# Patient Record
Sex: FEMALE | Race: BLACK OR AFRICAN AMERICAN | NOT HISPANIC OR LATINO | URBAN - METROPOLITAN AREA
[De-identification: names, ages, dates, MRNs, and addresses within clinical notes are randomized per-mention and may not be internally consistent; named-entity substitution may affect disease eponyms.]

---

## 2017-01-17 ENCOUNTER — OUTPATIENT (OUTPATIENT)
Dept: OUTPATIENT SERVICES | Facility: HOSPITAL | Age: 67
LOS: 1 days | Discharge: HOME | End: 2017-01-17
Payer: MEDICARE

## 2017-01-17 DIAGNOSIS — F33.9 MAJOR DEPRESSIVE DISORDER, RECURRENT, UNSPECIFIED: ICD-10-CM

## 2017-01-17 PROCEDURE — 90870 ELECTROCONVULSIVE THERAPY: CPT

## 2017-01-19 ENCOUNTER — OUTPATIENT (OUTPATIENT)
Dept: OUTPATIENT SERVICES | Facility: HOSPITAL | Age: 67
LOS: 1 days | Discharge: HOME | End: 2017-01-19
Payer: MEDICARE

## 2017-01-19 PROCEDURE — 90792 PSYCH DIAG EVAL W/MED SRVCS: CPT

## 2017-01-20 DIAGNOSIS — F25.0 SCHIZOAFFECTIVE DISORDER, BIPOLAR TYPE: ICD-10-CM

## 2017-01-23 PROCEDURE — 90870 ELECTROCONVULSIVE THERAPY: CPT

## 2017-01-30 PROCEDURE — 90870 ELECTROCONVULSIVE THERAPY: CPT

## 2017-02-02 PROCEDURE — 90834 PSYTX W PT 45 MINUTES: CPT

## 2017-02-06 PROCEDURE — 90870 ELECTROCONVULSIVE THERAPY: CPT

## 2017-02-08 PROCEDURE — 90834 PSYTX W PT 45 MINUTES: CPT

## 2017-02-13 PROCEDURE — 90870 ELECTROCONVULSIVE THERAPY: CPT

## 2017-02-16 PROCEDURE — 99214 OFFICE O/P EST MOD 30 MIN: CPT

## 2017-02-16 PROCEDURE — 90846 FAMILY PSYTX W/O PT 50 MIN: CPT

## 2017-03-08 PROCEDURE — 90870 ELECTROCONVULSIVE THERAPY: CPT

## 2017-03-16 PROCEDURE — 99214 OFFICE O/P EST MOD 30 MIN: CPT

## 2017-03-22 PROCEDURE — 90870 ELECTROCONVULSIVE THERAPY: CPT

## 2017-04-03 PROCEDURE — 90870 ELECTROCONVULSIVE THERAPY: CPT

## 2017-04-10 PROCEDURE — 99214 OFFICE O/P EST MOD 30 MIN: CPT

## 2017-04-19 PROCEDURE — 90870 ELECTROCONVULSIVE THERAPY: CPT

## 2017-05-03 PROCEDURE — 90870 ELECTROCONVULSIVE THERAPY: CPT

## 2017-05-09 PROCEDURE — 99214 OFFICE O/P EST MOD 30 MIN: CPT

## 2017-05-17 PROCEDURE — 90870 ELECTROCONVULSIVE THERAPY: CPT

## 2017-06-15 PROCEDURE — 99214 OFFICE O/P EST MOD 30 MIN: CPT

## 2017-06-21 PROCEDURE — 90870 ELECTROCONVULSIVE THERAPY: CPT

## 2017-07-13 PROCEDURE — 99214 OFFICE O/P EST MOD 30 MIN: CPT

## 2017-07-18 PROCEDURE — 90870 ELECTROCONVULSIVE THERAPY: CPT

## 2017-08-11 PROCEDURE — 99214 OFFICE O/P EST MOD 30 MIN: CPT

## 2017-08-15 PROCEDURE — 90870 ELECTROCONVULSIVE THERAPY: CPT

## 2017-08-23 PROCEDURE — 90846 FAMILY PSYTX W/O PT 50 MIN: CPT

## 2017-09-08 PROCEDURE — 99214 OFFICE O/P EST MOD 30 MIN: CPT

## 2017-09-14 PROCEDURE — 90870 ELECTROCONVULSIVE THERAPY: CPT

## 2017-10-06 PROCEDURE — 99214 OFFICE O/P EST MOD 30 MIN: CPT

## 2017-10-10 PROCEDURE — 90870 ELECTROCONVULSIVE THERAPY: CPT

## 2017-11-07 PROCEDURE — 90870 ELECTROCONVULSIVE THERAPY: CPT

## 2017-12-01 PROCEDURE — 99214 OFFICE O/P EST MOD 30 MIN: CPT

## 2017-12-05 LAB — GLUCOSE BLDC GLUCOMTR-MCNC: 120 MG/DL — HIGH (ref 70–99)

## 2017-12-05 PROCEDURE — 90870 ELECTROCONVULSIVE THERAPY: CPT

## 2017-12-27 PROCEDURE — 99214 OFFICE O/P EST MOD 30 MIN: CPT

## 2018-01-30 LAB — GLUCOSE BLDC GLUCOMTR-MCNC: 137 MG/DL — HIGH (ref 70–99)

## 2018-01-30 PROCEDURE — 90870 ELECTROCONVULSIVE THERAPY: CPT

## 2018-01-31 PROCEDURE — 99214 OFFICE O/P EST MOD 30 MIN: CPT

## 2018-02-27 LAB — GLUCOSE BLDC GLUCOMTR-MCNC: 135 MG/DL — HIGH (ref 70–99)

## 2018-02-27 PROCEDURE — 90870 ELECTROCONVULSIVE THERAPY: CPT

## 2018-03-27 LAB — GLUCOSE BLDC GLUCOMTR-MCNC: 101 MG/DL — HIGH (ref 70–99)

## 2018-03-27 PROCEDURE — 90870 ELECTROCONVULSIVE THERAPY: CPT

## 2018-04-02 PROCEDURE — 99214 OFFICE O/P EST MOD 30 MIN: CPT

## 2018-04-30 PROCEDURE — 99214 OFFICE O/P EST MOD 30 MIN: CPT

## 2018-05-01 DIAGNOSIS — F03.90 UNSPECIFIED DEMENTIA WITHOUT BEHAVIORAL DISTURBANCE: ICD-10-CM

## 2018-05-16 PROCEDURE — 99214 OFFICE O/P EST MOD 30 MIN: CPT

## 2018-05-16 PROCEDURE — 90834 PSYTX W PT 45 MINUTES: CPT

## 2018-05-29 PROCEDURE — 99214 OFFICE O/P EST MOD 30 MIN: CPT

## 2018-06-05 PROCEDURE — 90834 PSYTX W PT 45 MINUTES: CPT

## 2018-06-26 PROCEDURE — 99214 OFFICE O/P EST MOD 30 MIN: CPT

## 2018-07-03 PROCEDURE — 90834 PSYTX W PT 45 MINUTES: CPT

## 2018-07-10 PROCEDURE — 99214 OFFICE O/P EST MOD 30 MIN: CPT

## 2018-07-13 ENCOUNTER — INPATIENT (INPATIENT)
Facility: HOSPITAL | Age: 68
LOS: 19 days | Discharge: HOME | End: 2018-08-02
Attending: PSYCHIATRY & NEUROLOGY | Admitting: PSYCHIATRY & NEUROLOGY
Payer: MEDICARE

## 2018-07-13 VITALS — TEMPERATURE: 97 F | HEIGHT: 61 IN | WEIGHT: 123.02 LBS

## 2018-07-13 DIAGNOSIS — F31.10 BIPOLAR DISORDER, CURRENT EPISODE MANIC WITHOUT PSYCHOTIC FEATURES, UNSPECIFIED: ICD-10-CM

## 2018-07-13 RX ORDER — CITALOPRAM 10 MG/1
10 TABLET, FILM COATED ORAL DAILY
Qty: 0 | Refills: 0 | Status: DISCONTINUED | OUTPATIENT
Start: 2018-07-13 | End: 2018-07-14

## 2018-07-13 RX ORDER — ARIPIPRAZOLE 15 MG/1
20 TABLET ORAL AT BEDTIME
Qty: 0 | Refills: 0 | Status: DISCONTINUED | OUTPATIENT
Start: 2018-07-13 | End: 2018-08-02

## 2018-07-13 RX ORDER — METFORMIN HYDROCHLORIDE 850 MG/1
500 TABLET ORAL DAILY
Qty: 0 | Refills: 0 | Status: DISCONTINUED | OUTPATIENT
Start: 2018-07-13 | End: 2018-08-02

## 2018-07-13 RX ORDER — GABAPENTIN 400 MG/1
100 CAPSULE ORAL
Qty: 0 | Refills: 0 | Status: DISCONTINUED | OUTPATIENT
Start: 2018-07-13 | End: 2018-07-14

## 2018-07-13 RX ORDER — DONEPEZIL HYDROCHLORIDE 10 MG/1
10 TABLET, FILM COATED ORAL AT BEDTIME
Qty: 0 | Refills: 0 | Status: DISCONTINUED | OUTPATIENT
Start: 2018-07-13 | End: 2018-08-02

## 2018-07-13 RX ORDER — OLANZAPINE 15 MG/1
1.25 TABLET, FILM COATED ORAL ONCE
Qty: 0 | Refills: 0 | Status: DISCONTINUED | OUTPATIENT
Start: 2018-07-13 | End: 2018-08-02

## 2018-07-13 RX ORDER — OLANZAPINE 15 MG/1
1.25 TABLET, FILM COATED ORAL ONCE
Qty: 0 | Refills: 0 | Status: COMPLETED | OUTPATIENT
Start: 2018-07-13 | End: 2018-07-14

## 2018-07-13 RX ORDER — SIMVASTATIN 20 MG/1
20 TABLET, FILM COATED ORAL AT BEDTIME
Qty: 0 | Refills: 0 | Status: DISCONTINUED | OUTPATIENT
Start: 2018-07-13 | End: 2018-08-02

## 2018-07-14 PROCEDURE — 99232 SBSQ HOSP IP/OBS MODERATE 35: CPT

## 2018-07-14 RX ORDER — GABAPENTIN 400 MG/1
100 CAPSULE ORAL THREE TIMES A DAY
Qty: 0 | Refills: 0 | Status: DISCONTINUED | OUTPATIENT
Start: 2018-07-14 | End: 2018-07-17

## 2018-07-14 RX ADMIN — ARIPIPRAZOLE 20 MILLIGRAM(S): 15 TABLET ORAL at 21:50

## 2018-07-14 RX ADMIN — GABAPENTIN 100 MILLIGRAM(S): 400 CAPSULE ORAL at 21:50

## 2018-07-14 RX ADMIN — METFORMIN HYDROCHLORIDE 500 MILLIGRAM(S): 850 TABLET ORAL at 08:27

## 2018-07-14 RX ADMIN — SIMVASTATIN 20 MILLIGRAM(S): 20 TABLET, FILM COATED ORAL at 21:50

## 2018-07-14 RX ADMIN — DONEPEZIL HYDROCHLORIDE 10 MILLIGRAM(S): 10 TABLET, FILM COATED ORAL at 21:50

## 2018-07-14 RX ADMIN — OLANZAPINE 1.25 MILLIGRAM(S): 15 TABLET, FILM COATED ORAL at 00:26

## 2018-07-14 RX ADMIN — CITALOPRAM 10 MILLIGRAM(S): 10 TABLET, FILM COATED ORAL at 08:27

## 2018-07-14 RX ADMIN — GABAPENTIN 100 MILLIGRAM(S): 400 CAPSULE ORAL at 08:27

## 2018-07-14 NOTE — CHART NOTE - NSCHARTNOTEFT_GEN_A_CORE
Screening Medical Evaluation  Patient Admitted from: [facility]    [facility] admitting diagnosis: Bipolar affective disorder, current episode manic without psychotic symptom    PAST MEDICAL & SURGICAL HISTORY:  Depression  Diabetes  No significant past surgical history        Allergies    penicillins (Fever)  phenothiazines (Unknown)  Stelazine (Other)  sulfa drugs (Unknown)  sulfur (Other)    Intolerances        Social History:     FAMILY HISTORY:      MEDICATIONS  (STANDING):  ARIPiprazole 20 milliGRAM(s) Oral at bedtime  citalopram 10 milliGRAM(s) Oral daily  donepezil 10 milliGRAM(s) Oral at bedtime  gabapentin 100 milliGRAM(s) Oral two times a day  metFORMIN  milliGRAM(s) Oral daily  simvastatin 20 milliGRAM(s) Oral at bedtime    MEDICATIONS  (PRN):  OLANZapine Injectable 1.25 milliGRAM(s) IntraMuscular once PRN agitation      Vital Signs Last 24 Hrs  T(C): 36.2 (13 Jul 2018 21:45), Max: 36.2 (13 Jul 2018 21:45)  T(F): 97.1 (13 Jul 2018 21:45), Max: 97.1 (13 Jul 2018 21:45)  HR: 69 (13 Jul 2018 21:45)  BP: 158/77 (13 Jul 2018 21:45)  RR: --  SpO2: --  CAPILLARY BLOOD GLUCOSE            PHYSICAL EXAM:  GENERAL: NAD, well-developed  HEAD:  Atraumatic, Normocephalic  EYES: EOMI, PERRLA, conjunctiva and sclera clear  NECK: Supple.  CHEST/LUNG: Clear to auscultation bilaterally; No wheezes noted.  HEART: Regular rate and rhythm; +s1 and +s2; No murmurs, rubs, or gallops  ABDOMEN: Soft, Nontender, Nondistended; Normoactive Bowel sounds present  EXTREMITIES:  2+ Peripheral Pulses, No cyanosis, or edema  PSYCH: AAOx3  NEUROLOGY: non-focal  SKIN: No rashes or lesions    LABS:                    RADIOLOGY & ADDITIONAL TESTS:    Assessment and Plan:  67 year old female presenting today from [facility] to [facility] with admitting diagnosis of  Bipolar affective disorder, current episode manic without psychotic symptoms with PMH of HLD, DM, legally blind, and dementia. Denies any medical concerns at this time. Denies any fever, chills, headache, chest pain, SOB, abdominal pain, N/V/D/C.  1) DM: Continue metformin  mg PO BID.  2) Dementia: Continue Donepezil 10 mg po q hs.  3) HLD: Continue simvastatin 20 mg po q hs.  4)  Bipolar affective disorder, current episode manic without psychotic symptoms: Follow care plan as per primary psych team.

## 2018-07-15 LAB
CHOLEST SERPL-MCNC: 178 MG/DL — SIGNIFICANT CHANGE UP (ref 120–199)
FOLATE SERPL-MCNC: > 20 NG/ML — HIGH (ref 4.7–20)
GLUCOSE BLDC GLUCOMTR-MCNC: 115 MG/DL — HIGH (ref 70–99)
HBA1C BLD-MCNC: 6.7 % — HIGH (ref 4–5.6)
HDLC SERPL-MCNC: 108 MG/DL — HIGH (ref 45–65)
LIPID PNL WITH DIRECT LDL SERPL: 71 MG/DL — SIGNIFICANT CHANGE UP
TRIGL SERPL-MCNC: 71 MG/DL — SIGNIFICANT CHANGE UP (ref 10–149)
VIT B12 SERPL-MCNC: 1366 PG/ML — HIGH (ref 200–900)

## 2018-07-15 PROCEDURE — 99232 SBSQ HOSP IP/OBS MODERATE 35: CPT

## 2018-07-15 RX ADMIN — ARIPIPRAZOLE 20 MILLIGRAM(S): 15 TABLET ORAL at 21:14

## 2018-07-15 RX ADMIN — METFORMIN HYDROCHLORIDE 500 MILLIGRAM(S): 850 TABLET ORAL at 08:26

## 2018-07-15 RX ADMIN — SIMVASTATIN 20 MILLIGRAM(S): 20 TABLET, FILM COATED ORAL at 21:14

## 2018-07-15 RX ADMIN — GABAPENTIN 100 MILLIGRAM(S): 400 CAPSULE ORAL at 21:14

## 2018-07-15 RX ADMIN — DONEPEZIL HYDROCHLORIDE 10 MILLIGRAM(S): 10 TABLET, FILM COATED ORAL at 21:14

## 2018-07-15 RX ADMIN — GABAPENTIN 100 MILLIGRAM(S): 400 CAPSULE ORAL at 08:26

## 2018-07-16 LAB
GLUCOSE BLDC GLUCOMTR-MCNC: 112 MG/DL — HIGH (ref 70–99)
GLUCOSE BLDC GLUCOMTR-MCNC: 142 MG/DL — HIGH (ref 70–99)

## 2018-07-16 PROCEDURE — 99232 SBSQ HOSP IP/OBS MODERATE 35: CPT

## 2018-07-16 RX ADMIN — METFORMIN HYDROCHLORIDE 500 MILLIGRAM(S): 850 TABLET ORAL at 09:00

## 2018-07-16 RX ADMIN — GABAPENTIN 100 MILLIGRAM(S): 400 CAPSULE ORAL at 09:00

## 2018-07-16 RX ADMIN — ARIPIPRAZOLE 20 MILLIGRAM(S): 15 TABLET ORAL at 20:24

## 2018-07-16 RX ADMIN — DONEPEZIL HYDROCHLORIDE 10 MILLIGRAM(S): 10 TABLET, FILM COATED ORAL at 20:24

## 2018-07-16 RX ADMIN — GABAPENTIN 100 MILLIGRAM(S): 400 CAPSULE ORAL at 13:29

## 2018-07-16 RX ADMIN — SIMVASTATIN 20 MILLIGRAM(S): 20 TABLET, FILM COATED ORAL at 20:24

## 2018-07-16 RX ADMIN — GABAPENTIN 100 MILLIGRAM(S): 400 CAPSULE ORAL at 20:24

## 2018-07-17 LAB
ALBUMIN SERPL ELPH-MCNC: 4.3 G/DL — SIGNIFICANT CHANGE UP (ref 3.3–5)
ALP SERPL-CCNC: 57 U/L — SIGNIFICANT CHANGE UP (ref 40–120)
ALT FLD-CCNC: 18 U/L — SIGNIFICANT CHANGE UP (ref 4–33)
AST SERPL-CCNC: 28 U/L — SIGNIFICANT CHANGE UP (ref 4–32)
BASOPHILS # BLD AUTO: 0.04 K/UL — SIGNIFICANT CHANGE UP (ref 0–0.2)
BASOPHILS NFR BLD AUTO: 0.5 % — SIGNIFICANT CHANGE UP (ref 0–2)
BILIRUB SERPL-MCNC: 0.4 MG/DL — SIGNIFICANT CHANGE UP (ref 0.2–1.2)
BUN SERPL-MCNC: 25 MG/DL — HIGH (ref 7–23)
CALCIUM SERPL-MCNC: 10 MG/DL — SIGNIFICANT CHANGE UP (ref 8.4–10.5)
CHLORIDE SERPL-SCNC: 105 MMOL/L — SIGNIFICANT CHANGE UP (ref 98–107)
CO2 SERPL-SCNC: 26 MMOL/L — SIGNIFICANT CHANGE UP (ref 22–31)
CREAT SERPL-MCNC: 0.89 MG/DL — SIGNIFICANT CHANGE UP (ref 0.5–1.3)
EOSINOPHIL # BLD AUTO: 0.04 K/UL — SIGNIFICANT CHANGE UP (ref 0–0.5)
EOSINOPHIL NFR BLD AUTO: 0.5 % — SIGNIFICANT CHANGE UP (ref 0–6)
GLUCOSE BLDC GLUCOMTR-MCNC: 169 MG/DL — HIGH (ref 70–99)
GLUCOSE SERPL-MCNC: 206 MG/DL — HIGH (ref 70–99)
HCT VFR BLD CALC: 36.7 % — SIGNIFICANT CHANGE UP (ref 34.5–45)
HGB BLD-MCNC: 11.6 G/DL — SIGNIFICANT CHANGE UP (ref 11.5–15.5)
IMM GRANULOCYTES # BLD AUTO: 0.02 # — SIGNIFICANT CHANGE UP
IMM GRANULOCYTES NFR BLD AUTO: 0.2 % — SIGNIFICANT CHANGE UP (ref 0–1.5)
LYMPHOCYTES # BLD AUTO: 1.51 K/UL — SIGNIFICANT CHANGE UP (ref 1–3.3)
LYMPHOCYTES # BLD AUTO: 17.3 % — SIGNIFICANT CHANGE UP (ref 13–44)
MCHC RBC-ENTMCNC: 23.6 PG — LOW (ref 27–34)
MCHC RBC-ENTMCNC: 31.6 % — LOW (ref 32–36)
MCV RBC AUTO: 74.6 FL — LOW (ref 80–100)
MONOCYTES # BLD AUTO: 0.74 K/UL — SIGNIFICANT CHANGE UP (ref 0–0.9)
MONOCYTES NFR BLD AUTO: 8.5 % — SIGNIFICANT CHANGE UP (ref 2–14)
NEUTROPHILS # BLD AUTO: 6.37 K/UL — SIGNIFICANT CHANGE UP (ref 1.8–7.4)
NEUTROPHILS NFR BLD AUTO: 73 % — SIGNIFICANT CHANGE UP (ref 43–77)
NRBC # FLD: 0 — SIGNIFICANT CHANGE UP
PLATELET # BLD AUTO: 320 K/UL — SIGNIFICANT CHANGE UP (ref 150–400)
PMV BLD: 10.3 FL — SIGNIFICANT CHANGE UP (ref 7–13)
POTASSIUM SERPL-MCNC: 4.1 MMOL/L — SIGNIFICANT CHANGE UP (ref 3.5–5.3)
POTASSIUM SERPL-SCNC: 4.1 MMOL/L — SIGNIFICANT CHANGE UP (ref 3.5–5.3)
PROT SERPL-MCNC: 7.2 G/DL — SIGNIFICANT CHANGE UP (ref 6–8.3)
RBC # BLD: 4.92 M/UL — SIGNIFICANT CHANGE UP (ref 3.8–5.2)
RBC # FLD: 19.6 % — HIGH (ref 10.3–14.5)
SODIUM SERPL-SCNC: 145 MMOL/L — SIGNIFICANT CHANGE UP (ref 135–145)
WBC # BLD: 8.72 K/UL — SIGNIFICANT CHANGE UP (ref 3.8–10.5)
WBC # FLD AUTO: 8.72 K/UL — SIGNIFICANT CHANGE UP (ref 3.8–10.5)

## 2018-07-17 PROCEDURE — 99232 SBSQ HOSP IP/OBS MODERATE 35: CPT | Mod: 25

## 2018-07-17 RX ORDER — LITHIUM CARBONATE 300 MG/1
300 TABLET, EXTENDED RELEASE ORAL AT BEDTIME
Qty: 0 | Refills: 0 | Status: DISCONTINUED | OUTPATIENT
Start: 2018-07-17 | End: 2018-07-19

## 2018-07-17 RX ADMIN — DONEPEZIL HYDROCHLORIDE 10 MILLIGRAM(S): 10 TABLET, FILM COATED ORAL at 21:49

## 2018-07-17 RX ADMIN — GABAPENTIN 100 MILLIGRAM(S): 400 CAPSULE ORAL at 08:51

## 2018-07-17 RX ADMIN — Medication 0.5 MILLIGRAM(S): at 14:08

## 2018-07-17 RX ADMIN — METFORMIN HYDROCHLORIDE 500 MILLIGRAM(S): 850 TABLET ORAL at 08:51

## 2018-07-17 RX ADMIN — LITHIUM CARBONATE 300 MILLIGRAM(S): 300 TABLET, EXTENDED RELEASE ORAL at 21:49

## 2018-07-17 RX ADMIN — SIMVASTATIN 20 MILLIGRAM(S): 20 TABLET, FILM COATED ORAL at 21:49

## 2018-07-17 RX ADMIN — ARIPIPRAZOLE 20 MILLIGRAM(S): 15 TABLET ORAL at 21:49

## 2018-07-18 LAB — GLUCOSE BLDC GLUCOMTR-MCNC: 150 MG/DL — HIGH (ref 70–99)

## 2018-07-18 PROCEDURE — 99232 SBSQ HOSP IP/OBS MODERATE 35: CPT

## 2018-07-18 RX ADMIN — SIMVASTATIN 20 MILLIGRAM(S): 20 TABLET, FILM COATED ORAL at 21:37

## 2018-07-18 RX ADMIN — Medication 0.5 MILLIGRAM(S): at 09:56

## 2018-07-18 RX ADMIN — LITHIUM CARBONATE 300 MILLIGRAM(S): 300 TABLET, EXTENDED RELEASE ORAL at 21:36

## 2018-07-18 RX ADMIN — METFORMIN HYDROCHLORIDE 500 MILLIGRAM(S): 850 TABLET ORAL at 09:55

## 2018-07-18 RX ADMIN — DONEPEZIL HYDROCHLORIDE 10 MILLIGRAM(S): 10 TABLET, FILM COATED ORAL at 21:36

## 2018-07-18 RX ADMIN — ARIPIPRAZOLE 20 MILLIGRAM(S): 15 TABLET ORAL at 21:36

## 2018-07-19 LAB
GLUCOSE BLDC GLUCOMTR-MCNC: 101 MG/DL — HIGH (ref 70–99)
GLUCOSE BLDC GLUCOMTR-MCNC: 127 MG/DL — HIGH (ref 70–99)

## 2018-07-19 PROCEDURE — 99232 SBSQ HOSP IP/OBS MODERATE 35: CPT

## 2018-07-19 RX ORDER — LITHIUM CARBONATE 300 MG/1
450 TABLET, EXTENDED RELEASE ORAL AT BEDTIME
Qty: 0 | Refills: 0 | Status: DISCONTINUED | OUTPATIENT
Start: 2018-07-19 | End: 2018-08-02

## 2018-07-19 RX ADMIN — ARIPIPRAZOLE 20 MILLIGRAM(S): 15 TABLET ORAL at 20:40

## 2018-07-19 RX ADMIN — SIMVASTATIN 20 MILLIGRAM(S): 20 TABLET, FILM COATED ORAL at 20:39

## 2018-07-19 RX ADMIN — DONEPEZIL HYDROCHLORIDE 10 MILLIGRAM(S): 10 TABLET, FILM COATED ORAL at 20:40

## 2018-07-19 RX ADMIN — Medication 0.5 MILLIGRAM(S): at 09:41

## 2018-07-19 RX ADMIN — LITHIUM CARBONATE 450 MILLIGRAM(S): 300 TABLET, EXTENDED RELEASE ORAL at 20:39

## 2018-07-19 RX ADMIN — METFORMIN HYDROCHLORIDE 500 MILLIGRAM(S): 850 TABLET ORAL at 09:41

## 2018-07-20 LAB — GLUCOSE BLDC GLUCOMTR-MCNC: 94 MG/DL — SIGNIFICANT CHANGE UP (ref 70–99)

## 2018-07-20 PROCEDURE — 99232 SBSQ HOSP IP/OBS MODERATE 35: CPT

## 2018-07-20 RX ADMIN — METFORMIN HYDROCHLORIDE 500 MILLIGRAM(S): 850 TABLET ORAL at 12:20

## 2018-07-21 LAB — GLUCOSE BLDC GLUCOMTR-MCNC: 108 MG/DL — HIGH (ref 70–99)

## 2018-07-21 PROCEDURE — 99232 SBSQ HOSP IP/OBS MODERATE 35: CPT

## 2018-07-21 RX ADMIN — ARIPIPRAZOLE 20 MILLIGRAM(S): 15 TABLET ORAL at 21:20

## 2018-07-21 RX ADMIN — METFORMIN HYDROCHLORIDE 500 MILLIGRAM(S): 850 TABLET ORAL at 08:50

## 2018-07-21 RX ADMIN — DONEPEZIL HYDROCHLORIDE 10 MILLIGRAM(S): 10 TABLET, FILM COATED ORAL at 21:20

## 2018-07-21 RX ADMIN — LITHIUM CARBONATE 450 MILLIGRAM(S): 300 TABLET, EXTENDED RELEASE ORAL at 21:20

## 2018-07-21 RX ADMIN — SIMVASTATIN 20 MILLIGRAM(S): 20 TABLET, FILM COATED ORAL at 21:20

## 2018-07-22 LAB — GLUCOSE BLDC GLUCOMTR-MCNC: 235 MG/DL — HIGH (ref 70–99)

## 2018-07-22 PROCEDURE — 99232 SBSQ HOSP IP/OBS MODERATE 35: CPT

## 2018-07-22 RX ADMIN — SIMVASTATIN 20 MILLIGRAM(S): 20 TABLET, FILM COATED ORAL at 21:59

## 2018-07-22 RX ADMIN — DONEPEZIL HYDROCHLORIDE 10 MILLIGRAM(S): 10 TABLET, FILM COATED ORAL at 21:59

## 2018-07-22 RX ADMIN — ARIPIPRAZOLE 20 MILLIGRAM(S): 15 TABLET ORAL at 21:59

## 2018-07-22 RX ADMIN — METFORMIN HYDROCHLORIDE 500 MILLIGRAM(S): 850 TABLET ORAL at 08:54

## 2018-07-22 RX ADMIN — LITHIUM CARBONATE 450 MILLIGRAM(S): 300 TABLET, EXTENDED RELEASE ORAL at 21:59

## 2018-07-23 LAB
GLUCOSE BLDC GLUCOMTR-MCNC: 119 MG/DL — HIGH (ref 70–99)
LITHIUM SERPL-MCNC: 0.75 MMOL/L — SIGNIFICANT CHANGE UP (ref 0.6–1.2)

## 2018-07-23 PROCEDURE — 99232 SBSQ HOSP IP/OBS MODERATE 35: CPT

## 2018-07-23 RX ADMIN — LITHIUM CARBONATE 450 MILLIGRAM(S): 300 TABLET, EXTENDED RELEASE ORAL at 20:55

## 2018-07-23 RX ADMIN — DONEPEZIL HYDROCHLORIDE 10 MILLIGRAM(S): 10 TABLET, FILM COATED ORAL at 20:55

## 2018-07-23 RX ADMIN — METFORMIN HYDROCHLORIDE 500 MILLIGRAM(S): 850 TABLET ORAL at 08:48

## 2018-07-23 RX ADMIN — SIMVASTATIN 20 MILLIGRAM(S): 20 TABLET, FILM COATED ORAL at 20:55

## 2018-07-23 RX ADMIN — ARIPIPRAZOLE 20 MILLIGRAM(S): 15 TABLET ORAL at 20:55

## 2018-07-24 LAB — GLUCOSE BLDC GLUCOMTR-MCNC: 189 MG/DL — HIGH (ref 70–99)

## 2018-07-24 PROCEDURE — 99232 SBSQ HOSP IP/OBS MODERATE 35: CPT

## 2018-07-24 RX ADMIN — LITHIUM CARBONATE 450 MILLIGRAM(S): 300 TABLET, EXTENDED RELEASE ORAL at 21:29

## 2018-07-24 RX ADMIN — DONEPEZIL HYDROCHLORIDE 10 MILLIGRAM(S): 10 TABLET, FILM COATED ORAL at 21:29

## 2018-07-24 RX ADMIN — ARIPIPRAZOLE 20 MILLIGRAM(S): 15 TABLET ORAL at 21:29

## 2018-07-24 RX ADMIN — METFORMIN HYDROCHLORIDE 500 MILLIGRAM(S): 850 TABLET ORAL at 08:53

## 2018-07-24 RX ADMIN — SIMVASTATIN 20 MILLIGRAM(S): 20 TABLET, FILM COATED ORAL at 21:29

## 2018-07-25 LAB — GLUCOSE BLDC GLUCOMTR-MCNC: 136 MG/DL — HIGH (ref 70–99)

## 2018-07-25 PROCEDURE — 99232 SBSQ HOSP IP/OBS MODERATE 35: CPT

## 2018-07-25 RX ADMIN — SIMVASTATIN 20 MILLIGRAM(S): 20 TABLET, FILM COATED ORAL at 21:08

## 2018-07-25 RX ADMIN — METFORMIN HYDROCHLORIDE 500 MILLIGRAM(S): 850 TABLET ORAL at 08:41

## 2018-07-25 RX ADMIN — ARIPIPRAZOLE 20 MILLIGRAM(S): 15 TABLET ORAL at 21:07

## 2018-07-25 RX ADMIN — DONEPEZIL HYDROCHLORIDE 10 MILLIGRAM(S): 10 TABLET, FILM COATED ORAL at 21:07

## 2018-07-25 RX ADMIN — LITHIUM CARBONATE 450 MILLIGRAM(S): 300 TABLET, EXTENDED RELEASE ORAL at 21:07

## 2018-07-26 LAB — GLUCOSE BLDC GLUCOMTR-MCNC: 130 MG/DL — HIGH (ref 70–99)

## 2018-07-26 PROCEDURE — 99232 SBSQ HOSP IP/OBS MODERATE 35: CPT

## 2018-07-26 RX ADMIN — SIMVASTATIN 20 MILLIGRAM(S): 20 TABLET, FILM COATED ORAL at 20:23

## 2018-07-26 RX ADMIN — DONEPEZIL HYDROCHLORIDE 10 MILLIGRAM(S): 10 TABLET, FILM COATED ORAL at 20:23

## 2018-07-26 RX ADMIN — METFORMIN HYDROCHLORIDE 500 MILLIGRAM(S): 850 TABLET ORAL at 09:07

## 2018-07-26 RX ADMIN — ARIPIPRAZOLE 20 MILLIGRAM(S): 15 TABLET ORAL at 20:23

## 2018-07-26 RX ADMIN — LITHIUM CARBONATE 450 MILLIGRAM(S): 300 TABLET, EXTENDED RELEASE ORAL at 20:23

## 2018-07-27 LAB — GLUCOSE BLDC GLUCOMTR-MCNC: 140 MG/DL — HIGH (ref 70–99)

## 2018-07-27 PROCEDURE — 99232 SBSQ HOSP IP/OBS MODERATE 35: CPT

## 2018-07-27 RX ADMIN — ARIPIPRAZOLE 20 MILLIGRAM(S): 15 TABLET ORAL at 21:55

## 2018-07-27 RX ADMIN — SIMVASTATIN 20 MILLIGRAM(S): 20 TABLET, FILM COATED ORAL at 21:56

## 2018-07-27 RX ADMIN — LITHIUM CARBONATE 450 MILLIGRAM(S): 300 TABLET, EXTENDED RELEASE ORAL at 21:56

## 2018-07-27 RX ADMIN — DONEPEZIL HYDROCHLORIDE 10 MILLIGRAM(S): 10 TABLET, FILM COATED ORAL at 21:56

## 2018-07-27 RX ADMIN — METFORMIN HYDROCHLORIDE 500 MILLIGRAM(S): 850 TABLET ORAL at 09:44

## 2018-07-28 LAB — GLUCOSE BLDC GLUCOMTR-MCNC: 138 MG/DL — HIGH (ref 70–99)

## 2018-07-28 RX ADMIN — LITHIUM CARBONATE 450 MILLIGRAM(S): 300 TABLET, EXTENDED RELEASE ORAL at 21:46

## 2018-07-28 RX ADMIN — METFORMIN HYDROCHLORIDE 500 MILLIGRAM(S): 850 TABLET ORAL at 08:48

## 2018-07-28 RX ADMIN — ARIPIPRAZOLE 20 MILLIGRAM(S): 15 TABLET ORAL at 21:46

## 2018-07-28 RX ADMIN — DONEPEZIL HYDROCHLORIDE 10 MILLIGRAM(S): 10 TABLET, FILM COATED ORAL at 21:46

## 2018-07-28 RX ADMIN — SIMVASTATIN 20 MILLIGRAM(S): 20 TABLET, FILM COATED ORAL at 21:46

## 2018-07-29 LAB — GLUCOSE BLDC GLUCOMTR-MCNC: 204 MG/DL — HIGH (ref 70–99)

## 2018-07-29 RX ADMIN — METFORMIN HYDROCHLORIDE 500 MILLIGRAM(S): 850 TABLET ORAL at 09:58

## 2018-07-29 RX ADMIN — LITHIUM CARBONATE 450 MILLIGRAM(S): 300 TABLET, EXTENDED RELEASE ORAL at 21:20

## 2018-07-29 RX ADMIN — ARIPIPRAZOLE 20 MILLIGRAM(S): 15 TABLET ORAL at 21:20

## 2018-07-29 RX ADMIN — DONEPEZIL HYDROCHLORIDE 10 MILLIGRAM(S): 10 TABLET, FILM COATED ORAL at 21:20

## 2018-07-29 RX ADMIN — SIMVASTATIN 20 MILLIGRAM(S): 20 TABLET, FILM COATED ORAL at 21:20

## 2018-07-30 LAB
GLUCOSE BLDC GLUCOMTR-MCNC: 119 MG/DL — HIGH (ref 70–99)
LITHIUM SERPL-MCNC: 0.77 MMOL/L — SIGNIFICANT CHANGE UP (ref 0.6–1.2)

## 2018-07-30 PROCEDURE — 99232 SBSQ HOSP IP/OBS MODERATE 35: CPT

## 2018-07-30 RX ADMIN — METFORMIN HYDROCHLORIDE 500 MILLIGRAM(S): 850 TABLET ORAL at 08:57

## 2018-07-30 RX ADMIN — SIMVASTATIN 20 MILLIGRAM(S): 20 TABLET, FILM COATED ORAL at 21:28

## 2018-07-30 RX ADMIN — ARIPIPRAZOLE 20 MILLIGRAM(S): 15 TABLET ORAL at 21:28

## 2018-07-30 RX ADMIN — DONEPEZIL HYDROCHLORIDE 10 MILLIGRAM(S): 10 TABLET, FILM COATED ORAL at 21:28

## 2018-07-30 RX ADMIN — LITHIUM CARBONATE 450 MILLIGRAM(S): 300 TABLET, EXTENDED RELEASE ORAL at 21:28

## 2018-07-31 LAB
GLUCOSE BLDC GLUCOMTR-MCNC: 126 MG/DL — HIGH (ref 70–99)
GLUCOSE BLDC GLUCOMTR-MCNC: 167 MG/DL — HIGH (ref 70–99)

## 2018-07-31 PROCEDURE — 99232 SBSQ HOSP IP/OBS MODERATE 35: CPT

## 2018-07-31 RX ADMIN — ARIPIPRAZOLE 20 MILLIGRAM(S): 15 TABLET ORAL at 20:59

## 2018-07-31 RX ADMIN — SIMVASTATIN 20 MILLIGRAM(S): 20 TABLET, FILM COATED ORAL at 20:59

## 2018-07-31 RX ADMIN — DONEPEZIL HYDROCHLORIDE 10 MILLIGRAM(S): 10 TABLET, FILM COATED ORAL at 20:59

## 2018-07-31 RX ADMIN — LITHIUM CARBONATE 450 MILLIGRAM(S): 300 TABLET, EXTENDED RELEASE ORAL at 20:59

## 2018-07-31 RX ADMIN — METFORMIN HYDROCHLORIDE 500 MILLIGRAM(S): 850 TABLET ORAL at 09:28

## 2018-08-01 LAB — GLUCOSE BLDC GLUCOMTR-MCNC: 125 MG/DL — HIGH (ref 70–99)

## 2018-08-01 PROCEDURE — 99232 SBSQ HOSP IP/OBS MODERATE 35: CPT

## 2018-08-01 RX ORDER — SIMVASTATIN 20 MG/1
1 TABLET, FILM COATED ORAL
Qty: 30 | Refills: 0 | OUTPATIENT
Start: 2018-08-01 | End: 2018-08-30

## 2018-08-01 RX ORDER — ARIPIPRAZOLE 15 MG/1
1 TABLET ORAL
Qty: 30 | Refills: 0 | OUTPATIENT
Start: 2018-08-01 | End: 2018-08-30

## 2018-08-01 RX ORDER — LITHIUM CARBONATE 300 MG/1
1 TABLET, EXTENDED RELEASE ORAL
Qty: 30 | Refills: 0 | OUTPATIENT
Start: 2018-08-01 | End: 2018-08-30

## 2018-08-01 RX ORDER — METFORMIN HYDROCHLORIDE 850 MG/1
1 TABLET ORAL
Qty: 30 | Refills: 0 | OUTPATIENT
Start: 2018-08-01 | End: 2018-08-30

## 2018-08-01 RX ORDER — DONEPEZIL HYDROCHLORIDE 10 MG/1
1 TABLET, FILM COATED ORAL
Qty: 30 | Refills: 0 | OUTPATIENT
Start: 2018-08-01 | End: 2018-08-30

## 2018-08-01 RX ADMIN — ARIPIPRAZOLE 20 MILLIGRAM(S): 15 TABLET ORAL at 21:29

## 2018-08-01 RX ADMIN — METFORMIN HYDROCHLORIDE 500 MILLIGRAM(S): 850 TABLET ORAL at 08:53

## 2018-08-01 RX ADMIN — LITHIUM CARBONATE 450 MILLIGRAM(S): 300 TABLET, EXTENDED RELEASE ORAL at 21:29

## 2018-08-01 RX ADMIN — SIMVASTATIN 20 MILLIGRAM(S): 20 TABLET, FILM COATED ORAL at 21:29

## 2018-08-01 RX ADMIN — DONEPEZIL HYDROCHLORIDE 10 MILLIGRAM(S): 10 TABLET, FILM COATED ORAL at 21:29

## 2018-08-02 VITALS — HEART RATE: 85 BPM | SYSTOLIC BLOOD PRESSURE: 148 MMHG | TEMPERATURE: 97 F | DIASTOLIC BLOOD PRESSURE: 67 MMHG

## 2018-08-02 LAB — GLUCOSE BLDC GLUCOMTR-MCNC: 184 MG/DL — HIGH (ref 70–99)

## 2018-08-02 PROCEDURE — 99238 HOSP IP/OBS DSCHRG MGMT 30/<: CPT

## 2018-08-02 RX ADMIN — METFORMIN HYDROCHLORIDE 500 MILLIGRAM(S): 850 TABLET ORAL at 09:27

## 2018-08-10 ENCOUNTER — OUTPATIENT (OUTPATIENT)
Dept: OUTPATIENT SERVICES | Facility: HOSPITAL | Age: 68
LOS: 1 days | Discharge: HOME | End: 2018-08-10
Payer: MEDICARE

## 2018-08-10 ENCOUNTER — INPATIENT (INPATIENT)
Facility: HOSPITAL | Age: 68
LOS: 31 days | Discharge: HOME | End: 2018-09-11
Attending: PSYCHIATRY & NEUROLOGY | Admitting: PSYCHIATRY & NEUROLOGY
Payer: MEDICARE

## 2018-08-10 VITALS
RESPIRATION RATE: 16 BRPM | OXYGEN SATURATION: 99 % | TEMPERATURE: 98 F | HEART RATE: 62 BPM | SYSTOLIC BLOOD PRESSURE: 146 MMHG | DIASTOLIC BLOOD PRESSURE: 52 MMHG

## 2018-08-10 DIAGNOSIS — F33.9 MAJOR DEPRESSIVE DISORDER, RECURRENT, UNSPECIFIED: ICD-10-CM

## 2018-08-10 LAB
ALBUMIN SERPL ELPH-MCNC: 4.1 G/DL — SIGNIFICANT CHANGE UP (ref 3.3–5)
ALP SERPL-CCNC: 55 U/L — SIGNIFICANT CHANGE UP (ref 40–120)
ALT FLD-CCNC: 21 U/L — SIGNIFICANT CHANGE UP (ref 4–33)
AMPHET UR-MCNC: NEGATIVE — SIGNIFICANT CHANGE UP
APAP SERPL-MCNC: < 15 UG/ML — LOW (ref 15–25)
APPEARANCE UR: CLEAR — SIGNIFICANT CHANGE UP
AST SERPL-CCNC: 30 U/L — SIGNIFICANT CHANGE UP (ref 4–32)
BARBITURATES UR SCN-MCNC: NEGATIVE — SIGNIFICANT CHANGE UP
BASOPHILS # BLD AUTO: 0.03 K/UL — SIGNIFICANT CHANGE UP (ref 0–0.2)
BASOPHILS NFR BLD AUTO: 0.4 % — SIGNIFICANT CHANGE UP (ref 0–2)
BENZODIAZ UR-MCNC: NEGATIVE — SIGNIFICANT CHANGE UP
BILIRUB SERPL-MCNC: 0.6 MG/DL — SIGNIFICANT CHANGE UP (ref 0.2–1.2)
BILIRUB UR-MCNC: NEGATIVE — SIGNIFICANT CHANGE UP
BLOOD UR QL VISUAL: NEGATIVE — SIGNIFICANT CHANGE UP
BUN SERPL-MCNC: 19 MG/DL — SIGNIFICANT CHANGE UP (ref 7–23)
CALCIUM SERPL-MCNC: 10.4 MG/DL — SIGNIFICANT CHANGE UP (ref 8.4–10.5)
CANNABINOIDS UR-MCNC: NEGATIVE — SIGNIFICANT CHANGE UP
CHLORIDE SERPL-SCNC: 107 MMOL/L — SIGNIFICANT CHANGE UP (ref 98–107)
CO2 SERPL-SCNC: 27 MMOL/L — SIGNIFICANT CHANGE UP (ref 22–31)
COCAINE METAB.OTHER UR-MCNC: NEGATIVE — SIGNIFICANT CHANGE UP
COLOR SPEC: SIGNIFICANT CHANGE UP
CREAT SERPL-MCNC: 0.97 MG/DL — SIGNIFICANT CHANGE UP (ref 0.5–1.3)
EOSINOPHIL # BLD AUTO: 0.06 K/UL — SIGNIFICANT CHANGE UP (ref 0–0.5)
EOSINOPHIL NFR BLD AUTO: 0.7 % — SIGNIFICANT CHANGE UP (ref 0–6)
ETHANOL BLD-MCNC: < 10 MG/DL — SIGNIFICANT CHANGE UP
GLUCOSE BLDC GLUCOMTR-MCNC: 232 MG/DL — HIGH (ref 70–99)
GLUCOSE SERPL-MCNC: 109 MG/DL — HIGH (ref 70–99)
GLUCOSE UR-MCNC: NEGATIVE — SIGNIFICANT CHANGE UP
HCT VFR BLD CALC: 35.6 % — SIGNIFICANT CHANGE UP (ref 34.5–45)
HGB BLD-MCNC: 10.9 G/DL — LOW (ref 11.5–15.5)
IMM GRANULOCYTES # BLD AUTO: 0.02 # — SIGNIFICANT CHANGE UP
IMM GRANULOCYTES NFR BLD AUTO: 0.2 % — SIGNIFICANT CHANGE UP (ref 0–1.5)
KETONES UR-MCNC: NEGATIVE — SIGNIFICANT CHANGE UP
LEUKOCYTE ESTERASE UR-ACNC: NEGATIVE — SIGNIFICANT CHANGE UP
LYMPHOCYTES # BLD AUTO: 1.46 K/UL — SIGNIFICANT CHANGE UP (ref 1–3.3)
LYMPHOCYTES # BLD AUTO: 17.5 % — SIGNIFICANT CHANGE UP (ref 13–44)
MCHC RBC-ENTMCNC: 24.3 PG — LOW (ref 27–34)
MCHC RBC-ENTMCNC: 30.6 % — LOW (ref 32–36)
MCV RBC AUTO: 79.3 FL — LOW (ref 80–100)
METHADONE UR-MCNC: NEGATIVE — SIGNIFICANT CHANGE UP
MONOCYTES # BLD AUTO: 0.47 K/UL — SIGNIFICANT CHANGE UP (ref 0–0.9)
MONOCYTES NFR BLD AUTO: 5.6 % — SIGNIFICANT CHANGE UP (ref 2–14)
MUCOUS THREADS # UR AUTO: SIGNIFICANT CHANGE UP
NEUTROPHILS # BLD AUTO: 6.3 K/UL — SIGNIFICANT CHANGE UP (ref 1.8–7.4)
NEUTROPHILS NFR BLD AUTO: 75.6 % — SIGNIFICANT CHANGE UP (ref 43–77)
NITRITE UR-MCNC: NEGATIVE — SIGNIFICANT CHANGE UP
NRBC # FLD: 0 — SIGNIFICANT CHANGE UP
OPIATES UR-MCNC: NEGATIVE — SIGNIFICANT CHANGE UP
OXYCODONE UR-MCNC: NEGATIVE — SIGNIFICANT CHANGE UP
PCP UR-MCNC: NEGATIVE — SIGNIFICANT CHANGE UP
PH UR: 7 — SIGNIFICANT CHANGE UP (ref 4.6–8)
PLATELET # BLD AUTO: 291 K/UL — SIGNIFICANT CHANGE UP (ref 150–400)
PMV BLD: 9.9 FL — SIGNIFICANT CHANGE UP (ref 7–13)
POTASSIUM SERPL-MCNC: 4 MMOL/L — SIGNIFICANT CHANGE UP (ref 3.5–5.3)
POTASSIUM SERPL-SCNC: 4 MMOL/L — SIGNIFICANT CHANGE UP (ref 3.5–5.3)
PROT SERPL-MCNC: 6.7 G/DL — SIGNIFICANT CHANGE UP (ref 6–8.3)
PROT UR-MCNC: NEGATIVE MG/DL — SIGNIFICANT CHANGE UP
RBC # BLD: 4.49 M/UL — SIGNIFICANT CHANGE UP (ref 3.8–5.2)
RBC # FLD: 20.6 % — HIGH (ref 10.3–14.5)
SALICYLATES SERPL-MCNC: < 5 MG/DL — LOW (ref 15–30)
SODIUM SERPL-SCNC: 144 MMOL/L — SIGNIFICANT CHANGE UP (ref 135–145)
SP GR SPEC: 1.01 — SIGNIFICANT CHANGE UP (ref 1–1.04)
SQUAMOUS # UR AUTO: SIGNIFICANT CHANGE UP
TSH SERPL-MCNC: 2.88 UIU/ML — SIGNIFICANT CHANGE UP (ref 0.27–4.2)
UROBILINOGEN FLD QL: NORMAL MG/DL — SIGNIFICANT CHANGE UP
WBC # BLD: 8.34 K/UL — SIGNIFICANT CHANGE UP (ref 3.8–10.5)
WBC # FLD AUTO: 8.34 K/UL — SIGNIFICANT CHANGE UP (ref 3.8–10.5)
WBC UR QL: SIGNIFICANT CHANGE UP (ref 0–?)

## 2018-08-10 PROCEDURE — 99285 EMERGENCY DEPT VISIT HI MDM: CPT

## 2018-08-10 RX ORDER — INSULIN LISPRO 100/ML
VIAL (ML) SUBCUTANEOUS
Qty: 0 | Refills: 0 | Status: DISCONTINUED | OUTPATIENT
Start: 2018-08-10 | End: 2018-09-11

## 2018-08-10 RX ORDER — SIMVASTATIN 20 MG/1
20 TABLET, FILM COATED ORAL AT BEDTIME
Qty: 0 | Refills: 0 | Status: DISCONTINUED | OUTPATIENT
Start: 2018-08-10 | End: 2018-09-11

## 2018-08-10 RX ORDER — DONEPEZIL HYDROCHLORIDE 10 MG/1
10 TABLET, FILM COATED ORAL AT BEDTIME
Qty: 0 | Refills: 0 | Status: DISCONTINUED | OUTPATIENT
Start: 2018-08-10 | End: 2018-09-11

## 2018-08-10 RX ORDER — HALOPERIDOL DECANOATE 100 MG/ML
1 INJECTION INTRAMUSCULAR EVERY 6 HOURS
Qty: 0 | Refills: 0 | Status: DISCONTINUED | OUTPATIENT
Start: 2018-08-10 | End: 2018-09-11

## 2018-08-10 RX ORDER — LITHIUM CARBONATE 300 MG/1
450 TABLET, EXTENDED RELEASE ORAL AT BEDTIME
Qty: 0 | Refills: 0 | Status: DISCONTINUED | OUTPATIENT
Start: 2018-08-10 | End: 2018-09-11

## 2018-08-10 RX ORDER — DIPHENHYDRAMINE HCL 50 MG
25 CAPSULE ORAL ONCE
Qty: 0 | Refills: 0 | Status: DISCONTINUED | OUTPATIENT
Start: 2018-08-10 | End: 2018-08-11

## 2018-08-10 RX ORDER — ARIPIPRAZOLE 15 MG/1
20 TABLET ORAL AT BEDTIME
Qty: 0 | Refills: 0 | Status: DISCONTINUED | OUTPATIENT
Start: 2018-08-10 | End: 2018-08-14

## 2018-08-10 RX ORDER — DIPHENHYDRAMINE HCL 50 MG
25 CAPSULE ORAL EVERY 6 HOURS
Qty: 0 | Refills: 0 | Status: DISCONTINUED | OUTPATIENT
Start: 2018-08-10 | End: 2018-08-11

## 2018-08-10 RX ORDER — METFORMIN HYDROCHLORIDE 850 MG/1
500 TABLET ORAL DAILY
Qty: 0 | Refills: 0 | Status: DISCONTINUED | OUTPATIENT
Start: 2018-08-10 | End: 2018-09-11

## 2018-08-10 RX ORDER — HALOPERIDOL DECANOATE 100 MG/ML
1 INJECTION INTRAMUSCULAR ONCE
Qty: 0 | Refills: 0 | Status: DISCONTINUED | OUTPATIENT
Start: 2018-08-11 | End: 2018-09-11

## 2018-08-10 RX ORDER — INSULIN LISPRO 100/ML
VIAL (ML) SUBCUTANEOUS AT BEDTIME
Qty: 0 | Refills: 0 | Status: DISCONTINUED | OUTPATIENT
Start: 2018-08-10 | End: 2018-09-11

## 2018-08-10 RX ADMIN — DONEPEZIL HYDROCHLORIDE 10 MILLIGRAM(S): 10 TABLET, FILM COATED ORAL at 21:10

## 2018-08-10 RX ADMIN — LITHIUM CARBONATE 450 MILLIGRAM(S): 300 TABLET, EXTENDED RELEASE ORAL at 21:10

## 2018-08-10 RX ADMIN — ARIPIPRAZOLE 20 MILLIGRAM(S): 15 TABLET ORAL at 21:10

## 2018-08-10 RX ADMIN — SIMVASTATIN 20 MILLIGRAM(S): 20 TABLET, FILM COATED ORAL at 21:10

## 2018-08-10 NOTE — ED ADULT NURSE NOTE - NSIMPLEMENTINTERV_GEN_ALL_ED
Implemented All Universal Safety Interventions:  [place] to call system. Call bell, personal items and telephone within reach. Instruct patient to call for assistance. Room bathroom lighting operational. Non-slip footwear when patient is off stretcher. Physically safe environment: no spills, clutter or unnecessary equipment. Stretcher in lowest position, wheels locked, appropriate side rails in place.

## 2018-08-10 NOTE — ED BEHAVIORAL HEALTH ASSESSMENT NOTE - PSYCHIATRIC ISSUES AND PLAN (INCLUDE STANDING AND PRN MEDICATION)
Increase Abilify to 12.5mg q HS for psychosis, continue Klonopin 0.25mg q HS for anxiety. Haldol 1mg q 6 hours prn for acute agitation. Abilify 4.875mg IM for acute agitation Abilify  20 MG q HS for psychosis;  Lithium  mg HS; Aricept 10 mg HS; Haldol 1mg q 6 hours prn for acute agitation. Haldol 1 mg IM q 6 hours PRN acute agitation

## 2018-08-10 NOTE — ED BEHAVIORAL HEALTH ASSESSMENT NOTE - MEDICAL ISSUES AND PLAN (INCLUDE STANDING AND PRN MEDICATION)
Fall Risk precautions. Continue Simvastatin 20mg q HS for HLD, Metformin ER 500mg BID, Valsartan 20mg q AM (for renal protection, not for HTN), MVI, continue Citracal D, Metamucil PRN Fall Risk precautions. Continue Simvastatin 20mg q HS for HLD, Metformin ER 500mg AM,

## 2018-08-10 NOTE — ED BEHAVIORAL HEALTH ASSESSMENT NOTE - DESCRIPTION
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

## 2018-08-10 NOTE — ED PROVIDER NOTE - MEDICAL DECISION MAKING DETAILS
66 y/o F hx Depression, DM   Labs/ Urine Tox/UA, EKG   Medical evaluation performed. There is no clinical evidence of intoxication or any acute medical problem requiring immediate intervention. Patient is awaiting psychiatric consultation. Final disposition will be determined by psychiatrist. 66 y/o F hx Depression, DM , Schizophrenia, Dementia, Blindness   Labs/ Urine Tox/UA, EKG   Medical evaluation performed. There is no clinical evidence of intoxication or any acute medical problem requiring immediate intervention. Patient is awaiting psychiatric consultation. Final disposition will be determined by psychiatrist. 66 y/o F hx Depression, DM , Schizophrenia, Dementia, Blindness   Labs/ Urine Tox/UA, EKG.   Medical evaluation performed. There is no clinical evidence of intoxication or any acute medical problem requiring immediate intervention. Patient is awaiting psychiatric consultation. Final disposition will be determined by psychiatrist.
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
## 2018-08-10 NOTE — ED ADULT NURSE NOTE - OBJECTIVE STATEMENT
Received pt in  pt calm & cooperative sent from  outpatient clinic for  Hi towards . pt denies Si/AVh presently oriented to unit comfort measures provided eval on going.
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
## 2018-08-10 NOTE — ED BEHAVIORAL HEALTH ASSESSMENT NOTE - CASE SUMMARY
67F with schizoaffective, recently discharged, presents from outpatient intake for agitation and psychosis. Patient is threatening to harm her  and has delusions regarding infidelity. In the ED she is disorganized and agitated.  reports administering her medications and lithium level appropriate. Patient is currently a threat to others and requires admission for safety and stabilization. I agree with plan as above. Admit to low 5, 939. No constant observation in locked supervised setting. I agree with treatment plan above.

## 2018-08-10 NOTE — ED BEHAVIORAL HEALTH ASSESSMENT NOTE - NS ED BHA MSE GENERAL APPEARANCE
BP right back over 180 SBP.  Refused earlier tylenol rectally.  Had temp down on bear hugger.  On abx.      Plan:  - will avoid labetalol or diltiazem due to HR 70 and on amiodarone.  - Hydralazine 10 mg IV every 1 hr prn for 2 doses for SBP > 180 for now.     Well developed

## 2018-08-10 NOTE — ED BEHAVIORAL HEALTH ASSESSMENT NOTE - OTHER PAST PSYCHIATRIC HISTORY (INCLUDE DETAILS REGARDING ONSET, COURSE OF ILLNESS, INPATIENT/OUTPATIENT TREATMENT)
- initial diagnosis as "manic depressive," then Paranoid Schizophrenia, now Schizoaffective d/o, bipolary type;  - many prior psych admissions including [facility] on 3/28/11, [facility] 2S. Pt was doing well from [id number] and since then has been hospitalized almost every year. Got ECT in 2008 at [facility].;  Last admission [facility] 7/13/18-8/2/18;    - admitted at [facility] 2x in 2011   - no hx of suicide attempts/substance use

## 2018-08-10 NOTE — ED BEHAVIORAL HEALTH ASSESSMENT NOTE - OTHER
limited as Pt is legally blind ambulates w/ assistance due to legal blindness solemn; serious paranoid and grandiose CVM [id number]

## 2018-08-10 NOTE — ED BEHAVIORAL HEALTH ASSESSMENT NOTE - CURRENT MEDICATION
abilify 20mg po qhs; Lithium  mg HS, simvastatin 20 mg HS, Metformin ER 500mg am, , Aricept 10mg po qd (rx by neuro);

## 2018-08-10 NOTE — ED BEHAVIORAL HEALTH ASSESSMENT NOTE - AXIS III
legally blind secondary to glaucoma; chronic GI issues - fecal incontinence; hx of constipation; DM; HLD
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
## 2018-08-10 NOTE — ED ADULT TRIAGE NOTE - CHIEF COMPLAINT QUOTE
Pt from [facility] crisis center, sent by MD there for homicidal thoughts towards .  Pt told MD there that she feels unsafe around  that she would would try to hurt him.  Pt calm in triage.

## 2018-08-10 NOTE — ED PROVIDER NOTE - OBJECTIVE STATEMENT
68 y/o F hx Depression, DM   referred by the [facility] for increase auditory hallucinations, homicidal ideations  and bizarre behaviour.     Denies SI/HI/VH/AH.  Denies falling, punching or kicking any objects.  Denies pain, SOB, fever, chills, chest/ abdominal discomfort. Denies recent use of alcohol or illicit drugs . 68 y/o F hx Depression, DM, Schizophrenia, Dementia   referred by the [facility] for increase auditory hallucinations, homicidal ideations  and bizarre behaviour . States ' I might do something to him"  Denies SI/VH  Denies falling, punching or kicking any objects.  Denies pain, SOB, fever, chills, chest/ abdominal discomfort. Denies recent use of alcohol or illicit drugs .

## 2018-08-10 NOTE — ED ADULT NURSE NOTE - CHIEF COMPLAINT QUOTE
Pt from [facility] crisis center, sent by MD there for homicidal thoughts towards .  Pt told MD there that she feels unsafe around  that she would would try to hurt him.  Pt calm in triage.

## 2018-08-11 LAB
GLUCOSE BLDC GLUCOMTR-MCNC: 139 MG/DL — HIGH (ref 70–99)
GLUCOSE BLDC GLUCOMTR-MCNC: 142 MG/DL — HIGH (ref 70–99)
GLUCOSE BLDC GLUCOMTR-MCNC: 148 MG/DL — HIGH (ref 70–99)
GLUCOSE BLDC GLUCOMTR-MCNC: 161 MG/DL — HIGH (ref 70–99)

## 2018-08-11 PROCEDURE — 99223 1ST HOSP IP/OBS HIGH 75: CPT

## 2018-08-11 RX ORDER — CLONAZEPAM 1 MG
0.25 TABLET ORAL
Qty: 0 | Refills: 0 | Status: DISCONTINUED | OUTPATIENT
Start: 2018-08-11 | End: 2018-08-12

## 2018-08-11 RX ADMIN — LITHIUM CARBONATE 450 MILLIGRAM(S): 300 TABLET, EXTENDED RELEASE ORAL at 21:08

## 2018-08-11 RX ADMIN — SIMVASTATIN 20 MILLIGRAM(S): 20 TABLET, FILM COATED ORAL at 21:08

## 2018-08-11 RX ADMIN — Medication 1: at 17:20

## 2018-08-11 RX ADMIN — Medication 0.25 MILLIGRAM(S): at 21:01

## 2018-08-11 RX ADMIN — DONEPEZIL HYDROCHLORIDE 10 MILLIGRAM(S): 10 TABLET, FILM COATED ORAL at 21:01

## 2018-08-11 RX ADMIN — HALOPERIDOL DECANOATE 1 MILLIGRAM(S): 100 INJECTION INTRAMUSCULAR at 18:16

## 2018-08-11 RX ADMIN — METFORMIN HYDROCHLORIDE 500 MILLIGRAM(S): 850 TABLET ORAL at 08:31

## 2018-08-11 RX ADMIN — ARIPIPRAZOLE 20 MILLIGRAM(S): 15 TABLET ORAL at 21:01

## 2018-08-12 LAB
GLUCOSE BLDC GLUCOMTR-MCNC: 124 MG/DL — HIGH (ref 70–99)
GLUCOSE BLDC GLUCOMTR-MCNC: 138 MG/DL — HIGH (ref 70–99)
GLUCOSE BLDC GLUCOMTR-MCNC: 139 MG/DL — HIGH (ref 70–99)
GLUCOSE BLDC GLUCOMTR-MCNC: 182 MG/DL — HIGH (ref 70–99)

## 2018-08-12 PROCEDURE — 99232 SBSQ HOSP IP/OBS MODERATE 35: CPT

## 2018-08-12 RX ADMIN — HALOPERIDOL DECANOATE 1 MILLIGRAM(S): 100 INJECTION INTRAMUSCULAR at 22:50

## 2018-08-12 RX ADMIN — LITHIUM CARBONATE 450 MILLIGRAM(S): 300 TABLET, EXTENDED RELEASE ORAL at 20:35

## 2018-08-12 RX ADMIN — DONEPEZIL HYDROCHLORIDE 10 MILLIGRAM(S): 10 TABLET, FILM COATED ORAL at 20:35

## 2018-08-12 RX ADMIN — SIMVASTATIN 20 MILLIGRAM(S): 20 TABLET, FILM COATED ORAL at 20:36

## 2018-08-12 RX ADMIN — Medication 0.25 MILLIGRAM(S): at 08:27

## 2018-08-12 RX ADMIN — ARIPIPRAZOLE 20 MILLIGRAM(S): 15 TABLET ORAL at 20:35

## 2018-08-12 RX ADMIN — Medication 0.5 MILLIGRAM(S): at 20:36

## 2018-08-12 RX ADMIN — METFORMIN HYDROCHLORIDE 500 MILLIGRAM(S): 850 TABLET ORAL at 08:27

## 2018-08-13 LAB
GLUCOSE BLDC GLUCOMTR-MCNC: 125 MG/DL — HIGH (ref 70–99)
GLUCOSE BLDC GLUCOMTR-MCNC: 133 MG/DL — HIGH (ref 70–99)
GLUCOSE BLDC GLUCOMTR-MCNC: 139 MG/DL — HIGH (ref 70–99)
GLUCOSE BLDC GLUCOMTR-MCNC: 143 MG/DL — HIGH (ref 70–99)

## 2018-08-13 PROCEDURE — 99232 SBSQ HOSP IP/OBS MODERATE 35: CPT

## 2018-08-13 RX ADMIN — SIMVASTATIN 20 MILLIGRAM(S): 20 TABLET, FILM COATED ORAL at 21:42

## 2018-08-13 RX ADMIN — Medication 0.5 MILLIGRAM(S): at 09:00

## 2018-08-13 RX ADMIN — ARIPIPRAZOLE 20 MILLIGRAM(S): 15 TABLET ORAL at 21:43

## 2018-08-13 RX ADMIN — DONEPEZIL HYDROCHLORIDE 10 MILLIGRAM(S): 10 TABLET, FILM COATED ORAL at 21:43

## 2018-08-13 RX ADMIN — HALOPERIDOL DECANOATE 1 MILLIGRAM(S): 100 INJECTION INTRAMUSCULAR at 04:21

## 2018-08-13 RX ADMIN — METFORMIN HYDROCHLORIDE 500 MILLIGRAM(S): 850 TABLET ORAL at 09:00

## 2018-08-13 RX ADMIN — LITHIUM CARBONATE 450 MILLIGRAM(S): 300 TABLET, EXTENDED RELEASE ORAL at 21:42

## 2018-08-13 RX ADMIN — Medication 0.5 MILLIGRAM(S): at 21:42

## 2018-08-13 NOTE — ED BEHAVIORAL HEALTH NOTE - BEHAVIORAL HEALTH NOTE
worker called [phone] for [facility] and spoke to Marianna ORTIZ who states that authorization is not needed because Medicare is the primary insurance. Marianna provided call reference number [id number].

## 2018-08-14 LAB
GLUCOSE BLDC GLUCOMTR-MCNC: 128 MG/DL — HIGH (ref 70–99)
GLUCOSE BLDC GLUCOMTR-MCNC: 148 MG/DL — HIGH (ref 70–99)
GLUCOSE BLDC GLUCOMTR-MCNC: 167 MG/DL — HIGH (ref 70–99)

## 2018-08-14 PROCEDURE — 99232 SBSQ HOSP IP/OBS MODERATE 35: CPT

## 2018-08-14 RX ORDER — ARIPIPRAZOLE 15 MG/1
10 TABLET ORAL AT BEDTIME
Qty: 0 | Refills: 0 | Status: DISCONTINUED | OUTPATIENT
Start: 2018-08-14 | End: 2018-08-16

## 2018-08-14 RX ORDER — HALOPERIDOL DECANOATE 100 MG/ML
1 INJECTION INTRAMUSCULAR
Qty: 0 | Refills: 0 | Status: DISCONTINUED | OUTPATIENT
Start: 2018-08-14 | End: 2018-08-16

## 2018-08-14 RX ADMIN — HALOPERIDOL DECANOATE 1 MILLIGRAM(S): 100 INJECTION INTRAMUSCULAR at 12:57

## 2018-08-14 RX ADMIN — HALOPERIDOL DECANOATE 1 MILLIGRAM(S): 100 INJECTION INTRAMUSCULAR at 21:15

## 2018-08-14 RX ADMIN — HALOPERIDOL DECANOATE 1 MILLIGRAM(S): 100 INJECTION INTRAMUSCULAR at 08:25

## 2018-08-14 RX ADMIN — Medication 0.5 MILLIGRAM(S): at 08:26

## 2018-08-14 RX ADMIN — SIMVASTATIN 20 MILLIGRAM(S): 20 TABLET, FILM COATED ORAL at 21:15

## 2018-08-14 RX ADMIN — ARIPIPRAZOLE 10 MILLIGRAM(S): 15 TABLET ORAL at 21:15

## 2018-08-14 RX ADMIN — LITHIUM CARBONATE 450 MILLIGRAM(S): 300 TABLET, EXTENDED RELEASE ORAL at 21:15

## 2018-08-14 RX ADMIN — DONEPEZIL HYDROCHLORIDE 10 MILLIGRAM(S): 10 TABLET, FILM COATED ORAL at 21:15

## 2018-08-14 RX ADMIN — Medication 0: at 21:51

## 2018-08-14 RX ADMIN — Medication 0.5 MILLIGRAM(S): at 21:15

## 2018-08-14 RX ADMIN — METFORMIN HYDROCHLORIDE 500 MILLIGRAM(S): 850 TABLET ORAL at 08:26

## 2018-08-14 RX ADMIN — HALOPERIDOL DECANOATE 1 MILLIGRAM(S): 100 INJECTION INTRAMUSCULAR at 05:16

## 2018-08-15 LAB
GLUCOSE BLDC GLUCOMTR-MCNC: 123 MG/DL — HIGH (ref 70–99)
GLUCOSE BLDC GLUCOMTR-MCNC: 134 MG/DL — HIGH (ref 70–99)
GLUCOSE BLDC GLUCOMTR-MCNC: 167 MG/DL — HIGH (ref 70–99)
GLUCOSE BLDC GLUCOMTR-MCNC: 183 MG/DL — HIGH (ref 70–99)

## 2018-08-15 PROCEDURE — 99232 SBSQ HOSP IP/OBS MODERATE 35: CPT

## 2018-08-15 RX ADMIN — DONEPEZIL HYDROCHLORIDE 10 MILLIGRAM(S): 10 TABLET, FILM COATED ORAL at 21:27

## 2018-08-15 RX ADMIN — HALOPERIDOL DECANOATE 1 MILLIGRAM(S): 100 INJECTION INTRAMUSCULAR at 08:48

## 2018-08-15 RX ADMIN — Medication 1: at 12:46

## 2018-08-15 RX ADMIN — Medication 0.5 MILLIGRAM(S): at 08:48

## 2018-08-15 RX ADMIN — LITHIUM CARBONATE 450 MILLIGRAM(S): 300 TABLET, EXTENDED RELEASE ORAL at 21:26

## 2018-08-15 RX ADMIN — Medication 0.5 MILLIGRAM(S): at 21:26

## 2018-08-15 RX ADMIN — ARIPIPRAZOLE 10 MILLIGRAM(S): 15 TABLET ORAL at 21:27

## 2018-08-15 RX ADMIN — METFORMIN HYDROCHLORIDE 500 MILLIGRAM(S): 850 TABLET ORAL at 08:48

## 2018-08-15 RX ADMIN — SIMVASTATIN 20 MILLIGRAM(S): 20 TABLET, FILM COATED ORAL at 21:26

## 2018-08-15 RX ADMIN — HALOPERIDOL DECANOATE 1 MILLIGRAM(S): 100 INJECTION INTRAMUSCULAR at 21:27

## 2018-08-16 LAB
GLUCOSE BLDC GLUCOMTR-MCNC: 138 MG/DL — HIGH (ref 70–99)
GLUCOSE BLDC GLUCOMTR-MCNC: 149 MG/DL — HIGH (ref 70–99)
GLUCOSE BLDC GLUCOMTR-MCNC: 173 MG/DL — HIGH (ref 70–99)
GLUCOSE BLDC GLUCOMTR-MCNC: 191 MG/DL — HIGH (ref 70–99)

## 2018-08-16 PROCEDURE — 99232 SBSQ HOSP IP/OBS MODERATE 35: CPT

## 2018-08-16 RX ORDER — HALOPERIDOL DECANOATE 100 MG/ML
2 INJECTION INTRAMUSCULAR
Qty: 0 | Refills: 0 | Status: DISCONTINUED | OUTPATIENT
Start: 2018-08-16 | End: 2018-08-20

## 2018-08-16 RX ADMIN — LITHIUM CARBONATE 450 MILLIGRAM(S): 300 TABLET, EXTENDED RELEASE ORAL at 20:51

## 2018-08-16 RX ADMIN — Medication 1: at 08:54

## 2018-08-16 RX ADMIN — DONEPEZIL HYDROCHLORIDE 10 MILLIGRAM(S): 10 TABLET, FILM COATED ORAL at 20:52

## 2018-08-16 RX ADMIN — Medication 0.5 MILLIGRAM(S): at 08:54

## 2018-08-16 RX ADMIN — SIMVASTATIN 20 MILLIGRAM(S): 20 TABLET, FILM COATED ORAL at 20:51

## 2018-08-16 RX ADMIN — HALOPERIDOL DECANOATE 2 MILLIGRAM(S): 100 INJECTION INTRAMUSCULAR at 20:52

## 2018-08-16 RX ADMIN — HALOPERIDOL DECANOATE 2 MILLIGRAM(S): 100 INJECTION INTRAMUSCULAR at 08:54

## 2018-08-16 RX ADMIN — Medication 0.5 MILLIGRAM(S): at 20:51

## 2018-08-16 RX ADMIN — Medication 1: at 17:17

## 2018-08-16 RX ADMIN — METFORMIN HYDROCHLORIDE 500 MILLIGRAM(S): 850 TABLET ORAL at 08:54

## 2018-08-17 LAB
ALBUMIN SERPL ELPH-MCNC: 4.1 G/DL — SIGNIFICANT CHANGE UP (ref 3.3–5)
ALP SERPL-CCNC: 55 U/L — SIGNIFICANT CHANGE UP (ref 40–120)
ALT FLD-CCNC: 24 U/L — SIGNIFICANT CHANGE UP (ref 4–33)
AST SERPL-CCNC: 28 U/L — SIGNIFICANT CHANGE UP (ref 4–32)
BILIRUB SERPL-MCNC: 0.9 MG/DL — SIGNIFICANT CHANGE UP (ref 0.2–1.2)
BUN SERPL-MCNC: 22 MG/DL — SIGNIFICANT CHANGE UP (ref 7–23)
CALCIUM SERPL-MCNC: 10 MG/DL — SIGNIFICANT CHANGE UP (ref 8.4–10.5)
CHLORIDE SERPL-SCNC: 104 MMOL/L — SIGNIFICANT CHANGE UP (ref 98–107)
CO2 SERPL-SCNC: 25 MMOL/L — SIGNIFICANT CHANGE UP (ref 22–31)
CREAT SERPL-MCNC: 1 MG/DL — SIGNIFICANT CHANGE UP (ref 0.5–1.3)
GLUCOSE BLDC GLUCOMTR-MCNC: 123 MG/DL — HIGH (ref 70–99)
GLUCOSE BLDC GLUCOMTR-MCNC: 129 MG/DL — HIGH (ref 70–99)
GLUCOSE BLDC GLUCOMTR-MCNC: 149 MG/DL — HIGH (ref 70–99)
GLUCOSE BLDC GLUCOMTR-MCNC: 158 MG/DL — HIGH (ref 70–99)
GLUCOSE SERPL-MCNC: 114 MG/DL — HIGH (ref 70–99)
HCT VFR BLD CALC: 35.5 % — SIGNIFICANT CHANGE UP (ref 34.5–45)
HGB BLD-MCNC: 10.8 G/DL — LOW (ref 11.5–15.5)
MCHC RBC-ENTMCNC: 24.6 PG — LOW (ref 27–34)
MCHC RBC-ENTMCNC: 30.4 % — LOW (ref 32–36)
MCV RBC AUTO: 80.9 FL — SIGNIFICANT CHANGE UP (ref 80–100)
NRBC # FLD: 0 — SIGNIFICANT CHANGE UP
PLATELET # BLD AUTO: 300 K/UL — SIGNIFICANT CHANGE UP (ref 150–400)
PMV BLD: 10.3 FL — SIGNIFICANT CHANGE UP (ref 7–13)
POTASSIUM SERPL-MCNC: 4.1 MMOL/L — SIGNIFICANT CHANGE UP (ref 3.5–5.3)
POTASSIUM SERPL-SCNC: 4.1 MMOL/L — SIGNIFICANT CHANGE UP (ref 3.5–5.3)
PROT SERPL-MCNC: 6.6 G/DL — SIGNIFICANT CHANGE UP (ref 6–8.3)
RBC # BLD: 4.39 M/UL — SIGNIFICANT CHANGE UP (ref 3.8–5.2)
RBC # FLD: 20.8 % — HIGH (ref 10.3–14.5)
SODIUM SERPL-SCNC: 141 MMOL/L — SIGNIFICANT CHANGE UP (ref 135–145)
WBC # BLD: 9.39 K/UL — SIGNIFICANT CHANGE UP (ref 3.8–10.5)
WBC # FLD AUTO: 9.39 K/UL — SIGNIFICANT CHANGE UP (ref 3.8–10.5)

## 2018-08-17 PROCEDURE — 99232 SBSQ HOSP IP/OBS MODERATE 35: CPT

## 2018-08-17 RX ADMIN — METFORMIN HYDROCHLORIDE 500 MILLIGRAM(S): 850 TABLET ORAL at 08:39

## 2018-08-17 RX ADMIN — Medication 0.5 MILLIGRAM(S): at 08:39

## 2018-08-17 RX ADMIN — Medication 0.5 MILLIGRAM(S): at 20:50

## 2018-08-17 RX ADMIN — DONEPEZIL HYDROCHLORIDE 10 MILLIGRAM(S): 10 TABLET, FILM COATED ORAL at 20:50

## 2018-08-17 RX ADMIN — HALOPERIDOL DECANOATE 2 MILLIGRAM(S): 100 INJECTION INTRAMUSCULAR at 08:38

## 2018-08-17 RX ADMIN — HALOPERIDOL DECANOATE 1 MILLIGRAM(S): 100 INJECTION INTRAMUSCULAR at 01:14

## 2018-08-17 RX ADMIN — LITHIUM CARBONATE 450 MILLIGRAM(S): 300 TABLET, EXTENDED RELEASE ORAL at 20:50

## 2018-08-17 RX ADMIN — SIMVASTATIN 20 MILLIGRAM(S): 20 TABLET, FILM COATED ORAL at 20:50

## 2018-08-17 RX ADMIN — HALOPERIDOL DECANOATE 2 MILLIGRAM(S): 100 INJECTION INTRAMUSCULAR at 20:50

## 2018-08-18 LAB
GLUCOSE BLDC GLUCOMTR-MCNC: 120 MG/DL — HIGH (ref 70–99)
GLUCOSE BLDC GLUCOMTR-MCNC: 130 MG/DL — HIGH (ref 70–99)
GLUCOSE BLDC GLUCOMTR-MCNC: 183 MG/DL — HIGH (ref 70–99)
GLUCOSE BLDC GLUCOMTR-MCNC: 207 MG/DL — HIGH (ref 70–99)

## 2018-08-18 PROCEDURE — 99232 SBSQ HOSP IP/OBS MODERATE 35: CPT

## 2018-08-18 RX ADMIN — Medication 2: at 17:58

## 2018-08-18 RX ADMIN — HALOPERIDOL DECANOATE 2 MILLIGRAM(S): 100 INJECTION INTRAMUSCULAR at 09:08

## 2018-08-18 RX ADMIN — METFORMIN HYDROCHLORIDE 500 MILLIGRAM(S): 850 TABLET ORAL at 09:08

## 2018-08-18 RX ADMIN — LITHIUM CARBONATE 450 MILLIGRAM(S): 300 TABLET, EXTENDED RELEASE ORAL at 21:31

## 2018-08-18 RX ADMIN — HALOPERIDOL DECANOATE 2 MILLIGRAM(S): 100 INJECTION INTRAMUSCULAR at 21:31

## 2018-08-18 RX ADMIN — Medication 0.5 MILLIGRAM(S): at 09:08

## 2018-08-18 RX ADMIN — SIMVASTATIN 20 MILLIGRAM(S): 20 TABLET, FILM COATED ORAL at 21:31

## 2018-08-18 RX ADMIN — Medication 0.5 MILLIGRAM(S): at 21:31

## 2018-08-18 RX ADMIN — DONEPEZIL HYDROCHLORIDE 10 MILLIGRAM(S): 10 TABLET, FILM COATED ORAL at 21:31

## 2018-08-19 LAB
GLUCOSE BLDC GLUCOMTR-MCNC: 131 MG/DL — HIGH (ref 70–99)
GLUCOSE BLDC GLUCOMTR-MCNC: 132 MG/DL — HIGH (ref 70–99)
GLUCOSE BLDC GLUCOMTR-MCNC: 146 MG/DL — HIGH (ref 70–99)

## 2018-08-19 PROCEDURE — 99232 SBSQ HOSP IP/OBS MODERATE 35: CPT

## 2018-08-19 RX ADMIN — Medication 0.5 MILLIGRAM(S): at 09:30

## 2018-08-19 RX ADMIN — METFORMIN HYDROCHLORIDE 500 MILLIGRAM(S): 850 TABLET ORAL at 09:30

## 2018-08-19 RX ADMIN — DONEPEZIL HYDROCHLORIDE 10 MILLIGRAM(S): 10 TABLET, FILM COATED ORAL at 21:16

## 2018-08-19 RX ADMIN — LITHIUM CARBONATE 450 MILLIGRAM(S): 300 TABLET, EXTENDED RELEASE ORAL at 21:16

## 2018-08-19 RX ADMIN — SIMVASTATIN 20 MILLIGRAM(S): 20 TABLET, FILM COATED ORAL at 21:17

## 2018-08-19 RX ADMIN — Medication 0.5 MILLIGRAM(S): at 21:16

## 2018-08-19 RX ADMIN — HALOPERIDOL DECANOATE 2 MILLIGRAM(S): 100 INJECTION INTRAMUSCULAR at 09:31

## 2018-08-19 RX ADMIN — HALOPERIDOL DECANOATE 2 MILLIGRAM(S): 100 INJECTION INTRAMUSCULAR at 21:16

## 2018-08-20 LAB
GLUCOSE BLDC GLUCOMTR-MCNC: 130 MG/DL — HIGH (ref 70–99)
GLUCOSE BLDC GLUCOMTR-MCNC: 132 MG/DL — HIGH (ref 70–99)
GLUCOSE BLDC GLUCOMTR-MCNC: 156 MG/DL — HIGH (ref 70–99)
GLUCOSE BLDC GLUCOMTR-MCNC: 220 MG/DL — HIGH (ref 70–99)

## 2018-08-20 PROCEDURE — 99232 SBSQ HOSP IP/OBS MODERATE 35: CPT

## 2018-08-20 RX ORDER — HALOPERIDOL DECANOATE 100 MG/ML
3 INJECTION INTRAMUSCULAR AT BEDTIME
Qty: 0 | Refills: 0 | Status: DISCONTINUED | OUTPATIENT
Start: 2018-08-20 | End: 2018-08-23

## 2018-08-20 RX ORDER — HALOPERIDOL DECANOATE 100 MG/ML
2 INJECTION INTRAMUSCULAR DAILY
Qty: 0 | Refills: 0 | Status: DISCONTINUED | OUTPATIENT
Start: 2018-08-20 | End: 2018-08-23

## 2018-08-20 RX ADMIN — Medication 0.5 MILLIGRAM(S): at 08:41

## 2018-08-20 RX ADMIN — HALOPERIDOL DECANOATE 3 MILLIGRAM(S): 100 INJECTION INTRAMUSCULAR at 20:42

## 2018-08-20 RX ADMIN — METFORMIN HYDROCHLORIDE 500 MILLIGRAM(S): 850 TABLET ORAL at 08:41

## 2018-08-20 RX ADMIN — DONEPEZIL HYDROCHLORIDE 10 MILLIGRAM(S): 10 TABLET, FILM COATED ORAL at 20:42

## 2018-08-20 RX ADMIN — Medication 0.5 MILLIGRAM(S): at 20:42

## 2018-08-20 RX ADMIN — LITHIUM CARBONATE 450 MILLIGRAM(S): 300 TABLET, EXTENDED RELEASE ORAL at 20:42

## 2018-08-20 RX ADMIN — Medication 1: at 08:41

## 2018-08-20 RX ADMIN — HALOPERIDOL DECANOATE 2 MILLIGRAM(S): 100 INJECTION INTRAMUSCULAR at 08:41

## 2018-08-20 RX ADMIN — SIMVASTATIN 20 MILLIGRAM(S): 20 TABLET, FILM COATED ORAL at 20:42

## 2018-08-21 LAB
GLUCOSE BLDC GLUCOMTR-MCNC: 114 MG/DL — HIGH (ref 70–99)
GLUCOSE BLDC GLUCOMTR-MCNC: 131 MG/DL — HIGH (ref 70–99)
GLUCOSE BLDC GLUCOMTR-MCNC: 161 MG/DL — HIGH (ref 70–99)
GLUCOSE BLDC GLUCOMTR-MCNC: 179 MG/DL — HIGH (ref 70–99)
GLUCOSE BLDC GLUCOMTR-MCNC: 409 MG/DL — HIGH (ref 70–99)

## 2018-08-21 PROCEDURE — 99232 SBSQ HOSP IP/OBS MODERATE 35: CPT

## 2018-08-21 RX ADMIN — HALOPERIDOL DECANOATE 2 MILLIGRAM(S): 100 INJECTION INTRAMUSCULAR at 09:14

## 2018-08-21 RX ADMIN — DONEPEZIL HYDROCHLORIDE 10 MILLIGRAM(S): 10 TABLET, FILM COATED ORAL at 20:52

## 2018-08-21 RX ADMIN — HALOPERIDOL DECANOATE 3 MILLIGRAM(S): 100 INJECTION INTRAMUSCULAR at 21:08

## 2018-08-21 RX ADMIN — SIMVASTATIN 20 MILLIGRAM(S): 20 TABLET, FILM COATED ORAL at 20:52

## 2018-08-21 RX ADMIN — METFORMIN HYDROCHLORIDE 500 MILLIGRAM(S): 850 TABLET ORAL at 09:14

## 2018-08-21 RX ADMIN — Medication 0.5 MILLIGRAM(S): at 09:14

## 2018-08-21 RX ADMIN — LITHIUM CARBONATE 450 MILLIGRAM(S): 300 TABLET, EXTENDED RELEASE ORAL at 20:52

## 2018-08-21 RX ADMIN — Medication 0.5 MILLIGRAM(S): at 20:52

## 2018-08-21 RX ADMIN — Medication 1: at 17:09

## 2018-08-22 LAB
GLUCOSE BLDC GLUCOMTR-MCNC: 132 MG/DL — HIGH (ref 70–99)
GLUCOSE BLDC GLUCOMTR-MCNC: 141 MG/DL — HIGH (ref 70–99)
GLUCOSE BLDC GLUCOMTR-MCNC: 170 MG/DL — HIGH (ref 70–99)
GLUCOSE BLDC GLUCOMTR-MCNC: 265 MG/DL — HIGH (ref 70–99)

## 2018-08-22 PROCEDURE — 99232 SBSQ HOSP IP/OBS MODERATE 35: CPT

## 2018-08-22 RX ADMIN — Medication 1: at 12:26

## 2018-08-22 RX ADMIN — HALOPERIDOL DECANOATE 2 MILLIGRAM(S): 100 INJECTION INTRAMUSCULAR at 08:15

## 2018-08-22 RX ADMIN — Medication 1: at 20:57

## 2018-08-22 RX ADMIN — Medication 0.5 MILLIGRAM(S): at 20:19

## 2018-08-22 RX ADMIN — Medication 0.5 MILLIGRAM(S): at 08:15

## 2018-08-22 RX ADMIN — LITHIUM CARBONATE 450 MILLIGRAM(S): 300 TABLET, EXTENDED RELEASE ORAL at 20:19

## 2018-08-22 RX ADMIN — HALOPERIDOL DECANOATE 3 MILLIGRAM(S): 100 INJECTION INTRAMUSCULAR at 20:19

## 2018-08-22 RX ADMIN — METFORMIN HYDROCHLORIDE 500 MILLIGRAM(S): 850 TABLET ORAL at 08:53

## 2018-08-22 RX ADMIN — SIMVASTATIN 20 MILLIGRAM(S): 20 TABLET, FILM COATED ORAL at 20:19

## 2018-08-22 RX ADMIN — DONEPEZIL HYDROCHLORIDE 10 MILLIGRAM(S): 10 TABLET, FILM COATED ORAL at 20:19

## 2018-08-23 LAB
GLUCOSE BLDC GLUCOMTR-MCNC: 141 MG/DL — HIGH (ref 70–99)
GLUCOSE BLDC GLUCOMTR-MCNC: 193 MG/DL — HIGH (ref 70–99)
GLUCOSE BLDC GLUCOMTR-MCNC: 230 MG/DL — HIGH (ref 70–99)

## 2018-08-23 PROCEDURE — 99232 SBSQ HOSP IP/OBS MODERATE 35: CPT

## 2018-08-23 RX ORDER — HALOPERIDOL DECANOATE 100 MG/ML
3 INJECTION INTRAMUSCULAR
Qty: 0 | Refills: 0 | Status: DISCONTINUED | OUTPATIENT
Start: 2018-08-23 | End: 2018-09-05

## 2018-08-23 RX ADMIN — Medication 0.5 MILLIGRAM(S): at 08:30

## 2018-08-23 RX ADMIN — METFORMIN HYDROCHLORIDE 500 MILLIGRAM(S): 850 TABLET ORAL at 08:30

## 2018-08-23 RX ADMIN — SIMVASTATIN 20 MILLIGRAM(S): 20 TABLET, FILM COATED ORAL at 20:08

## 2018-08-23 RX ADMIN — HALOPERIDOL DECANOATE 3 MILLIGRAM(S): 100 INJECTION INTRAMUSCULAR at 08:27

## 2018-08-23 RX ADMIN — HALOPERIDOL DECANOATE 3 MILLIGRAM(S): 100 INJECTION INTRAMUSCULAR at 20:08

## 2018-08-23 RX ADMIN — LITHIUM CARBONATE 450 MILLIGRAM(S): 300 TABLET, EXTENDED RELEASE ORAL at 20:08

## 2018-08-23 RX ADMIN — Medication 0: at 21:05

## 2018-08-23 RX ADMIN — DONEPEZIL HYDROCHLORIDE 10 MILLIGRAM(S): 10 TABLET, FILM COATED ORAL at 20:08

## 2018-08-23 RX ADMIN — Medication 0.5 MILLIGRAM(S): at 20:08

## 2018-08-23 RX ADMIN — Medication 1: at 12:57

## 2018-08-24 LAB
GLUCOSE BLDC GLUCOMTR-MCNC: 151 MG/DL — HIGH (ref 70–99)
GLUCOSE BLDC GLUCOMTR-MCNC: 153 MG/DL — HIGH (ref 70–99)
GLUCOSE BLDC GLUCOMTR-MCNC: 170 MG/DL — HIGH (ref 70–99)
GLUCOSE BLDC GLUCOMTR-MCNC: 236 MG/DL — HIGH (ref 70–99)

## 2018-08-24 PROCEDURE — 99232 SBSQ HOSP IP/OBS MODERATE 35: CPT

## 2018-08-24 RX ADMIN — HALOPERIDOL DECANOATE 3 MILLIGRAM(S): 100 INJECTION INTRAMUSCULAR at 08:28

## 2018-08-24 RX ADMIN — LITHIUM CARBONATE 450 MILLIGRAM(S): 300 TABLET, EXTENDED RELEASE ORAL at 20:49

## 2018-08-24 RX ADMIN — Medication 1: at 08:47

## 2018-08-24 RX ADMIN — HALOPERIDOL DECANOATE 3 MILLIGRAM(S): 100 INJECTION INTRAMUSCULAR at 20:49

## 2018-08-24 RX ADMIN — Medication 1: at 17:06

## 2018-08-24 RX ADMIN — DONEPEZIL HYDROCHLORIDE 10 MILLIGRAM(S): 10 TABLET, FILM COATED ORAL at 20:49

## 2018-08-24 RX ADMIN — SIMVASTATIN 20 MILLIGRAM(S): 20 TABLET, FILM COATED ORAL at 20:49

## 2018-08-24 RX ADMIN — METFORMIN HYDROCHLORIDE 500 MILLIGRAM(S): 850 TABLET ORAL at 08:28

## 2018-08-24 RX ADMIN — Medication 0.5 MILLIGRAM(S): at 08:28

## 2018-08-24 RX ADMIN — Medication 0.5 MILLIGRAM(S): at 20:49

## 2018-08-25 LAB
GLUCOSE BLDC GLUCOMTR-MCNC: 147 MG/DL — HIGH (ref 70–99)
GLUCOSE BLDC GLUCOMTR-MCNC: 153 MG/DL — HIGH (ref 70–99)
GLUCOSE BLDC GLUCOMTR-MCNC: 180 MG/DL — HIGH (ref 70–99)

## 2018-08-25 RX ADMIN — SIMVASTATIN 20 MILLIGRAM(S): 20 TABLET, FILM COATED ORAL at 21:19

## 2018-08-25 RX ADMIN — Medication 0.5 MILLIGRAM(S): at 21:19

## 2018-08-25 RX ADMIN — DONEPEZIL HYDROCHLORIDE 10 MILLIGRAM(S): 10 TABLET, FILM COATED ORAL at 22:41

## 2018-08-25 RX ADMIN — METFORMIN HYDROCHLORIDE 500 MILLIGRAM(S): 850 TABLET ORAL at 09:00

## 2018-08-25 RX ADMIN — Medication 0.5 MILLIGRAM(S): at 08:59

## 2018-08-25 RX ADMIN — LITHIUM CARBONATE 450 MILLIGRAM(S): 300 TABLET, EXTENDED RELEASE ORAL at 21:19

## 2018-08-25 RX ADMIN — HALOPERIDOL DECANOATE 3 MILLIGRAM(S): 100 INJECTION INTRAMUSCULAR at 22:41

## 2018-08-25 RX ADMIN — HALOPERIDOL DECANOATE 3 MILLIGRAM(S): 100 INJECTION INTRAMUSCULAR at 08:59

## 2018-08-25 RX ADMIN — Medication 1: at 17:18

## 2018-08-26 LAB
GLUCOSE BLDC GLUCOMTR-MCNC: 125 MG/DL — HIGH (ref 70–99)
GLUCOSE BLDC GLUCOMTR-MCNC: 126 MG/DL — HIGH (ref 70–99)
GLUCOSE BLDC GLUCOMTR-MCNC: 139 MG/DL — HIGH (ref 70–99)
GLUCOSE BLDC GLUCOMTR-MCNC: 232 MG/DL — HIGH (ref 70–99)

## 2018-08-26 RX ADMIN — SIMVASTATIN 20 MILLIGRAM(S): 20 TABLET, FILM COATED ORAL at 20:57

## 2018-08-26 RX ADMIN — Medication 0.5 MILLIGRAM(S): at 20:57

## 2018-08-26 RX ADMIN — HALOPERIDOL DECANOATE 3 MILLIGRAM(S): 100 INJECTION INTRAMUSCULAR at 09:59

## 2018-08-26 RX ADMIN — Medication 0.5 MILLIGRAM(S): at 09:59

## 2018-08-26 RX ADMIN — LITHIUM CARBONATE 450 MILLIGRAM(S): 300 TABLET, EXTENDED RELEASE ORAL at 20:57

## 2018-08-26 RX ADMIN — HALOPERIDOL DECANOATE 3 MILLIGRAM(S): 100 INJECTION INTRAMUSCULAR at 20:56

## 2018-08-26 RX ADMIN — METFORMIN HYDROCHLORIDE 500 MILLIGRAM(S): 850 TABLET ORAL at 09:59

## 2018-08-26 RX ADMIN — DONEPEZIL HYDROCHLORIDE 10 MILLIGRAM(S): 10 TABLET, FILM COATED ORAL at 20:56

## 2018-08-27 LAB
GLUCOSE BLDC GLUCOMTR-MCNC: 137 MG/DL — HIGH (ref 70–99)
GLUCOSE BLDC GLUCOMTR-MCNC: 151 MG/DL — HIGH (ref 70–99)
GLUCOSE BLDC GLUCOMTR-MCNC: 179 MG/DL — HIGH (ref 70–99)
GLUCOSE BLDC GLUCOMTR-MCNC: 383 MG/DL — HIGH (ref 70–99)

## 2018-08-27 PROCEDURE — 99232 SBSQ HOSP IP/OBS MODERATE 35: CPT

## 2018-08-27 RX ADMIN — HALOPERIDOL DECANOATE 3 MILLIGRAM(S): 100 INJECTION INTRAMUSCULAR at 08:03

## 2018-08-27 RX ADMIN — HALOPERIDOL DECANOATE 3 MILLIGRAM(S): 100 INJECTION INTRAMUSCULAR at 20:33

## 2018-08-27 RX ADMIN — LITHIUM CARBONATE 450 MILLIGRAM(S): 300 TABLET, EXTENDED RELEASE ORAL at 20:33

## 2018-08-27 RX ADMIN — DONEPEZIL HYDROCHLORIDE 10 MILLIGRAM(S): 10 TABLET, FILM COATED ORAL at 20:33

## 2018-08-27 RX ADMIN — Medication 0.5 MILLIGRAM(S): at 08:03

## 2018-08-27 RX ADMIN — SIMVASTATIN 20 MILLIGRAM(S): 20 TABLET, FILM COATED ORAL at 20:33

## 2018-08-27 RX ADMIN — Medication 3: at 21:20

## 2018-08-27 RX ADMIN — Medication 1: at 12:54

## 2018-08-27 RX ADMIN — Medication 1: at 09:05

## 2018-08-27 RX ADMIN — METFORMIN HYDROCHLORIDE 500 MILLIGRAM(S): 850 TABLET ORAL at 08:03

## 2018-08-27 RX ADMIN — Medication 0.5 MILLIGRAM(S): at 20:33

## 2018-08-28 LAB
GLUCOSE BLDC GLUCOMTR-MCNC: 130 MG/DL — HIGH (ref 70–99)
GLUCOSE BLDC GLUCOMTR-MCNC: 143 MG/DL — HIGH (ref 70–99)
GLUCOSE BLDC GLUCOMTR-MCNC: 204 MG/DL — HIGH (ref 70–99)
GLUCOSE BLDC GLUCOMTR-MCNC: 238 MG/DL — HIGH (ref 70–99)

## 2018-08-28 PROCEDURE — 99232 SBSQ HOSP IP/OBS MODERATE 35: CPT

## 2018-08-28 RX ADMIN — DONEPEZIL HYDROCHLORIDE 10 MILLIGRAM(S): 10 TABLET, FILM COATED ORAL at 20:50

## 2018-08-28 RX ADMIN — LITHIUM CARBONATE 450 MILLIGRAM(S): 300 TABLET, EXTENDED RELEASE ORAL at 20:50

## 2018-08-28 RX ADMIN — Medication 0: at 21:15

## 2018-08-28 RX ADMIN — SIMVASTATIN 20 MILLIGRAM(S): 20 TABLET, FILM COATED ORAL at 20:50

## 2018-08-28 RX ADMIN — HALOPERIDOL DECANOATE 3 MILLIGRAM(S): 100 INJECTION INTRAMUSCULAR at 20:50

## 2018-08-28 RX ADMIN — HALOPERIDOL DECANOATE 3 MILLIGRAM(S): 100 INJECTION INTRAMUSCULAR at 08:26

## 2018-08-28 RX ADMIN — Medication 0.5 MILLIGRAM(S): at 08:26

## 2018-08-28 RX ADMIN — METFORMIN HYDROCHLORIDE 500 MILLIGRAM(S): 850 TABLET ORAL at 08:26

## 2018-08-28 RX ADMIN — Medication 0.5 MILLIGRAM(S): at 20:50

## 2018-08-29 PROBLEM — F03.90 UNSPECIFIED DEMENTIA WITHOUT BEHAVIORAL DISTURBANCE: Chronic | Status: ACTIVE | Noted: 2018-08-10

## 2018-08-29 PROBLEM — F20.9 SCHIZOPHRENIA, UNSPECIFIED: Chronic | Status: ACTIVE | Noted: 2018-08-10

## 2018-08-29 PROBLEM — F03.90 UNSPECIFIED DEMENTIA, UNSPECIFIED SEVERITY, WITHOUT BEHAVIORAL DISTURBANCE, PSYCHOTIC DISTURBANCE, MOOD DISTURBANCE, AND ANXIETY: Chronic | Status: ACTIVE | Noted: 2018-08-10

## 2018-08-29 PROBLEM — H54.8 LEGAL BLINDNESS, AS DEFINED IN USA: Chronic | Status: ACTIVE | Noted: 2018-08-10

## 2018-08-29 LAB
GLUCOSE BLDC GLUCOMTR-MCNC: 171 MG/DL — HIGH (ref 70–99)
GLUCOSE BLDC GLUCOMTR-MCNC: 173 MG/DL — HIGH (ref 70–99)
GLUCOSE BLDC GLUCOMTR-MCNC: 181 MG/DL — HIGH (ref 70–99)
GLUCOSE BLDC GLUCOMTR-MCNC: 217 MG/DL — HIGH (ref 70–99)

## 2018-08-29 PROCEDURE — 99232 SBSQ HOSP IP/OBS MODERATE 35: CPT

## 2018-08-29 RX ADMIN — DONEPEZIL HYDROCHLORIDE 10 MILLIGRAM(S): 10 TABLET, FILM COATED ORAL at 21:04

## 2018-08-29 RX ADMIN — HALOPERIDOL DECANOATE 3 MILLIGRAM(S): 100 INJECTION INTRAMUSCULAR at 21:04

## 2018-08-29 RX ADMIN — LITHIUM CARBONATE 450 MILLIGRAM(S): 300 TABLET, EXTENDED RELEASE ORAL at 21:04

## 2018-08-29 RX ADMIN — Medication 1: at 12:46

## 2018-08-29 RX ADMIN — SIMVASTATIN 20 MILLIGRAM(S): 20 TABLET, FILM COATED ORAL at 21:04

## 2018-08-29 RX ADMIN — Medication 1: at 16:48

## 2018-08-29 RX ADMIN — Medication 1: at 08:31

## 2018-08-29 RX ADMIN — Medication 0.5 MILLIGRAM(S): at 21:04

## 2018-08-29 RX ADMIN — HALOPERIDOL DECANOATE 3 MILLIGRAM(S): 100 INJECTION INTRAMUSCULAR at 09:00

## 2018-08-29 RX ADMIN — Medication 0.5 MILLIGRAM(S): at 08:31

## 2018-08-29 RX ADMIN — METFORMIN HYDROCHLORIDE 500 MILLIGRAM(S): 850 TABLET ORAL at 08:31

## 2018-08-30 LAB
GLUCOSE BLDC GLUCOMTR-MCNC: 136 MG/DL — HIGH (ref 70–99)
GLUCOSE BLDC GLUCOMTR-MCNC: 147 MG/DL — HIGH (ref 70–99)
GLUCOSE BLDC GLUCOMTR-MCNC: 151 MG/DL — HIGH (ref 70–99)
GLUCOSE BLDC GLUCOMTR-MCNC: 206 MG/DL — HIGH (ref 70–99)

## 2018-08-30 PROCEDURE — 99232 SBSQ HOSP IP/OBS MODERATE 35: CPT

## 2018-08-30 RX ADMIN — Medication 0.5 MILLIGRAM(S): at 08:28

## 2018-08-30 RX ADMIN — Medication 1: at 08:59

## 2018-08-30 RX ADMIN — SIMVASTATIN 20 MILLIGRAM(S): 20 TABLET, FILM COATED ORAL at 21:43

## 2018-08-30 RX ADMIN — HALOPERIDOL DECANOATE 3 MILLIGRAM(S): 100 INJECTION INTRAMUSCULAR at 08:28

## 2018-08-30 RX ADMIN — DONEPEZIL HYDROCHLORIDE 10 MILLIGRAM(S): 10 TABLET, FILM COATED ORAL at 21:43

## 2018-08-30 RX ADMIN — HALOPERIDOL DECANOATE 3 MILLIGRAM(S): 100 INJECTION INTRAMUSCULAR at 21:43

## 2018-08-30 RX ADMIN — Medication 0.5 MILLIGRAM(S): at 21:43

## 2018-08-30 RX ADMIN — LITHIUM CARBONATE 450 MILLIGRAM(S): 300 TABLET, EXTENDED RELEASE ORAL at 21:43

## 2018-08-30 RX ADMIN — METFORMIN HYDROCHLORIDE 500 MILLIGRAM(S): 850 TABLET ORAL at 08:28

## 2018-08-31 LAB
GLUCOSE BLDC GLUCOMTR-MCNC: 114 MG/DL — HIGH (ref 70–99)
GLUCOSE BLDC GLUCOMTR-MCNC: 167 MG/DL — HIGH (ref 70–99)
GLUCOSE BLDC GLUCOMTR-MCNC: 190 MG/DL — HIGH (ref 70–99)
GLUCOSE BLDC GLUCOMTR-MCNC: 244 MG/DL — HIGH (ref 70–99)

## 2018-08-31 PROCEDURE — 99232 SBSQ HOSP IP/OBS MODERATE 35: CPT

## 2018-08-31 RX ADMIN — Medication 0.5 MILLIGRAM(S): at 09:22

## 2018-08-31 RX ADMIN — Medication 1: at 17:23

## 2018-08-31 RX ADMIN — HALOPERIDOL DECANOATE 3 MILLIGRAM(S): 100 INJECTION INTRAMUSCULAR at 20:58

## 2018-08-31 RX ADMIN — DONEPEZIL HYDROCHLORIDE 10 MILLIGRAM(S): 10 TABLET, FILM COATED ORAL at 20:58

## 2018-08-31 RX ADMIN — Medication 0.5 MILLIGRAM(S): at 20:58

## 2018-08-31 RX ADMIN — LITHIUM CARBONATE 450 MILLIGRAM(S): 300 TABLET, EXTENDED RELEASE ORAL at 20:58

## 2018-08-31 RX ADMIN — METFORMIN HYDROCHLORIDE 500 MILLIGRAM(S): 850 TABLET ORAL at 09:22

## 2018-08-31 RX ADMIN — HALOPERIDOL DECANOATE 3 MILLIGRAM(S): 100 INJECTION INTRAMUSCULAR at 09:22

## 2018-08-31 RX ADMIN — SIMVASTATIN 20 MILLIGRAM(S): 20 TABLET, FILM COATED ORAL at 20:58

## 2018-09-01 LAB
GLUCOSE BLDC GLUCOMTR-MCNC: 122 MG/DL — HIGH (ref 70–99)
GLUCOSE BLDC GLUCOMTR-MCNC: 151 MG/DL — HIGH (ref 70–99)
GLUCOSE BLDC GLUCOMTR-MCNC: 155 MG/DL — HIGH (ref 70–99)
GLUCOSE BLDC GLUCOMTR-MCNC: 192 MG/DL — HIGH (ref 70–99)

## 2018-09-01 RX ADMIN — LITHIUM CARBONATE 450 MILLIGRAM(S): 300 TABLET, EXTENDED RELEASE ORAL at 21:03

## 2018-09-01 RX ADMIN — Medication 0: at 21:11

## 2018-09-01 RX ADMIN — HALOPERIDOL DECANOATE 3 MILLIGRAM(S): 100 INJECTION INTRAMUSCULAR at 21:04

## 2018-09-01 RX ADMIN — Medication 0.5 MILLIGRAM(S): at 21:03

## 2018-09-01 RX ADMIN — DONEPEZIL HYDROCHLORIDE 10 MILLIGRAM(S): 10 TABLET, FILM COATED ORAL at 21:04

## 2018-09-01 RX ADMIN — HALOPERIDOL DECANOATE 3 MILLIGRAM(S): 100 INJECTION INTRAMUSCULAR at 09:07

## 2018-09-01 RX ADMIN — Medication 0.5 MILLIGRAM(S): at 09:07

## 2018-09-01 RX ADMIN — SIMVASTATIN 20 MILLIGRAM(S): 20 TABLET, FILM COATED ORAL at 21:03

## 2018-09-01 RX ADMIN — METFORMIN HYDROCHLORIDE 500 MILLIGRAM(S): 850 TABLET ORAL at 09:07

## 2018-09-02 LAB
GLUCOSE BLDC GLUCOMTR-MCNC: 127 MG/DL — HIGH (ref 70–99)
GLUCOSE BLDC GLUCOMTR-MCNC: 152 MG/DL — HIGH (ref 70–99)
GLUCOSE BLDC GLUCOMTR-MCNC: 222 MG/DL — HIGH (ref 70–99)

## 2018-09-02 RX ADMIN — METFORMIN HYDROCHLORIDE 500 MILLIGRAM(S): 850 TABLET ORAL at 09:28

## 2018-09-02 RX ADMIN — Medication 0: at 20:39

## 2018-09-02 RX ADMIN — HALOPERIDOL DECANOATE 3 MILLIGRAM(S): 100 INJECTION INTRAMUSCULAR at 09:28

## 2018-09-02 RX ADMIN — Medication 1: at 17:17

## 2018-09-02 RX ADMIN — Medication 0.5 MILLIGRAM(S): at 09:28

## 2018-09-02 RX ADMIN — SIMVASTATIN 20 MILLIGRAM(S): 20 TABLET, FILM COATED ORAL at 20:45

## 2018-09-02 RX ADMIN — HALOPERIDOL DECANOATE 3 MILLIGRAM(S): 100 INJECTION INTRAMUSCULAR at 20:45

## 2018-09-02 RX ADMIN — LITHIUM CARBONATE 450 MILLIGRAM(S): 300 TABLET, EXTENDED RELEASE ORAL at 20:45

## 2018-09-02 RX ADMIN — Medication 0.5 MILLIGRAM(S): at 20:45

## 2018-09-02 RX ADMIN — DONEPEZIL HYDROCHLORIDE 10 MILLIGRAM(S): 10 TABLET, FILM COATED ORAL at 20:45

## 2018-09-03 LAB
GLUCOSE BLDC GLUCOMTR-MCNC: 143 MG/DL — HIGH (ref 70–99)
GLUCOSE BLDC GLUCOMTR-MCNC: 166 MG/DL — HIGH (ref 70–99)
GLUCOSE BLDC GLUCOMTR-MCNC: 192 MG/DL — HIGH (ref 70–99)
GLUCOSE BLDC GLUCOMTR-MCNC: 198 MG/DL — HIGH (ref 70–99)

## 2018-09-03 RX ADMIN — Medication 1: at 12:45

## 2018-09-03 RX ADMIN — DONEPEZIL HYDROCHLORIDE 10 MILLIGRAM(S): 10 TABLET, FILM COATED ORAL at 20:37

## 2018-09-03 RX ADMIN — HALOPERIDOL DECANOATE 3 MILLIGRAM(S): 100 INJECTION INTRAMUSCULAR at 20:37

## 2018-09-03 RX ADMIN — Medication 1: at 17:37

## 2018-09-03 RX ADMIN — SIMVASTATIN 20 MILLIGRAM(S): 20 TABLET, FILM COATED ORAL at 20:37

## 2018-09-03 RX ADMIN — LITHIUM CARBONATE 450 MILLIGRAM(S): 300 TABLET, EXTENDED RELEASE ORAL at 20:37

## 2018-09-03 RX ADMIN — METFORMIN HYDROCHLORIDE 500 MILLIGRAM(S): 850 TABLET ORAL at 08:50

## 2018-09-03 RX ADMIN — Medication 0.5 MILLIGRAM(S): at 08:49

## 2018-09-03 RX ADMIN — HALOPERIDOL DECANOATE 3 MILLIGRAM(S): 100 INJECTION INTRAMUSCULAR at 08:50

## 2018-09-03 RX ADMIN — Medication 0: at 20:31

## 2018-09-03 RX ADMIN — Medication 0.5 MILLIGRAM(S): at 20:37

## 2018-09-04 LAB
GLUCOSE BLDC GLUCOMTR-MCNC: 149 MG/DL — HIGH (ref 70–99)
GLUCOSE BLDC GLUCOMTR-MCNC: 159 MG/DL — HIGH (ref 70–99)
GLUCOSE BLDC GLUCOMTR-MCNC: 171 MG/DL — HIGH (ref 70–99)
GLUCOSE BLDC GLUCOMTR-MCNC: 245 MG/DL — HIGH (ref 70–99)

## 2018-09-04 PROCEDURE — 99232 SBSQ HOSP IP/OBS MODERATE 35: CPT

## 2018-09-04 RX ADMIN — SIMVASTATIN 20 MILLIGRAM(S): 20 TABLET, FILM COATED ORAL at 20:55

## 2018-09-04 RX ADMIN — HALOPERIDOL DECANOATE 3 MILLIGRAM(S): 100 INJECTION INTRAMUSCULAR at 08:38

## 2018-09-04 RX ADMIN — Medication 0.5 MILLIGRAM(S): at 08:38

## 2018-09-04 RX ADMIN — DONEPEZIL HYDROCHLORIDE 10 MILLIGRAM(S): 10 TABLET, FILM COATED ORAL at 20:55

## 2018-09-04 RX ADMIN — Medication 1: at 12:42

## 2018-09-04 RX ADMIN — HALOPERIDOL DECANOATE 3 MILLIGRAM(S): 100 INJECTION INTRAMUSCULAR at 20:55

## 2018-09-04 RX ADMIN — LITHIUM CARBONATE 450 MILLIGRAM(S): 300 TABLET, EXTENDED RELEASE ORAL at 20:55

## 2018-09-04 RX ADMIN — METFORMIN HYDROCHLORIDE 500 MILLIGRAM(S): 850 TABLET ORAL at 08:38

## 2018-09-04 RX ADMIN — Medication 0.5 MILLIGRAM(S): at 20:55

## 2018-09-04 RX ADMIN — Medication 1: at 08:34

## 2018-09-05 LAB
BUN SERPL-MCNC: 24 MG/DL — HIGH (ref 7–23)
CALCIUM SERPL-MCNC: 10.3 MG/DL — SIGNIFICANT CHANGE UP (ref 8.4–10.5)
CHLORIDE SERPL-SCNC: 110 MMOL/L — HIGH (ref 98–107)
CO2 SERPL-SCNC: 27 MMOL/L — SIGNIFICANT CHANGE UP (ref 22–31)
CREAT SERPL-MCNC: 0.99 MG/DL — SIGNIFICANT CHANGE UP (ref 0.5–1.3)
GLUCOSE BLDC GLUCOMTR-MCNC: 153 MG/DL — HIGH (ref 70–99)
GLUCOSE BLDC GLUCOMTR-MCNC: 171 MG/DL — HIGH (ref 70–99)
GLUCOSE BLDC GLUCOMTR-MCNC: 177 MG/DL — HIGH (ref 70–99)
GLUCOSE BLDC GLUCOMTR-MCNC: 210 MG/DL — HIGH (ref 70–99)
GLUCOSE SERPL-MCNC: 161 MG/DL — HIGH (ref 70–99)
LITHIUM SERPL-MCNC: 0.77 MMOL/L — SIGNIFICANT CHANGE UP (ref 0.6–1.2)
POTASSIUM SERPL-MCNC: 4.2 MMOL/L — SIGNIFICANT CHANGE UP (ref 3.5–5.3)
POTASSIUM SERPL-SCNC: 4.2 MMOL/L — SIGNIFICANT CHANGE UP (ref 3.5–5.3)
SODIUM SERPL-SCNC: 148 MMOL/L — HIGH (ref 135–145)

## 2018-09-05 PROCEDURE — 99232 SBSQ HOSP IP/OBS MODERATE 35: CPT | Mod: 25

## 2018-09-05 RX ORDER — HALOPERIDOL DECANOATE 100 MG/ML
2 INJECTION INTRAMUSCULAR
Qty: 0 | Refills: 0 | Status: DISCONTINUED | OUTPATIENT
Start: 2018-09-05 | End: 2018-09-11

## 2018-09-05 RX ADMIN — Medication 0.5 MILLIGRAM(S): at 08:27

## 2018-09-05 RX ADMIN — HALOPERIDOL DECANOATE 2 MILLIGRAM(S): 100 INJECTION INTRAMUSCULAR at 20:27

## 2018-09-05 RX ADMIN — LITHIUM CARBONATE 450 MILLIGRAM(S): 300 TABLET, EXTENDED RELEASE ORAL at 20:27

## 2018-09-05 RX ADMIN — Medication 0.5 MILLIGRAM(S): at 20:27

## 2018-09-05 RX ADMIN — Medication 1: at 09:23

## 2018-09-05 RX ADMIN — HALOPERIDOL DECANOATE 3 MILLIGRAM(S): 100 INJECTION INTRAMUSCULAR at 08:27

## 2018-09-05 RX ADMIN — Medication 1: at 17:16

## 2018-09-05 RX ADMIN — SIMVASTATIN 20 MILLIGRAM(S): 20 TABLET, FILM COATED ORAL at 20:27

## 2018-09-05 RX ADMIN — METFORMIN HYDROCHLORIDE 500 MILLIGRAM(S): 850 TABLET ORAL at 08:27

## 2018-09-05 RX ADMIN — DONEPEZIL HYDROCHLORIDE 10 MILLIGRAM(S): 10 TABLET, FILM COATED ORAL at 20:27

## 2018-09-06 LAB
ALBUMIN SERPL ELPH-MCNC: 4 G/DL — SIGNIFICANT CHANGE UP (ref 3.3–5)
ALP SERPL-CCNC: 69 U/L — SIGNIFICANT CHANGE UP (ref 40–120)
ALT FLD-CCNC: 16 U/L — SIGNIFICANT CHANGE UP (ref 4–33)
AST SERPL-CCNC: 21 U/L — SIGNIFICANT CHANGE UP (ref 4–32)
BILIRUB SERPL-MCNC: 1.1 MG/DL — SIGNIFICANT CHANGE UP (ref 0.2–1.2)
BUN SERPL-MCNC: 27 MG/DL — HIGH (ref 7–23)
CALCIUM SERPL-MCNC: 9.9 MG/DL — SIGNIFICANT CHANGE UP (ref 8.4–10.5)
CHLORIDE SERPL-SCNC: 104 MMOL/L — SIGNIFICANT CHANGE UP (ref 98–107)
CO2 SERPL-SCNC: 25 MMOL/L — SIGNIFICANT CHANGE UP (ref 22–31)
CREAT SERPL-MCNC: 1 MG/DL — SIGNIFICANT CHANGE UP (ref 0.5–1.3)
GLUCOSE BLDC GLUCOMTR-MCNC: 160 MG/DL — HIGH (ref 70–99)
GLUCOSE BLDC GLUCOMTR-MCNC: 163 MG/DL — HIGH (ref 70–99)
GLUCOSE BLDC GLUCOMTR-MCNC: 173 MG/DL — HIGH (ref 70–99)
GLUCOSE BLDC GLUCOMTR-MCNC: 242 MG/DL — HIGH (ref 70–99)
GLUCOSE SERPL-MCNC: 162 MG/DL — HIGH (ref 70–99)
HCT VFR BLD CALC: 37.6 % — SIGNIFICANT CHANGE UP (ref 34.5–45)
HGB BLD-MCNC: 11.7 G/DL — SIGNIFICANT CHANGE UP (ref 11.5–15.5)
LITHIUM SERPL-MCNC: 0.72 MMOL/L — SIGNIFICANT CHANGE UP (ref 0.6–1.2)
MCHC RBC-ENTMCNC: 26.1 PG — LOW (ref 27–34)
MCHC RBC-ENTMCNC: 31.1 % — LOW (ref 32–36)
MCV RBC AUTO: 83.9 FL — SIGNIFICANT CHANGE UP (ref 80–100)
NRBC # FLD: 0 — SIGNIFICANT CHANGE UP
PLATELET # BLD AUTO: 282 K/UL — SIGNIFICANT CHANGE UP (ref 150–400)
PMV BLD: 9.9 FL — SIGNIFICANT CHANGE UP (ref 7–13)
POTASSIUM SERPL-MCNC: 3.9 MMOL/L — SIGNIFICANT CHANGE UP (ref 3.5–5.3)
POTASSIUM SERPL-SCNC: 3.9 MMOL/L — SIGNIFICANT CHANGE UP (ref 3.5–5.3)
PROT SERPL-MCNC: 7 G/DL — SIGNIFICANT CHANGE UP (ref 6–8.3)
RBC # BLD: 4.48 M/UL — SIGNIFICANT CHANGE UP (ref 3.8–5.2)
RBC # FLD: 19.2 % — HIGH (ref 10.3–14.5)
SODIUM SERPL-SCNC: 141 MMOL/L — SIGNIFICANT CHANGE UP (ref 135–145)
WBC # BLD: 14.9 K/UL — HIGH (ref 3.8–10.5)
WBC # FLD AUTO: 14.9 K/UL — HIGH (ref 3.8–10.5)

## 2018-09-06 PROCEDURE — 99232 SBSQ HOSP IP/OBS MODERATE 35: CPT

## 2018-09-06 RX ADMIN — DONEPEZIL HYDROCHLORIDE 10 MILLIGRAM(S): 10 TABLET, FILM COATED ORAL at 20:43

## 2018-09-06 RX ADMIN — Medication 1: at 13:56

## 2018-09-06 RX ADMIN — Medication 1: at 17:11

## 2018-09-06 RX ADMIN — METFORMIN HYDROCHLORIDE 500 MILLIGRAM(S): 850 TABLET ORAL at 08:43

## 2018-09-06 RX ADMIN — Medication 0.5 MILLIGRAM(S): at 08:43

## 2018-09-06 RX ADMIN — HALOPERIDOL DECANOATE 2 MILLIGRAM(S): 100 INJECTION INTRAMUSCULAR at 20:43

## 2018-09-06 RX ADMIN — LITHIUM CARBONATE 450 MILLIGRAM(S): 300 TABLET, EXTENDED RELEASE ORAL at 21:17

## 2018-09-06 RX ADMIN — Medication 0.5 MILLIGRAM(S): at 20:43

## 2018-09-06 RX ADMIN — SIMVASTATIN 20 MILLIGRAM(S): 20 TABLET, FILM COATED ORAL at 20:43

## 2018-09-06 RX ADMIN — HALOPERIDOL DECANOATE 2 MILLIGRAM(S): 100 INJECTION INTRAMUSCULAR at 08:43

## 2018-09-06 RX ADMIN — Medication 1: at 09:03

## 2018-09-07 LAB
GLUCOSE BLDC GLUCOMTR-MCNC: 152 MG/DL — HIGH (ref 70–99)
GLUCOSE BLDC GLUCOMTR-MCNC: 162 MG/DL — HIGH (ref 70–99)
GLUCOSE BLDC GLUCOMTR-MCNC: 173 MG/DL — HIGH (ref 70–99)
GLUCOSE BLDC GLUCOMTR-MCNC: 247 MG/DL — HIGH (ref 70–99)

## 2018-09-07 PROCEDURE — 99232 SBSQ HOSP IP/OBS MODERATE 35: CPT

## 2018-09-07 RX ADMIN — Medication 0.5 MILLIGRAM(S): at 08:27

## 2018-09-07 RX ADMIN — DONEPEZIL HYDROCHLORIDE 10 MILLIGRAM(S): 10 TABLET, FILM COATED ORAL at 21:20

## 2018-09-07 RX ADMIN — Medication 1: at 12:44

## 2018-09-07 RX ADMIN — METFORMIN HYDROCHLORIDE 500 MILLIGRAM(S): 850 TABLET ORAL at 08:32

## 2018-09-07 RX ADMIN — HALOPERIDOL DECANOATE 2 MILLIGRAM(S): 100 INJECTION INTRAMUSCULAR at 21:20

## 2018-09-07 RX ADMIN — Medication 1: at 08:37

## 2018-09-07 RX ADMIN — Medication 0.5 MILLIGRAM(S): at 21:20

## 2018-09-07 RX ADMIN — LITHIUM CARBONATE 450 MILLIGRAM(S): 300 TABLET, EXTENDED RELEASE ORAL at 21:20

## 2018-09-07 RX ADMIN — HALOPERIDOL DECANOATE 2 MILLIGRAM(S): 100 INJECTION INTRAMUSCULAR at 08:27

## 2018-09-07 RX ADMIN — SIMVASTATIN 20 MILLIGRAM(S): 20 TABLET, FILM COATED ORAL at 21:20

## 2018-09-08 LAB
GLUCOSE BLDC GLUCOMTR-MCNC: 163 MG/DL — HIGH (ref 70–99)
GLUCOSE BLDC GLUCOMTR-MCNC: 169 MG/DL — HIGH (ref 70–99)
GLUCOSE BLDC GLUCOMTR-MCNC: 255 MG/DL — HIGH (ref 70–99)

## 2018-09-08 RX ORDER — LANOLIN ALCOHOL/MO/W.PET/CERES
3 CREAM (GRAM) TOPICAL AT BEDTIME
Qty: 0 | Refills: 0 | Status: DISCONTINUED | OUTPATIENT
Start: 2018-09-08 | End: 2018-09-11

## 2018-09-08 RX ADMIN — LITHIUM CARBONATE 450 MILLIGRAM(S): 300 TABLET, EXTENDED RELEASE ORAL at 21:15

## 2018-09-08 RX ADMIN — Medication 0.5 MILLIGRAM(S): at 21:15

## 2018-09-08 RX ADMIN — METFORMIN HYDROCHLORIDE 500 MILLIGRAM(S): 850 TABLET ORAL at 08:27

## 2018-09-08 RX ADMIN — Medication 3 MILLIGRAM(S): at 00:56

## 2018-09-08 RX ADMIN — Medication 0: at 21:00

## 2018-09-08 RX ADMIN — SIMVASTATIN 20 MILLIGRAM(S): 20 TABLET, FILM COATED ORAL at 21:15

## 2018-09-08 RX ADMIN — HALOPERIDOL DECANOATE 2 MILLIGRAM(S): 100 INJECTION INTRAMUSCULAR at 08:26

## 2018-09-08 RX ADMIN — Medication 1: at 08:26

## 2018-09-08 RX ADMIN — Medication 0.5 MILLIGRAM(S): at 08:27

## 2018-09-08 RX ADMIN — DONEPEZIL HYDROCHLORIDE 10 MILLIGRAM(S): 10 TABLET, FILM COATED ORAL at 21:15

## 2018-09-08 RX ADMIN — HALOPERIDOL DECANOATE 2 MILLIGRAM(S): 100 INJECTION INTRAMUSCULAR at 21:15

## 2018-09-09 LAB
GLUCOSE BLDC GLUCOMTR-MCNC: 145 MG/DL — HIGH (ref 70–99)
GLUCOSE BLDC GLUCOMTR-MCNC: 145 MG/DL — HIGH (ref 70–99)
GLUCOSE BLDC GLUCOMTR-MCNC: 155 MG/DL — HIGH (ref 70–99)
GLUCOSE BLDC GLUCOMTR-MCNC: 190 MG/DL — HIGH (ref 70–99)

## 2018-09-09 RX ADMIN — LITHIUM CARBONATE 450 MILLIGRAM(S): 300 TABLET, EXTENDED RELEASE ORAL at 21:42

## 2018-09-09 RX ADMIN — DONEPEZIL HYDROCHLORIDE 10 MILLIGRAM(S): 10 TABLET, FILM COATED ORAL at 21:42

## 2018-09-09 RX ADMIN — Medication 0.5 MILLIGRAM(S): at 08:49

## 2018-09-09 RX ADMIN — Medication 0.5 MILLIGRAM(S): at 21:42

## 2018-09-09 RX ADMIN — METFORMIN HYDROCHLORIDE 500 MILLIGRAM(S): 850 TABLET ORAL at 08:49

## 2018-09-09 RX ADMIN — HALOPERIDOL DECANOATE 2 MILLIGRAM(S): 100 INJECTION INTRAMUSCULAR at 21:42

## 2018-09-09 RX ADMIN — HALOPERIDOL DECANOATE 2 MILLIGRAM(S): 100 INJECTION INTRAMUSCULAR at 08:49

## 2018-09-09 RX ADMIN — SIMVASTATIN 20 MILLIGRAM(S): 20 TABLET, FILM COATED ORAL at 21:43

## 2018-09-09 RX ADMIN — Medication 3 MILLIGRAM(S): at 21:43

## 2018-09-10 LAB
GLUCOSE BLDC GLUCOMTR-MCNC: 130 MG/DL — HIGH (ref 70–99)
GLUCOSE BLDC GLUCOMTR-MCNC: 145 MG/DL — HIGH (ref 70–99)
GLUCOSE BLDC GLUCOMTR-MCNC: 146 MG/DL — HIGH (ref 70–99)
GLUCOSE BLDC GLUCOMTR-MCNC: 177 MG/DL — HIGH (ref 70–99)
GLUCOSE BLDC GLUCOMTR-MCNC: 283 MG/DL — HIGH (ref 70–99)

## 2018-09-10 PROCEDURE — 99232 SBSQ HOSP IP/OBS MODERATE 35: CPT

## 2018-09-10 RX ORDER — HALOPERIDOL DECANOATE 100 MG/ML
1 INJECTION INTRAMUSCULAR
Qty: 0 | Refills: 0 | DISCHARGE
Start: 2018-09-10

## 2018-09-10 RX ORDER — METFORMIN HYDROCHLORIDE 850 MG/1
1 TABLET ORAL
Qty: 0 | Refills: 0 | DISCHARGE
Start: 2018-09-10

## 2018-09-10 RX ORDER — DONEPEZIL HYDROCHLORIDE 10 MG/1
1 TABLET, FILM COATED ORAL
Qty: 0 | Refills: 0 | DISCHARGE
Start: 2018-09-10

## 2018-09-10 RX ORDER — SIMVASTATIN 20 MG/1
1 TABLET, FILM COATED ORAL
Qty: 0 | Refills: 0 | DISCHARGE
Start: 2018-09-10

## 2018-09-10 RX ORDER — LITHIUM CARBONATE 300 MG/1
1 TABLET, EXTENDED RELEASE ORAL
Qty: 0 | Refills: 0 | DISCHARGE
Start: 2018-09-10

## 2018-09-10 RX ORDER — LANOLIN ALCOHOL/MO/W.PET/CERES
1 CREAM (GRAM) TOPICAL
Qty: 0 | Refills: 0 | DISCHARGE
Start: 2018-09-10

## 2018-09-10 RX ADMIN — Medication 1: at 21:50

## 2018-09-10 RX ADMIN — LITHIUM CARBONATE 450 MILLIGRAM(S): 300 TABLET, EXTENDED RELEASE ORAL at 20:58

## 2018-09-10 RX ADMIN — Medication 0.5 MILLIGRAM(S): at 09:33

## 2018-09-10 RX ADMIN — SIMVASTATIN 20 MILLIGRAM(S): 20 TABLET, FILM COATED ORAL at 21:47

## 2018-09-10 RX ADMIN — HALOPERIDOL DECANOATE 2 MILLIGRAM(S): 100 INJECTION INTRAMUSCULAR at 21:46

## 2018-09-10 RX ADMIN — METFORMIN HYDROCHLORIDE 500 MILLIGRAM(S): 850 TABLET ORAL at 09:33

## 2018-09-10 RX ADMIN — HALOPERIDOL DECANOATE 2 MILLIGRAM(S): 100 INJECTION INTRAMUSCULAR at 09:33

## 2018-09-10 RX ADMIN — Medication 0.5 MILLIGRAM(S): at 20:58

## 2018-09-10 RX ADMIN — DONEPEZIL HYDROCHLORIDE 10 MILLIGRAM(S): 10 TABLET, FILM COATED ORAL at 20:58

## 2018-09-10 RX ADMIN — Medication 3 MILLIGRAM(S): at 21:47

## 2018-09-11 VITALS — TEMPERATURE: 98 F | RESPIRATION RATE: 18 BRPM

## 2018-09-11 LAB — GLUCOSE BLDC GLUCOMTR-MCNC: 162 MG/DL — HIGH (ref 70–99)

## 2018-09-11 PROCEDURE — 99238 HOSP IP/OBS DSCHRG MGMT 30/<: CPT

## 2018-09-11 RX ADMIN — Medication 0.5 MILLIGRAM(S): at 08:44

## 2018-09-11 RX ADMIN — METFORMIN HYDROCHLORIDE 500 MILLIGRAM(S): 850 TABLET ORAL at 08:44

## 2018-09-11 RX ADMIN — HALOPERIDOL DECANOATE 2 MILLIGRAM(S): 100 INJECTION INTRAMUSCULAR at 08:44

## 2018-09-11 RX ADMIN — Medication 1: at 08:40

## 2018-09-12 ENCOUNTER — OUTPATIENT (OUTPATIENT)
Dept: OUTPATIENT SERVICES | Facility: HOSPITAL | Age: 68
LOS: 1 days | Discharge: HOME | End: 2018-09-12

## 2018-09-12 PROCEDURE — 90792 PSYCH DIAG EVAL W/MED SRVCS: CPT

## 2018-09-13 DIAGNOSIS — E11.9 TYPE 2 DIABETES MELLITUS WITHOUT COMPLICATIONS: ICD-10-CM

## 2018-09-13 DIAGNOSIS — H54.7 UNSPECIFIED VISUAL LOSS: ICD-10-CM

## 2018-09-13 DIAGNOSIS — F03.90 UNSPECIFIED DEMENTIA WITHOUT BEHAVIORAL DISTURBANCE: ICD-10-CM

## 2018-09-13 DIAGNOSIS — E78.5 HYPERLIPIDEMIA, UNSPECIFIED: ICD-10-CM

## 2018-09-13 DIAGNOSIS — F25.0 SCHIZOAFFECTIVE DISORDER, BIPOLAR TYPE: ICD-10-CM

## 2018-10-30 ENCOUNTER — EMERGENCY (EMERGENCY)
Facility: HOSPITAL | Age: 68
LOS: 1 days | Discharge: HOME | End: 2018-10-30
Attending: EMERGENCY MEDICINE
Payer: MEDICARE

## 2018-10-30 VITALS
HEART RATE: 73 BPM | TEMPERATURE: 98 F | RESPIRATION RATE: 16 BRPM | OXYGEN SATURATION: 100 % | DIASTOLIC BLOOD PRESSURE: 72 MMHG | SYSTOLIC BLOOD PRESSURE: 131 MMHG

## 2018-10-30 VITALS — WEIGHT: 123.02 LBS | HEIGHT: 61 IN

## 2018-10-30 LAB
ALBUMIN SERPL ELPH-MCNC: 2.8 G/DL — LOW (ref 3.5–5)
ALP SERPL-CCNC: 63 U/L — SIGNIFICANT CHANGE UP (ref 40–120)
ALT FLD-CCNC: 29 U/L DA — SIGNIFICANT CHANGE UP (ref 10–60)
ANION GAP SERPL CALC-SCNC: 8 MMOL/L — SIGNIFICANT CHANGE UP (ref 5–17)
APAP SERPL-MCNC: <2 UG/ML — LOW (ref 10–30)
APPEARANCE UR: CLEAR — SIGNIFICANT CHANGE UP
AST SERPL-CCNC: 23 U/L — SIGNIFICANT CHANGE UP (ref 10–40)
BASOPHILS # BLD AUTO: 0.1 K/UL — SIGNIFICANT CHANGE UP (ref 0–0.2)
BASOPHILS NFR BLD AUTO: 1.5 % — SIGNIFICANT CHANGE UP (ref 0–2)
BILIRUB SERPL-MCNC: 0.3 MG/DL — SIGNIFICANT CHANGE UP (ref 0.2–1.2)
BILIRUB UR-MCNC: NEGATIVE — SIGNIFICANT CHANGE UP
BUN SERPL-MCNC: 22 MG/DL — HIGH (ref 7–18)
CALCIUM SERPL-MCNC: 9.4 MG/DL — SIGNIFICANT CHANGE UP (ref 8.4–10.5)
CHLORIDE SERPL-SCNC: 102 MMOL/L — SIGNIFICANT CHANGE UP (ref 96–108)
CO2 SERPL-SCNC: 27 MMOL/L — SIGNIFICANT CHANGE UP (ref 22–31)
COLOR SPEC: YELLOW — SIGNIFICANT CHANGE UP
CREAT SERPL-MCNC: 1.02 MG/DL — SIGNIFICANT CHANGE UP (ref 0.5–1.3)
DIFF PNL FLD: ABNORMAL
EOSINOPHIL # BLD AUTO: 0.1 K/UL — SIGNIFICANT CHANGE UP (ref 0–0.5)
EOSINOPHIL NFR BLD AUTO: 1.2 % — SIGNIFICANT CHANGE UP (ref 0–6)
ETHANOL SERPL-MCNC: <10 MG/DL — SIGNIFICANT CHANGE UP (ref 0–10)
GLUCOSE SERPL-MCNC: 197 MG/DL — HIGH (ref 70–99)
GLUCOSE UR QL: NEGATIVE — SIGNIFICANT CHANGE UP
HCT VFR BLD CALC: 42.5 % — SIGNIFICANT CHANGE UP (ref 34.5–45)
HGB BLD-MCNC: 13.6 G/DL — SIGNIFICANT CHANGE UP (ref 11.5–15.5)
KETONES UR-MCNC: NEGATIVE — SIGNIFICANT CHANGE UP
LEUKOCYTE ESTERASE UR-ACNC: ABNORMAL
LITHIUM SERPL-MCNC: 0.7 MMOL/L — SIGNIFICANT CHANGE UP (ref 0.6–1.2)
LYMPHOCYTES # BLD AUTO: 0.9 K/UL — LOW (ref 1–3.3)
LYMPHOCYTES # BLD AUTO: 9.6 % — LOW (ref 13–44)
MCHC RBC-ENTMCNC: 26.1 PG — LOW (ref 27–34)
MCHC RBC-ENTMCNC: 32 GM/DL — SIGNIFICANT CHANGE UP (ref 32–36)
MCV RBC AUTO: 81.6 FL — SIGNIFICANT CHANGE UP (ref 80–100)
MONOCYTES # BLD AUTO: 1 K/UL — HIGH (ref 0–0.9)
MONOCYTES NFR BLD AUTO: 10 % — SIGNIFICANT CHANGE UP (ref 2–14)
NEUTROPHILS # BLD AUTO: 7.4 K/UL — SIGNIFICANT CHANGE UP (ref 1.8–7.4)
NEUTROPHILS NFR BLD AUTO: 77.7 % — HIGH (ref 43–77)
NITRITE UR-MCNC: NEGATIVE — SIGNIFICANT CHANGE UP
PH UR: 7 — SIGNIFICANT CHANGE UP (ref 5–8)
PLATELET # BLD AUTO: 229 K/UL — SIGNIFICANT CHANGE UP (ref 150–400)
POTASSIUM SERPL-MCNC: 3.7 MMOL/L — SIGNIFICANT CHANGE UP (ref 3.5–5.3)
POTASSIUM SERPL-SCNC: 3.7 MMOL/L — SIGNIFICANT CHANGE UP (ref 3.5–5.3)
PROT SERPL-MCNC: 6.8 G/DL — SIGNIFICANT CHANGE UP (ref 6–8.3)
PROT UR-MCNC: 15
RBC # BLD: 5.21 M/UL — HIGH (ref 3.8–5.2)
RBC # FLD: 12.6 % — SIGNIFICANT CHANGE UP (ref 10.3–14.5)
SALICYLATES SERPL-MCNC: <1.7 MG/DL — LOW (ref 2.8–20)
SODIUM SERPL-SCNC: 137 MMOL/L — SIGNIFICANT CHANGE UP (ref 135–145)
SP GR SPEC: 1 — LOW (ref 1.01–1.02)
TROPONIN I SERPL-MCNC: <0.015 NG/ML — SIGNIFICANT CHANGE UP (ref 0–0.04)
UROBILINOGEN FLD QL: NEGATIVE — SIGNIFICANT CHANGE UP
WBC # BLD: 9.5 K/UL — SIGNIFICANT CHANGE UP (ref 3.8–10.5)
WBC # FLD AUTO: 9.5 K/UL — SIGNIFICANT CHANGE UP (ref 3.8–10.5)

## 2018-10-30 PROCEDURE — 99283 EMERGENCY DEPT VISIT LOW MDM: CPT | Mod: 25

## 2018-10-30 PROCEDURE — 99283 EMERGENCY DEPT VISIT LOW MDM: CPT

## 2018-10-30 PROCEDURE — 93005 ELECTROCARDIOGRAM TRACING: CPT

## 2018-10-30 PROCEDURE — 84484 ASSAY OF TROPONIN QUANT: CPT

## 2018-10-30 PROCEDURE — 82962 GLUCOSE BLOOD TEST: CPT

## 2018-10-30 PROCEDURE — 80053 COMPREHEN METABOLIC PANEL: CPT

## 2018-10-30 PROCEDURE — 85027 COMPLETE CBC AUTOMATED: CPT

## 2018-10-30 PROCEDURE — 80307 DRUG TEST PRSMV CHEM ANLYZR: CPT

## 2018-10-30 PROCEDURE — 81001 URINALYSIS AUTO W/SCOPE: CPT

## 2018-10-30 PROCEDURE — 80178 ASSAY OF LITHIUM: CPT

## 2018-10-30 NOTE — ED PROVIDER NOTE - MEDICAL DECISION MAKING DETAILS
69 y/o F presents with known dementia and schizoaffective disorder taking lithium. Patient sent for decreased activity over 4 days. Will obtain labs, urinalysis and reevaluate.

## 2018-10-30 NOTE — ED PROVIDER NOTE - CARE PLAN
Principal Discharge DX:	Weakness  Secondary Diagnosis:	Dementia without behavioral disturbance, unspecified dementia type

## 2018-10-30 NOTE — ED ADULT NURSE NOTE - NS ED NURSE RECORD ANOTHER HT AND WT
Advance Directive  Advance directives are legal documents that let you make choices ahead of time about your health care and medical treatment in case you become unable to communicate for yourself. Advance directives are a way for you to communicate your wishes to family, friends, and health care providers. This can help convey your decisions about end-of-life care if you become unable to communicate.  Discussing and writing advance directives should happen over time rather than all at once. Advance directives can be changed depending on your situation and what you want, even after you have signed the advance directives.  If you do not have an advance directive, some states assign family decision makers to act on your behalf based on how closely you are related to them. Each state has its own laws regarding advance directives. You may want to check with your health care provider, , or state representative about the laws in your state. There are different types of advance directives, such as:  · Medical power of .  · Living will.  · Do not resuscitate (DNR) or do not attempt resuscitation (DNAR) order.  Health care proxy and medical power of   A health care proxy, also called a health care agent, is a person who is appointed to make medical decisions for you in cases in which you are unable to make the decisions yourself. Generally, people choose someone they know well and trust to represent their preferences. Make sure to ask this person for an agreement to act as your proxy. A proxy may have to exercise judgment in the event of a medical decision for which your wishes are not known.  A medical power of  is a legal document that names your health care proxy. Depending on the laws in your state, after the document is written, it may also need to be:  · Signed.  · Notarized.  · Dated.  · Copied.  · Witnessed.  · Incorporated into your medical record.  You may also want to appoint  someone to manage your financial affairs in a situation in which you are unable to do so. This is called a durable power of  for finances. It is a separate legal document from the durable power of  for health care. You may choose the same person or someone different from your health care proxy to act as your agent in financial matters.  If you do not appoint a proxy, or if there is a concern that the proxy is not acting in your best interests, a court-appointed guardian may be designated to act on your behalf.  Living will  A living will is a set of instructions documenting your wishes about medical care when you cannot express them yourself. Health care providers should keep a copy of your living will in your medical record. You may want to give a copy to family members or friends. To alert caregivers in case of an emergency, you can place a card in your wallet to let them know that you have a living will and where they can find it. A living will is used if you become:  · Terminally ill.  · Incapacitated.  · Unable to communicate or make decisions.  Items to consider in your living will include:  · The use or non-use of life-sustaining equipment, such as dialysis machines and breathing machines (ventilators).  · A DNR or DNAR order, which is the instruction not to use cardiopulmonary resuscitation (CPR) if breathing or heartbeat stops.  · The use or non-use of tube feeding.  · Withholding of food and fluids.  · Comfort (palliative) care when the goal becomes comfort rather than a cure.  · Organ and tissue donation.  A living will does not give instructions for distributing your money and property if you should pass away. It is recommended that you seek the advice of a  when writing a will. Decisions about taxes, beneficiaries, and asset distribution will be legally binding. This process can relieve your family and friends of any concerns surrounding disputes or questions that may come up about  the distribution of your assets.  DNR or DNAR  A DNR or DNAR order is a request not to have CPR in the event that your heart stops beating or you stop breathing. If a DNR or DNAR order has not been made and shared, a health care provider will try to help any patient whose heart has stopped or who has stopped breathing. If you plan to have surgery, talk with your health care provider about how your DNR or DNAR order will be followed if problems occur.  Summary  · Advance directives are the legal documents that allow you to make choices ahead of time about your health care and medical treatment in case you become unable to communicate for yourself.  · The process of discussing and writing advance directives should happen over time. You can change the advance directives, even after you have signed them.  · Advance directives include DNR or DNAR orders, living hubbard, and designating an agent as your medical power of .  This information is not intended to replace advice given to you by your health care provider. Make sure you discuss any questions you have with your health care provider.  Document Released: 03/26/2009 Document Revised: 11/06/2017 Document Reviewed: 11/06/2017  [employer] Interactive Patient Education © 2017 [employer]     Yes

## 2018-10-30 NOTE — ED PROVIDER NOTE - OBJECTIVE STATEMENT
67 y/o F with PMHx of dementia, schizoaffective disorder, DM, legally blind presents to the ED sent from home by visiting nurse with  for decreased activity level x 4 days. Patient was started on lithium a few months ago. Home care nurse wanted labs, lithium level and urinalysis.  lives with patient and denies injury, fever, vomiting, diarrhea, neurologic symptoms, syncope, LOC or any other complaints. Allergies: PCNs, Phenothiazines, Sulfa drugs, Stelazine, Sulfur.

## 2018-10-30 NOTE — ED ADULT TRIAGE NOTE - CHIEF COMPLAINT QUOTE
Lethargic x Friday as per spouse. Says PMD wants her Lithium level checked. Lethargic x Friday as per spouse. Says PMD wants her Lithium level checked. Has h/o Dementia.

## 2018-10-30 NOTE — ED ADULT NURSE NOTE - OBJECTIVE STATEMENT
As per  pt not herself poor appetite frequent urination and decreased activity. H/o vision impairment

## 2018-11-20 ENCOUNTER — EMERGENCY (EMERGENCY)
Facility: HOSPITAL | Age: 68
LOS: 1 days | Discharge: HOME | End: 2018-11-20
Attending: EMERGENCY MEDICINE
Payer: MEDICARE

## 2018-11-20 VITALS
OXYGEN SATURATION: 98 % | TEMPERATURE: 98 F | DIASTOLIC BLOOD PRESSURE: 93 MMHG | SYSTOLIC BLOOD PRESSURE: 159 MMHG | HEART RATE: 98 BPM | RESPIRATION RATE: 18 BRPM

## 2018-11-20 VITALS
RESPIRATION RATE: 16 BRPM | SYSTOLIC BLOOD PRESSURE: 152 MMHG | HEART RATE: 93 BPM | TEMPERATURE: 98 F | DIASTOLIC BLOOD PRESSURE: 74 MMHG | OXYGEN SATURATION: 99 %

## 2018-11-20 DIAGNOSIS — K62.3 RECTAL PROLAPSE: Chronic | ICD-10-CM

## 2018-11-20 PROCEDURE — 99284 EMERGENCY DEPT VISIT MOD MDM: CPT

## 2018-11-20 PROCEDURE — 70450 CT HEAD/BRAIN W/O DYE: CPT | Mod: 26

## 2018-11-20 PROCEDURE — 99284 EMERGENCY DEPT VISIT MOD MDM: CPT | Mod: 25

## 2018-11-20 PROCEDURE — 70450 CT HEAD/BRAIN W/O DYE: CPT

## 2018-11-20 PROCEDURE — 72125 CT NECK SPINE W/O DYE: CPT

## 2018-11-20 RX ORDER — ACETAMINOPHEN 500 MG
650 TABLET ORAL ONCE
Qty: 0 | Refills: 0 | Status: COMPLETED | OUTPATIENT
Start: 2018-11-20 | End: 2018-11-20

## 2018-11-20 RX ADMIN — Medication 650 MILLIGRAM(S): at 23:54

## 2018-11-20 NOTE — ED PROVIDER NOTE - OBJECTIVE STATEMENT
68 y.o. female with h/o NIDDM, last dose 6pm, as per , pt was sitting on the sofa,  instructed pt not to get up, pt with dementia, got up,  heard a noise, found pt on the floor in supine position, pt denies any pain @ the time.  no bleeding, LOC 68 y.o. female with h/o NIDDM, last dose 6pm, as per , pt was sitting on the sofa,  instructed pt not to get up, pt with dementia, got up,  heard a noise, found pt on the floor in supine position, pt denies any pain @ the time.  no bleeding, LOC,  is not sure if he head hit the exercise bike nearby

## 2018-11-20 NOTE — ED ADULT NURSE NOTE - NSIMPLEMENTINTERV_GEN_ALL_ED
Implemented All Fall Risk Interventions:  [place] to call system. Call bell, personal items and telephone within reach. Instruct patient to call for assistance. Room bathroom lighting operational. Non-slip footwear when patient is off stretcher. Physically safe environment: no spills, clutter or unnecessary equipment. Stretcher in lowest position, wheels locked, appropriate side rails in place. Provide visual cue, wrist band, yellow gown, etc. Monitor gait and stability. Monitor for mental status changes and reorient to person, place, and time. Review medications for side effects contributing to fall risk. Reinforce activity limits and safety measures with patient and family.

## 2018-11-20 NOTE — ED ADULT NURSE NOTE - OBJECTIVE STATEMENT
68 years old female brought to ED by  h/o falling and striking the occiput, pt denies any pain @ the time.  no bleeding, LOC,  is not sure if he head hit the exercise bike nearby. ED physician is aware of patient.

## 2018-11-20 NOTE — ED PROVIDER NOTE - PROGRESS NOTE DETAILS
pt now c/o neck pain, will get CT cervical pt now c/o ?neck pain, unable to verbalize, will get CT cervical CT head/cervical spine-neg, advised to f/u with PMD regarding abn thyroid finding

## 2018-11-20 NOTE — ED PROVIDER NOTE - MUSCULOSKELETAL, MLM
Spine appears normal, range of motion is not limited, no muscle or joint tenderness, generalized stiffness, no hematoma

## 2018-11-21 PROCEDURE — 72125 CT NECK SPINE W/O DYE: CPT | Mod: 26

## 2019-02-22 LAB — LITHIUM SERPL-MCNC: 0.83 MMOL/L — SIGNIFICANT CHANGE UP (ref 0.6–1.2)

## 2019-06-19 LAB
ALBUMIN SERPL ELPH-MCNC: 4.2 G/DL — SIGNIFICANT CHANGE UP (ref 3.3–5)
ALP SERPL-CCNC: 55 U/L — SIGNIFICANT CHANGE UP (ref 40–120)
ALT FLD-CCNC: 15 U/L — SIGNIFICANT CHANGE UP (ref 4–33)
ANION GAP SERPL CALC-SCNC: 12 MMO/L — SIGNIFICANT CHANGE UP (ref 7–14)
AST SERPL-CCNC: 20 U/L — SIGNIFICANT CHANGE UP (ref 4–32)
BASOPHILS # BLD AUTO: 0.05 K/UL — SIGNIFICANT CHANGE UP (ref 0–0.2)
BASOPHILS NFR BLD AUTO: 0.5 % — SIGNIFICANT CHANGE UP (ref 0–2)
BILIRUB SERPL-MCNC: 0.5 MG/DL — SIGNIFICANT CHANGE UP (ref 0.2–1.2)
BUN SERPL-MCNC: 34 MG/DL — HIGH (ref 7–23)
CALCIUM SERPL-MCNC: 11.7 MG/DL — HIGH (ref 8.4–10.5)
CHLORIDE SERPL-SCNC: 100 MMOL/L — SIGNIFICANT CHANGE UP (ref 98–107)
CHOLEST SERPL-MCNC: 126 MG/DL — SIGNIFICANT CHANGE UP (ref 120–199)
CO2 SERPL-SCNC: 28 MMOL/L — SIGNIFICANT CHANGE UP (ref 22–31)
CREAT SERPL-MCNC: 1.02 MG/DL — SIGNIFICANT CHANGE UP (ref 0.5–1.3)
EOSINOPHIL # BLD AUTO: 0.07 K/UL — SIGNIFICANT CHANGE UP (ref 0–0.5)
EOSINOPHIL NFR BLD AUTO: 0.7 % — SIGNIFICANT CHANGE UP (ref 0–6)
GLUCOSE SERPL-MCNC: 113 MG/DL — HIGH (ref 70–99)
HBA1C BLD-MCNC: 6.1 % — HIGH (ref 4–5.6)
HCT VFR BLD CALC: 40.2 % — SIGNIFICANT CHANGE UP (ref 34.5–45)
HDLC SERPL-MCNC: 70 MG/DL — HIGH (ref 45–65)
HGB BLD-MCNC: 12.1 G/DL — SIGNIFICANT CHANGE UP (ref 11.5–15.5)
IMM GRANULOCYTES NFR BLD AUTO: 0.4 % — SIGNIFICANT CHANGE UP (ref 0–1.5)
LIPID PNL WITH DIRECT LDL SERPL: 59 MG/DL — SIGNIFICANT CHANGE UP
LITHIUM SERPL-MCNC: 1.48 MMOL/L — HIGH (ref 0.6–1.2)
LYMPHOCYTES # BLD AUTO: 1.58 K/UL — SIGNIFICANT CHANGE UP (ref 1–3.3)
LYMPHOCYTES # BLD AUTO: 15.3 % — SIGNIFICANT CHANGE UP (ref 13–44)
MCHC RBC-ENTMCNC: 26.5 PG — LOW (ref 27–34)
MCHC RBC-ENTMCNC: 30.1 % — LOW (ref 32–36)
MCV RBC AUTO: 88 FL — SIGNIFICANT CHANGE UP (ref 80–100)
MONOCYTES # BLD AUTO: 0.61 K/UL — SIGNIFICANT CHANGE UP (ref 0–0.9)
MONOCYTES NFR BLD AUTO: 5.9 % — SIGNIFICANT CHANGE UP (ref 2–14)
NEUTROPHILS # BLD AUTO: 8 K/UL — HIGH (ref 1.8–7.4)
NEUTROPHILS NFR BLD AUTO: 77.2 % — HIGH (ref 43–77)
NRBC # FLD: 0 K/UL — SIGNIFICANT CHANGE UP (ref 0–0)
PLATELET # BLD AUTO: 303 K/UL — SIGNIFICANT CHANGE UP (ref 150–400)
PMV BLD: 10.6 FL — SIGNIFICANT CHANGE UP (ref 7–13)
POTASSIUM SERPL-MCNC: 4.3 MMOL/L — SIGNIFICANT CHANGE UP (ref 3.5–5.3)
POTASSIUM SERPL-SCNC: 4.3 MMOL/L — SIGNIFICANT CHANGE UP (ref 3.5–5.3)
PROT SERPL-MCNC: 6.9 G/DL — SIGNIFICANT CHANGE UP (ref 6–8.3)
RBC # BLD: 4.57 M/UL — SIGNIFICANT CHANGE UP (ref 3.8–5.2)
RBC # FLD: 15.8 % — HIGH (ref 10.3–14.5)
SODIUM SERPL-SCNC: 140 MMOL/L — SIGNIFICANT CHANGE UP (ref 135–145)
TRIGL SERPL-MCNC: 75 MG/DL — SIGNIFICANT CHANGE UP (ref 10–149)
TSH SERPL-MCNC: 3.72 UIU/ML — SIGNIFICANT CHANGE UP (ref 0.27–4.2)
WBC # BLD: 10.35 K/UL — SIGNIFICANT CHANGE UP (ref 3.8–10.5)
WBC # FLD AUTO: 10.35 K/UL — SIGNIFICANT CHANGE UP (ref 3.8–10.5)

## 2019-07-22 LAB
ALBUMIN SERPL ELPH-MCNC: 4.2 G/DL — SIGNIFICANT CHANGE UP (ref 3.3–5)
ALP SERPL-CCNC: 59 U/L — SIGNIFICANT CHANGE UP (ref 40–120)
ALT FLD-CCNC: 14 U/L — SIGNIFICANT CHANGE UP (ref 4–33)
ANION GAP SERPL CALC-SCNC: 16 MMO/L — HIGH (ref 7–14)
AST SERPL-CCNC: 21 U/L — SIGNIFICANT CHANGE UP (ref 4–32)
BASOPHILS # BLD AUTO: 0.05 K/UL — SIGNIFICANT CHANGE UP (ref 0–0.2)
BASOPHILS NFR BLD AUTO: 0.5 % — SIGNIFICANT CHANGE UP (ref 0–2)
BILIRUB SERPL-MCNC: 0.4 MG/DL — SIGNIFICANT CHANGE UP (ref 0.2–1.2)
BUN SERPL-MCNC: 31 MG/DL — HIGH (ref 7–23)
CA-I BLD-SCNC: 1.37 MMOL/L — HIGH (ref 1.03–1.23)
CALCIUM SERPL-MCNC: 11.4 MG/DL — HIGH (ref 8.4–10.5)
CHLORIDE SERPL-SCNC: 105 MMOL/L — SIGNIFICANT CHANGE UP (ref 98–107)
CO2 SERPL-SCNC: 23 MMOL/L — SIGNIFICANT CHANGE UP (ref 22–31)
CREAT SERPL-MCNC: 0.98 MG/DL — SIGNIFICANT CHANGE UP (ref 0.5–1.3)
EOSINOPHIL # BLD AUTO: 0.21 K/UL — SIGNIFICANT CHANGE UP (ref 0–0.5)
EOSINOPHIL NFR BLD AUTO: 1.9 % — SIGNIFICANT CHANGE UP (ref 0–6)
GLUCOSE SERPL-MCNC: 127 MG/DL — HIGH (ref 70–99)
HBA1C BLD-MCNC: 6.1 % — HIGH (ref 4–5.6)
HCT VFR BLD CALC: 41.1 % — SIGNIFICANT CHANGE UP (ref 34.5–45)
HGB BLD-MCNC: 12.3 G/DL — SIGNIFICANT CHANGE UP (ref 11.5–15.5)
IMM GRANULOCYTES NFR BLD AUTO: 0.4 % — SIGNIFICANT CHANGE UP (ref 0–1.5)
LITHIUM SERPL-MCNC: 1.43 MMOL/L — HIGH (ref 0.6–1.2)
LYMPHOCYTES # BLD AUTO: 1.81 K/UL — SIGNIFICANT CHANGE UP (ref 1–3.3)
LYMPHOCYTES # BLD AUTO: 16.4 % — SIGNIFICANT CHANGE UP (ref 13–44)
MCHC RBC-ENTMCNC: 26.9 PG — LOW (ref 27–34)
MCHC RBC-ENTMCNC: 29.9 % — LOW (ref 32–36)
MCV RBC AUTO: 89.7 FL — SIGNIFICANT CHANGE UP (ref 80–100)
MONOCYTES # BLD AUTO: 0.81 K/UL — SIGNIFICANT CHANGE UP (ref 0–0.9)
MONOCYTES NFR BLD AUTO: 7.3 % — SIGNIFICANT CHANGE UP (ref 2–14)
NEUTROPHILS # BLD AUTO: 8.13 K/UL — HIGH (ref 1.8–7.4)
NEUTROPHILS NFR BLD AUTO: 73.5 % — SIGNIFICANT CHANGE UP (ref 43–77)
NRBC # FLD: 0 K/UL — SIGNIFICANT CHANGE UP (ref 0–0)
PLATELET # BLD AUTO: 226 K/UL — SIGNIFICANT CHANGE UP (ref 150–400)
PMV BLD: 12 FL — SIGNIFICANT CHANGE UP (ref 7–13)
POTASSIUM SERPL-MCNC: 4.4 MMOL/L — SIGNIFICANT CHANGE UP (ref 3.5–5.3)
POTASSIUM SERPL-SCNC: 4.4 MMOL/L — SIGNIFICANT CHANGE UP (ref 3.5–5.3)
PROT SERPL-MCNC: 6.9 G/DL — SIGNIFICANT CHANGE UP (ref 6–8.3)
PTH-INTACT SERPL-MCNC: 33.59 PG/ML — SIGNIFICANT CHANGE UP (ref 15–65)
RBC # BLD: 4.58 M/UL — SIGNIFICANT CHANGE UP (ref 3.8–5.2)
RBC # FLD: 14.6 % — HIGH (ref 10.3–14.5)
SODIUM SERPL-SCNC: 144 MMOL/L — SIGNIFICANT CHANGE UP (ref 135–145)
TSH SERPL-MCNC: 3.72 UIU/ML — SIGNIFICANT CHANGE UP (ref 0.27–4.2)
VIT B12 SERPL-MCNC: 1686 PG/ML — HIGH (ref 200–900)
WBC # BLD: 11.05 K/UL — HIGH (ref 3.8–10.5)
WBC # FLD AUTO: 11.05 K/UL — HIGH (ref 3.8–10.5)

## 2019-07-29 LAB
ANION GAP SERPL CALC-SCNC: 15 MMO/L — HIGH (ref 7–14)
BUN SERPL-MCNC: 24 MG/DL — HIGH (ref 7–23)
CALCIUM SERPL-MCNC: 10.9 MG/DL — HIGH (ref 8.4–10.5)
CHLORIDE SERPL-SCNC: 105 MMOL/L — SIGNIFICANT CHANGE UP (ref 98–107)
CO2 SERPL-SCNC: 22 MMOL/L — SIGNIFICANT CHANGE UP (ref 22–31)
CREAT SERPL-MCNC: 0.93 MG/DL — SIGNIFICANT CHANGE UP (ref 0.5–1.3)
GLUCOSE SERPL-MCNC: 165 MG/DL — HIGH (ref 70–99)
LITHIUM SERPL-MCNC: 0.69 MMOL/L — SIGNIFICANT CHANGE UP (ref 0.6–1.2)
POTASSIUM SERPL-MCNC: 4 MMOL/L — SIGNIFICANT CHANGE UP (ref 3.5–5.3)
POTASSIUM SERPL-SCNC: 4 MMOL/L — SIGNIFICANT CHANGE UP (ref 3.5–5.3)
SODIUM SERPL-SCNC: 142 MMOL/L — SIGNIFICANT CHANGE UP (ref 135–145)

## 2019-09-04 LAB
ALBUMIN SERPL ELPH-MCNC: 4.1 G/DL — SIGNIFICANT CHANGE UP (ref 3.3–5)
ALP SERPL-CCNC: 62 U/L — SIGNIFICANT CHANGE UP (ref 40–120)
ALT FLD-CCNC: 10 U/L — SIGNIFICANT CHANGE UP (ref 4–33)
ANION GAP SERPL CALC-SCNC: 16 MMO/L — HIGH (ref 7–14)
AST SERPL-CCNC: 19 U/L — SIGNIFICANT CHANGE UP (ref 4–32)
BILIRUB SERPL-MCNC: 0.5 MG/DL — SIGNIFICANT CHANGE UP (ref 0.2–1.2)
BUN SERPL-MCNC: 18 MG/DL — SIGNIFICANT CHANGE UP (ref 7–23)
CALCIUM SERPL-MCNC: 10.7 MG/DL — HIGH (ref 8.4–10.5)
CHLORIDE SERPL-SCNC: 98 MMOL/L — SIGNIFICANT CHANGE UP (ref 98–107)
CO2 SERPL-SCNC: 25 MMOL/L — SIGNIFICANT CHANGE UP (ref 22–31)
CREAT SERPL-MCNC: 0.85 MG/DL — SIGNIFICANT CHANGE UP (ref 0.5–1.3)
GLUCOSE SERPL-MCNC: 111 MG/DL — HIGH (ref 70–99)
LITHIUM SERPL-MCNC: 0.46 MMOL/L — LOW (ref 0.6–1.2)
POTASSIUM SERPL-MCNC: 3.6 MMOL/L — SIGNIFICANT CHANGE UP (ref 3.5–5.3)
POTASSIUM SERPL-SCNC: 3.6 MMOL/L — SIGNIFICANT CHANGE UP (ref 3.5–5.3)
PROT SERPL-MCNC: 7 G/DL — SIGNIFICANT CHANGE UP (ref 6–8.3)
SODIUM SERPL-SCNC: 139 MMOL/L — SIGNIFICANT CHANGE UP (ref 135–145)

## 2019-10-19 ENCOUNTER — EMERGENCY (EMERGENCY)
Facility: HOSPITAL | Age: 69
LOS: 1 days | Discharge: HOME | End: 2019-10-19
Attending: EMERGENCY MEDICINE
Payer: MEDICARE

## 2019-10-19 VITALS
HEART RATE: 105 BPM | DIASTOLIC BLOOD PRESSURE: 93 MMHG | WEIGHT: 117.95 LBS | OXYGEN SATURATION: 100 % | TEMPERATURE: 98 F | RESPIRATION RATE: 20 BRPM | SYSTOLIC BLOOD PRESSURE: 169 MMHG | HEIGHT: 61 IN

## 2019-10-19 VITALS
RESPIRATION RATE: 16 BRPM | HEART RATE: 91 BPM | OXYGEN SATURATION: 97 % | DIASTOLIC BLOOD PRESSURE: 84 MMHG | SYSTOLIC BLOOD PRESSURE: 138 MMHG | TEMPERATURE: 98 F

## 2019-10-19 DIAGNOSIS — K62.3 RECTAL PROLAPSE: Chronic | ICD-10-CM

## 2019-10-19 LAB
ALBUMIN SERPL ELPH-MCNC: 3.2 G/DL — LOW (ref 3.5–5)
ALP SERPL-CCNC: 99 U/L — SIGNIFICANT CHANGE UP (ref 40–120)
ALT FLD-CCNC: 19 U/L DA — SIGNIFICANT CHANGE UP (ref 10–60)
ANION GAP SERPL CALC-SCNC: 8 MMOL/L — SIGNIFICANT CHANGE UP (ref 5–17)
APPEARANCE UR: CLEAR — SIGNIFICANT CHANGE UP
AST SERPL-CCNC: 21 U/L — SIGNIFICANT CHANGE UP (ref 10–40)
BACTERIA # UR AUTO: ABNORMAL /HPF
BASOPHILS # BLD AUTO: 0.03 K/UL — SIGNIFICANT CHANGE UP (ref 0–0.2)
BASOPHILS NFR BLD AUTO: 0.3 % — SIGNIFICANT CHANGE UP (ref 0–2)
BILIRUB SERPL-MCNC: 0.4 MG/DL — SIGNIFICANT CHANGE UP (ref 0.2–1.2)
BILIRUB UR-MCNC: NEGATIVE — SIGNIFICANT CHANGE UP
BUN SERPL-MCNC: 17 MG/DL — SIGNIFICANT CHANGE UP (ref 7–18)
CALCIUM SERPL-MCNC: 10 MG/DL — SIGNIFICANT CHANGE UP (ref 8.4–10.5)
CHLORIDE SERPL-SCNC: 108 MMOL/L — SIGNIFICANT CHANGE UP (ref 96–108)
CO2 SERPL-SCNC: 27 MMOL/L — SIGNIFICANT CHANGE UP (ref 22–31)
COLOR SPEC: YELLOW — SIGNIFICANT CHANGE UP
CREAT SERPL-MCNC: 1.13 MG/DL — SIGNIFICANT CHANGE UP (ref 0.5–1.3)
DIFF PNL FLD: ABNORMAL
EOSINOPHIL # BLD AUTO: 0.04 K/UL — SIGNIFICANT CHANGE UP (ref 0–0.5)
EOSINOPHIL NFR BLD AUTO: 0.3 % — SIGNIFICANT CHANGE UP (ref 0–6)
EPI CELLS # UR: ABNORMAL /HPF
GLUCOSE SERPL-MCNC: 182 MG/DL — HIGH (ref 70–99)
GLUCOSE UR QL: NEGATIVE — SIGNIFICANT CHANGE UP
HCT VFR BLD CALC: 38.6 % — SIGNIFICANT CHANGE UP (ref 34.5–45)
HGB BLD-MCNC: 11.7 G/DL — SIGNIFICANT CHANGE UP (ref 11.5–15.5)
IMM GRANULOCYTES NFR BLD AUTO: 0.4 % — SIGNIFICANT CHANGE UP (ref 0–1.5)
KETONES UR-MCNC: NEGATIVE — SIGNIFICANT CHANGE UP
LEUKOCYTE ESTERASE UR-ACNC: ABNORMAL
LITHIUM SERPL-MCNC: 0.6 MMOL/L — SIGNIFICANT CHANGE UP (ref 0.6–1.2)
LYMPHOCYTES # BLD AUTO: 1.2 K/UL — SIGNIFICANT CHANGE UP (ref 1–3.3)
LYMPHOCYTES # BLD AUTO: 10.1 % — LOW (ref 13–44)
MCHC RBC-ENTMCNC: 25.3 PG — LOW (ref 27–34)
MCHC RBC-ENTMCNC: 30.3 GM/DL — LOW (ref 32–36)
MCV RBC AUTO: 83.4 FL — SIGNIFICANT CHANGE UP (ref 80–100)
MONOCYTES # BLD AUTO: 0.6 K/UL — SIGNIFICANT CHANGE UP (ref 0–0.9)
MONOCYTES NFR BLD AUTO: 5.1 % — SIGNIFICANT CHANGE UP (ref 2–14)
NEUTROPHILS # BLD AUTO: 9.94 K/UL — HIGH (ref 1.8–7.4)
NEUTROPHILS NFR BLD AUTO: 83.8 % — HIGH (ref 43–77)
NITRITE UR-MCNC: NEGATIVE — SIGNIFICANT CHANGE UP
NRBC # BLD: 0 /100 WBCS — SIGNIFICANT CHANGE UP (ref 0–0)
PH UR: 8 — SIGNIFICANT CHANGE UP (ref 5–8)
PLATELET # BLD AUTO: 362 K/UL — SIGNIFICANT CHANGE UP (ref 150–400)
POTASSIUM SERPL-MCNC: 4 MMOL/L — SIGNIFICANT CHANGE UP (ref 3.5–5.3)
POTASSIUM SERPL-SCNC: 4 MMOL/L — SIGNIFICANT CHANGE UP (ref 3.5–5.3)
PROT SERPL-MCNC: 6.7 G/DL — SIGNIFICANT CHANGE UP (ref 6–8.3)
PROT UR-MCNC: 30 MG/DL
RBC # BLD: 4.63 M/UL — SIGNIFICANT CHANGE UP (ref 3.8–5.2)
RBC # FLD: 14.4 % — SIGNIFICANT CHANGE UP (ref 10.3–14.5)
RBC CASTS # UR COMP ASSIST: ABNORMAL /HPF (ref 0–2)
SODIUM SERPL-SCNC: 143 MMOL/L — SIGNIFICANT CHANGE UP (ref 135–145)
SP GR SPEC: 1.01 — SIGNIFICANT CHANGE UP (ref 1.01–1.02)
TROPONIN I SERPL-MCNC: <0.015 NG/ML — SIGNIFICANT CHANGE UP (ref 0–0.04)
UROBILINOGEN FLD QL: NEGATIVE — SIGNIFICANT CHANGE UP
WBC # BLD: 11.86 K/UL — HIGH (ref 3.8–10.5)
WBC # FLD AUTO: 11.86 K/UL — HIGH (ref 3.8–10.5)
WBC UR QL: ABNORMAL /HPF (ref 0–5)

## 2019-10-19 PROCEDURE — 71046 X-RAY EXAM CHEST 2 VIEWS: CPT

## 2019-10-19 PROCEDURE — 85027 COMPLETE CBC AUTOMATED: CPT

## 2019-10-19 PROCEDURE — 84484 ASSAY OF TROPONIN QUANT: CPT

## 2019-10-19 PROCEDURE — 81001 URINALYSIS AUTO W/SCOPE: CPT

## 2019-10-19 PROCEDURE — 71046 X-RAY EXAM CHEST 2 VIEWS: CPT | Mod: 26

## 2019-10-19 PROCEDURE — 72125 CT NECK SPINE W/O DYE: CPT

## 2019-10-19 PROCEDURE — 99285 EMERGENCY DEPT VISIT HI MDM: CPT | Mod: 25

## 2019-10-19 PROCEDURE — 99285 EMERGENCY DEPT VISIT HI MDM: CPT

## 2019-10-19 PROCEDURE — 80178 ASSAY OF LITHIUM: CPT

## 2019-10-19 PROCEDURE — 36415 COLL VENOUS BLD VENIPUNCTURE: CPT

## 2019-10-19 PROCEDURE — 70450 CT HEAD/BRAIN W/O DYE: CPT

## 2019-10-19 PROCEDURE — 93005 ELECTROCARDIOGRAM TRACING: CPT

## 2019-10-19 PROCEDURE — 80053 COMPREHEN METABOLIC PANEL: CPT

## 2019-10-19 PROCEDURE — 72125 CT NECK SPINE W/O DYE: CPT | Mod: 26

## 2019-10-19 PROCEDURE — 70450 CT HEAD/BRAIN W/O DYE: CPT | Mod: 26

## 2019-10-19 RX ORDER — NITROFURANTOIN MACROCRYSTAL 50 MG
1 CAPSULE ORAL
Qty: 10 | Refills: 0
Start: 2019-10-19 | End: 2019-10-23

## 2019-10-19 NOTE — ED PROVIDER NOTE - ENMT, MLM
Airway patent, Nasal mucosa clear. Mouth with normal mucosa. Throat has no vesicles, no oropharyngeal exudates and uvula is midline. Airway patent, Nasal mucosa clear.  Throat has no vesicles, no oropharyngeal exudates and uvula is midline. Dry mucous membranes

## 2019-10-19 NOTE — ED ADULT NURSE NOTE - NSIMPLEMENTINTERV_GEN_ALL_ED
Implemented All Fall with Harm Risk Interventions:  [place] to call system. Call bell, personal items and telephone within reach. Instruct patient to call for assistance. Room bathroom lighting operational. Non-slip footwear when patient is off stretcher. Physically safe environment: no spills, clutter or unnecessary equipment. Stretcher in lowest position, wheels locked, appropriate side rails in place. Provide visual cue, wrist band, yellow gown, etc. Monitor gait and stability. Monitor for mental status changes and reorient to person, place, and time. Review medications for side effects contributing to fall risk. Reinforce activity limits and safety measures with patient and family. Provide visual clues: red socks.

## 2019-10-19 NOTE — ED PROVIDER NOTE - MUSCULOSKELETAL, MLM
Spine appears normal, range of motion is not limited, no muscle or joint tenderness. PELVIS IS STABLE. MOVING ALL EXTREMITIES.

## 2019-10-19 NOTE — ED PROVIDER NOTE - CLINICAL SUMMARY MEDICAL DECISION MAKING FREE TEXT BOX
68 yo F s/p fall. Hx of schizophrenia and dementia. Poor historian and cannot relay events.  witnessed fall. CT head, C spine and XR negative for acute pathology. Labs grossly unremarkable. UA + for leuk esterase. Will tx for UTI. Rx provided for macrobid. Vital signs stable. Nontoxic and medically stable for discharge. Return precautions provided and patient understands to return to the ED for worsening signs and symptoms. Instructed to follow up with primary care physician and agreeable. Patient's questions answered.

## 2019-10-19 NOTE — ED PROVIDER NOTE - OBJECTIVE STATEMENT
70 y/o F patient with a significant PMHx of dementia, schizophrenia, depression, DM, legally blind bilaterally and with no significant PSHx presents to the ED s/p fall. As per  who witnessed fall, patient was getting up when she fell back and hit her head at 3:15 pm. Patient was ambulatory after fall and did not loose loc. Patient does not comprehend and speaks minimally at baseline, is a poor historian. Patient has had no recent medication changes. allergies: penicillin, stelazine, sulfur, phenothiazine, sulfa drugs. 68 y/o F patient with a significant PMHx of dementia, schizophrenia, depression, DM, legally blind bilaterally presents to the ED s/p fall. As per  who witnessed fall, patient was getting up when she fell back and hit her head at 3:15 pm. Patient was ambulatory after fall and did not lose loc. Patient does not comprehend and speaks minimally at baseline, is a poor historian. Patient has had no recent medication changes. allergies: penicillin, stelazine, sulfur, phenothiazine, sulfa drugs.

## 2019-10-19 NOTE — ED PROVIDER NOTE - NSFOLLOWUPCLINICS_GEN_ALL_ED_FT
[facility]  [facility]  [address] - [address]nd [place] [address]  Phone: [phone]  Fax: [phone]  Follow Up Time:

## 2019-10-19 NOTE — ED PROVIDER NOTE - PATIENT PORTAL LINK FT
You can access the FollowMyHealth Patient Portal offered by [facility] by registering at the following website: [email or web address]. By joining [employer]’s FollowMyHealth portal, you will also be able to view your health information using other applications (apps) compatible with our system.

## 2019-10-19 NOTE — ED PROVIDER NOTE - CONSTITUTIONAL, MLM
normal... Well appearing, well nourished, awake, alert, oriented to person, place, time/situation and in no apparent distress. well nourished, awake, in no apparent distress.

## 2019-10-19 NOTE — ED ADULT TRIAGE NOTE - CHIEF COMPLAINT QUOTE
Fell and hit back of head x 3.15 pm. No known loc. Pt has Dementia and Mental health issues. Also Blind

## 2019-10-19 NOTE — ED PROVIDER NOTE - NSFOLLOWUPINSTRUCTIONS_ED_ALL_ED_FT
You were seen today after your fall. You had a CAT scan which did not show acute fracture. Your lithium level was within normal limits. You were found to have a urinary tract infection. Please follow up with your primary care doctor. Please return to the Emergency Department for worsening signs or symptoms.

## 2019-10-19 NOTE — ED PROVIDER NOTE - EYES, MLM
Clear bilaterally, pupils equal, round and reactive to light. pupils equal, round and reactive to light.

## 2019-11-11 ENCOUNTER — INPATIENT (INPATIENT)
Facility: HOSPITAL | Age: 69
LOS: 22 days | Discharge: HOSPICE | End: 2019-12-04
Attending: HOSPITALIST | Admitting: HOSPITALIST
Payer: MEDICARE

## 2019-11-11 VITALS
OXYGEN SATURATION: 98 % | RESPIRATION RATE: 16 BRPM | HEART RATE: 99 BPM | SYSTOLIC BLOOD PRESSURE: 152 MMHG | DIASTOLIC BLOOD PRESSURE: 76 MMHG

## 2019-11-11 DIAGNOSIS — N18.2 CHRONIC KIDNEY DISEASE, STAGE 2 (MILD): ICD-10-CM

## 2019-11-11 DIAGNOSIS — N39.0 URINARY TRACT INFECTION, SITE NOT SPECIFIED: ICD-10-CM

## 2019-11-11 DIAGNOSIS — E11.9 TYPE 2 DIABETES MELLITUS WITHOUT COMPLICATIONS: ICD-10-CM

## 2019-11-11 DIAGNOSIS — K62.3 RECTAL PROLAPSE: Chronic | ICD-10-CM

## 2019-11-11 DIAGNOSIS — F20.9 SCHIZOPHRENIA, UNSPECIFIED: ICD-10-CM

## 2019-11-11 DIAGNOSIS — Z02.9 ENCOUNTER FOR ADMINISTRATIVE EXAMINATIONS, UNSPECIFIED: ICD-10-CM

## 2019-11-11 DIAGNOSIS — F03.91 UNSPECIFIED DEMENTIA WITH BEHAVIORAL DISTURBANCE: ICD-10-CM

## 2019-11-11 DIAGNOSIS — I50.32 CHRONIC DIASTOLIC (CONGESTIVE) HEART FAILURE: ICD-10-CM

## 2019-11-11 DIAGNOSIS — G93.41 METABOLIC ENCEPHALOPATHY: ICD-10-CM

## 2019-11-11 DIAGNOSIS — Z29.9 ENCOUNTER FOR PROPHYLACTIC MEASURES, UNSPECIFIED: ICD-10-CM

## 2019-11-11 LAB
ALBUMIN SERPL ELPH-MCNC: 4.1 G/DL — SIGNIFICANT CHANGE UP (ref 3.3–5)
ALP SERPL-CCNC: 88 U/L — SIGNIFICANT CHANGE UP (ref 40–120)
ALT FLD-CCNC: 10 U/L — SIGNIFICANT CHANGE UP (ref 4–33)
AMPHET UR-MCNC: NEGATIVE — SIGNIFICANT CHANGE UP
ANION GAP SERPL CALC-SCNC: 14 MMO/L — SIGNIFICANT CHANGE UP (ref 7–14)
APAP SERPL-MCNC: < 15 UG/ML — LOW (ref 15–25)
APPEARANCE UR: CLEAR — SIGNIFICANT CHANGE UP
AST SERPL-CCNC: 17 U/L — SIGNIFICANT CHANGE UP (ref 4–32)
BACTERIA # UR AUTO: HIGH
BARBITURATES UR SCN-MCNC: NEGATIVE — SIGNIFICANT CHANGE UP
BASE EXCESS BLDV CALC-SCNC: 6.7 MMOL/L — SIGNIFICANT CHANGE UP
BASOPHILS # BLD AUTO: 0.05 K/UL — SIGNIFICANT CHANGE UP (ref 0–0.2)
BASOPHILS NFR BLD AUTO: 0.3 % — SIGNIFICANT CHANGE UP (ref 0–2)
BENZODIAZ UR-MCNC: NEGATIVE — SIGNIFICANT CHANGE UP
BILIRUB SERPL-MCNC: 0.4 MG/DL — SIGNIFICANT CHANGE UP (ref 0.2–1.2)
BILIRUB UR-MCNC: NEGATIVE — SIGNIFICANT CHANGE UP
BLOOD GAS VENOUS - CREATININE: SIGNIFICANT CHANGE UP MG/DL (ref 0.5–1.3)
BLOOD UR QL VISUAL: SIGNIFICANT CHANGE UP
BUN SERPL-MCNC: 20 MG/DL — SIGNIFICANT CHANGE UP (ref 7–23)
CALCIUM SERPL-MCNC: 10.4 MG/DL — SIGNIFICANT CHANGE UP (ref 8.4–10.5)
CANNABINOIDS UR-MCNC: NEGATIVE — SIGNIFICANT CHANGE UP
CHLORIDE BLDV-SCNC: 110 MMOL/L — HIGH (ref 96–108)
CHLORIDE SERPL-SCNC: 106 MMOL/L — SIGNIFICANT CHANGE UP (ref 98–107)
CO2 SERPL-SCNC: 25 MMOL/L — SIGNIFICANT CHANGE UP (ref 22–31)
COCAINE METAB.OTHER UR-MCNC: NEGATIVE — SIGNIFICANT CHANGE UP
COLOR SPEC: COLORLESS — SIGNIFICANT CHANGE UP
CREAT SERPL-MCNC: 0.97 MG/DL — SIGNIFICANT CHANGE UP (ref 0.5–1.3)
EOSINOPHIL # BLD AUTO: 0.03 K/UL — SIGNIFICANT CHANGE UP (ref 0–0.5)
EOSINOPHIL NFR BLD AUTO: 0.2 % — SIGNIFICANT CHANGE UP (ref 0–6)
EPI CELLS # UR: SIGNIFICANT CHANGE UP
ETHANOL BLD-MCNC: < 10 MG/DL — SIGNIFICANT CHANGE UP
GAS PNL BLDV: 145 MMOL/L — SIGNIFICANT CHANGE UP (ref 136–146)
GLUCOSE BLDC GLUCOMTR-MCNC: 171 MG/DL — HIGH (ref 70–99)
GLUCOSE BLDV-MCNC: 192 MG/DL — HIGH (ref 70–99)
GLUCOSE SERPL-MCNC: 140 MG/DL — HIGH (ref 70–99)
GLUCOSE UR-MCNC: NEGATIVE — SIGNIFICANT CHANGE UP
HCO3 BLDV-SCNC: 29 MMOL/L — HIGH (ref 20–27)
HCT VFR BLD CALC: 40.2 % — SIGNIFICANT CHANGE UP (ref 34.5–45)
HCT VFR BLDV CALC: 51.8 % — HIGH (ref 34.5–45)
HGB BLD-MCNC: 12.1 G/DL — SIGNIFICANT CHANGE UP (ref 11.5–15.5)
HGB BLDV-MCNC: 16.9 G/DL — HIGH (ref 11.5–15.5)
IMM GRANULOCYTES NFR BLD AUTO: 0.4 % — SIGNIFICANT CHANGE UP (ref 0–1.5)
KETONES UR-MCNC: NEGATIVE — SIGNIFICANT CHANGE UP
LACTATE BLDV-MCNC: 2 MMOL/L — SIGNIFICANT CHANGE UP (ref 0.5–2)
LEUKOCYTE ESTERASE UR-ACNC: HIGH
LYMPHOCYTES # BLD AUTO: 1.32 K/UL — SIGNIFICANT CHANGE UP (ref 1–3.3)
LYMPHOCYTES # BLD AUTO: 6.8 % — LOW (ref 13–44)
MCHC RBC-ENTMCNC: 24.6 PG — LOW (ref 27–34)
MCHC RBC-ENTMCNC: 30.1 % — LOW (ref 32–36)
MCV RBC AUTO: 81.9 FL — SIGNIFICANT CHANGE UP (ref 80–100)
METHADONE UR-MCNC: NEGATIVE — SIGNIFICANT CHANGE UP
MONOCYTES # BLD AUTO: 0.82 K/UL — SIGNIFICANT CHANGE UP (ref 0–0.9)
MONOCYTES NFR BLD AUTO: 4.2 % — SIGNIFICANT CHANGE UP (ref 2–14)
MUCOUS THREADS # UR AUTO: SIGNIFICANT CHANGE UP
NEUTROPHILS # BLD AUTO: 17.18 K/UL — HIGH (ref 1.8–7.4)
NEUTROPHILS NFR BLD AUTO: 88.1 % — HIGH (ref 43–77)
NITRITE UR-MCNC: NEGATIVE — SIGNIFICANT CHANGE UP
NRBC # FLD: 0 K/UL — SIGNIFICANT CHANGE UP (ref 0–0)
OPIATES UR-MCNC: NEGATIVE — SIGNIFICANT CHANGE UP
OXYCODONE UR-MCNC: NEGATIVE — SIGNIFICANT CHANGE UP
PCO2 BLDV: 47 MMHG — SIGNIFICANT CHANGE UP (ref 41–51)
PCP UR-MCNC: NEGATIVE — SIGNIFICANT CHANGE UP
PH BLDV: 7.44 PH — HIGH (ref 7.32–7.43)
PH UR: 7 — SIGNIFICANT CHANGE UP (ref 5–8)
PLATELET # BLD AUTO: 381 K/UL — SIGNIFICANT CHANGE UP (ref 150–400)
PMV BLD: 10.5 FL — SIGNIFICANT CHANGE UP (ref 7–13)
PO2 BLDV: 37 MMHG — SIGNIFICANT CHANGE UP (ref 35–40)
POTASSIUM BLDV-SCNC: 3.9 MMOL/L — SIGNIFICANT CHANGE UP (ref 3.4–4.5)
POTASSIUM SERPL-MCNC: 4.2 MMOL/L — SIGNIFICANT CHANGE UP (ref 3.5–5.3)
POTASSIUM SERPL-SCNC: 4.2 MMOL/L — SIGNIFICANT CHANGE UP (ref 3.5–5.3)
PROT SERPL-MCNC: 7.3 G/DL — SIGNIFICANT CHANGE UP (ref 6–8.3)
PROT UR-MCNC: NEGATIVE — SIGNIFICANT CHANGE UP
RBC # BLD: 4.91 M/UL — SIGNIFICANT CHANGE UP (ref 3.8–5.2)
RBC # FLD: 15.1 % — HIGH (ref 10.3–14.5)
RBC CASTS # UR COMP ASSIST: HIGH (ref 0–?)
SALICYLATES SERPL-MCNC: < 5 MG/DL — LOW (ref 15–30)
SAO2 % BLDV: 67.9 % — SIGNIFICANT CHANGE UP (ref 60–85)
SODIUM SERPL-SCNC: 145 MMOL/L — SIGNIFICANT CHANGE UP (ref 135–145)
SP GR SPEC: 1.01 — SIGNIFICANT CHANGE UP (ref 1–1.04)
TSH SERPL-MCNC: 1.66 UIU/ML — SIGNIFICANT CHANGE UP (ref 0.27–4.2)
UROBILINOGEN FLD QL: NORMAL — SIGNIFICANT CHANGE UP
WBC # BLD: 19.48 K/UL — HIGH (ref 3.8–10.5)
WBC # FLD AUTO: 19.48 K/UL — HIGH (ref 3.8–10.5)
WBC UR QL: HIGH (ref 0–?)

## 2019-11-11 PROCEDURE — 99223 1ST HOSP IP/OBS HIGH 75: CPT

## 2019-11-11 RX ORDER — LITHIUM CARBONATE 300 MG/1
300 TABLET, EXTENDED RELEASE ORAL
Qty: 0 | Refills: 0 | DISCHARGE

## 2019-11-11 RX ORDER — SODIUM CHLORIDE 9 MG/ML
1000 INJECTION, SOLUTION INTRAVENOUS
Refills: 0 | Status: DISCONTINUED | OUTPATIENT
Start: 2019-11-11 | End: 2019-12-04

## 2019-11-11 RX ORDER — INSULIN LISPRO 100/ML
VIAL (ML) SUBCUTANEOUS
Refills: 0 | Status: DISCONTINUED | OUTPATIENT
Start: 2019-11-11 | End: 2019-12-04

## 2019-11-11 RX ORDER — INSULIN LISPRO 100/ML
VIAL (ML) SUBCUTANEOUS AT BEDTIME
Refills: 0 | Status: DISCONTINUED | OUTPATIENT
Start: 2019-11-11 | End: 2019-12-04

## 2019-11-11 RX ORDER — HALOPERIDOL DECANOATE 100 MG/ML
2 INJECTION INTRAMUSCULAR
Refills: 0 | Status: DISCONTINUED | OUTPATIENT
Start: 2019-11-11 | End: 2019-11-14

## 2019-11-11 RX ORDER — HALOPERIDOL DECANOATE 100 MG/ML
2 INJECTION INTRAMUSCULAR ONCE
Refills: 0 | Status: COMPLETED | OUTPATIENT
Start: 2019-11-11 | End: 2019-11-11

## 2019-11-11 RX ORDER — LITHIUM CARBONATE 300 MG/1
300 TABLET, EXTENDED RELEASE ORAL AT BEDTIME
Refills: 0 | Status: DISCONTINUED | OUTPATIENT
Start: 2019-11-11 | End: 2019-12-04

## 2019-11-11 RX ORDER — GLUCAGON INJECTION, SOLUTION 0.5 MG/.1ML
1 INJECTION, SOLUTION SUBCUTANEOUS ONCE
Refills: 0 | Status: DISCONTINUED | OUTPATIENT
Start: 2019-11-11 | End: 2019-12-04

## 2019-11-11 RX ORDER — DEXTROSE 50 % IN WATER 50 %
15 SYRINGE (ML) INTRAVENOUS ONCE
Refills: 0 | Status: DISCONTINUED | OUTPATIENT
Start: 2019-11-11 | End: 2019-12-04

## 2019-11-11 RX ORDER — CEFTRIAXONE 500 MG/1
1000 INJECTION, POWDER, FOR SOLUTION INTRAMUSCULAR; INTRAVENOUS ONCE
Refills: 0 | Status: COMPLETED | OUTPATIENT
Start: 2019-11-11 | End: 2019-11-11

## 2019-11-11 RX ORDER — ENOXAPARIN SODIUM 100 MG/ML
30 INJECTION SUBCUTANEOUS DAILY
Refills: 0 | Status: DISCONTINUED | OUTPATIENT
Start: 2019-11-11 | End: 2019-11-12

## 2019-11-11 RX ORDER — CEFTRIAXONE 500 MG/1
1000 INJECTION, POWDER, FOR SOLUTION INTRAMUSCULAR; INTRAVENOUS EVERY 24 HOURS
Refills: 0 | Status: COMPLETED | OUTPATIENT
Start: 2019-11-12 | End: 2019-11-13

## 2019-11-11 RX ORDER — SIMVASTATIN 20 MG/1
20 TABLET, FILM COATED ORAL AT BEDTIME
Refills: 0 | Status: DISCONTINUED | OUTPATIENT
Start: 2019-11-11 | End: 2019-12-04

## 2019-11-11 RX ORDER — LITHIUM CARBONATE 300 MG/1
300 TABLET, EXTENDED RELEASE ORAL AT BEDTIME
Refills: 0 | Status: DISCONTINUED | OUTPATIENT
Start: 2019-11-11 | End: 2019-11-11

## 2019-11-11 RX ORDER — DEXTROSE 50 % IN WATER 50 %
25 SYRINGE (ML) INTRAVENOUS ONCE
Refills: 0 | Status: DISCONTINUED | OUTPATIENT
Start: 2019-11-11 | End: 2019-12-04

## 2019-11-11 RX ORDER — DEXTROSE 50 % IN WATER 50 %
12.5 SYRINGE (ML) INTRAVENOUS ONCE
Refills: 0 | Status: DISCONTINUED | OUTPATIENT
Start: 2019-11-11 | End: 2019-12-04

## 2019-11-11 RX ORDER — TRAZODONE HCL 50 MG
50 TABLET ORAL AT BEDTIME
Refills: 0 | Status: DISCONTINUED | OUTPATIENT
Start: 2019-11-11 | End: 2019-11-20

## 2019-11-11 RX ORDER — HALOPERIDOL DECANOATE 100 MG/ML
1 INJECTION INTRAMUSCULAR
Refills: 0 | Status: DISCONTINUED | OUTPATIENT
Start: 2019-11-11 | End: 2019-11-14

## 2019-11-11 RX ADMIN — Medication 50 MILLIGRAM(S): at 21:01

## 2019-11-11 RX ADMIN — HALOPERIDOL DECANOATE 2 MILLIGRAM(S): 100 INJECTION INTRAMUSCULAR at 19:57

## 2019-11-11 RX ADMIN — Medication 0.25 MILLIGRAM(S): at 21:01

## 2019-11-11 RX ADMIN — CEFTRIAXONE 100 MILLIGRAM(S): 500 INJECTION, POWDER, FOR SOLUTION INTRAMUSCULAR; INTRAVENOUS at 17:16

## 2019-11-11 RX ADMIN — HALOPERIDOL DECANOATE 2 MILLIGRAM(S): 100 INJECTION INTRAMUSCULAR at 21:00

## 2019-11-11 RX ADMIN — SIMVASTATIN 20 MILLIGRAM(S): 20 TABLET, FILM COATED ORAL at 21:01

## 2019-11-11 RX ADMIN — Medication 0.5 MILLIGRAM(S): at 16:26

## 2019-11-11 RX ADMIN — LITHIUM CARBONATE 300 MILLIGRAM(S): 300 TABLET, EXTENDED RELEASE ORAL at 21:01

## 2019-11-11 NOTE — H&P ADULT - ASSESSMENT
68 yo female with pmhx of T2DM, schizophrenia, depression, dementia, legally blind, presenting with altered mental status for 3 days, a/f sepsis 2/2 UTI, metabolic encephalopathy: 69F h/o T2DM, schizophrenia, depression dementia (AAOx1 name), legally blind, presenting with altered mental status for 3 days and increased agitation at home, a/f sepsis (inc HR, leukocytosis) 2/2 UTI, metabolic encephalopathy:

## 2019-11-11 NOTE — ED PROVIDER NOTE - CARE PLAN
Principal Discharge DX:	UTI (urinary tract infection) Principal Discharge DX:	UTI (urinary tract infection)  Secondary Diagnosis:	Dementia with behavioral disturbance, unspecified dementia type

## 2019-11-11 NOTE — H&P ADULT - NSHPPHYSICALEXAM_GEN_ALL_CORE
Vital Signs Last 24 Hrs  T(C): 37.1 (11-11-19 @ 14:10), Max: 37.1 (11-11-19 @ 14:10)  T(F): 98.8 (11-11-19 @ 14:10), Max: 98.8 (11-11-19 @ 14:10)  HR: 82 (11-11-19 @ 16:00) (82 - 99)  BP: 130/61 (11-11-19 @ 16:00) (130/61 - 156/77)  BP(mean): --  RR: 18 (11-11-19 @ 16:00) (16 - 18)  SpO2: 100% (11-11-19 @ 16:00) (98% - 100%)    GENERAL: NAD  HEENT: EOMI, MMM, no oropharyngeal lesions or erythema appreciated  Pulm: normal work of breathing, CTABL  CV: RRR, S1&S2+, no m/r/g appreciated  ABDOMEN: soft, nt, nd, no hepatosplenomegaly  MSK: nl ROM  EXTREMITIES:  no appreciable edema in b/l LE  Neuro: A&Ox3, no focal deficits  SKIN: warm and dry, no visible rash Vital Signs Last 24 Hrs  T(C): 37.1 (11-11-19 @ 14:10), Max: 37.1 (11-11-19 @ 14:10)  T(F): 98.8 (11-11-19 @ 14:10), Max: 98.8 (11-11-19 @ 14:10)  HR: 82 (11-11-19 @ 16:00) (82 - 99)  BP: 130/61 (11-11-19 @ 16:00) (130/61 - 156/77)  BP(mean): --  RR: 18 (11-11-19 @ 16:00) (16 - 18)  SpO2: 100% (11-11-19 @ 16:00) (98% - 100%)    GENERAL: NAD  HEENT: EOMI, MMM, no oropharyngeal lesions or erythema appreciated  Pulm: normal work of breathing, CTABL  CV: RRR, S1&S2+, no m/r/g appreciated  ABDOMEN: soft, nt, nd, no rebound or guarding   MSK: sponatneously moving extremities   EXTREMITIES:  +1 edema b/l   psych: calm   SKIN: warm and dry, no visible rash

## 2019-11-11 NOTE — H&P ADULT - NSHPLABSRESULTS_GEN_ALL_CORE
admission labs personally reviewed: leukocytosis, (+) UA admission labs personally reviewed: leukocytosis, (+) UA, negative tox screen, CKD2, lithium wnl

## 2019-11-11 NOTE — ED PROVIDER NOTE - CHIEF COMPLAINT
The patient is a 69y Female complaining of agitation. The patient is a 69y Female brought in for agitation.

## 2019-11-11 NOTE — H&P ADULT - PROBLEM SELECTOR PLAN 8
-improve score 2, dvt ppx: lovenox   diet: cc  dispo: pending psych eval and treatment of infectio n

## 2019-11-11 NOTE — H&P ADULT - HISTORY OF PRESENT ILLNESS
70 yo female with pmhx of T2DM, schizophrenia, depression, dementia, legally blind, presenting with altered mental status for 3 days. Per family, patient has been scratching, hitting her family members and home health aides recently. No recent medication changes. Brought to ED for further evaluation. Denies fevers, tremors, seizures. 69F h/o T2DM, schizophrenia, depression dementia (AAOx1 name), legally blind, presenting with altered mental status for 3 days and increased agitation at home.  Patient had received ativan for agitation in ED and was unable to provide history.  provided history. Per , patient has been scratching, hitting her family members and home health aides recently, with worsening behavior over the past two days. She recently was treated at [facility] for UTI. No h/o MDR cultures. Incontinent at baseline so  unsure if there has been a change in freq or dysuria with dementia. No fevers at home, cough, SOB, dyspnea, abn pain, N/V/D/C.  No recent medication changes, sick contacts.  helps with meds and has HHA for a couple of hours each day.  brought to ED for further evaluation.

## 2019-11-11 NOTE — H&P ADULT - PROBLEM SELECTOR PLAN 3
-resume home psych meds -resume haldol TID, lithium hs, trazodone. check admission EKG to evaluate QTc  -emailed patient's outpt psychiatrist about admission   -behavioral health c/s in am 2/2 worsening agitation at home

## 2019-11-11 NOTE — H&P ADULT - PROBLEM SELECTOR PLAN 1
-meets sepsis criteria, c/w ceftriaxone, no h/o MDR organisms   -obtain blood and urine cultures  -check lactate   -IVF   -tylenol for fever -meets sepsis criteria, c/w ceftriaxone, no h/o MDR organisms. low threshold to broaden abx and obtain CT A/P if no clinical improvement on ceftriaxone  -obtain blood and urine cultures  -check lactate   -IVF   -tylenol for fever

## 2019-11-11 NOTE — H&P ADULT - PROBLEM SELECTOR PROBLEM 5
Type 2 diabetes mellitus without complication, without long-term current use of insulin Chronic diastolic heart failure

## 2019-11-11 NOTE — ED PROVIDER NOTE - ATTENDING CONTRIBUTION TO CARE
70 yo female with pmhx of schizo-affective disorder,, depression, dementia, legally blind, presenting with altered mental status for 3 days. Per family, patient has been scratching, hitting her family members and home health aides recently. No recent medication changes. Brought to ED for further evaluation.  No fevers, tremors, seizures. Patient unable to give history On exam: mild agitation at times, VSS afebrile, thin, no head neck or other acute injuries noted, lungs clear heart sounds normal abdomen soft. IMP: Increased agitation in 69F with schizo-affective disorder and dementia. Plan: RO medical cause, sedate as needed, labs UA. UTI noted and patient admitted to medicine 70 yo female with pmhx of schizo-affective disorder,, depression, dementia, legally blind, presenting with altered mental status for 3 days. Per family, patient has been scratching, hitting her family members and home health aides recently. No recent medication changes. Brought to ED for further evaluation.  No fevers, tremors, seizures. Patient unable to give history On exam: mild agitation at times, VSS afebrile, thin, no head neck or other acute injuries noted, lungs clear heart sounds normal abdomen soft. IMP: Increased agitation in 69F with schizo-affective disorder and dementia. Plan: RO medical cause, sedate as needed, labs UA. WBC 19 and UTI noted CXR LIDYA, and patient admitted to medicine

## 2019-11-11 NOTE — ED PROVIDER NOTE - OBJECTIVE STATEMENT
70 yo female with pmhx of schizophrenia, depression, dementia, legally blind, presenting with altered mental status for 3 days 68 yo female with pmhx of schizophrenia, depression, dementia, legally blind, presenting with altered mental status for 3 days. Per family, patient has been scratching, hitting her family members and home health aides recently. No recent medication changes. Brought to ED for further evaluation.    Denies fevers, tremors, seizures. 68 yo female with pmhx of schizo-affective disorder,, depression, dementia, legally blind, presenting with altered mental status for 3 days. Per family, patient has been scratching, hitting her family members and home health aides recently. No recent medication changes. Brought to ED for further evaluation.  No fevers, tremors, seizures. Patient unable to give history

## 2019-11-11 NOTE — ED ADULT NURSE NOTE - NSIMPLEMENTINTERV_GEN_ALL_ED
Implemented All Fall with Harm Risk Interventions:  [place] to call system. Call bell, personal items and telephone within reach. Instruct patient to call for assistance. Room bathroom lighting operational. Non-slip footwear when patient is off stretcher. Physically safe environment: no spills, clutter or unnecessary equipment. Stretcher in lowest position, wheels locked, appropriate side rails in place. Provide visual cue, wrist band, yellow gown, etc. Monitor gait and stability. Monitor for mental status changes and reorient to person, place, and time. Review medications for side effects contributing to fall risk. Reinforce activity limits and safety measures with patient and family. Provide visual clues: red socks.

## 2019-11-11 NOTE — ED PROVIDER NOTE - CLINICAL SUMMARY MEDICAL DECISION MAKING FREE TEXT BOX
68 yo female with pmhx schizophrenia depression dementia legally blind presenting with agitation/mental status change for 3 days. suggestive of medical/infectious vs psychiatric etiology. will get cbc cmp urine studies, and will reassess. likely admission

## 2019-11-11 NOTE — H&P ADULT - PROBLEM SELECTOR PLAN 2
-in setting of infection, treatment as stated in problem 1 -in setting of infection, treatment as stated in problem 1  -no recent falls   -tox screen and lithium wnl

## 2019-11-11 NOTE — H&P ADULT - PROBLEM SELECTOR PLAN 7
Transitions of Care Status:  1.  Name of PCP:  2.  PCP Contacted on Admission: [ ] Y    [ ] N    3.  PCP contacted at Discharge: [ ] Y    [ ] N    [ ] N/A  4.  Post-Discharge Appointment Date and Location:  5.  Summary of Handoff given to PCP: -renally dose meds and monitor Cr

## 2019-11-11 NOTE — ED ADULT NURSE NOTE - OBJECTIVE STATEMENT
70 y/o female presents to ED with c/o worsening agitation.  Per pt's  pt has been becoming more agitated and combative during the day.   states that pt hitting and biting HHA.  States that when nighttime medications are given her behavior is better.  Pt arrived in bed #26, awake, attempting to hit and bite at times but movements are slow, pt answers to name, follows simple commands but not consistently.  No cough, fever, chills, no n/v/d as per family at bedside.  Pt incontinent of urine in depends, no foul odor noted.  Pt afebrile rectally.   at bedside, awaiting provider eval. 70 y/o female presents to ED with c/o worsening agitation.  Per pt's  pt has been becoming more agitated and combative during the day. Pt with hx of dementia and schizophrenia.  states that pt hitting and biting HHA.  States that when nighttime medications are given her behavior is better.  Pt arrived in bed #26, awake, attempting to hit and bite at times but movements are slow, pt answers to name, follows simple commands but not consistently.  No cough, fever, chills, no n/v/d as per family at bedside.  Pt incontinent of urine in depends, no foul odor noted.  Pt afebrile rectally.   at bedside, awaiting provider eval.

## 2019-11-11 NOTE — ED ADULT TRIAGE NOTE - CHIEF COMPLAINT QUOTE
Arrives alone, as per EMS  activated EMS r/t increased aggression at home. As per EMS pt was grabbing onto things and notably agitated but no aggressive. Pt noted to be blind and bedbound, not cooperative with questions. PMH of schizophrenia and dementia.  Cathy Eckert notified, pt to go to main.

## 2019-11-11 NOTE — H&P ADULT - NSHPREVIEWOFSYSTEMS_GEN_ALL_CORE
REVIEW OF SYSTEMS:    CONSTITUTIONAL: No weakness, fevers or chills  EYES/ENT: No visual changes;  no throat pain   NECK: No pain or stiffness  RESPIRATORY: No cough, wheezing, hemoptysis; No shortness of breath  CARDIOVASCULAR: No chest pain or palpitations  GASTROINTESTINAL: No abdominal or epigastric pain. No nausea, vomiting, or hematemesis; No diarrhea or constipation. No melena or hematochezia.  GENITOURINARY: No dysuria, change in frequency or hematuria  NEUROLOGICAL: No numbness or weakness  SKIN: No itching, burning, rashes, or lesions   Psych: No depression   All other review of systems is negative unless indicated above. REVIEW OF SYSTEMS: limited ROS 2/2 dementia and patient receiving ativan. see above hpi

## 2019-11-11 NOTE — H&P ADULT - PROBLEM SELECTOR PLAN 9
Transitions of Care Status:  1.  Name of PCP: Dr. Pena   2.  PCP Contacted on Admission: [x ] Y    [ ] N  -emailed   3.  PCP contacted at Discharge: [ ] Y    [ ] N    [ ] N/A  4.  Post-Discharge Appointment Date and Location:  5.  Summary of Handoff given to PCP:

## 2019-11-11 NOTE — ED ADULT NURSE NOTE - HPI (INCLUDE ILLNESS QUALITY, SEVERITY, DURATION, TIMING, CONTEXT, MODIFYING FACTORS, ASSOCIATED SIGNS AND SYMPTOMS)
70 y/o female presents to ED with c/o worsening agitation.  Per pt's  pt has been becoming more agitated and combative during the day. Pt with hx of dementia and schizophrenia.  states that pt hitting and biting HHA.  States that when nighttime medications are given her behavior is better.  Pt arrived in bed #26, awake, attempting to hit and bite at times but movements are slow, pt answers to name, follows simple commands but not consistently.  No cough, fever, chills, no n/v/d as per family at bedside.  Pt incontinent of urine in depends, no foul odor noted.  Pt afebrile rectally.   at bedside, awaiting provider eval.

## 2019-11-11 NOTE — H&P ADULT - PROBLEM SELECTOR PLAN 5
-FS qac and hs with SSI -has mild LE edema, will hold lasix in setting of infection and resume tomorrow   -daily weight

## 2019-11-12 ENCOUNTER — TRANSCRIPTION ENCOUNTER (OUTPATIENT)
Age: 69
End: 2019-11-12

## 2019-11-12 DIAGNOSIS — F25.0 SCHIZOAFFECTIVE DISORDER, BIPOLAR TYPE: ICD-10-CM

## 2019-11-12 DIAGNOSIS — E87.0 HYPEROSMOLALITY AND HYPERNATREMIA: ICD-10-CM

## 2019-11-12 DIAGNOSIS — G93.40 ENCEPHALOPATHY, UNSPECIFIED: ICD-10-CM

## 2019-11-12 LAB
ANION GAP SERPL CALC-SCNC: 11 MMO/L — SIGNIFICANT CHANGE UP (ref 7–14)
ANION GAP SERPL CALC-SCNC: 16 MMO/L — HIGH (ref 7–14)
BASOPHILS # BLD AUTO: 0.06 K/UL — SIGNIFICANT CHANGE UP (ref 0–0.2)
BASOPHILS NFR BLD AUTO: 0.5 % — SIGNIFICANT CHANGE UP (ref 0–2)
BUN SERPL-MCNC: 20 MG/DL — SIGNIFICANT CHANGE UP (ref 7–23)
BUN SERPL-MCNC: 20 MG/DL — SIGNIFICANT CHANGE UP (ref 7–23)
CALCIUM SERPL-MCNC: 10.3 MG/DL — SIGNIFICANT CHANGE UP (ref 8.4–10.5)
CALCIUM SERPL-MCNC: 10.7 MG/DL — HIGH (ref 8.4–10.5)
CHLORIDE SERPL-SCNC: 108 MMOL/L — HIGH (ref 98–107)
CHLORIDE SERPL-SCNC: 115 MMOL/L — HIGH (ref 98–107)
CO2 SERPL-SCNC: 25 MMOL/L — SIGNIFICANT CHANGE UP (ref 22–31)
CO2 SERPL-SCNC: 26 MMOL/L — SIGNIFICANT CHANGE UP (ref 22–31)
CREAT SERPL-MCNC: 1.01 MG/DL — SIGNIFICANT CHANGE UP (ref 0.5–1.3)
CREAT SERPL-MCNC: 1.07 MG/DL — SIGNIFICANT CHANGE UP (ref 0.5–1.3)
EOSINOPHIL # BLD AUTO: 0.09 K/UL — SIGNIFICANT CHANGE UP (ref 0–0.5)
EOSINOPHIL NFR BLD AUTO: 0.7 % — SIGNIFICANT CHANGE UP (ref 0–6)
GLUCOSE BLDC GLUCOMTR-MCNC: 129 MG/DL — HIGH (ref 70–99)
GLUCOSE BLDC GLUCOMTR-MCNC: 135 MG/DL — HIGH (ref 70–99)
GLUCOSE BLDC GLUCOMTR-MCNC: 157 MG/DL — HIGH (ref 70–99)
GLUCOSE BLDC GLUCOMTR-MCNC: 168 MG/DL — HIGH (ref 70–99)
GLUCOSE SERPL-MCNC: 159 MG/DL — HIGH (ref 70–99)
GLUCOSE SERPL-MCNC: 173 MG/DL — HIGH (ref 70–99)
HBA1C BLD-MCNC: 7.5 % — HIGH (ref 4–5.6)
HCT VFR BLD CALC: 42.9 % — SIGNIFICANT CHANGE UP (ref 34.5–45)
HCV AB S/CO SERPL IA: 0.21 S/CO — SIGNIFICANT CHANGE UP (ref 0–0.99)
HCV AB SERPL-IMP: SIGNIFICANT CHANGE UP
HGB BLD-MCNC: 13.3 G/DL — SIGNIFICANT CHANGE UP (ref 11.5–15.5)
IMM GRANULOCYTES NFR BLD AUTO: 0.4 % — SIGNIFICANT CHANGE UP (ref 0–1.5)
LYMPHOCYTES # BLD AUTO: 1.7 K/UL — SIGNIFICANT CHANGE UP (ref 1–3.3)
LYMPHOCYTES # BLD AUTO: 13 % — SIGNIFICANT CHANGE UP (ref 13–44)
MAGNESIUM SERPL-MCNC: 2.1 MG/DL — SIGNIFICANT CHANGE UP (ref 1.6–2.6)
MCHC RBC-ENTMCNC: 25 PG — LOW (ref 27–34)
MCHC RBC-ENTMCNC: 31 % — LOW (ref 32–36)
MCV RBC AUTO: 80.8 FL — SIGNIFICANT CHANGE UP (ref 80–100)
MONOCYTES # BLD AUTO: 0.71 K/UL — SIGNIFICANT CHANGE UP (ref 0–0.9)
MONOCYTES NFR BLD AUTO: 5.4 % — SIGNIFICANT CHANGE UP (ref 2–14)
NEUTROPHILS # BLD AUTO: 10.48 K/UL — HIGH (ref 1.8–7.4)
NEUTROPHILS NFR BLD AUTO: 80 % — HIGH (ref 43–77)
NRBC # FLD: 0 K/UL — SIGNIFICANT CHANGE UP (ref 0–0)
PHOSPHATE SERPL-MCNC: 3 MG/DL — SIGNIFICANT CHANGE UP (ref 2.5–4.5)
PLATELET # BLD AUTO: 408 K/UL — HIGH (ref 150–400)
PMV BLD: 10.1 FL — SIGNIFICANT CHANGE UP (ref 7–13)
POTASSIUM SERPL-MCNC: 4.2 MMOL/L — SIGNIFICANT CHANGE UP (ref 3.5–5.3)
POTASSIUM SERPL-MCNC: 4.4 MMOL/L — SIGNIFICANT CHANGE UP (ref 3.5–5.3)
POTASSIUM SERPL-SCNC: 4.2 MMOL/L — SIGNIFICANT CHANGE UP (ref 3.5–5.3)
POTASSIUM SERPL-SCNC: 4.4 MMOL/L — SIGNIFICANT CHANGE UP (ref 3.5–5.3)
RBC # BLD: 5.31 M/UL — HIGH (ref 3.8–5.2)
RBC # FLD: 15.2 % — HIGH (ref 10.3–14.5)
SODIUM SERPL-SCNC: 149 MMOL/L — HIGH (ref 135–145)
SODIUM SERPL-SCNC: 152 MMOL/L — HIGH (ref 135–145)
SPECIMEN SOURCE: SIGNIFICANT CHANGE UP
SPECIMEN SOURCE: SIGNIFICANT CHANGE UP
WBC # BLD: 13.09 K/UL — HIGH (ref 3.8–10.5)
WBC # FLD AUTO: 13.09 K/UL — HIGH (ref 3.8–10.5)

## 2019-11-12 PROCEDURE — 93010 ELECTROCARDIOGRAM REPORT: CPT

## 2019-11-12 PROCEDURE — 90792 PSYCH DIAG EVAL W/MED SRVCS: CPT

## 2019-11-12 PROCEDURE — 99233 SBSQ HOSP IP/OBS HIGH 50: CPT

## 2019-11-12 RX ORDER — ENOXAPARIN SODIUM 100 MG/ML
40 INJECTION SUBCUTANEOUS DAILY
Refills: 0 | Status: DISCONTINUED | OUTPATIENT
Start: 2019-11-12 | End: 2019-12-04

## 2019-11-12 RX ORDER — SODIUM CHLORIDE 9 MG/ML
1000 INJECTION, SOLUTION INTRAVENOUS
Refills: 0 | Status: DISCONTINUED | OUTPATIENT
Start: 2019-11-12 | End: 2019-11-13

## 2019-11-12 RX ORDER — SODIUM CHLORIDE 9 MG/ML
1000 INJECTION INTRAMUSCULAR; INTRAVENOUS; SUBCUTANEOUS
Refills: 0 | Status: DISCONTINUED | OUTPATIENT
Start: 2019-11-12 | End: 2019-11-12

## 2019-11-12 RX ORDER — INFLUENZA VIRUS VACCINE 15; 15; 15; 15 UG/.5ML; UG/.5ML; UG/.5ML; UG/.5ML
0.5 SUSPENSION INTRAMUSCULAR ONCE
Refills: 0 | Status: DISCONTINUED | OUTPATIENT
Start: 2019-11-12 | End: 2019-11-14

## 2019-11-12 RX ADMIN — Medication 1: at 12:01

## 2019-11-12 RX ADMIN — CEFTRIAXONE 100 MILLIGRAM(S): 500 INJECTION, POWDER, FOR SOLUTION INTRAMUSCULAR; INTRAVENOUS at 17:11

## 2019-11-12 RX ADMIN — SIMVASTATIN 20 MILLIGRAM(S): 20 TABLET, FILM COATED ORAL at 21:38

## 2019-11-12 RX ADMIN — Medication 0.25 MILLIGRAM(S): at 21:38

## 2019-11-12 RX ADMIN — ENOXAPARIN SODIUM 40 MILLIGRAM(S): 100 INJECTION SUBCUTANEOUS at 12:03

## 2019-11-12 RX ADMIN — SODIUM CHLORIDE 60 MILLILITER(S): 9 INJECTION INTRAMUSCULAR; INTRAVENOUS; SUBCUTANEOUS at 08:58

## 2019-11-12 RX ADMIN — HALOPERIDOL DECANOATE 2 MILLIGRAM(S): 100 INJECTION INTRAMUSCULAR at 21:38

## 2019-11-12 RX ADMIN — Medication 1: at 17:08

## 2019-11-12 RX ADMIN — HALOPERIDOL DECANOATE 1 MILLIGRAM(S): 100 INJECTION INTRAMUSCULAR at 13:55

## 2019-11-12 RX ADMIN — Medication 50 MILLIGRAM(S): at 21:38

## 2019-11-12 RX ADMIN — HALOPERIDOL DECANOATE 2 MILLIGRAM(S): 100 INJECTION INTRAMUSCULAR at 08:40

## 2019-11-12 RX ADMIN — SODIUM CHLORIDE 75 MILLILITER(S): 9 INJECTION, SOLUTION INTRAVENOUS at 23:00

## 2019-11-12 RX ADMIN — LITHIUM CARBONATE 300 MILLIGRAM(S): 300 TABLET, EXTENDED RELEASE ORAL at 21:38

## 2019-11-12 NOTE — BEHAVIORAL HEALTH ASSESSMENT NOTE - HPI (INCLUDE ILLNESS QUALITY, SEVERITY, DURATION, TIMING, CONTEXT, MODIFYING FACTORS, ASSOCIATED SIGNS AND SYMPTOMS)
Patient is a 69 y.o. female , lives with , has home aid 4-6 hours a day, unemployed, legally blind, bedbound, multiple inpatient psych admissions, has psychiatrist in community at [facility], past psych hx of schizoaffective disorder depression type and possible dementia, PMHx of CKD and diabetes type 2 presenting to the ED with AMS and behavioral disturbance x 3 days. She has had increased aggression at home biting /home aid and pulling home aid's hair. In ED last night she received ativan and haldol for increased agitation. Psychiatry consulted for worsening dementia with agitation.   At bedside, patient appears irritable and has trouble engaging in conversation. She is A&O x 1 only to name. She keeps her eyes closed, holding onto baby doll mumbling incoherent words.   Collateral  at bedside says she has had increasing agitation at home. He says she was diagnosed with schizoaffective disorder around 15 y/o. He says she has not been formally diagnosed with dementia, but was put on Donepezil- discontinued about 5-6 months ago for being ineffective. Recently she has become more oriented at baseline. He helps her with eating, washing up, and brings her to her doctor appointments. He says she has no previous SA. She has gotten ECT before- last in March 2018.

## 2019-11-12 NOTE — BEHAVIORAL HEALTH ASSESSMENT NOTE - SUMMARY
Patient is a 69 y.o. female , lives with , has home aid 4-6 hours a day, unemployed, legally blind, bedbound, multiple inpatient psych admissions, has psychiatrist in community at [facility], past psych hx of schizoaffective disorder depression type and possible dementia, PMHx of CKD and diabetes type 2 presenting to the ED with AMS and behavioral disturbance x 3 days. She has had increased aggression at home biting /home aid and pulling home aid's hair. In ED last night she received ativan and haldol for increased agitation. Psychiatry consulted for worsening dementia with agitation.     11/12- Patient is irritable and unable to converse.  at bedside explains her increased agitation.     Recommendations-  - EO  - Continue haldol 1 mg @ 2 pm and 2 mg @ 8 am and 8 pm  - Continue lithium 300 mg at bedtime  - Continue ativan .25 mg at bedtime  - Continue trazodone 50 mg at bedtime  - Haldol .5 mg PRN PO/IM/IV for increased agitation

## 2019-11-12 NOTE — BEHAVIORAL HEALTH ASSESSMENT NOTE - PATIENT'S CHIEF COMPLAINT
Patient unable to converse. No nurse concerns today (has been letting nurse change her and taking meds), sleeping well, and no PRNs today.

## 2019-11-12 NOTE — PROGRESS NOTE ADULT - PROBLEM SELECTOR PLAN 8
-improve score 2, dvt ppx: lovenox   diet: cc  dispo: pending psych eval and treatment of infection -renally dose meds and monitor Cr

## 2019-11-12 NOTE — PROGRESS NOTE ADULT - PROBLEM SELECTOR PLAN 1
Met sepsis criteria, c/w ceftriaxone, no h/o MDR organisms. low threshold to broaden abx and obtain CT A/P if no clinical improvement on ceftriaxone  - blood cx's pending, urine culture not yet sent therefore will likely be low yield

## 2019-11-12 NOTE — PROGRESS NOTE ADULT - PROBLEM SELECTOR PROBLEM 7
CKD (chronic kidney disease) stage 2, GFR 60-89 ml/min Type 2 diabetes mellitus without complication, without long-term current use of insulin

## 2019-11-12 NOTE — PROGRESS NOTE ADULT - PROBLEM SELECTOR PLAN 2
-in setting of infection, likely underlying dementia and schizoaffective d/o  - treating infection  avoid Ambien/anticholinergics, day/night cues, supportive care

## 2019-11-12 NOTE — DISCHARGE NOTE PROVIDER - NSDCMRMEDTOKEN_GEN_ALL_CORE_FT
furosemide 20 mg oral tablet: 1 tab(s) orally 3 times a week: MW  Haldol 1 mg oral tablet: 1 milligram(s) orally once a day: in the afternoon   haloperidol 2 mg oral tablet: 1 tab(s) orally 2 times a day  lithium carbonate extended release: 300 milligram(s) orally once a day (at bedtime)  LORazepam 0.5 mg oral tablet: 0.5 tab(s) orally once a day (at bedtime)  metFORMIN 500 mg oral tablet, extended release: 1 tab(s) orally 2 times a day  potassium chloride 10 mEq oral capsule, extended release: 1 cap(s) orally 3 times a week: MW  simvastatin 20 mg oral tablet: 1 tab(s) orally once a day (at bedtime)  traZODone 50 mg oral tablet: 1 tab(s) orally once a day (at bedtime) Haldol 1 mg oral tablet: 1 milligram(s) orally once a day: in the afternoon   haloperidol 1 mg oral tablet: 1 tab(s) orally 2 times a day  lithium carbonate extended release: 300 milligram(s) orally once a day (at bedtime)  LORazepam 0.5 mg oral tablet: 0.5 tab(s) orally once a day (at bedtime)  metFORMIN 500 mg oral tablet, extended release: 1 tab(s) orally 2 times a day  simvastatin 20 mg oral tablet: 1 tab(s) orally once a day (at bedtime)  traZODone 50 mg oral tablet: 1 tab(s) orally once a day (at bedtime) Haldol 1 mg oral tablet: 1 milligram(s) orally once a day: in the afternoon   haloperidol 1 mg oral tablet: 1 tab(s) orally 2 times a day  lithium carbonate extended release: 300 milligram(s) orally once a day (at bedtime)  metFORMIN 500 mg oral tablet, extended release: 1 tab(s) orally 2 times a day  simvastatin 20 mg oral tablet: 1 tab(s) orally once a day (at bedtime)  traZODone 50 mg oral tablet: 1 tab(s) orally once a day (at bedtime) haloperidol 1 mg oral tablet: 1 tab(s) orally 3 times a day 8 am, 2pm, 8 pm  lithium carbonate extended release: 300 milligram(s) orally once a day (at bedtime)  metFORMIN 500 mg oral tablet, extended release: 1 tab(s) orally 2 times a day  simvastatin 20 mg oral tablet: 1 tab(s) orally once a day (at bedtime)  traZODone 50 mg oral tablet: 1 tab(s) orally once a day (at bedtime)

## 2019-11-12 NOTE — DISCHARGE NOTE PROVIDER - HOSPITAL COURSE
68 y/o F h/o T2DM, schizophrenia, depression, dementia, legally blind p/w AMS        Urinary tract infection likely etiology for patient's state of encephalopathy for which started on Ceftriaxone; Tox screen and lithium WNL; L    - Blood and urine cultures---        Hypernatremia/Hypercalcemia    - Fluids started; Trend BMP        Schizophrenia w/ concurrent h/o dementia for which psychiatry consulted and patient continued Haldol, Lithium, Ativan, Trazodone        Chronic diastolic heart failure with mild LE edema; Hold lasix in setting of infection and resume PRN ___________ **        Type 2 diabetes mellitus w/ A1c 7.5% monitored FS on ISS         Dispo - 70 y/o F h/o T2DM, schizophrenia, depression, dementia, legally blind p/w AMS        Urinary tract infection likely etiology for patient's state of encephalopathy for which started on Ceftriaxone; Tox screen and lithium WNL; L    - Blood and urine cultures---        Hypernatremia/Hypercalcemia    - 2/2 Poor PO intake, Fluids started; Trend BMP        Schizophrenia w/ concurrent h/o dementia for which psychiatry consulted and patient continued Haldol, Lithium, Ativan, Trazodone        Chronic diastolic heart failure with mild LE edema; Hold lasix in setting of  dehydration 2/2 poor PO intake         Type 2 diabetes mellitus w/ A1c 7.5% monitored FS on ISS         Dispo - 68 y/o F h/o T2DM, schizophrenia, depression, dementia, legally blind p/w AMS        Urinary tract infection likely etiology for patient's state of encephalopathy for which patient was treated with Ceftriaxone x3 days; Tox screen neg and lithium low; BCx NTD, UA (+) on admission, rpt UA neg, UCx not sent.        Metabolic encephalopathy in setting of infection, likely 2/2 UTI and poor PO intake. Labs as above.        Hypernatremia/Hypercalcemia in setting of poor PO intake and infection. Patient was started on glucerna x2 daily and D5W. Trended  BMP daily.        Schizophrenia w/ concurrent h/o dementia for which psychiatry consulted and patient continued on Haldol, Lithium, Ativan, Trazodone        Chronic diastolic heart failure with mild LE edema; Lasix held in setting of  dehydration 2/2 poor PO intake.        Type 2 diabetes mellitus w/ A1c 7.5% monitored FS on ISS.        Dispo -         On ___ this case was reviewed with  ____, the patient is medically stable and optimized for discharge. All medications were reviewed and prescriptions were sent to mutually agreed upon pharmacy. 70 y/o F h/o T2DM, schizophrenia, depression, dementia, legally blind p/w AMS        Urinary tract infection likely etiology for patient's state of encephalopathy for which patient was treated with Ceftriaxone x3 days; Tox screen neg and lithium low; BCx NTD, UA (+) on admission, rpt UA neg, UCx not sent.        Metabolic encephalopathy in setting of infection, likely 2/2 UTI and poor PO intake. Labs as above.        Hypernatremia/Hypercalcemia in setting of poor PO intake and infection. Patient was started on glucerna x2 daily and D5W. Trended  BMP daily.        Schizophrenia w/ concurrent h/o dementia for which psychiatry consulted and patient continued on Haldol, Lithium, Ativan, Trazodone        Chronic diastolic heart failure with mild LE edema; Lasix held in setting of  dehydration 2/2 poor PO intake.        Type 2 diabetes mellitus w/ A1c 7.5% monitored FS on ISS.        Dispo - Home with home hospice        On ___ this case was reviewed with  ____, the patient is cleared for discharge with home hospice services. All medications were reviewed and prescriptions were sent to hospice pharmacy. 70 y/o F h/o T2DM, schizophrenia, depression, dementia, legally blind p/w AMS        Urinary tract infection likely etiology for patient's state of encephalopathy for which patient was treated with Ceftriaxone x3 days; Tox screen neg and lithium low; BCx Negative, UA (+) on admission, rpt UA neg, UCx not sent.        Metabolic encephalopathy in setting of infection, likely 2/2 UTI and poor PO intake. Labs as above.        Hypernatremia/Hypercalcemia in setting of poor PO intake and infection. Patient was started on glucerna x2 daily and D5W. Trended  BMP daily.        Schizophrenia w/ concurrent h/o dementia for which psychiatry consulted. Continued haldol and lithium. Discontinued ativan and initiated trazadone. Haldol PRN for agitation.          Chronic diastolic heart failure with mild LE edema; Lasix held in setting of  dehydration 2/2 poor PO intake.        Type 2 diabetes mellitus w/ A1c 7.5% monitored FS on ISS.        Dispo - Home with home hospice        On ___ this case was reviewed with  ____, the patient is cleared for discharge with home hospice services. All medications were reviewed and prescriptions were sent to hospice pharmacy. 70 y/o F h/o T2DM, schizophrenia, depression, dementia, legally blind p/w AMS        Urinary tract infection likely etiology for patient's state of encephalopathy for which patient was treated with Ceftriaxone x3 days; Tox screen neg and lithium low; BCx Negative, UA (+) on admission, rpt UA neg, UCx not sent.        Metabolic encephalopathy in setting of infection, likely 2/2 UTI and poor PO intake. Labs as above.        Hypernatremia/Hypercalcemia in setting of poor PO intake and infection. Patient was started on glucerna x2 daily and D5W. Trended  BMP daily.        Schizophrenia w/ concurrent h/o dementia for which psychiatry consulted. Continued haldol and lithium. Discontinued ativan and initiated trazadone. Haldol PRN for agitation.          Chronic diastolic heart failure with mild LE edema; Lasix held in setting of  dehydration 2/2 poor PO intake.        Type 2 diabetes mellitus w/ A1c 7.5% monitored FS on ISS.        Dispo - Home with home hospice        On 12/4 this case was reviewed with Dr. Bryson  the patient is cleared for discharge with home hospice services. All medications were reviewed and prescriptions were sent to hospice pharmacy.

## 2019-11-12 NOTE — PROGRESS NOTE ADULT - PROBLEM SELECTOR PLAN 3
-c/w haldol TID, lithium hs, trazodone  avoid Ambien/anticholinergics, day/night cues, supportive care

## 2019-11-12 NOTE — PHYSICAL THERAPY INITIAL EVALUATION ADULT - PERTINENT HX OF CURRENT PROBLEM, REHAB EVAL
Pt. admitted for altered mental status, behavioral disturbance. Urinary tract infection without hematuria, metabolic encephalopathy.

## 2019-11-12 NOTE — PHYSICAL THERAPY INITIAL EVALUATION ADULT - CRITERIA FOR SKILLED THERAPEUTIC INTERVENTIONS
anticipated discharge recommendation/rehab potential/predicted duration of therapy intervention/risk reduction/prevention/impairments found/therapy frequency

## 2019-11-12 NOTE — DISCHARGE NOTE PROVIDER - NSDCCPCAREPLAN_GEN_ALL_CORE_FT
PRINCIPAL DISCHARGE DIAGNOSIS  Diagnosis: UTI (urinary tract infection)  Assessment and Plan of Treatment: You were started on antibiotics for a urinary infection. To help prevent future infections maintain healthy hygiene and avoid taking long baths. Wipe from front to back and empty your bladder frequently by keeping yourself properly hydrated. Monitor for signs/symptoms of infection, such as, fever/chills, burning/pain with urination, urinary frequency/hesitancy, cloudy urine, or blood in urine and follow-up with your primary care provider as outpatient for further care.      SECONDARY DISCHARGE DIAGNOSES  Diagnosis: Chronic diastolic heart failure  Assessment and Plan of Treatment: LASIX ___________    Diagnosis: Type 2 diabetes mellitus without complication, without long-term current use of insulin  Assessment and Plan of Treatment: Continue on medication regimen. Your HgA1c is 7.5%. Monitor for signs/symptoms of low blood glucose, such as, dizziness, altered mental status, or cool/clammy skin. In addition, monitor for signs/symptoms of high blood glucose, such as, feeling hot, dry, fatigued, or with increased thirst/urination. Make regular podiatry appointments in order to have feet checked for wounds and uncontrolled toe nail growth to prevent infections, as well as, appointments with an ophthalmologist to monitor your vision.    Diagnosis: Schizophrenia, unspecified type  Assessment and Plan of Treatment: Continue your medications as directed and follow up with your primary care provider/psychiatrist for further evaluation/management. If you are ever in need of immediate psychiatric assistance you may reach out to the [facility] [phone].    Diagnosis: Dementia with behavioral disturbance, unspecified dementia type  Assessment and Plan of Treatment: Please continue your medications as prescribed and allow help with daily acitivities of living. Maintain a safe environment and make movements in a careful manner to prevent falls. Ensure that you are eating and drinking adequately and maintaining a healthy sleep cycle. If you are in need of assistance with medication adjustment you can follow-up with your outpatient provider or refer to the [facility] clinic by calling [phone]. PRINCIPAL DISCHARGE DIAGNOSIS  Diagnosis: UTI (urinary tract infection)  Assessment and Plan of Treatment: You were started on antibiotics for a urinary infection. To help prevent future infections maintain healthy hygiene and avoid taking long baths. Wipe from front to back and empty your bladder frequently by keeping yourself properly hydrated. Monitor for signs/symptoms of infection, such as, fever/chills, burning/pain with urination, urinary frequency/hesitancy, cloudy urine, or blood in urine and follow-up with your primary care provider as outpatient for further care.      SECONDARY DISCHARGE DIAGNOSES  Diagnosis: Chronic diastolic heart failure  Assessment and Plan of Treatment: LASIX ___________    Diagnosis: Type 2 diabetes mellitus without complication, without long-term current use of insulin  Assessment and Plan of Treatment: Continue on medication regimen. Your HgA1c is 7.5%. Monitor for signs/symptoms of low blood glucose, such as, dizziness, altered mental status, or cool/clammy skin. In addition, monitor for signs/symptoms of high blood glucose, such as, feeling hot, dry, fatigued, or with increased thirst/urination. Make regular podiatry appointments in order to have feet checked for wounds and uncontrolled toe nail growth to prevent infections, as well as, appointments with an ophthalmologist to monitor your vision.    Diagnosis: Schizophrenia, unspecified type  Assessment and Plan of Treatment: Continue your medications as directed and follow up with your primary care provider/psychiatrist for further evaluation/management. If you are ever in need of immediate psychiatric assistance you may reach out to the [facility] [phone].    Diagnosis: Dementia with behavioral disturbance, unspecified dementia type  Assessment and Plan of Treatment: Please continue your medications as prescribed and allow help with daily acitivities of living. Maintain a safe environment and make movements in a careful manner to prevent falls. Ensure that you are eating and drinking adequately and maintaining a healthy sleep cycle. If you are in need of assistance with medication adjustment you can follow-up with your outpatient provider or refer to the [facility] clinic by calling [phone]. PRINCIPAL DISCHARGE DIAGNOSIS  Diagnosis: UTI (urinary tract infection)  Assessment and Plan of Treatment: You were started on antibiotics for a urinary infection. To help prevent future infections maintain healthy hygiene and avoid taking long baths. Wipe from front to back and empty your bladder frequently by keeping yourself properly hydrated. Monitor for signs/symptoms of infection, such as, fever/chills, burning/pain with urination, urinary frequency/hesitancy, cloudy urine, or blood in urine and follow-up with your primary care provider as outpatient for further care.      SECONDARY DISCHARGE DIAGNOSES  Diagnosis: Schizophrenia, unspecified type  Assessment and Plan of Treatment: Continue your medications as directed and follow up with your primary care provider/psychiatrist for further evaluation/management. If you are ever in need of immediate psychiatric assistance you may reach out to the [facility] [phone].    Diagnosis: Chronic diastolic heart failure  Assessment and Plan of Treatment: Continue recommended medication regimen. Monitor for signs/symptoms of fluid overload and electrolyte abnormalities, such as, shortness of breath, cough, swelling, chest discomfort, changes in heart rate, dizziness, fainting, or changes in menrtal status. Follow-up with your PCP/cardiologist outpatient after you've been discharged from the hospital.      Diagnosis: Type 2 diabetes mellitus without complication, without long-term current use of insulin  Assessment and Plan of Treatment: Continue on medication regimen. Your HgA1c is 7.5%. Monitor for signs/symptoms of low blood glucose, such as, dizziness, altered mental status, or cool/clammy skin. In addition, monitor for signs/symptoms of high blood glucose, such as, feeling hot, dry, fatigued, or with increased thirst/urination. Make regular podiatry appointments in order to have feet checked for wounds and uncontrolled toe nail growth to prevent infections, as well as, appointments with an ophthalmologist to monitor your vision.    Diagnosis: Dementia with behavioral disturbance, unspecified dementia type  Assessment and Plan of Treatment: Please continue your medications as prescribed and allow help with daily acitivities of living. Maintain a safe environment and make movements in a careful manner to prevent falls. Ensure that you are eating and drinking adequately and maintaining a healthy sleep cycle. If you are in need of assistance with medication adjustment you can follow-up with your outpatient provider or refer to the [facility] clinic by calling [phone]. PRINCIPAL DISCHARGE DIAGNOSIS  Diagnosis: UTI (urinary tract infection)  Assessment and Plan of Treatment: You were started on antibiotics for a urinary infection. To help prevent future infections maintain healthy hygiene and avoid taking long baths. Wipe from front to back and empty your bladder frequently by keeping yourself properly hydrated. Monitor for signs/symptoms of infection, such as, fever/chills, burning/pain with urination, urinary frequency/hesitancy, cloudy urine, or blood in urine and follow-up with your primary care provider as outpatient for further care.      SECONDARY DISCHARGE DIAGNOSES  Diagnosis: Hypernatremia  Assessment and Plan of Treatment: Sodium levels have been noted to be elevated. D5W was started via IV. Please continue D5W @ 50cc/hr.    Diagnosis: Schizophrenia, unspecified type  Assessment and Plan of Treatment: Continue your medications as directed and follow up with your primary care provider/psychiatrist for further evaluation/management. If you are ever in need of immediate psychiatric assistance you may reach out to the [facility] [phone].    Diagnosis: Chronic diastolic heart failure  Assessment and Plan of Treatment: Continue recommended medication regimen. Monitor for signs/symptoms of fluid overload and electrolyte abnormalities, such as, shortness of breath, cough, swelling, chest discomfort, changes in heart rate, dizziness, fainting, or changes in menrtal status. Follow-up with your PCP/cardiologist outpatient after you've been discharged from the hospital.      Diagnosis: Type 2 diabetes mellitus without complication, without long-term current use of insulin  Assessment and Plan of Treatment: Continue on medication regimen. Your HgA1c is 7.5%. Monitor for signs/symptoms of low blood glucose, such as, dizziness, altered mental status, or cool/clammy skin. In addition, monitor for signs/symptoms of high blood glucose, such as, feeling hot, dry, fatigued, or with increased thirst/urination. Make regular podiatry appointments in order to have feet checked for wounds and uncontrolled toe nail growth to prevent infections, as well as, appointments with an ophthalmologist to monitor your vision.    Diagnosis: Dementia with behavioral disturbance, unspecified dementia type  Assessment and Plan of Treatment: Please continue your medications as prescribed and allow help with daily acitivities of living. Maintain a safe environment and make movements in a careful manner to prevent falls. Ensure that you are eating and drinking adequately and maintaining a healthy sleep cycle. If you are in need of assistance with medication adjustment you can follow-up with your outpatient provider or refer to the [facility] clinic by calling [phone]. PRINCIPAL DISCHARGE DIAGNOSIS  Diagnosis: UTI (urinary tract infection)  Assessment and Plan of Treatment: You were started on antibiotics for a urinary infection. To help prevent future infections maintain healthy hygiene and avoid taking long baths. Wipe from front to back and empty your bladder frequently by keeping yourself properly hydrated. Monitor for signs/symptoms of infection, such as, fever/chills, burning/pain with urination, urinary frequency/hesitancy, cloudy urine, or blood in urine and follow-up with your primary care provider as outpatient for further care.      SECONDARY DISCHARGE DIAGNOSES  Diagnosis: Hypernatremia  Assessment and Plan of Treatment: Sodium levels have been noted to be elevated. You were given IVF uring hospitalization. Pt able to tolerate oral liquids. Please encourage PO entake upon discharge.    Diagnosis: Schizophrenia, unspecified type  Assessment and Plan of Treatment: Continue your medications as directed and follow up with your primary care provider/psychiatrist for further evaluation/management. If you are ever in need of immediate psychiatric assistance you may reach out to the [facility] [phone].    Diagnosis: Chronic diastolic heart failure  Assessment and Plan of Treatment: Continue recommended medication regimen. Monitor for signs/symptoms of fluid overload and electrolyte abnormalities, such as, shortness of breath, cough, swelling, chest discomfort, changes in heart rate, dizziness, fainting, or changes in menrtal status. Follow-up with your PCP/cardiologist outpatient after you've been discharged from the hospital.      Diagnosis: Type 2 diabetes mellitus without complication, without long-term current use of insulin  Assessment and Plan of Treatment: Continue on medication regimen. Your HgA1c is 7.5%. Monitor for signs/symptoms of low blood glucose, such as, dizziness, altered mental status, or cool/clammy skin. In addition, monitor for signs/symptoms of high blood glucose, such as, feeling hot, dry, fatigued, or with increased thirst/urination. Make regular podiatry appointments in order to have feet checked for wounds and uncontrolled toe nail growth to prevent infections, as well as, appointments with an ophthalmologist to monitor your vision.    Diagnosis: Dementia with behavioral disturbance, unspecified dementia type  Assessment and Plan of Treatment: Please continue your medications as prescribed and allow help with daily acitivities of living. Maintain a safe environment and make movements in a careful manner to prevent falls. Ensure that you are eating and drinking adequately and maintaining a healthy sleep cycle. If you are in need of assistance with medication adjustment you can follow-up with your outpatient provider or refer to the [facility] clinic by calling [phone].

## 2019-11-12 NOTE — PROGRESS NOTE ADULT - PROBLEM SELECTOR PLAN 9
Transitions of Care Status:  1.  Name of PCP: Dr. Pena   2.  PCP Contacted on Admission: [x ] Y    [ ] N  -emailed   3.  PCP contacted at Discharge: [ ] Y    [ ] N    [ ] N/A  4.  Post-Discharge Appointment Date and Location:  5.  Summary of Handoff given to PCP: -improve score 2, dvt ppx: lovenox   diet: cc  dispo: pending psych eval and treatment of infection

## 2019-11-12 NOTE — DISCHARGE NOTE PROVIDER - NSFOLLOWUPCLINICS_GEN_ALL_ED_FT
[facility]  Psychiatry  [address] [place] [address]  Phone: [phone]  Fax:   Follow Up Time:

## 2019-11-12 NOTE — PROGRESS NOTE ADULT - SUBJECTIVE AND OBJECTIVE BOX
[facility] Division of Hospital Medicine  Denia Trejo MD  Pager (M-F, 8A-5P):  In-house pager [phone]; Long-range pager [phone]  Other Times:  Please page Hospitalist in Charge -  In-house pager [phone]    Patient is a 69y old  Female who presents with a chief complaint of AMS, behavioral disturbance (2019 15:59)      SUBJECTIVE / OVERNIGHT EVENTS: Per RN did not sleep last night.  Given Haldol and Ativan in ED.  RN able to feed pt this am.  Pt awake, eyes open, not answering ?s reliably.  Given dementia baby doll and pt bit doll's head then held it.  ADDITIONAL REVIEW OF SYSTEMS:    MEDICATIONS  (STANDING):  cefTRIAXone   IVPB 1000 milliGRAM(s) IV Intermittent every 24 hours  dextrose 5%. 1000 milliLiter(s) (50 mL/Hr) IV Continuous <Continuous>  dextrose 50% Injectable 12.5 Gram(s) IV Push once  dextrose 50% Injectable 25 Gram(s) IV Push once  dextrose 50% Injectable 25 Gram(s) IV Push once  enoxaparin Injectable 40 milliGRAM(s) SubCutaneous daily  haloperidol    Concentrate 2 milliGRAM(s) Oral <User Schedule>  haloperidol    Concentrate 1 milliGRAM(s) Oral <User Schedule>  influenza   Vaccine 0.5 milliLiter(s) IntraMuscular once  insulin lispro (HumaLOG) corrective regimen sliding scale   SubCutaneous three times a day before meals  insulin lispro (HumaLOG) corrective regimen sliding scale   SubCutaneous at bedtime  lithium SR (LITHOBID) 300 milliGRAM(s) Oral at bedtime  LORazepam     Tablet 0.25 milliGRAM(s) Oral at bedtime  simvastatin 20 milliGRAM(s) Oral at bedtime  sodium chloride 0.9%. 1000 milliLiter(s) (60 mL/Hr) IV Continuous <Continuous>  traZODone 50 milliGRAM(s) Oral at bedtime    MEDICATIONS  (PRN):  dextrose 40% Gel 15 Gram(s) Oral once PRN Blood Glucose LESS THAN 70 milliGRAM(s)/deciliter  glucagon  Injectable 1 milliGRAM(s) IntraMuscular once PRN Glucose LESS THAN 70 milligrams/deciliter      CAPILLARY BLOOD GLUCOSE      POCT Blood Glucose.: 168 mg/dL (2019 16:38)  POCT Blood Glucose.: 157 mg/dL (2019 11:10)  POCT Blood Glucose.: 135 mg/dL (2019 07:28)  POCT Blood Glucose.: 171 mg/dL (2019 21:27)    I&O's Summary      PHYSICAL EXAM:  Vital Signs Last 24 Hrs  T(C): 36.6 (2019 11:06), Max: 36.6 (2019 11:06)  T(F): 97.9 (2019 11:06), Max: 97.9 (2019 11:06)  HR: 95 (2019 11:06) (76 - 95)  BP: 153/92 (2019 11:06) (129/76 - 153/92)  BP(mean): --  RR: 17 (2019 11:06) (17 - 18)  SpO2: 99% (2019 11:06) (95% - 99%)    CONSTITUTIONAL: elderly female, lying in bed, eyes open,   EYES: poor vision  ENMT: Moist oral mucosa, no pharyngeal injection or exudates; normal dentition  NECK: Supple, no palpable masses; no thyromegaly  RESPIRATORY: Normal respiratory effort; lungs are clear to auscultation bilaterally  CARDIOVASCULAR: Regular rate and rhythm, normal S1 and S2, no murmur/rub/gallop; No lower extremity edema; Peripheral pulses are 2+ bilaterally  ABDOMEN: Nontender to palpation, normoactive bowel sounds, no rebound/guarding  PSYCH: calm, oriented to name only, not answering ?s reliably    LABS:                        13.3   13.09 )-----------( 408      ( 2019 05:50 )             42.9     -12    149<H>  |  108<H>  |  20  ----------------------------<  173<H>  4.2   |  25  |  1.07    Ca    10.7<H>      2019 05:50  Phos  3.0     11-12  Mg     2.1     -12    TPro  7.3  /  Alb  4.1  /  TBili  0.4  /  DBili  x   /  AST  17  /  ALT  10  /  AlkPhos  88  11-11          Urinalysis Basic - ( 2019 16:20 )    Color: COLORLESS / Appearance: CLEAR / S.006 / pH: 7.0  Gluc: NEGATIVE / Ketone: NEGATIVE  / Bili: NEGATIVE / Urobili: NORMAL   Blood: SMALL / Protein: NEGATIVE / Nitrite: NEGATIVE   Leuk Esterase: LARGE / RBC: 6-10 / WBC 26-50   Sq Epi: x / Non Sq Epi: FEW / Bacteria: MODERATE          RADIOLOGY & ADDITIONAL TESTS:  Results Reviewed:   Imaging Personally Reviewed:  Electrocardiogram Personally Reviewed:    COORDINATION OF CARE:  Care Discussed with Consultants/Other Providers [Y/N]: RN, SW, CM, ACP re overall care   Prior or Outpatient Records Reviewed [Y/N]:

## 2019-11-12 NOTE — PROGRESS NOTE ADULT - ASSESSMENT
69F h/o T2DM, schizoaffective disorder, depression, ?dementia (AAOx1 name), legally blind, presenting with altered mental status for 3 days and increased agitation at home, a/f sepsis (inc HR, leukocytosis) 2/2 UTI, metabolic encephalopathy:

## 2019-11-12 NOTE — BEHAVIORAL HEALTH ASSESSMENT NOTE - NSBHCHARTREVIEWLAB_PSY_A_CORE FT
13.3   13.09 )-----------( 408      ( 12 Nov 2019 05:50 )             42.9   11-12    149<H>  |  108<H>  |  20  ----------------------------<  173<H>  4.2   |  25  |  1.07    Ca    10.7<H>      12 Nov 2019 05:50  Phos  3.0     11-12  Mg     2.1     11-12    TPro  7.3  /  Alb  4.1  /  TBili  0.4  /  DBili  x   /  AST  17  /  ALT  10  /  AlkPhos  88  11-11

## 2019-11-12 NOTE — BEHAVIORAL HEALTH ASSESSMENT NOTE - RISK ASSESSMENT
Low Acute Suicide Risk Patient has chronic mental health issues and impulsivity. Protective factors include supportive  and medication compliance.

## 2019-11-12 NOTE — PHYSICAL THERAPY INITIAL EVALUATION ADULT - DISCHARGE DISPOSITION, PT EVAL
Inpatient restorative rehabilitation at this time. PT will continue to monitor patient progress closely.

## 2019-11-12 NOTE — DISCHARGE NOTE PROVIDER - NSDCFUADDAPPT_GEN_ALL_CORE_FT
Discharged with home hospice. Follow up with hospice services for further monitoring and management.

## 2019-11-12 NOTE — DISCHARGE NOTE PROVIDER - NSDCQMCOGNITION_NEU_ALL_CORE
Difficulty making decisions/Difficulty concentrating Difficulty concentrating/Difficulty making decisions/Difficulty remembering

## 2019-11-12 NOTE — PROGRESS NOTE ADULT - PROBLEM SELECTOR PLAN 5
-has mild LE edema, will hold lasix in setting of infection  -daily weight - c/w haldol TID, lithium hs, trazodone

## 2019-11-12 NOTE — PHYSICAL THERAPY INITIAL EVALUATION ADULT - ADDITIONAL COMMENTS
Pt. unable to provide information on social history and previous level of function secondary to cognitive status. Per documentation, Pt. lives in an apartment with . Pt. requires assistance and use of assistive equipment for functional mobility. Pt. requires assistance with ADLs. Pt. has a HHA a few hours/day. Pt. owns and wheelchair.     Pt. was left supine in bed post PT Evaluation, no apparent distress. ALEA Aguilar made aware of pt. status.

## 2019-11-13 LAB
ANION GAP SERPL CALC-SCNC: 13 MMO/L — SIGNIFICANT CHANGE UP (ref 7–14)
ANION GAP SERPL CALC-SCNC: 14 MMO/L — SIGNIFICANT CHANGE UP (ref 7–14)
BUN SERPL-MCNC: 15 MG/DL — SIGNIFICANT CHANGE UP (ref 7–23)
BUN SERPL-MCNC: 18 MG/DL — SIGNIFICANT CHANGE UP (ref 7–23)
CALCIUM SERPL-MCNC: 10.1 MG/DL — SIGNIFICANT CHANGE UP (ref 8.4–10.5)
CALCIUM SERPL-MCNC: 10.2 MG/DL — SIGNIFICANT CHANGE UP (ref 8.4–10.5)
CHLORIDE SERPL-SCNC: 113 MMOL/L — HIGH (ref 98–107)
CHLORIDE SERPL-SCNC: 114 MMOL/L — HIGH (ref 98–107)
CO2 SERPL-SCNC: 24 MMOL/L — SIGNIFICANT CHANGE UP (ref 22–31)
CO2 SERPL-SCNC: 24 MMOL/L — SIGNIFICANT CHANGE UP (ref 22–31)
CREAT SERPL-MCNC: 0.94 MG/DL — SIGNIFICANT CHANGE UP (ref 0.5–1.3)
CREAT SERPL-MCNC: 0.98 MG/DL — SIGNIFICANT CHANGE UP (ref 0.5–1.3)
GLUCOSE BLDC GLUCOMTR-MCNC: 135 MG/DL — HIGH (ref 70–99)
GLUCOSE BLDC GLUCOMTR-MCNC: 145 MG/DL — HIGH (ref 70–99)
GLUCOSE BLDC GLUCOMTR-MCNC: 212 MG/DL — HIGH (ref 70–99)
GLUCOSE BLDC GLUCOMTR-MCNC: 267 MG/DL — HIGH (ref 70–99)
GLUCOSE SERPL-MCNC: 192 MG/DL — HIGH (ref 70–99)
GLUCOSE SERPL-MCNC: 215 MG/DL — HIGH (ref 70–99)
HCT VFR BLD CALC: 42.3 % — SIGNIFICANT CHANGE UP (ref 34.5–45)
HGB BLD-MCNC: 12.7 G/DL — SIGNIFICANT CHANGE UP (ref 11.5–15.5)
MAGNESIUM SERPL-MCNC: 2.2 MG/DL — SIGNIFICANT CHANGE UP (ref 1.6–2.6)
MCHC RBC-ENTMCNC: 25.1 PG — LOW (ref 27–34)
MCHC RBC-ENTMCNC: 30 % — LOW (ref 32–36)
MCV RBC AUTO: 83.8 FL — SIGNIFICANT CHANGE UP (ref 80–100)
NRBC # FLD: 0 K/UL — SIGNIFICANT CHANGE UP (ref 0–0)
PHOSPHATE SERPL-MCNC: 2.8 MG/DL — SIGNIFICANT CHANGE UP (ref 2.5–4.5)
PLATELET # BLD AUTO: 366 K/UL — SIGNIFICANT CHANGE UP (ref 150–400)
PMV BLD: 9.8 FL — SIGNIFICANT CHANGE UP (ref 7–13)
POTASSIUM SERPL-MCNC: 3.6 MMOL/L — SIGNIFICANT CHANGE UP (ref 3.5–5.3)
POTASSIUM SERPL-MCNC: 3.7 MMOL/L — SIGNIFICANT CHANGE UP (ref 3.5–5.3)
POTASSIUM SERPL-SCNC: 3.6 MMOL/L — SIGNIFICANT CHANGE UP (ref 3.5–5.3)
POTASSIUM SERPL-SCNC: 3.7 MMOL/L — SIGNIFICANT CHANGE UP (ref 3.5–5.3)
RBC # BLD: 5.05 M/UL — SIGNIFICANT CHANGE UP (ref 3.8–5.2)
RBC # FLD: 15.6 % — HIGH (ref 10.3–14.5)
SODIUM SERPL-SCNC: 150 MMOL/L — HIGH (ref 135–145)
SODIUM SERPL-SCNC: 152 MMOL/L — HIGH (ref 135–145)
WBC # BLD: 11.08 K/UL — HIGH (ref 3.8–10.5)
WBC # FLD AUTO: 11.08 K/UL — HIGH (ref 3.8–10.5)

## 2019-11-13 PROCEDURE — 99233 SBSQ HOSP IP/OBS HIGH 50: CPT

## 2019-11-13 RX ORDER — SODIUM CHLORIDE 9 MG/ML
1000 INJECTION, SOLUTION INTRAVENOUS
Refills: 0 | Status: DISCONTINUED | OUTPATIENT
Start: 2019-11-13 | End: 2019-11-14

## 2019-11-13 RX ADMIN — ENOXAPARIN SODIUM 40 MILLIGRAM(S): 100 INJECTION SUBCUTANEOUS at 11:21

## 2019-11-13 RX ADMIN — HALOPERIDOL DECANOATE 2 MILLIGRAM(S): 100 INJECTION INTRAMUSCULAR at 19:36

## 2019-11-13 RX ADMIN — Medication 2: at 07:57

## 2019-11-13 RX ADMIN — Medication 3: at 16:38

## 2019-11-13 RX ADMIN — HALOPERIDOL DECANOATE 1 MILLIGRAM(S): 100 INJECTION INTRAMUSCULAR at 14:10

## 2019-11-13 RX ADMIN — CEFTRIAXONE 100 MILLIGRAM(S): 500 INJECTION, POWDER, FOR SOLUTION INTRAMUSCULAR; INTRAVENOUS at 16:39

## 2019-11-13 RX ADMIN — LITHIUM CARBONATE 300 MILLIGRAM(S): 300 TABLET, EXTENDED RELEASE ORAL at 21:04

## 2019-11-13 RX ADMIN — SIMVASTATIN 20 MILLIGRAM(S): 20 TABLET, FILM COATED ORAL at 21:04

## 2019-11-13 RX ADMIN — Medication 50 MILLIGRAM(S): at 21:04

## 2019-11-13 RX ADMIN — HALOPERIDOL DECANOATE 2 MILLIGRAM(S): 100 INJECTION INTRAMUSCULAR at 07:57

## 2019-11-13 RX ADMIN — SODIUM CHLORIDE 75 MILLILITER(S): 9 INJECTION, SOLUTION INTRAVENOUS at 11:15

## 2019-11-13 RX ADMIN — Medication 0.25 MILLIGRAM(S): at 21:04

## 2019-11-13 NOTE — PROGRESS NOTE BEHAVIORAL HEALTH - SUMMARY
Patient is a 69 y.o. female , lives with , has home aid 4-6 hours a day, unemployed, legally blind, bedbound, multiple inpatient psych admissions, has psychiatrist in community at [facility], past psych hx of schizoaffective disorder depression type and possible dementia, PMHx of CKD and diabetes type 2 presenting to the ED with AMS and behavioral disturbance x 3 days. She has had increased aggression at home biting /home aid and pulling home aid's hair. In ED last night she received ativan and haldol for increased agitation. Psychiatry consulted for worsening dementia with agitation.     11/12- Patient is irritable and unable to converse.     Recommendations-  - EO  - Continue haldol 1 mg @ 2 pm and 2 mg @ 8 am and 8 pm  - Continue lithium 300 mg at bedtime  - Continue ativan .25 mg at bedtime  - Continue trazodone 50 mg at bedtime  - Haldol .5 mg PRN PO/IM/IV for increased agitation Patient is a 69 y.o. female , lives with , has home aid 4-6 hours a day, unemployed, legally blind, bedbound, multiple inpatient psych admissions, has psychiatrist in community at [facility], past psych hx of schizoaffective disorder depression type and possible dementia, PMHx of CKD and diabetes type 2 presenting to the ED with AMS and behavioral disturbance x 3 days. She has had increased aggression at home biting /home aid and pulling home aid's hair. In ED last night she received ativan and haldol for increased agitation. Psychiatry consulted for worsening dementia with agitation.     11/12- Patient is irritable and unable to converse.     11/13- not agitation, no acute rn concerns    Recommendations-  - EO  - Continue haldol 1 mg @ 2 pm and 2 mg @ 8 am and 8 pm  - Continue lithium 300 mg at bedtime  - Continue ativan 0.25 mg at bedtime  - Continue trazodone 50 mg at bedtime  - Haldol 0.5 mg PRN PO/IM/IV for increased agitation

## 2019-11-13 NOTE — PROGRESS NOTE ADULT - PROBLEM SELECTOR PLAN 6
-has mild LE edema, will hold lasix in setting of infection  -daily weight -has mild LE edema, holding lasix  -daily weight

## 2019-11-13 NOTE — PROGRESS NOTE ADULT - SUBJECTIVE AND OBJECTIVE BOX
[facility] Division of Hospital Medicine  Denia Trejo MD  Pager (M-F, 8A-5P):  In-house pager [phone]; Long-range pager [phone]  Other Times:  Please page Hospitalist in Charge -  In-house pager [phone]    Patient is a 69y old  Female who presents with a chief complaint of AMS, behavioral disturbance (12 Nov 2019 16:48)      SUBJECTIVE / OVERNIGHT EVENTS: No problems reported over night. Needs to be fed.   feels she seems bit better.  ADDITIONAL REVIEW OF SYSTEMS:    MEDICATIONS  (STANDING):  dextrose 5%. 1000 milliLiter(s) (50 mL/Hr) IV Continuous <Continuous>  dextrose 5%. 1000 milliLiter(s) (75 mL/Hr) IV Continuous <Continuous>  dextrose 50% Injectable 12.5 Gram(s) IV Push once  dextrose 50% Injectable 25 Gram(s) IV Push once  dextrose 50% Injectable 25 Gram(s) IV Push once  enoxaparin Injectable 40 milliGRAM(s) SubCutaneous daily  haloperidol    Concentrate 2 milliGRAM(s) Oral <User Schedule>  haloperidol    Concentrate 1 milliGRAM(s) Oral <User Schedule>  influenza   Vaccine 0.5 milliLiter(s) IntraMuscular once  insulin lispro (HumaLOG) corrective regimen sliding scale   SubCutaneous three times a day before meals  insulin lispro (HumaLOG) corrective regimen sliding scale   SubCutaneous at bedtime  lithium SR (LITHOBID) 300 milliGRAM(s) Oral at bedtime  LORazepam     Tablet 0.25 milliGRAM(s) Oral at bedtime  simvastatin 20 milliGRAM(s) Oral at bedtime  traZODone 50 milliGRAM(s) Oral at bedtime    MEDICATIONS  (PRN):  dextrose 40% Gel 15 Gram(s) Oral once PRN Blood Glucose LESS THAN 70 milliGRAM(s)/deciliter  glucagon  Injectable 1 milliGRAM(s) IntraMuscular once PRN Glucose LESS THAN 70 milligrams/deciliter      CAPILLARY BLOOD GLUCOSE      POCT Blood Glucose.: 267 mg/dL (13 Nov 2019 16:35)  POCT Blood Glucose.: 145 mg/dL (13 Nov 2019 11:19)  POCT Blood Glucose.: 212 mg/dL (13 Nov 2019 07:30)  POCT Blood Glucose.: 129 mg/dL (12 Nov 2019 22:06)    I&O's Summary      PHYSICAL EXAM:  Vital Signs Last 24 Hrs  T(C): 36.7 (13 Nov 2019 14:56), Max: 36.7 (12 Nov 2019 21:08)  T(F): 98 (13 Nov 2019 14:56), Max: 98 (12 Nov 2019 21:08)  HR: 75 (13 Nov 2019 14:56) (75 - 80)  BP: 128/83 (13 Nov 2019 14:56) (128/83 - 146/87)  BP(mean): --  RR: 18 (13 Nov 2019 14:56) (18 - 18)  SpO2: 100% (13 Nov 2019 14:56) (97% - 100%)    CONSTITUTIONAL: elderly female, lying in bed, eyes open,   EYES: poor vision  ENMT: Moist oral mucosa, no pharyngeal injection or exudates; normal dentition  NECK: Supple, no palpable masses; no thyromegaly  RESPIRATORY: Normal respiratory effort; lungs are clear to auscultation bilaterally  CARDIOVASCULAR: Regular rate and rhythm, normal S1 and S2, no murmur/rub/gallop; No lower extremity edema; Peripheral pulses are 2+ bilaterally  ABDOMEN: Nontender to palpation, normoactive bowel sounds, no rebound/guarding  PSYCH: calm, oriented to name only    LABS:                        12.7   11.08 )-----------( 366      ( 13 Nov 2019 04:20 )             42.3     11-13    150<H>  |  113<H>  |  15  ----------------------------<  192<H>  3.6   |  24  |  0.94    Ca    10.2      13 Nov 2019 12:13  Phos  2.8     11-13  Mg     2.2     11-13                Culture - Blood (collected 11 Nov 2019 22:40)  Source: BLOOD VENOUS  Preliminary Report (12 Nov 2019 22:41):    NO ORGANISMS ISOLATED    NO ORGANISMS ISOLATED AT 24 HOURS    Culture - Blood (collected 11 Nov 2019 22:40)  Source: BLOOD PERIPHERAL  Preliminary Report (12 Nov 2019 22:41):    NO ORGANISMS ISOLATED    NO ORGANISMS ISOLATED AT 24 HOURS        RADIOLOGY & ADDITIONAL TESTS:  Results Reviewed:   Imaging Personally Reviewed:  Electrocardiogram Personally Reviewed:    COORDINATION OF CARE:  Care Discussed with Consultants/Other Providers [Y/N]: RN, SW, CM, ACP re overall care   Prior or Outpatient Records Reviewed [Y/N]: [facility] Division of Hospital Medicine  Denia Trejo MD  Pager (M-F, 8A-5P):  In-house pager [phone]; Long-range pager [phone]  Other Times:  Please page Hospitalist in Charge -  In-house pager [phone]    Patient is a 69y old  Female who presents with a chief complaint of AMS, behavioral disturbance (12 Nov 2019 16:48)      SUBJECTIVE / OVERNIGHT EVENTS: No problems reported over night. Needs to be fed.   feels she seems bit better. Pain said "I feel ok."  "I have a lady who is supposed to help me."  Other speech nonsensical.  ADDITIONAL REVIEW OF SYSTEMS:    MEDICATIONS  (STANDING):  dextrose 5%. 1000 milliLiter(s) (50 mL/Hr) IV Continuous <Continuous>  dextrose 5%. 1000 milliLiter(s) (75 mL/Hr) IV Continuous <Continuous>  dextrose 50% Injectable 12.5 Gram(s) IV Push once  dextrose 50% Injectable 25 Gram(s) IV Push once  dextrose 50% Injectable 25 Gram(s) IV Push once  enoxaparin Injectable 40 milliGRAM(s) SubCutaneous daily  haloperidol    Concentrate 2 milliGRAM(s) Oral <User Schedule>  haloperidol    Concentrate 1 milliGRAM(s) Oral <User Schedule>  influenza   Vaccine 0.5 milliLiter(s) IntraMuscular once  insulin lispro (HumaLOG) corrective regimen sliding scale   SubCutaneous three times a day before meals  insulin lispro (HumaLOG) corrective regimen sliding scale   SubCutaneous at bedtime  lithium SR (LITHOBID) 300 milliGRAM(s) Oral at bedtime  LORazepam     Tablet 0.25 milliGRAM(s) Oral at bedtime  simvastatin 20 milliGRAM(s) Oral at bedtime  traZODone 50 milliGRAM(s) Oral at bedtime    MEDICATIONS  (PRN):  dextrose 40% Gel 15 Gram(s) Oral once PRN Blood Glucose LESS THAN 70 milliGRAM(s)/deciliter  glucagon  Injectable 1 milliGRAM(s) IntraMuscular once PRN Glucose LESS THAN 70 milligrams/deciliter      CAPILLARY BLOOD GLUCOSE      POCT Blood Glucose.: 267 mg/dL (13 Nov 2019 16:35)  POCT Blood Glucose.: 145 mg/dL (13 Nov 2019 11:19)  POCT Blood Glucose.: 212 mg/dL (13 Nov 2019 07:30)  POCT Blood Glucose.: 129 mg/dL (12 Nov 2019 22:06)    I&O's Summary      PHYSICAL EXAM:  Vital Signs Last 24 Hrs  T(C): 36.7 (13 Nov 2019 14:56), Max: 36.7 (12 Nov 2019 21:08)  T(F): 98 (13 Nov 2019 14:56), Max: 98 (12 Nov 2019 21:08)  HR: 75 (13 Nov 2019 14:56) (75 - 80)  BP: 128/83 (13 Nov 2019 14:56) (128/83 - 146/87)  BP(mean): --  RR: 18 (13 Nov 2019 14:56) (18 - 18)  SpO2: 100% (13 Nov 2019 14:56) (97% - 100%)    CONSTITUTIONAL: elderly female, lying in bed, eyes open,   EYES: poor vision  NECK: Supple, no palpable masses; no thyromegaly  RESPIRATORY: Normal respiratory effort; lungs are clear to auscultation bilaterally  CARDIOVASCULAR: Regular rate and rhythm, normal S1 and S2, no murmur/rub/gallop; No lower extremity edema; Peripheral pulses are 2+ bilaterally  ABDOMEN: Nontender to palpation, normoactive bowel sounds, no rebound/guarding  PSYCH: calm, oriented to name only    LABS:                        12.7   11.08 )-----------( 366      ( 13 Nov 2019 04:20 )             42.3     11-13    150<H>  |  113<H>  |  15  ----------------------------<  192<H>  3.6   |  24  |  0.94    Ca    10.2      13 Nov 2019 12:13  Phos  2.8     11-13  Mg     2.2     11-13    Urinalysis (11.11.19 @ 16:20)    Color: COLORLESS    Urine Appearance: CLEAR    Glucose: NEGATIVE    Bilirubin: NEGATIVE    Ketone - Urine: NEGATIVE    Specific Gravity: 1.006    Blood: SMALL    pH - Urine: 7.0    Protein, Urine: NEGATIVE    Urobilinogen: NORMAL    Nitrite: NEGATIVE    Leukocyte Esterase Concentration: LARGE    Red Blood Cell - Urine: 6-10    White Blood Cell - Urine: 26-50    Epithelial Cells: FEW    Mucus: FEW    Bacteria: MODERATE      Culture - Blood (collected 11 Nov 2019 22:40)  Source: BLOOD VENOUS  Preliminary Report (12 Nov 2019 22:41):    NO ORGANISMS ISOLATED    NO ORGANISMS ISOLATED AT 24 HOURS    Culture - Blood (collected 11 Nov 2019 22:40)  Source: BLOOD PERIPHERAL  Preliminary Report (12 Nov 2019 22:41):    NO ORGANISMS ISOLATED    NO ORGANISMS ISOLATED AT 24 HOURS        RADIOLOGY & ADDITIONAL TESTS:  Results Reviewed:   Imaging Personally Reviewed:  Electrocardiogram Personally Reviewed:    COORDINATION OF CARE:  Care Discussed with Consultants/Other Providers [Y/N]: RN, SW, CM, ACP re overall care   Prior or Outpatient Records Reviewed [Y/N]:

## 2019-11-13 NOTE — PROGRESS NOTE BEHAVIORAL HEALTH - NSBHFUPINTERVALHXFT_PSY_A_CORE
Patient seen at bedside for follow up of dementia management. At bedside, patient appears calm and unable to engage in conversation. She keeps her eyes closed and grabs out to interviewer's hand holding on tight. Tries to put things in her mouth and has sucking reflex. Patient seen at bedside for follow up of dementia management. At bedside, patient appears calm and unable to engage in conversation. She keeps her eyes closed and grabs out to interviewer's hand holding on tight. Tries to put things in her mouth and has sucking reflex- primitive responses likely due to advancing dementia

## 2019-11-13 NOTE — PROGRESS NOTE BEHAVIORAL HEALTH - NSBHCHARTREVIEWLAB_PSY_A_CORE FT
11-13    150<H>  |  113<H>  |  15  ----------------------------<  192<H>  3.6   |  24  |  0.94    Ca    10.2      13 Nov 2019 12:13  Phos  2.8     11-13  Mg     2.2     11-13    TPro  7.3  /  Alb  4.1  /  TBili  0.4  /  DBili  x   /  AST  17  /  ALT  10  /  AlkPhos  88  11-11                        12.7   11.08 )-----------( 366      ( 13 Nov 2019 04:20 )             42.3

## 2019-11-13 NOTE — PROGRESS NOTE BEHAVIORAL HEALTH - NSBHCHARTREVIEWVS_PSY_A_CORE FT
Vital Signs Last 24 Hrs  T(C): 36.3 (13 Nov 2019 05:02), Max: 36.7 (12 Nov 2019 21:08)  T(F): 97.4 (13 Nov 2019 05:02), Max: 98 (12 Nov 2019 21:08)  HR: 80 (13 Nov 2019 05:02) (78 - 80)  BP: 146/87 (13 Nov 2019 05:02) (135/84 - 146/87)  BP(mean): --  RR: 18 (13 Nov 2019 05:02) (18 - 18)  SpO2: 100% (13 Nov 2019 05:02) (97% - 100%)

## 2019-11-13 NOTE — PROGRESS NOTE ADULT - ATTENDING COMMENTS
d/w  at bedside, feels she is less agitated.  HHA M-F 4h/d, rest of time he care for her.  Was able to walk PTA.  PT does recommend rehab.

## 2019-11-14 ENCOUNTER — TRANSCRIPTION ENCOUNTER (OUTPATIENT)
Age: 69
End: 2019-11-14

## 2019-11-14 LAB
ANION GAP SERPL CALC-SCNC: 14 MMO/L — SIGNIFICANT CHANGE UP (ref 7–14)
BUN SERPL-MCNC: 17 MG/DL — SIGNIFICANT CHANGE UP (ref 7–23)
CALCIUM SERPL-MCNC: 10.4 MG/DL — SIGNIFICANT CHANGE UP (ref 8.4–10.5)
CHLORIDE SERPL-SCNC: 108 MMOL/L — HIGH (ref 98–107)
CO2 SERPL-SCNC: 23 MMOL/L — SIGNIFICANT CHANGE UP (ref 22–31)
CREAT SERPL-MCNC: 0.97 MG/DL — SIGNIFICANT CHANGE UP (ref 0.5–1.3)
GLUCOSE BLDC GLUCOMTR-MCNC: 134 MG/DL — HIGH (ref 70–99)
GLUCOSE BLDC GLUCOMTR-MCNC: 177 MG/DL — HIGH (ref 70–99)
GLUCOSE BLDC GLUCOMTR-MCNC: 180 MG/DL — HIGH (ref 70–99)
GLUCOSE BLDC GLUCOMTR-MCNC: 185 MG/DL — HIGH (ref 70–99)
GLUCOSE SERPL-MCNC: 236 MG/DL — HIGH (ref 70–99)
HCT VFR BLD CALC: 41.7 % — SIGNIFICANT CHANGE UP (ref 34.5–45)
HGB BLD-MCNC: 13 G/DL — SIGNIFICANT CHANGE UP (ref 11.5–15.5)
MAGNESIUM SERPL-MCNC: 1.9 MG/DL — SIGNIFICANT CHANGE UP (ref 1.6–2.6)
MCHC RBC-ENTMCNC: 25.2 PG — LOW (ref 27–34)
MCHC RBC-ENTMCNC: 31.2 % — LOW (ref 32–36)
MCV RBC AUTO: 81 FL — SIGNIFICANT CHANGE UP (ref 80–100)
NRBC # FLD: 0 K/UL — SIGNIFICANT CHANGE UP (ref 0–0)
PHOSPHATE SERPL-MCNC: 2.9 MG/DL — SIGNIFICANT CHANGE UP (ref 2.5–4.5)
PLATELET # BLD AUTO: 395 K/UL — SIGNIFICANT CHANGE UP (ref 150–400)
PMV BLD: 9.9 FL — SIGNIFICANT CHANGE UP (ref 7–13)
POTASSIUM SERPL-MCNC: 3.6 MMOL/L — SIGNIFICANT CHANGE UP (ref 3.5–5.3)
POTASSIUM SERPL-SCNC: 3.6 MMOL/L — SIGNIFICANT CHANGE UP (ref 3.5–5.3)
RBC # BLD: 5.15 M/UL — SIGNIFICANT CHANGE UP (ref 3.8–5.2)
RBC # FLD: 15.4 % — HIGH (ref 10.3–14.5)
SODIUM SERPL-SCNC: 145 MMOL/L — SIGNIFICANT CHANGE UP (ref 135–145)
WBC # BLD: 9.93 K/UL — SIGNIFICANT CHANGE UP (ref 3.8–10.5)
WBC # FLD AUTO: 9.93 K/UL — SIGNIFICANT CHANGE UP (ref 3.8–10.5)

## 2019-11-14 PROCEDURE — 99233 SBSQ HOSP IP/OBS HIGH 50: CPT

## 2019-11-14 RX ORDER — INFLUENZA VIRUS VACCINE 15; 15; 15; 15 UG/.5ML; UG/.5ML; UG/.5ML; UG/.5ML
0.5 SUSPENSION INTRAMUSCULAR ONCE
Refills: 0 | Status: COMPLETED | OUTPATIENT
Start: 2019-11-14 | End: 2019-11-19

## 2019-11-14 RX ORDER — HALOPERIDOL DECANOATE 100 MG/ML
1 INJECTION INTRAMUSCULAR
Refills: 0 | Status: DISCONTINUED | OUTPATIENT
Start: 2019-11-14 | End: 2019-11-18

## 2019-11-14 RX ORDER — SODIUM CHLORIDE 9 MG/ML
1000 INJECTION, SOLUTION INTRAVENOUS
Refills: 0 | Status: DISCONTINUED | OUTPATIENT
Start: 2019-11-14 | End: 2019-11-15

## 2019-11-14 RX ORDER — HALOPERIDOL DECANOATE 100 MG/ML
2 INJECTION INTRAMUSCULAR
Refills: 0 | Status: DISCONTINUED | OUTPATIENT
Start: 2019-11-14 | End: 2019-11-18

## 2019-11-14 RX ADMIN — SODIUM CHLORIDE 50 MILLILITER(S): 9 INJECTION, SOLUTION INTRAVENOUS at 17:20

## 2019-11-14 RX ADMIN — ENOXAPARIN SODIUM 40 MILLIGRAM(S): 100 INJECTION SUBCUTANEOUS at 12:02

## 2019-11-14 RX ADMIN — SIMVASTATIN 20 MILLIGRAM(S): 20 TABLET, FILM COATED ORAL at 21:21

## 2019-11-14 RX ADMIN — HALOPERIDOL DECANOATE 2 MILLIGRAM(S): 100 INJECTION INTRAMUSCULAR at 07:59

## 2019-11-14 RX ADMIN — SODIUM CHLORIDE 75 MILLILITER(S): 9 INJECTION, SOLUTION INTRAVENOUS at 08:04

## 2019-11-14 RX ADMIN — Medication 1: at 12:01

## 2019-11-14 RX ADMIN — LITHIUM CARBONATE 300 MILLIGRAM(S): 300 TABLET, EXTENDED RELEASE ORAL at 21:21

## 2019-11-14 RX ADMIN — Medication 1: at 08:03

## 2019-11-14 NOTE — PROGRESS NOTE ADULT - PROBLEM SELECTOR PLAN 2
-in setting of infection, likely underlying dementia and schizoaffective d/o  - treating infection  avoid Ambien/anticholinergics, day/night cues, supportive care -c/w haldol TID, lithium hs, trazodone  avoid Ambien/anticholinergics, day/night cues, supportive care  - d/w  seems dementia progressing, have asked Hospice to evaluate, also began discussion of goals of care.  PT has recommended rehab,  realistic will see if pt able to participate.

## 2019-11-14 NOTE — PROGRESS NOTE BEHAVIORAL HEALTH - NSBHCHARTREVIEWVS_PSY_A_CORE FT
Vital Signs Last 24 Hrs  T(C): 36.6 (14 Nov 2019 06:09), Max: 36.7 (13 Nov 2019 14:56)  T(F): 97.8 (14 Nov 2019 06:09), Max: 98 (13 Nov 2019 14:56)  HR: 73 (14 Nov 2019 06:09) (67 - 75)  BP: 125/66 (14 Nov 2019 06:09) (125/66 - 153/81)  BP(mean): --  RR: 18 (14 Nov 2019 06:09) (18 - 18)  SpO2: 98% (14 Nov 2019 06:09) (98% - 100%)

## 2019-11-14 NOTE — PROGRESS NOTE ADULT - ATTENDING COMMENTS
d/w  at bedside, feels she is less agitated.  HHA M-F 4h/d, rest of time he care for her.  Was able to walk PTA.  PT does recommend rehab. HHA M-F 4h/d, rest of time he care for her.  Was able to walk PTA.  PT does recommend rehab.    PMD Dr. Jose Pena

## 2019-11-14 NOTE — PROGRESS NOTE ADULT - PROBLEM SELECTOR PLAN 1
Met sepsis criteria, c/w ceftriaxone, no h/o MDR organisms. low threshold to broaden abx and obtain CT A/P if no clinical improvement on ceftriaxone  - blood cx's pending, urine culture not yet sent therefore will likely be low yield in setting of infection, likely underlying dementia and schizoaffective d/o  - s/p abx for UTI  avoid Ambien/anticholinergics, day/night cues, supportive care

## 2019-11-14 NOTE — PROGRESS NOTE ADULT - PROBLEM SELECTOR PLAN 4
changed to D5W, encourage PO fluids, f/u Na Met sepsis criteria on admission, completed 3d CTX, blood cx negative, urine culture was not sent

## 2019-11-14 NOTE — PROGRESS NOTE ADULT - PROBLEM SELECTOR PLAN 6
-has mild LE edema, holding lasix  -daily weight lasix held, not clinically volume overloaded  poor PO

## 2019-11-14 NOTE — PROGRESS NOTE ADULT - SUBJECTIVE AND OBJECTIVE BOX
[facility] Division of Hospital Medicine  Denia Trejo MD  Pager (M-F, 8A-5P):  In-house pager [phone]; Long-range pager [phone]  Other Times:  Please page Hospitalist in Charge -  In-house pager [phone]    Patient is a 69y old  Female who presents with a chief complaint of AMS, behavioral disturbance (13 Nov 2019 17:21)      SUBJECTIVE / OVERNIGHT EVENTS: No problems reported over night.   ADDITIONAL REVIEW OF SYSTEMS:    MEDICATIONS  (STANDING):  dextrose 5%. 1000 milliLiter(s) (50 mL/Hr) IV Continuous <Continuous>  dextrose 5%. 1000 milliLiter(s) (75 mL/Hr) IV Continuous <Continuous>  dextrose 50% Injectable 12.5 Gram(s) IV Push once  dextrose 50% Injectable 25 Gram(s) IV Push once  dextrose 50% Injectable 25 Gram(s) IV Push once  enoxaparin Injectable 40 milliGRAM(s) SubCutaneous daily  haloperidol    Concentrate 2 milliGRAM(s) Oral <User Schedule>  haloperidol    Concentrate 1 milliGRAM(s) Oral <User Schedule>  influenza   Vaccine 0.5 milliLiter(s) IntraMuscular once  insulin lispro (HumaLOG) corrective regimen sliding scale   SubCutaneous three times a day before meals  insulin lispro (HumaLOG) corrective regimen sliding scale   SubCutaneous at bedtime  lithium SR (LITHOBID) 300 milliGRAM(s) Oral at bedtime  LORazepam     Tablet 0.25 milliGRAM(s) Oral at bedtime  simvastatin 20 milliGRAM(s) Oral at bedtime  traZODone 50 milliGRAM(s) Oral at bedtime    MEDICATIONS  (PRN):  dextrose 40% Gel 15 Gram(s) Oral once PRN Blood Glucose LESS THAN 70 milliGRAM(s)/deciliter  glucagon  Injectable 1 milliGRAM(s) IntraMuscular once PRN Glucose LESS THAN 70 milligrams/deciliter      CAPILLARY BLOOD GLUCOSE      POCT Blood Glucose.: 180 mg/dL (14 Nov 2019 07:49)  POCT Blood Glucose.: 135 mg/dL (13 Nov 2019 22:18)  POCT Blood Glucose.: 267 mg/dL (13 Nov 2019 16:35)  POCT Blood Glucose.: 145 mg/dL (13 Nov 2019 11:19)    I&O's Summary      PHYSICAL EXAM:  Vital Signs Last 24 Hrs  T(C): 36.6 (14 Nov 2019 06:09), Max: 36.7 (13 Nov 2019 14:56)  T(F): 97.8 (14 Nov 2019 06:09), Max: 98 (13 Nov 2019 14:56)  HR: 73 (14 Nov 2019 06:09) (67 - 75)  BP: 125/66 (14 Nov 2019 06:09) (125/66 - 153/81)  BP(mean): --  RR: 18 (14 Nov 2019 06:09) (18 - 18)  SpO2: 98% (14 Nov 2019 06:09) (98% - 100%)    CONSTITUTIONAL: elderly female, lying in bed, eyes open,   EYES: poor vision  NECK: Supple, no palpable masses; no thyromegaly  RESPIRATORY: Normal respiratory effort; lungs are clear to auscultation bilaterally  CARDIOVASCULAR: Regular rate and rhythm, normal S1 and S2, no murmur/rub/gallop; No lower extremity edema; Peripheral pulses are 2+ bilaterally  ABDOMEN: Nontender to palpation, normoactive bowel sounds, no rebound/guarding  PSYCH: calm, oriented to name only    LABS:                        13.0   9.93  )-----------( 395      ( 14 Nov 2019 06:00 )             41.7     11-14    145  |  108<H>  |  17  ----------------------------<  236<H>  3.6   |  23  |  0.97    Ca    10.4      14 Nov 2019 06:00  Phos  2.9     11-14  Mg     1.9     11-14                Culture - Blood (collected 11 Nov 2019 22:40)  Source: BLOOD VENOUS  Preliminary Report (13 Nov 2019 22:41):    NO ORGANISMS ISOLATED    NO ORGANISMS ISOLATED AT 48 HRS.    Culture - Blood (collected 11 Nov 2019 22:40)  Source: BLOOD PERIPHERAL  Preliminary Report (13 Nov 2019 22:41):    NO ORGANISMS ISOLATED    NO ORGANISMS ISOLATED AT 48 HRS.        RADIOLOGY & ADDITIONAL TESTS:  Results Reviewed:   Imaging Personally Reviewed:  Electrocardiogram Personally Reviewed:    COORDINATION OF CARE:  Care Discussed with Consultants/Other Providers [Y/N]: RN, SW, CM, ACP re overall care   Prior or Outpatient Records Reviewed [Y/N]: [facility] Division of Hospital Medicine  Denia Trejo MD  Pager (M-F, 8A-5P):  In-house pager [phone]; Long-range pager [phone]  Other Times:  Please page Hospitalist in Charge -  In-house pager [phone]    Patient is a 69y old  Female who presents with a chief complaint of AMS, behavioral disturbance (13 Nov 2019 17:21)      SUBJECTIVE / OVERNIGHT EVENTS: No problems reported over night. Not eating much.  Sleepy much of day.   at bedside in afternoon, pt sleeping whole time.  ADDITIONAL REVIEW OF SYSTEMS:    MEDICATIONS  (STANDING):  dextrose 5%. 1000 milliLiter(s) (50 mL/Hr) IV Continuous <Continuous>  dextrose 5%. 1000 milliLiter(s) (75 mL/Hr) IV Continuous <Continuous>  dextrose 50% Injectable 12.5 Gram(s) IV Push once  dextrose 50% Injectable 25 Gram(s) IV Push once  dextrose 50% Injectable 25 Gram(s) IV Push once  enoxaparin Injectable 40 milliGRAM(s) SubCutaneous daily  haloperidol    Concentrate 2 milliGRAM(s) Oral <User Schedule>  haloperidol    Concentrate 1 milliGRAM(s) Oral <User Schedule>  influenza   Vaccine 0.5 milliLiter(s) IntraMuscular once  insulin lispro (HumaLOG) corrective regimen sliding scale   SubCutaneous three times a day before meals  insulin lispro (HumaLOG) corrective regimen sliding scale   SubCutaneous at bedtime  lithium SR (LITHOBID) 300 milliGRAM(s) Oral at bedtime  LORazepam     Tablet 0.25 milliGRAM(s) Oral at bedtime  simvastatin 20 milliGRAM(s) Oral at bedtime  traZODone 50 milliGRAM(s) Oral at bedtime    MEDICATIONS  (PRN):  dextrose 40% Gel 15 Gram(s) Oral once PRN Blood Glucose LESS THAN 70 milliGRAM(s)/deciliter  glucagon  Injectable 1 milliGRAM(s) IntraMuscular once PRN Glucose LESS THAN 70 milligrams/deciliter      CAPILLARY BLOOD GLUCOSE      POCT Blood Glucose.: 180 mg/dL (14 Nov 2019 07:49)  POCT Blood Glucose.: 135 mg/dL (13 Nov 2019 22:18)  POCT Blood Glucose.: 267 mg/dL (13 Nov 2019 16:35)  POCT Blood Glucose.: 145 mg/dL (13 Nov 2019 11:19)    I&O's Summary      PHYSICAL EXAM:  Vital Signs Last 24 Hrs  T(C): 36.6 (14 Nov 2019 06:09), Max: 36.7 (13 Nov 2019 14:56)  T(F): 97.8 (14 Nov 2019 06:09), Max: 98 (13 Nov 2019 14:56)  HR: 73 (14 Nov 2019 06:09) (67 - 75)  BP: 125/66 (14 Nov 2019 06:09) (125/66 - 153/81)  BP(mean): --  RR: 18 (14 Nov 2019 06:09) (18 - 18)  SpO2: 98% (14 Nov 2019 06:09) (98% - 100%)    CONSTITUTIONAL: elderly female, lying in bed, sleeping, wakes briefly  EYES: poor vision  NECK: Supple, no palpable masses; no thyromegaly  RESPIRATORY: Normal respiratory effort; lungs are clear to auscultation bilaterally  CARDIOVASCULAR: Regular rate and rhythm, normal S1 and S2, no murmur/rub/gallop; No lower extremity edema; Peripheral pulses are 2+ bilaterally  ABDOMEN: Nontender to palpation, normoactive bowel sounds, no rebound/guarding  PSYCH: calm, oriented to name only    LABS:                        13.0   9.93  )-----------( 395      ( 14 Nov 2019 06:00 )             41.7     11-14    145  |  108<H>  |  17  ----------------------------<  236<H>  3.6   |  23  |  0.97    Ca    10.4      14 Nov 2019 06:00  Phos  2.9     11-14  Mg     1.9     11-14                Culture - Blood (collected 11 Nov 2019 22:40)  Source: BLOOD VENOUS  Preliminary Report (13 Nov 2019 22:41):    NO ORGANISMS ISOLATED    NO ORGANISMS ISOLATED AT 48 HRS.    Culture - Blood (collected 11 Nov 2019 22:40)  Source: BLOOD PERIPHERAL  Preliminary Report (13 Nov 2019 22:41):    NO ORGANISMS ISOLATED    NO ORGANISMS ISOLATED AT 48 HRS.        RADIOLOGY & ADDITIONAL TESTS:  Results Reviewed:   Imaging Personally Reviewed:  Electrocardiogram Personally Reviewed:    COORDINATION OF CARE:  Care Discussed with Consultants/Other Providers [Y/N]: RN, SW, CM, ACP re overall care   Prior or Outpatient Records Reviewed [Y/N]:

## 2019-11-14 NOTE — DISCHARGE NOTE NURSING/CASE MANAGEMENT/SOCIAL WORK - PATIENT PORTAL LINK FT
You can access the FollowMyHealth Patient Portal offered by [facility] by registering at the following website: [email or web address]. By joining [employer]’s FollowMyHealth portal, you will also be able to view your health information using other applications (apps) compatible with our system.

## 2019-11-14 NOTE — PROGRESS NOTE BEHAVIORAL HEALTH - NSBHCHARTREVIEWLAB_PSY_A_CORE FT
11-14    145  |  108<H>  |  17  ----------------------------<  236<H>  3.6   |  23  |  0.97    Ca    10.4      14 Nov 2019 06:00  Phos  2.9     11-14  Mg     1.9     11-14                          13.0   9.93  )-----------( 395      ( 14 Nov 2019 06:00 )             41.7

## 2019-11-14 NOTE — PROGRESS NOTE BEHAVIORAL HEALTH - SUMMARY
Patient is a 69 y.o. female , lives with , has home aid 4-6 hours a day, unemployed, legally blind, bedbound, multiple inpatient psych admissions, has psychiatrist in community at [facility], past psych hx of schizoaffective disorder depression type and possible dementia, PMHx of CKD and diabetes type 2 presenting to the ED with AMS and behavioral disturbance x 3 days. She has had increased aggression at home biting /home aid and pulling home aid's hair. In ED last night she received ativan and haldol for increased agitation. Psychiatry consulted for worsening dementia with agitation.     11/14- no agitation and patient is calm, no acute rn concerns    Recommendations-  - EO  - Continue haldol 1 mg @ 2 pm and 2 mg @ 8 am and 8 pm  - Continue lithium 300 mg at bedtime  - Continue ativan 0.25 mg at bedtime  - Continue trazodone 50 mg at bedtime  - Haldol 0.5 mg PRN PO/IM/IV for increased agitation.

## 2019-11-14 NOTE — PROGRESS NOTE BEHAVIORAL HEALTH - NSBHFUPINTERVALHXFT_PSY_A_CORE
Patient seen and evaluated at bedside for follow up of agitation like management. Patient is calm and asleep at encounter. Unable to engage in conversation when awakened. Patient seen and evaluated at bedside for follow up of agitation like management. Patient is calm and asleep at encounter. Unable to engage in conversation when awakened.  Care was coordinated with patient's outpatient resident psychiatrist Dr Dockery. Discussed that patient carries a diagnosis of Schizoaffective disorder, dementia, and in the last 1 year has been physically declining. Discussed below medications.

## 2019-11-14 NOTE — PROGRESS NOTE ADULT - PROBLEM SELECTOR PLAN 3
-c/w haldol TID, lithium hs, trazodone  avoid Ambien/anticholinergics, day/night cues, supportive care improving, decrease D5 to 50, encourage PO, f/u Na

## 2019-11-15 LAB
ANION GAP SERPL CALC-SCNC: 13 MMO/L — SIGNIFICANT CHANGE UP (ref 7–14)
BUN SERPL-MCNC: 19 MG/DL — SIGNIFICANT CHANGE UP (ref 7–23)
CALCIUM SERPL-MCNC: 10.4 MG/DL — SIGNIFICANT CHANGE UP (ref 8.4–10.5)
CHLORIDE SERPL-SCNC: 108 MMOL/L — HIGH (ref 98–107)
CO2 SERPL-SCNC: 23 MMOL/L — SIGNIFICANT CHANGE UP (ref 22–31)
CREAT SERPL-MCNC: 1.03 MG/DL — SIGNIFICANT CHANGE UP (ref 0.5–1.3)
GLUCOSE BLDC GLUCOMTR-MCNC: 182 MG/DL — HIGH (ref 70–99)
GLUCOSE BLDC GLUCOMTR-MCNC: 191 MG/DL — HIGH (ref 70–99)
GLUCOSE BLDC GLUCOMTR-MCNC: 195 MG/DL — HIGH (ref 70–99)
GLUCOSE BLDC GLUCOMTR-MCNC: 210 MG/DL — HIGH (ref 70–99)
GLUCOSE SERPL-MCNC: 217 MG/DL — HIGH (ref 70–99)
HCT VFR BLD CALC: 43.9 % — SIGNIFICANT CHANGE UP (ref 34.5–45)
HGB BLD-MCNC: 13.3 G/DL — SIGNIFICANT CHANGE UP (ref 11.5–15.5)
LITHIUM SERPL-MCNC: 0.74 MMOL/L — SIGNIFICANT CHANGE UP (ref 0.6–1.2)
MAGNESIUM SERPL-MCNC: 1.9 MG/DL — SIGNIFICANT CHANGE UP (ref 1.6–2.6)
MCHC RBC-ENTMCNC: 25 PG — LOW (ref 27–34)
MCHC RBC-ENTMCNC: 30.3 % — LOW (ref 32–36)
MCV RBC AUTO: 82.4 FL — SIGNIFICANT CHANGE UP (ref 80–100)
NRBC # FLD: 0 K/UL — SIGNIFICANT CHANGE UP (ref 0–0)
PHOSPHATE SERPL-MCNC: 3.5 MG/DL — SIGNIFICANT CHANGE UP (ref 2.5–4.5)
PLATELET # BLD AUTO: 413 K/UL — HIGH (ref 150–400)
PMV BLD: 10.5 FL — SIGNIFICANT CHANGE UP (ref 7–13)
POTASSIUM SERPL-MCNC: 4.5 MMOL/L — SIGNIFICANT CHANGE UP (ref 3.5–5.3)
POTASSIUM SERPL-SCNC: 4.5 MMOL/L — SIGNIFICANT CHANGE UP (ref 3.5–5.3)
RBC # BLD: 5.33 M/UL — HIGH (ref 3.8–5.2)
RBC # FLD: 15.6 % — HIGH (ref 10.3–14.5)
SODIUM SERPL-SCNC: 144 MMOL/L — SIGNIFICANT CHANGE UP (ref 135–145)
WBC # BLD: 12.71 K/UL — HIGH (ref 3.8–10.5)
WBC # FLD AUTO: 12.71 K/UL — HIGH (ref 3.8–10.5)

## 2019-11-15 PROCEDURE — 99232 SBSQ HOSP IP/OBS MODERATE 35: CPT

## 2019-11-15 RX ADMIN — ENOXAPARIN SODIUM 40 MILLIGRAM(S): 100 INJECTION SUBCUTANEOUS at 12:30

## 2019-11-15 RX ADMIN — Medication 1: at 16:54

## 2019-11-15 RX ADMIN — Medication 1: at 12:29

## 2019-11-15 RX ADMIN — Medication 0.25 MILLIGRAM(S): at 21:50

## 2019-11-15 RX ADMIN — HALOPERIDOL DECANOATE 1 MILLIGRAM(S): 100 INJECTION INTRAMUSCULAR at 16:55

## 2019-11-15 RX ADMIN — Medication 50 MILLIGRAM(S): at 21:51

## 2019-11-15 RX ADMIN — Medication 2: at 07:45

## 2019-11-15 RX ADMIN — HALOPERIDOL DECANOATE 2 MILLIGRAM(S): 100 INJECTION INTRAMUSCULAR at 21:51

## 2019-11-15 RX ADMIN — LITHIUM CARBONATE 300 MILLIGRAM(S): 300 TABLET, EXTENDED RELEASE ORAL at 21:51

## 2019-11-15 RX ADMIN — SIMVASTATIN 20 MILLIGRAM(S): 20 TABLET, FILM COATED ORAL at 21:51

## 2019-11-15 RX ADMIN — HALOPERIDOL DECANOATE 2 MILLIGRAM(S): 100 INJECTION INTRAMUSCULAR at 12:29

## 2019-11-15 NOTE — PROGRESS NOTE ADULT - SUBJECTIVE AND OBJECTIVE BOX
[facility] Division of Hospital Medicine  Karis Salamanca MD  Pager [phone]    Patient is a 69y old  Female who presents with a chief complaint of AMS, behavioral disturbance (14 Nov 2019 08:55)      SUBJECTIVE / OVERNIGHT EVENTS: pt seen and examined at 230p- sleeping  ADDITIONAL REVIEW OF SYSTEMS:    MEDICATIONS  (STANDING):  dextrose 5%. 1000 milliLiter(s) (50 mL/Hr) IV Continuous <Continuous>  dextrose 5%. 1000 milliLiter(s) (50 mL/Hr) IV Continuous <Continuous>  dextrose 50% Injectable 12.5 Gram(s) IV Push once  dextrose 50% Injectable 25 Gram(s) IV Push once  dextrose 50% Injectable 25 Gram(s) IV Push once  enoxaparin Injectable 40 milliGRAM(s) SubCutaneous daily  haloperidol     Tablet 2 milliGRAM(s) Oral <User Schedule>  haloperidol     Tablet 1 milliGRAM(s) Oral <User Schedule>  influenza  Vaccine (HIGH DOSE) 0.5 milliLiter(s) IntraMuscular once  insulin lispro (HumaLOG) corrective regimen sliding scale   SubCutaneous three times a day before meals  insulin lispro (HumaLOG) corrective regimen sliding scale   SubCutaneous at bedtime  lithium SR (LITHOBID) 300 milliGRAM(s) Oral at bedtime  LORazepam     Tablet 0.25 milliGRAM(s) Oral at bedtime  simvastatin 20 milliGRAM(s) Oral at bedtime  traZODone 50 milliGRAM(s) Oral at bedtime    MEDICATIONS  (PRN):  dextrose 40% Gel 15 Gram(s) Oral once PRN Blood Glucose LESS THAN 70 milliGRAM(s)/deciliter  glucagon  Injectable 1 milliGRAM(s) IntraMuscular once PRN Glucose LESS THAN 70 milligrams/deciliter      CAPILLARY BLOOD GLUCOSE      POCT Blood Glucose.: 195 mg/dL (15 Nov 2019 16:31)  POCT Blood Glucose.: 191 mg/dL (15 Nov 2019 12:13)  POCT Blood Glucose.: 210 mg/dL (15 Nov 2019 07:39)  POCT Blood Glucose.: 185 mg/dL (14 Nov 2019 21:55)    I&O's Summary    14 Nov 2019 07:01  -  15 Nov 2019 07:00  --------------------------------------------------------  IN: 800 mL / OUT: 0 mL / NET: 800 mL        PHYSICAL EXAM:  Vital Signs Last 24 Hrs  T(C): 36.7 (15 Nov 2019 13:05), Max: 37.1 (14 Nov 2019 19:55)  T(F): 98 (15 Nov 2019 13:05), Max: 98.7 (14 Nov 2019 19:55)  HR: 72 (15 Nov 2019 13:05) (64 - 72)  BP: 129/75 (15 Nov 2019 13:05) (106/64 - 129/83)  BP(mean): --  RR: 18 (15 Nov 2019 13:05) (17 - 18)  SpO2: 99% (15 Nov 2019 13:05) (99% - 99%)  CONSTITUTIONAL: NAD, well-developed, well-groomed  RESPIRATORY: Normal respiratory effort; lungs are clear to auscultation bilaterally  CARDIOVASCULAR: Regular rate and rhythm, normal S1 and S2, no murmur/rub/gallop; No lower extremity edema;   ABDOMEN: Nontender to palpation, normoactive bowel sounds, not distended;     LABS:                        13.3   12.71 )-----------( 413      ( 15 Nov 2019 06:30 )             43.9     11-15    144  |  108<H>  |  19  ----------------------------<  217<H>  4.5   |  23  |  1.03    Ca    10.4      15 Nov 2019 06:30  Phos  3.5     11-15  Mg     1.9     11-15                  RADIOLOGY & ADDITIONAL TESTS:  Results Reviewed:   Imaging Personally Reviewed:  Electrocardiogram Personally Reviewed:    COORDINATION OF CARE:  Care Discussed with Consultants/Other Providers [Y/N]:  Prior or Outpatient Records Reviewed [Y/N]:

## 2019-11-15 NOTE — PROGRESS NOTE ADULT - PROBLEM SELECTOR PLAN 4
Met sepsis criteria on admission, completed 3d CTX, blood cx negative, urine culture was not sent  now wbc rising- trend in am

## 2019-11-15 NOTE — PROGRESS NOTE ADULT - PROBLEM SELECTOR PLAN 1
in setting of infection, likely underlying dementia and schizoaffective d/o  - s/p abx for UTI  avoid Ambien/anticholinergics, day/night cues, supportive care

## 2019-11-15 NOTE — PROGRESS NOTE ADULT - PROBLEM SELECTOR PLAN 2
-c/w haldol TID, lithium hs, trazodone  avoid Ambien/anticholinergics, day/night cues, supportive care  - previous attdg d/w  seems dementia progressing, have asked Hospice to evaluate, also began discussion of goals of care.  PT has recommended rehab,  realistic will see if pt able to participate.   for now pt's  opting for rehab

## 2019-11-16 LAB
ANION GAP SERPL CALC-SCNC: 13 MMO/L — SIGNIFICANT CHANGE UP (ref 7–14)
APPEARANCE UR: CLEAR — SIGNIFICANT CHANGE UP
BACTERIA # UR AUTO: NEGATIVE — SIGNIFICANT CHANGE UP
BACTERIA BLD CULT: SIGNIFICANT CHANGE UP
BACTERIA BLD CULT: SIGNIFICANT CHANGE UP
BILIRUB UR-MCNC: NEGATIVE — SIGNIFICANT CHANGE UP
BLOOD UR QL VISUAL: SIGNIFICANT CHANGE UP
BUN SERPL-MCNC: 22 MG/DL — SIGNIFICANT CHANGE UP (ref 7–23)
CALCIUM SERPL-MCNC: 9.9 MG/DL — SIGNIFICANT CHANGE UP (ref 8.4–10.5)
CHLORIDE SERPL-SCNC: 112 MMOL/L — HIGH (ref 98–107)
CO2 SERPL-SCNC: 23 MMOL/L — SIGNIFICANT CHANGE UP (ref 22–31)
COLOR SPEC: SIGNIFICANT CHANGE UP
CREAT SERPL-MCNC: 1.04 MG/DL — SIGNIFICANT CHANGE UP (ref 0.5–1.3)
GLUCOSE BLDC GLUCOMTR-MCNC: 178 MG/DL — HIGH (ref 70–99)
GLUCOSE BLDC GLUCOMTR-MCNC: 183 MG/DL — HIGH (ref 70–99)
GLUCOSE BLDC GLUCOMTR-MCNC: 214 MG/DL — HIGH (ref 70–99)
GLUCOSE BLDC GLUCOMTR-MCNC: 248 MG/DL — HIGH (ref 70–99)
GLUCOSE BLDC GLUCOMTR-MCNC: 266 MG/DL — HIGH (ref 70–99)
GLUCOSE SERPL-MCNC: 200 MG/DL — HIGH (ref 70–99)
GLUCOSE UR-MCNC: NEGATIVE — SIGNIFICANT CHANGE UP
HCT VFR BLD CALC: 45.9 % — HIGH (ref 34.5–45)
HGB BLD-MCNC: 13.8 G/DL — SIGNIFICANT CHANGE UP (ref 11.5–15.5)
HYALINE CASTS # UR AUTO: NEGATIVE — SIGNIFICANT CHANGE UP
KETONES UR-MCNC: NEGATIVE — SIGNIFICANT CHANGE UP
LEUKOCYTE ESTERASE UR-ACNC: SIGNIFICANT CHANGE UP
MAGNESIUM SERPL-MCNC: 2 MG/DL — SIGNIFICANT CHANGE UP (ref 1.6–2.6)
MCHC RBC-ENTMCNC: 24.8 PG — LOW (ref 27–34)
MCHC RBC-ENTMCNC: 30.1 % — LOW (ref 32–36)
MCV RBC AUTO: 82.6 FL — SIGNIFICANT CHANGE UP (ref 80–100)
NITRITE UR-MCNC: NEGATIVE — SIGNIFICANT CHANGE UP
NRBC # FLD: 0 K/UL — SIGNIFICANT CHANGE UP (ref 0–0)
PH UR: 5.5 — SIGNIFICANT CHANGE UP (ref 5–8)
PHOSPHATE SERPL-MCNC: 2.8 MG/DL — SIGNIFICANT CHANGE UP (ref 2.5–4.5)
PLATELET # BLD AUTO: 415 K/UL — HIGH (ref 150–400)
PMV BLD: 11 FL — SIGNIFICANT CHANGE UP (ref 7–13)
POTASSIUM SERPL-MCNC: 4 MMOL/L — SIGNIFICANT CHANGE UP (ref 3.5–5.3)
POTASSIUM SERPL-SCNC: 4 MMOL/L — SIGNIFICANT CHANGE UP (ref 3.5–5.3)
PROT UR-MCNC: NEGATIVE — SIGNIFICANT CHANGE UP
RBC # BLD: 5.56 M/UL — HIGH (ref 3.8–5.2)
RBC # FLD: 15.8 % — HIGH (ref 10.3–14.5)
RBC CASTS # UR COMP ASSIST: SIGNIFICANT CHANGE UP (ref 0–?)
SODIUM SERPL-SCNC: 148 MMOL/L — HIGH (ref 135–145)
SP GR SPEC: 1.01 — SIGNIFICANT CHANGE UP (ref 1–1.04)
SQUAMOUS # UR AUTO: SIGNIFICANT CHANGE UP
UROBILINOGEN FLD QL: NORMAL — SIGNIFICANT CHANGE UP
WBC # BLD: 12.7 K/UL — HIGH (ref 3.8–10.5)
WBC # FLD AUTO: 12.7 K/UL — HIGH (ref 3.8–10.5)
WBC UR QL: SIGNIFICANT CHANGE UP (ref 0–?)

## 2019-11-16 PROCEDURE — 99232 SBSQ HOSP IP/OBS MODERATE 35: CPT

## 2019-11-16 RX ORDER — SODIUM CHLORIDE 9 MG/ML
1000 INJECTION, SOLUTION INTRAVENOUS
Refills: 0 | Status: DISCONTINUED | OUTPATIENT
Start: 2019-11-16 | End: 2019-11-17

## 2019-11-16 RX ORDER — SODIUM CHLORIDE 9 MG/ML
1000 INJECTION INTRAMUSCULAR; INTRAVENOUS; SUBCUTANEOUS
Refills: 0 | Status: DISCONTINUED | OUTPATIENT
Start: 2019-11-16 | End: 2019-11-16

## 2019-11-16 RX ADMIN — Medication 3: at 11:25

## 2019-11-16 RX ADMIN — SIMVASTATIN 20 MILLIGRAM(S): 20 TABLET, FILM COATED ORAL at 22:41

## 2019-11-16 RX ADMIN — LITHIUM CARBONATE 300 MILLIGRAM(S): 300 TABLET, EXTENDED RELEASE ORAL at 22:41

## 2019-11-16 RX ADMIN — SODIUM CHLORIDE 50 MILLILITER(S): 9 INJECTION, SOLUTION INTRAVENOUS at 20:37

## 2019-11-16 RX ADMIN — HALOPERIDOL DECANOATE 2 MILLIGRAM(S): 100 INJECTION INTRAMUSCULAR at 07:55

## 2019-11-16 RX ADMIN — Medication 0.25 MILLIGRAM(S): at 22:41

## 2019-11-16 RX ADMIN — HALOPERIDOL DECANOATE 2 MILLIGRAM(S): 100 INJECTION INTRAMUSCULAR at 20:37

## 2019-11-16 RX ADMIN — Medication 50 MILLIGRAM(S): at 22:41

## 2019-11-16 RX ADMIN — HALOPERIDOL DECANOATE 1 MILLIGRAM(S): 100 INJECTION INTRAMUSCULAR at 13:48

## 2019-11-16 RX ADMIN — Medication 2: at 17:16

## 2019-11-16 RX ADMIN — SODIUM CHLORIDE 50 MILLILITER(S): 9 INJECTION, SOLUTION INTRAVENOUS at 14:36

## 2019-11-16 RX ADMIN — Medication 1: at 07:55

## 2019-11-16 RX ADMIN — ENOXAPARIN SODIUM 40 MILLIGRAM(S): 100 INJECTION SUBCUTANEOUS at 13:48

## 2019-11-16 NOTE — PROGRESS NOTE ADULT - PROBLEM SELECTOR PLAN 4
Met sepsis criteria on admission, completed 3d CTX, blood cx negative, urine culture was not sent  now wbc rising- repeat UA negative.  trial of iv fluids and recheck

## 2019-11-16 NOTE — PROGRESS NOTE ADULT - SUBJECTIVE AND OBJECTIVE BOX
[facility] Division of Hospital Medicine  Karis Salamanca MD  Pager [phone]    Patient is a 69y old  Female who presents with a chief complaint of AMS, behavioral disturbance (15 Nov 2019 17:42)      SUBJECTIVE / OVERNIGHT EVENTS: pt seen and examined at 2p- sleeping but opens eyes  ADDITIONAL REVIEW OF SYSTEMS:    MEDICATIONS  (STANDING):  dextrose 5% + sodium chloride 0.45%. 1000 milliLiter(s) (50 mL/Hr) IV Continuous <Continuous>  dextrose 5%. 1000 milliLiter(s) (50 mL/Hr) IV Continuous <Continuous>  dextrose 50% Injectable 12.5 Gram(s) IV Push once  dextrose 50% Injectable 25 Gram(s) IV Push once  dextrose 50% Injectable 25 Gram(s) IV Push once  enoxaparin Injectable 40 milliGRAM(s) SubCutaneous daily  haloperidol     Tablet 2 milliGRAM(s) Oral <User Schedule>  haloperidol     Tablet 1 milliGRAM(s) Oral <User Schedule>  influenza  Vaccine (HIGH DOSE) 0.5 milliLiter(s) IntraMuscular once  insulin lispro (HumaLOG) corrective regimen sliding scale   SubCutaneous three times a day before meals  insulin lispro (HumaLOG) corrective regimen sliding scale   SubCutaneous at bedtime  lithium SR (LITHOBID) 300 milliGRAM(s) Oral at bedtime  LORazepam     Tablet 0.25 milliGRAM(s) Oral at bedtime  simvastatin 20 milliGRAM(s) Oral at bedtime  traZODone 50 milliGRAM(s) Oral at bedtime    MEDICATIONS  (PRN):  dextrose 40% Gel 15 Gram(s) Oral once PRN Blood Glucose LESS THAN 70 milliGRAM(s)/deciliter  glucagon  Injectable 1 milliGRAM(s) IntraMuscular once PRN Glucose LESS THAN 70 milligrams/deciliter      CAPILLARY BLOOD GLUCOSE      POCT Blood Glucose.: 214 mg/dL (2019 16:56)  POCT Blood Glucose.: 248 mg/dL (2019 11:18)  POCT Blood Glucose.: 266 mg/dL (2019 11:18)  POCT Blood Glucose.: 178 mg/dL (2019 07:41)  POCT Blood Glucose.: 182 mg/dL (15 Nov 2019 22:17)    I&O's Summary    15 Nov 2019 07:01  -  2019 07:00  --------------------------------------------------------  IN: 700 mL / OUT: 0 mL / NET: 700 mL        PHYSICAL EXAM:  Vital Signs Last 24 Hrs  T(C): 36.8 (2019 14:08), Max: 36.9 (15 Nov 2019 21:22)  T(F): 98.2 (2019 14:08), Max: 98.4 (15 Nov 2019 21:22)  HR: 95 (2019 14:08) (71 - 95)  BP: 124/77 (2019 14:08) (92/64 - 155/84)  BP(mean): --  RR: 18 (2019 14:08) (17 - 18)  SpO2: 99% (2019 14:08) (91% - 100%)  CONSTITUTIONAL: NAD, well-developed, well-groomed  EYES: conjunctiva and sclera clear  NECK: Supple  RESPIRATORY: Normal respiratory effort; lungs are clear to auscultation bilaterally  CARDIOVASCULAR: Regular rate and rhythm, normal S1 and S2, no murmur/rub/gallop; No lower extremity edema;   ABDOMEN: Nontender to palpation, normoactive bowel sounds, not distended;       LABS:                        13.8   12.70 )-----------( 415      ( 2019 06:24 )             45.9     11-16    148<H>  |  112<H>  |  22  ----------------------------<  200<H>  4.0   |  23  |  1.04    Ca    9.9      2019 06:24  Phos  2.8     11-16  Mg     2.0     11-16            Urinalysis Basic - ( 2019 15:15 )    Color: LIGHT YELLOW / Appearance: CLEAR / S.011 / pH: 5.5  Gluc: NEGATIVE / Ketone: NEGATIVE  / Bili: NEGATIVE / Urobili: NORMAL   Blood: TRACE / Protein: NEGATIVE / Nitrite: NEGATIVE   Leuk Esterase: TRACE / RBC: 3-5 / WBC 3-5   Sq Epi: OCC / Non Sq Epi: x / Bacteria: NEGATIVE          RADIOLOGY & ADDITIONAL TESTS:  Results Reviewed:   Imaging Personally Reviewed:  Electrocardiogram Personally Reviewed:    COORDINATION OF CARE:  Care Discussed with Consultants/Other Providers [Y/N]:  Prior or Outpatient Records Reviewed [Y/N]:

## 2019-11-17 LAB
ANION GAP SERPL CALC-SCNC: 11 MMO/L — SIGNIFICANT CHANGE UP (ref 7–14)
BASOPHILS # BLD AUTO: 0.05 K/UL — SIGNIFICANT CHANGE UP (ref 0–0.2)
BASOPHILS NFR BLD AUTO: 0.4 % — SIGNIFICANT CHANGE UP (ref 0–2)
BUN SERPL-MCNC: 25 MG/DL — HIGH (ref 7–23)
CALCIUM SERPL-MCNC: 10.5 MG/DL — SIGNIFICANT CHANGE UP (ref 8.4–10.5)
CHLORIDE SERPL-SCNC: 116 MMOL/L — HIGH (ref 98–107)
CO2 SERPL-SCNC: 23 MMOL/L — SIGNIFICANT CHANGE UP (ref 22–31)
CREAT SERPL-MCNC: 1.02 MG/DL — SIGNIFICANT CHANGE UP (ref 0.5–1.3)
EOSINOPHIL # BLD AUTO: 0.11 K/UL — SIGNIFICANT CHANGE UP (ref 0–0.5)
EOSINOPHIL NFR BLD AUTO: 1 % — SIGNIFICANT CHANGE UP (ref 0–6)
GLUCOSE BLDC GLUCOMTR-MCNC: 183 MG/DL — HIGH (ref 70–99)
GLUCOSE BLDC GLUCOMTR-MCNC: 209 MG/DL — HIGH (ref 70–99)
GLUCOSE BLDC GLUCOMTR-MCNC: 214 MG/DL — HIGH (ref 70–99)
GLUCOSE BLDC GLUCOMTR-MCNC: 218 MG/DL — HIGH (ref 70–99)
GLUCOSE SERPL-MCNC: 235 MG/DL — HIGH (ref 70–99)
HCT VFR BLD CALC: 44 % — SIGNIFICANT CHANGE UP (ref 34.5–45)
HGB BLD-MCNC: 13.6 G/DL — SIGNIFICANT CHANGE UP (ref 11.5–15.5)
IMM GRANULOCYTES NFR BLD AUTO: 0.4 % — SIGNIFICANT CHANGE UP (ref 0–1.5)
LYMPHOCYTES # BLD AUTO: 1.72 K/UL — SIGNIFICANT CHANGE UP (ref 1–3.3)
LYMPHOCYTES # BLD AUTO: 15.1 % — SIGNIFICANT CHANGE UP (ref 13–44)
MAGNESIUM SERPL-MCNC: 1.9 MG/DL — SIGNIFICANT CHANGE UP (ref 1.6–2.6)
MCHC RBC-ENTMCNC: 25.3 PG — LOW (ref 27–34)
MCHC RBC-ENTMCNC: 30.9 % — LOW (ref 32–36)
MCV RBC AUTO: 81.9 FL — SIGNIFICANT CHANGE UP (ref 80–100)
MONOCYTES # BLD AUTO: 0.77 K/UL — SIGNIFICANT CHANGE UP (ref 0–0.9)
MONOCYTES NFR BLD AUTO: 6.8 % — SIGNIFICANT CHANGE UP (ref 2–14)
NEUTROPHILS # BLD AUTO: 8.7 K/UL — HIGH (ref 1.8–7.4)
NEUTROPHILS NFR BLD AUTO: 76.3 % — SIGNIFICANT CHANGE UP (ref 43–77)
NRBC # FLD: 0 K/UL — SIGNIFICANT CHANGE UP (ref 0–0)
PHOSPHATE SERPL-MCNC: 2.6 MG/DL — SIGNIFICANT CHANGE UP (ref 2.5–4.5)
PLATELET # BLD AUTO: 384 K/UL — SIGNIFICANT CHANGE UP (ref 150–400)
PMV BLD: 10.4 FL — SIGNIFICANT CHANGE UP (ref 7–13)
POTASSIUM SERPL-MCNC: 3.7 MMOL/L — SIGNIFICANT CHANGE UP (ref 3.5–5.3)
POTASSIUM SERPL-SCNC: 3.7 MMOL/L — SIGNIFICANT CHANGE UP (ref 3.5–5.3)
RBC # BLD: 5.37 M/UL — HIGH (ref 3.8–5.2)
RBC # FLD: 15.8 % — HIGH (ref 10.3–14.5)
SODIUM SERPL-SCNC: 150 MMOL/L — HIGH (ref 135–145)
WBC # BLD: 11.39 K/UL — HIGH (ref 3.8–10.5)
WBC # FLD AUTO: 11.39 K/UL — HIGH (ref 3.8–10.5)

## 2019-11-17 PROCEDURE — 99232 SBSQ HOSP IP/OBS MODERATE 35: CPT

## 2019-11-17 RX ORDER — SODIUM CHLORIDE 9 MG/ML
1000 INJECTION, SOLUTION INTRAVENOUS
Refills: 0 | Status: DISCONTINUED | OUTPATIENT
Start: 2019-11-17 | End: 2019-11-18

## 2019-11-17 RX ORDER — SODIUM CHLORIDE 9 MG/ML
1000 INJECTION, SOLUTION INTRAVENOUS
Refills: 0 | Status: DISCONTINUED | OUTPATIENT
Start: 2019-11-17 | End: 2019-11-17

## 2019-11-17 RX ADMIN — SIMVASTATIN 20 MILLIGRAM(S): 20 TABLET, FILM COATED ORAL at 21:23

## 2019-11-17 RX ADMIN — Medication 2: at 08:03

## 2019-11-17 RX ADMIN — Medication 2: at 16:44

## 2019-11-17 RX ADMIN — HALOPERIDOL DECANOATE 1 MILLIGRAM(S): 100 INJECTION INTRAMUSCULAR at 14:07

## 2019-11-17 RX ADMIN — Medication 50 MILLIGRAM(S): at 21:23

## 2019-11-17 RX ADMIN — SODIUM CHLORIDE 75 MILLILITER(S): 9 INJECTION, SOLUTION INTRAVENOUS at 21:45

## 2019-11-17 RX ADMIN — SODIUM CHLORIDE 100 MILLILITER(S): 9 INJECTION, SOLUTION INTRAVENOUS at 09:57

## 2019-11-17 RX ADMIN — ENOXAPARIN SODIUM 40 MILLIGRAM(S): 100 INJECTION SUBCUTANEOUS at 11:53

## 2019-11-17 RX ADMIN — LITHIUM CARBONATE 300 MILLIGRAM(S): 300 TABLET, EXTENDED RELEASE ORAL at 21:22

## 2019-11-17 RX ADMIN — Medication 0.25 MILLIGRAM(S): at 21:45

## 2019-11-17 RX ADMIN — HALOPERIDOL DECANOATE 2 MILLIGRAM(S): 100 INJECTION INTRAMUSCULAR at 08:04

## 2019-11-17 RX ADMIN — Medication 2: at 11:53

## 2019-11-17 RX ADMIN — SODIUM CHLORIDE 50 MILLILITER(S): 9 INJECTION, SOLUTION INTRAVENOUS at 07:09

## 2019-11-17 RX ADMIN — HALOPERIDOL DECANOATE 2 MILLIGRAM(S): 100 INJECTION INTRAMUSCULAR at 21:21

## 2019-11-17 NOTE — PROGRESS NOTE ADULT - SUBJECTIVE AND OBJECTIVE BOX
[facility] Division of Hospital Medicine  Karis Salamanca MD  Pager [phone]    Patient is a 69y old  Female who presents with a chief complaint of AMS, behavioral disturbance (2019 17:22)      SUBJECTIVE / OVERNIGHT EVENTS: pt seen and examined at 140p- was awake being fed for lunch  ADDITIONAL REVIEW OF SYSTEMS:    MEDICATIONS  (STANDING):  dextrose 5%. 1000 milliLiter(s) (100 mL/Hr) IV Continuous <Continuous>  dextrose 5%. 1000 milliLiter(s) (50 mL/Hr) IV Continuous <Continuous>  dextrose 50% Injectable 12.5 Gram(s) IV Push once  dextrose 50% Injectable 25 Gram(s) IV Push once  dextrose 50% Injectable 25 Gram(s) IV Push once  enoxaparin Injectable 40 milliGRAM(s) SubCutaneous daily  haloperidol     Tablet 2 milliGRAM(s) Oral <User Schedule>  haloperidol     Tablet 1 milliGRAM(s) Oral <User Schedule>  influenza  Vaccine (HIGH DOSE) 0.5 milliLiter(s) IntraMuscular once  insulin lispro (HumaLOG) corrective regimen sliding scale   SubCutaneous three times a day before meals  insulin lispro (HumaLOG) corrective regimen sliding scale   SubCutaneous at bedtime  lithium SR (LITHOBID) 300 milliGRAM(s) Oral at bedtime  LORazepam     Tablet 0.25 milliGRAM(s) Oral at bedtime  simvastatin 20 milliGRAM(s) Oral at bedtime  traZODone 50 milliGRAM(s) Oral at bedtime    MEDICATIONS  (PRN):  dextrose 40% Gel 15 Gram(s) Oral once PRN Blood Glucose LESS THAN 70 milliGRAM(s)/deciliter  glucagon  Injectable 1 milliGRAM(s) IntraMuscular once PRN Glucose LESS THAN 70 milligrams/deciliter      CAPILLARY BLOOD GLUCOSE      POCT Blood Glucose.: 214 mg/dL (2019 16:32)  POCT Blood Glucose.: 218 mg/dL (2019 11:48)  POCT Blood Glucose.: 209 mg/dL (2019 07:25)  POCT Blood Glucose.: 183 mg/dL (2019 21:45)    I&O's Summary      PHYSICAL EXAM:  Vital Signs Last 24 Hrs  T(C): 36.6 (2019 13:24), Max: 36.6 (2019 13:24)  T(F): 97.8 (2019 13:24), Max: 97.8 (2019 13:24)  HR: 90 (2019 13:24) (76 - 90)  BP: 149/94 (2019 13:24) (143/79 - 149/94)  BP(mean): --  RR: 17 (2019 13:24) (17 - 17)  SpO2: 98% (2019 13:24) (97% - 98%)  CONSTITUTIONAL: NAD, well-developed, well-groomed  EYES: conjunctiva and sclera clear  NECK: Supple  RESPIRATORY: Normal respiratory effort; lungs are clear to auscultation bilaterally  CARDIOVASCULAR: Regular rate and rhythm, normal S1 and S2, no murmur/rub/gallop; No lower extremity edema;   ABDOMEN: Nontender to palpation, normoactive bowel sounds, not distended;       LABS:                        13.6   11.39 )-----------( 384      ( 2019 05:50 )             44.0     11-17    150<H>  |  116<H>  |  25<H>  ----------------------------<  235<H>  3.7   |  23  |  1.02    Ca    10.5      2019 05:50  Phos  2.6     11-17  Mg     1.9     11-17            Urinalysis Basic - ( 2019 15:15 )    Color: LIGHT YELLOW / Appearance: CLEAR / S.011 / pH: 5.5  Gluc: NEGATIVE / Ketone: NEGATIVE  / Bili: NEGATIVE / Urobili: NORMAL   Blood: TRACE / Protein: NEGATIVE / Nitrite: NEGATIVE   Leuk Esterase: TRACE / RBC: 3-5 / WBC 3-5   Sq Epi: OCC / Non Sq Epi: x / Bacteria: NEGATIVE          RADIOLOGY & ADDITIONAL TESTS:  Results Reviewed:   Imaging Personally Reviewed:  Electrocardiogram Personally Reviewed:    COORDINATION OF CARE:  Care Discussed with Consultants/Other Providers [Y/N]:  Prior or Outpatient Records Reviewed [Y/N]:

## 2019-11-18 LAB
ANION GAP SERPL CALC-SCNC: 12 MMO/L — SIGNIFICANT CHANGE UP (ref 7–14)
ANION GAP SERPL CALC-SCNC: 12 MMO/L — SIGNIFICANT CHANGE UP (ref 7–14)
BASOPHILS # BLD AUTO: 0.04 K/UL — SIGNIFICANT CHANGE UP (ref 0–0.2)
BASOPHILS NFR BLD AUTO: 0.4 % — SIGNIFICANT CHANGE UP (ref 0–2)
BUN SERPL-MCNC: 19 MG/DL — SIGNIFICANT CHANGE UP (ref 7–23)
BUN SERPL-MCNC: 26 MG/DL — HIGH (ref 7–23)
CALCIUM SERPL-MCNC: 10.1 MG/DL — SIGNIFICANT CHANGE UP (ref 8.4–10.5)
CALCIUM SERPL-MCNC: 10.5 MG/DL — SIGNIFICANT CHANGE UP (ref 8.4–10.5)
CHLORIDE SERPL-SCNC: 104 MMOL/L — SIGNIFICANT CHANGE UP (ref 98–107)
CHLORIDE SERPL-SCNC: 112 MMOL/L — HIGH (ref 98–107)
CO2 SERPL-SCNC: 23 MMOL/L — SIGNIFICANT CHANGE UP (ref 22–31)
CO2 SERPL-SCNC: 24 MMOL/L — SIGNIFICANT CHANGE UP (ref 22–31)
CREAT SERPL-MCNC: 0.88 MG/DL — SIGNIFICANT CHANGE UP (ref 0.5–1.3)
CREAT SERPL-MCNC: 1.15 MG/DL — SIGNIFICANT CHANGE UP (ref 0.5–1.3)
EOSINOPHIL # BLD AUTO: 0.18 K/UL — SIGNIFICANT CHANGE UP (ref 0–0.5)
EOSINOPHIL NFR BLD AUTO: 1.7 % — SIGNIFICANT CHANGE UP (ref 0–6)
GLUCOSE BLDC GLUCOMTR-MCNC: 110 MG/DL — HIGH (ref 70–99)
GLUCOSE BLDC GLUCOMTR-MCNC: 194 MG/DL — HIGH (ref 70–99)
GLUCOSE BLDC GLUCOMTR-MCNC: 233 MG/DL — HIGH (ref 70–99)
GLUCOSE BLDC GLUCOMTR-MCNC: 240 MG/DL — HIGH (ref 70–99)
GLUCOSE SERPL-MCNC: 215 MG/DL — HIGH (ref 70–99)
GLUCOSE SERPL-MCNC: 266 MG/DL — HIGH (ref 70–99)
HCT VFR BLD CALC: 43.9 % — SIGNIFICANT CHANGE UP (ref 34.5–45)
HGB BLD-MCNC: 13 G/DL — SIGNIFICANT CHANGE UP (ref 11.5–15.5)
IMM GRANULOCYTES NFR BLD AUTO: 0.4 % — SIGNIFICANT CHANGE UP (ref 0–1.5)
LYMPHOCYTES # BLD AUTO: 1.85 K/UL — SIGNIFICANT CHANGE UP (ref 1–3.3)
LYMPHOCYTES # BLD AUTO: 17.8 % — SIGNIFICANT CHANGE UP (ref 13–44)
MAGNESIUM SERPL-MCNC: 1.8 MG/DL — SIGNIFICANT CHANGE UP (ref 1.6–2.6)
MCHC RBC-ENTMCNC: 24.4 PG — LOW (ref 27–34)
MCHC RBC-ENTMCNC: 29.6 % — LOW (ref 32–36)
MCV RBC AUTO: 82.5 FL — SIGNIFICANT CHANGE UP (ref 80–100)
MONOCYTES # BLD AUTO: 0.7 K/UL — SIGNIFICANT CHANGE UP (ref 0–0.9)
MONOCYTES NFR BLD AUTO: 6.7 % — SIGNIFICANT CHANGE UP (ref 2–14)
NEUTROPHILS # BLD AUTO: 7.61 K/UL — HIGH (ref 1.8–7.4)
NEUTROPHILS NFR BLD AUTO: 73 % — SIGNIFICANT CHANGE UP (ref 43–77)
NRBC # FLD: 0 K/UL — SIGNIFICANT CHANGE UP (ref 0–0)
PHOSPHATE SERPL-MCNC: 2.5 MG/DL — SIGNIFICANT CHANGE UP (ref 2.5–4.5)
PLATELET # BLD AUTO: 367 K/UL — SIGNIFICANT CHANGE UP (ref 150–400)
PMV BLD: 10.8 FL — SIGNIFICANT CHANGE UP (ref 7–13)
POTASSIUM SERPL-MCNC: 3.6 MMOL/L — SIGNIFICANT CHANGE UP (ref 3.5–5.3)
POTASSIUM SERPL-MCNC: 4.2 MMOL/L — SIGNIFICANT CHANGE UP (ref 3.5–5.3)
POTASSIUM SERPL-SCNC: 3.6 MMOL/L — SIGNIFICANT CHANGE UP (ref 3.5–5.3)
POTASSIUM SERPL-SCNC: 4.2 MMOL/L — SIGNIFICANT CHANGE UP (ref 3.5–5.3)
RBC # BLD: 5.32 M/UL — HIGH (ref 3.8–5.2)
RBC # FLD: 15.9 % — HIGH (ref 10.3–14.5)
SODIUM SERPL-SCNC: 140 MMOL/L — SIGNIFICANT CHANGE UP (ref 135–145)
SODIUM SERPL-SCNC: 147 MMOL/L — HIGH (ref 135–145)
WBC # BLD: 10.42 K/UL — SIGNIFICANT CHANGE UP (ref 3.8–10.5)
WBC # FLD AUTO: 10.42 K/UL — SIGNIFICANT CHANGE UP (ref 3.8–10.5)

## 2019-11-18 PROCEDURE — 99232 SBSQ HOSP IP/OBS MODERATE 35: CPT

## 2019-11-18 RX ORDER — SODIUM CHLORIDE 9 MG/ML
1000 INJECTION, SOLUTION INTRAVENOUS
Refills: 0 | Status: DISCONTINUED | OUTPATIENT
Start: 2019-11-18 | End: 2019-11-18

## 2019-11-18 RX ORDER — HALOPERIDOL DECANOATE 100 MG/ML
2 INJECTION INTRAMUSCULAR
Refills: 0 | Status: DISCONTINUED | OUTPATIENT
Start: 2019-11-18 | End: 2019-11-19

## 2019-11-18 RX ORDER — HALOPERIDOL DECANOATE 100 MG/ML
1 INJECTION INTRAMUSCULAR
Refills: 0 | Status: DISCONTINUED | OUTPATIENT
Start: 2019-11-18 | End: 2019-12-04

## 2019-11-18 RX ORDER — SODIUM CHLORIDE 9 MG/ML
1000 INJECTION, SOLUTION INTRAVENOUS
Refills: 0 | Status: DISCONTINUED | OUTPATIENT
Start: 2019-11-18 | End: 2019-11-19

## 2019-11-18 RX ADMIN — SIMVASTATIN 20 MILLIGRAM(S): 20 TABLET, FILM COATED ORAL at 22:12

## 2019-11-18 RX ADMIN — SODIUM CHLORIDE 50 MILLILITER(S): 9 INJECTION, SOLUTION INTRAVENOUS at 20:30

## 2019-11-18 RX ADMIN — Medication 0.25 MILLIGRAM(S): at 22:12

## 2019-11-18 RX ADMIN — LITHIUM CARBONATE 300 MILLIGRAM(S): 300 TABLET, EXTENDED RELEASE ORAL at 22:11

## 2019-11-18 RX ADMIN — HALOPERIDOL DECANOATE 2 MILLIGRAM(S): 100 INJECTION INTRAMUSCULAR at 22:12

## 2019-11-18 RX ADMIN — SODIUM CHLORIDE 75 MILLILITER(S): 9 INJECTION, SOLUTION INTRAVENOUS at 10:00

## 2019-11-18 RX ADMIN — Medication 2: at 07:50

## 2019-11-18 RX ADMIN — SODIUM CHLORIDE 75 MILLILITER(S): 9 INJECTION, SOLUTION INTRAVENOUS at 07:50

## 2019-11-18 RX ADMIN — Medication 50 MILLIGRAM(S): at 22:12

## 2019-11-18 RX ADMIN — ENOXAPARIN SODIUM 40 MILLIGRAM(S): 100 INJECTION SUBCUTANEOUS at 11:25

## 2019-11-18 RX ADMIN — Medication 2: at 11:25

## 2019-11-18 RX ADMIN — Medication 1: at 16:55

## 2019-11-18 NOTE — PROGRESS NOTE ADULT - PROBLEM SELECTOR PLAN 2
-c/w haldol TID (hold for sedation), lithium hs, trazodone  avoid Ambien/anticholinergics, day/night cues, supportive care  - Have d/w  seems dementia progressing, have asked Hospice to evaluate, also began discussion of goals of care.  PT has recommended rehab,  realistic will see if pt able to participate. For now pt's  opting for rehab

## 2019-11-18 NOTE — PROGRESS NOTE ADULT - SUBJECTIVE AND OBJECTIVE BOX
[facility] Division of Hospital Medicine  Denia Trejo MD  Pager (M-F, 8A-5P):  In-house pager [phone]; Long-range pager [phone]  Other Times:  Please page Hospitalist in Charge -  In-house pager [phone]    Patient is a 69y old  Female who presents with a chief complaint of AMS, behavioral disturbance (17 Nov 2019 18:19)      SUBJECTIVE / OVERNIGHT EVENTS: Awake Sat, sleepy Sunday.  Sleepy this am, afternoon Haldol held, woke, fed self lunch with her hands.    ADDITIONAL REVIEW OF SYSTEMS:    MEDICATIONS  (STANDING):  dextrose 5%. 1000 milliLiter(s) (50 mL/Hr) IV Continuous <Continuous>  dextrose 50% Injectable 12.5 Gram(s) IV Push once  dextrose 50% Injectable 25 Gram(s) IV Push once  dextrose 50% Injectable 25 Gram(s) IV Push once  enoxaparin Injectable 40 milliGRAM(s) SubCutaneous daily  haloperidol     Tablet 1 milliGRAM(s) Oral <User Schedule>  haloperidol     Tablet 2 milliGRAM(s) Oral <User Schedule>  influenza  Vaccine (HIGH DOSE) 0.5 milliLiter(s) IntraMuscular once  insulin lispro (HumaLOG) corrective regimen sliding scale   SubCutaneous three times a day before meals  insulin lispro (HumaLOG) corrective regimen sliding scale   SubCutaneous at bedtime  lithium SR (LITHOBID) 300 milliGRAM(s) Oral at bedtime  LORazepam     Tablet 0.25 milliGRAM(s) Oral at bedtime  simvastatin 20 milliGRAM(s) Oral at bedtime  sodium chloride 0.45%. 1000 milliLiter(s) (50 mL/Hr) IV Continuous <Continuous>  traZODone 50 milliGRAM(s) Oral at bedtime    MEDICATIONS  (PRN):  dextrose 40% Gel 15 Gram(s) Oral once PRN Blood Glucose LESS THAN 70 milliGRAM(s)/deciliter  glucagon  Injectable 1 milliGRAM(s) IntraMuscular once PRN Glucose LESS THAN 70 milligrams/deciliter      CAPILLARY BLOOD GLUCOSE      POCT Blood Glucose.: 194 mg/dL (18 Nov 2019 16:48)  POCT Blood Glucose.: 240 mg/dL (18 Nov 2019 11:07)  POCT Blood Glucose.: 233 mg/dL (18 Nov 2019 07:18)  POCT Blood Glucose.: 183 mg/dL (17 Nov 2019 21:28)    I&O's Summary      PHYSICAL EXAM:  Vital Signs Last 24 Hrs  T(C): 36.2 (18 Nov 2019 13:26), Max: 36.6 (17 Nov 2019 21:07)  T(F): 97.2 (18 Nov 2019 13:26), Max: 97.8 (17 Nov 2019 21:07)  HR: 86 (18 Nov 2019 13:33) (55 - 107)  BP: 146/82 (18 Nov 2019 13:33) (114/73 - 149/60)  BP(mean): --  RR: 17 (18 Nov 2019 13:26) (16 - 17)  SpO2: 98% (18 Nov 2019 13:26) (98% - 100%)    CONSTITUTIONAL: elderly female, sleeping sitting up in chair, wakes to loud voice  EYES: poor vision  NECK: Supple, no palpable masses; no thyromegaly  RESPIRATORY: Normal respiratory effort; lungs are clear to auscultation bilaterally  CARDIOVASCULAR: Regular rate and rhythm, normal S1 and S2, no murmur/rub/gallop; No lower extremity edema; Peripheral pulses are 2+ bilaterally  ABDOMEN: Nontender to palpation, normoactive bowel sounds, no rebound/guarding  PSYCH: sleepy, oriented to name    LABS:                        13.0   10.42 )-----------( 367      ( 18 Nov 2019 05:58 )             43.9     11-18    140  |  104  |  26<H>  ----------------------------<  215<H>  4.2   |  24  |  1.15    Ca    10.1      18 Nov 2019 17:40  Phos  2.5     11-18  Mg     1.8     11-18                  RADIOLOGY & ADDITIONAL TESTS:  Results Reviewed:   Imaging Personally Reviewed:  Electrocardiogram Personally Reviewed:    COORDINATION OF CARE:  Care Discussed with Consultants/Other Providers [Y/N]: RN, SW, CM, ACP re overall care   Prior or Outpatient Records Reviewed [Y/N]:

## 2019-11-18 NOTE — PROGRESS NOTE ADULT - PROBLEM SELECTOR PLAN 1
in setting of infection, likely underlying dementia and schizoaffective d/o  - s/p abx for UTI  avoid Ambien/anticholinergics, day/night cues, supportive care  - hold Haldol if sedated

## 2019-11-18 NOTE — PROGRESS NOTE ADULT - ASSESSMENT
69F h/o T2DM, schizoaffective disorder, depression, ?dementia (AAOx1 name), legally blind, presenting with altered mental status for 3 days and increased agitation at home, a/f sepsis (inc HR, leukocytosis) 2/2 UTI, metabolic encephalopathy

## 2019-11-19 LAB
ANION GAP SERPL CALC-SCNC: 11 MMO/L — SIGNIFICANT CHANGE UP (ref 7–14)
ANION GAP SERPL CALC-SCNC: 13 MMO/L — SIGNIFICANT CHANGE UP (ref 7–14)
BUN SERPL-MCNC: 25 MG/DL — HIGH (ref 7–23)
BUN SERPL-MCNC: 30 MG/DL — HIGH (ref 7–23)
CALCIUM SERPL-MCNC: 10 MG/DL — SIGNIFICANT CHANGE UP (ref 8.4–10.5)
CALCIUM SERPL-MCNC: 9.8 MG/DL — SIGNIFICANT CHANGE UP (ref 8.4–10.5)
CHLORIDE SERPL-SCNC: 106 MMOL/L — SIGNIFICANT CHANGE UP (ref 98–107)
CHLORIDE SERPL-SCNC: 113 MMOL/L — HIGH (ref 98–107)
CO2 SERPL-SCNC: 21 MMOL/L — LOW (ref 22–31)
CO2 SERPL-SCNC: 24 MMOL/L — SIGNIFICANT CHANGE UP (ref 22–31)
CREAT SERPL-MCNC: 0.93 MG/DL — SIGNIFICANT CHANGE UP (ref 0.5–1.3)
CREAT SERPL-MCNC: 1.02 MG/DL — SIGNIFICANT CHANGE UP (ref 0.5–1.3)
GLUCOSE BLDC GLUCOMTR-MCNC: 128 MG/DL — HIGH (ref 70–99)
GLUCOSE BLDC GLUCOMTR-MCNC: 175 MG/DL — HIGH (ref 70–99)
GLUCOSE BLDC GLUCOMTR-MCNC: 192 MG/DL — HIGH (ref 70–99)
GLUCOSE BLDC GLUCOMTR-MCNC: 266 MG/DL — HIGH (ref 70–99)
GLUCOSE SERPL-MCNC: 160 MG/DL — HIGH (ref 70–99)
GLUCOSE SERPL-MCNC: 222 MG/DL — HIGH (ref 70–99)
POTASSIUM SERPL-MCNC: 3.8 MMOL/L — SIGNIFICANT CHANGE UP (ref 3.5–5.3)
POTASSIUM SERPL-MCNC: 4.3 MMOL/L — SIGNIFICANT CHANGE UP (ref 3.5–5.3)
POTASSIUM SERPL-SCNC: 3.8 MMOL/L — SIGNIFICANT CHANGE UP (ref 3.5–5.3)
POTASSIUM SERPL-SCNC: 4.3 MMOL/L — SIGNIFICANT CHANGE UP (ref 3.5–5.3)
SODIUM SERPL-SCNC: 140 MMOL/L — SIGNIFICANT CHANGE UP (ref 135–145)
SODIUM SERPL-SCNC: 148 MMOL/L — HIGH (ref 135–145)

## 2019-11-19 PROCEDURE — 99232 SBSQ HOSP IP/OBS MODERATE 35: CPT

## 2019-11-19 RX ORDER — SODIUM CHLORIDE 9 MG/ML
1000 INJECTION, SOLUTION INTRAVENOUS
Refills: 0 | Status: DISCONTINUED | OUTPATIENT
Start: 2019-11-19 | End: 2019-11-20

## 2019-11-19 RX ORDER — HALOPERIDOL DECANOATE 100 MG/ML
1 INJECTION INTRAMUSCULAR
Refills: 0 | Status: DISCONTINUED | OUTPATIENT
Start: 2019-11-19 | End: 2019-12-04

## 2019-11-19 RX ORDER — POLYETHYLENE GLYCOL 3350 17 G/17G
17 POWDER, FOR SOLUTION ORAL DAILY
Refills: 0 | Status: DISCONTINUED | OUTPATIENT
Start: 2019-11-19 | End: 2019-11-20

## 2019-11-19 RX ORDER — SODIUM CHLORIDE 9 MG/ML
1000 INJECTION, SOLUTION INTRAVENOUS
Refills: 0 | Status: DISCONTINUED | OUTPATIENT
Start: 2019-11-19 | End: 2019-11-19

## 2019-11-19 RX ADMIN — Medication 1: at 22:05

## 2019-11-19 RX ADMIN — HALOPERIDOL DECANOATE 1 MILLIGRAM(S): 100 INJECTION INTRAMUSCULAR at 21:15

## 2019-11-19 RX ADMIN — POLYETHYLENE GLYCOL 3350 17 GRAM(S): 17 POWDER, FOR SOLUTION ORAL at 16:53

## 2019-11-19 RX ADMIN — LITHIUM CARBONATE 300 MILLIGRAM(S): 300 TABLET, EXTENDED RELEASE ORAL at 22:05

## 2019-11-19 RX ADMIN — SIMVASTATIN 20 MILLIGRAM(S): 20 TABLET, FILM COATED ORAL at 21:15

## 2019-11-19 RX ADMIN — Medication 1: at 12:05

## 2019-11-19 RX ADMIN — Medication 0.25 MILLIGRAM(S): at 21:15

## 2019-11-19 RX ADMIN — Medication 1: at 16:51

## 2019-11-19 RX ADMIN — INFLUENZA VIRUS VACCINE 0.5 MILLILITER(S): 15; 15; 15; 15 SUSPENSION INTRAMUSCULAR at 13:18

## 2019-11-19 RX ADMIN — ENOXAPARIN SODIUM 40 MILLIGRAM(S): 100 INJECTION SUBCUTANEOUS at 11:29

## 2019-11-19 RX ADMIN — Medication 50 MILLIGRAM(S): at 21:15

## 2019-11-19 RX ADMIN — SODIUM CHLORIDE 75 MILLILITER(S): 9 INJECTION, SOLUTION INTRAVENOUS at 10:25

## 2019-11-19 NOTE — DIETITIAN INITIAL EVALUATION ADULT. - PROBLEM SELECTOR PLAN 1
-meets sepsis criteria, c/w ceftriaxone, no h/o MDR organisms. low threshold to broaden abx and obtain CT A/P if no clinical improvement on ceftriaxone  -obtain blood and urine cultures  -check lactate   -IVF   -tylenol for fever

## 2019-11-19 NOTE — DIETITIAN INITIAL EVALUATION ADULT. - PHYSICAL APPEARANCE
underweight/Moderate muscle wasting at clavicles, temples, shoulders. Mild fat wasting at orbitals. Moderate fat wasting at buccals, triceps.

## 2019-11-19 NOTE — DIETITIAN INITIAL EVALUATION ADULT. - PERTINENT LABORATORY DATA
11-19 Na 148 mmol/L<H> Glu 160 mg/dL<H> K+ 4.3 mmol/L Cr 1.02 mg/dL BUN 25 mg/dL<H> Phos n/a    11-12-19 HbA1c 7.5 %  11-19 @ 16:32 POCT 175 mg/dL  11-19 @ 11:27 POCT 192 mg/dL  11-19 @ 07:36 POCT 128 mg/dL  11-18 @ 22:00 POCT 110 mg/dL

## 2019-11-19 NOTE — DIETITIAN INITIAL EVALUATION ADULT. - ENERGY NEEDS
Ht: 63 in Wt: (11/19) 110.6 pounds BMI: 19.6   IBW: 115 pounds   Edema: 3+ L/R leg  Pressure Injuries: none

## 2019-11-19 NOTE — PROGRESS NOTE ADULT - SUBJECTIVE AND OBJECTIVE BOX
[facility] Division of Hospital Medicine  Denia Trejo MD  Pager (M-F, 8A-5P):  In-house pager [phone]; Long-range pager [phone]  Other Times:  Please page Hospitalist in Charge -  In-house pager [phone]    Patient is a 69y old  Female who presents with a chief complaint of AMS, behavioral disturbance (18 Nov 2019 19:59)      SUBJECTIVE / OVERNIGHT EVENTS: No problems reported over night. Sleepy, woke later in day, RN able to feed her.    ADDITIONAL REVIEW OF SYSTEMS:    MEDICATIONS  (STANDING):  dextrose 5%. 1000 milliLiter(s) (50 mL/Hr) IV Continuous <Continuous>  dextrose 5%. 1000 milliLiter(s) (75 mL/Hr) IV Continuous <Continuous>  dextrose 50% Injectable 12.5 Gram(s) IV Push once  dextrose 50% Injectable 25 Gram(s) IV Push once  dextrose 50% Injectable 25 Gram(s) IV Push once  enoxaparin Injectable 40 milliGRAM(s) SubCutaneous daily  haloperidol     Tablet 1 milliGRAM(s) Oral <User Schedule>  haloperidol     Tablet 1 milliGRAM(s) Oral <User Schedule>  insulin lispro (HumaLOG) corrective regimen sliding scale   SubCutaneous three times a day before meals  insulin lispro (HumaLOG) corrective regimen sliding scale   SubCutaneous at bedtime  lithium SR (LITHOBID) 300 milliGRAM(s) Oral at bedtime  LORazepam     Tablet 0.25 milliGRAM(s) Oral at bedtime  polyethylene glycol 3350 17 Gram(s) Oral daily  simvastatin 20 milliGRAM(s) Oral at bedtime  traZODone 50 milliGRAM(s) Oral at bedtime    MEDICATIONS  (PRN):  dextrose 40% Gel 15 Gram(s) Oral once PRN Blood Glucose LESS THAN 70 milliGRAM(s)/deciliter  glucagon  Injectable 1 milliGRAM(s) IntraMuscular once PRN Glucose LESS THAN 70 milligrams/deciliter    CAPILLARY BLOOD GLUCOSE  POCT Blood Glucose.: 175 mg/dL (19 Nov 2019 16:32)  POCT Blood Glucose.: 192 mg/dL (19 Nov 2019 11:27)  POCT Blood Glucose.: 128 mg/dL (19 Nov 2019 07:36)  POCT Blood Glucose.: 110 mg/dL (18 Nov 2019 22:00)    PHYSICAL EXAM:  Vital Signs Last 24 Hrs  T(C): 36.6 (19 Nov 2019 13:18), Max: 36.6 (19 Nov 2019 13:18)  T(F): 97.9 (19 Nov 2019 13:18), Max: 97.9 (19 Nov 2019 13:18)  HR: 61 (19 Nov 2019 13:18) (54 - 72)  BP: 137/79 (19 Nov 2019 13:18) (101/72 - 137/79)  BP(mean): --  RR: 18 (19 Nov 2019 13:18) (15 - 18)  SpO2: 100% (19 Nov 2019 13:18) (98% - 100%)    CONSTITUTIONAL: elderly female, sleeping in bed, wakes to voice  EYES: poor vision  NECK: Supple, no palpable masses; no thyromegaly  RESPIRATORY: Normal respiratory effort; lungs are clear to auscultation bilaterally  CARDIOVASCULAR: Regular rate and rhythm, normal S1 and S2, no murmur/rub/gallop; No lower extremity edema; Peripheral pulses are 2+ bilaterally  ABDOMEN: Nontender to palpation, normoactive bowel sounds, no rebound/guarding  PSYCH: sleepy, oriented to name    LABS:                        13.0   10.42 )-----------( 367      ( 18 Nov 2019 05:58 )             43.9     11-19    148<H>  |  113<H>  |  25<H>  ----------------------------<  160<H>  4.3   |  24  |  1.02    Ca    10.0      19 Nov 2019 05:45  Phos  2.5     11-18  Mg     1.8     11-18                  RADIOLOGY & ADDITIONAL TESTS:  Results Reviewed:   Imaging Personally Reviewed:  Electrocardiogram Personally Reviewed:    COORDINATION OF CARE:  Care Discussed with Consultants/Other Providers [Y/N]: RN, SW, CM, ACP re overall care   Prior or Outpatient Records Reviewed [Y/N]:

## 2019-11-19 NOTE — PROGRESS NOTE ADULT - PROBLEM SELECTOR PLAN 2
-c/w  lithium hs, trazodone;  d/w psych decrease haldol to 1 TID (hold for sedation),  avoid Ambien/anticholinergics, day/night cues, supportive care  - Have d/w  seems dementia progressing, have asked Hospice to evaluate, also began discussion of goals of care.  PT has recommended rehab,  realistic will see if pt able to participate. For now pt's  opting for rehab

## 2019-11-19 NOTE — CHART NOTE - NSCHARTNOTEFT_GEN_A_CORE
NUTRITION SERVICES     Upon Nutritional Assessment by the Registered Dietitian your patient was determined to meet criteria/ has evidence of the following diagnosis/diagnoses:  [ ] Mild Protein Calorie Malnutrition   [x] Moderate Protein Calorie Malnutrition   [ ] Severe Protein Calorie Malnutrition   [ ] Unspecified Protein Calorie Malnutrition   [ ] Underweight / BMI <19  [ ] Morbid Obesity / BMI >40    Findings as based on:  •  Comprehensive nutritional assessment and consultation    Please refer to Initial Dietitian Evaluation via documents section of [employer] EMR for further recommendations.

## 2019-11-19 NOTE — DIETITIAN INITIAL EVALUATION ADULT. - PROBLEM SELECTOR PLAN 3
-resume haldol TID, lithium hs, trazodone. check admission EKG to evaluate QTc  -emailed patient's outpt psychiatrist about admission   -behavioral health c/s in am 2/2 worsening agitation at home

## 2019-11-19 NOTE — PROGRESS NOTE ADULT - PROBLEM SELECTOR PLAN 1
in setting of infection, likely underlying dementia and schizoaffective d/o  - s/p abx for UTI  avoid Ambien/anticholinergics, day/night cues, supportive care  - hold Haldol if sedated --> d/w psych decrease Haldol as recommended

## 2019-11-19 NOTE — DIETITIAN INITIAL EVALUATION ADULT. - OTHER INFO
70 y/o F with T2DM, depression, dementia presenting with AMS; treated for UTI. Pt's  at bedside. He states pt has been eating fairly at home- 2/3 meals with >75% completion. He gives pt Miralax if no BM for 2-3 days; pt is prone to constipation. No chewing or swallowing difficulties at this time. Per , pt weighed ~118 pounds 3 months ago; current weight (11/19) 110.6 pounds. Pt with fair PO intake during admission-eats better when  is at bedside. Amenable to Glucerna supplement.

## 2019-11-19 NOTE — DIETITIAN INITIAL EVALUATION ADULT. - ADD RECOMMEND
1. Continue consistent carbohydrate diet. 2. Encourage PO intake and honor food preferences as able. Please help with feeding assistance and menu selections. 3. To improve protein/energy intake recommend Glucerna 2x daily (440kcals, 20g protein)

## 2019-11-19 NOTE — DIETITIAN INITIAL EVALUATION ADULT. - PERTINENT MEDS FT
MEDICATIONS  (STANDING):  dextrose 5%. 1000 milliLiter(s) (50 mL/Hr) IV Continuous <Continuous>  dextrose 5%. 1000 milliLiter(s) (75 mL/Hr) IV Continuous <Continuous>  dextrose 50% Injectable 12.5 Gram(s) IV Push once  dextrose 50% Injectable 25 Gram(s) IV Push once  dextrose 50% Injectable 25 Gram(s) IV Push once  enoxaparin Injectable 40 milliGRAM(s) SubCutaneous daily  haloperidol     Tablet 1 milliGRAM(s) Oral <User Schedule>  haloperidol     Tablet 1 milliGRAM(s) Oral <User Schedule>  insulin lispro (HumaLOG) corrective regimen sliding scale   SubCutaneous three times a day before meals  insulin lispro (HumaLOG) corrective regimen sliding scale   SubCutaneous at bedtime  lithium SR (LITHOBID) 300 milliGRAM(s) Oral at bedtime  LORazepam     Tablet 0.25 milliGRAM(s) Oral at bedtime  polyethylene glycol 3350 17 Gram(s) Oral daily  simvastatin 20 milliGRAM(s) Oral at bedtime  traZODone 50 milliGRAM(s) Oral at bedtime

## 2019-11-19 NOTE — DIETITIAN INITIAL EVALUATION ADULT. - PROBLEM SELECTOR PLAN 2
-in setting of infection, treatment as stated in problem 1  -no recent falls   -tox screen and lithium wnl

## 2019-11-20 DIAGNOSIS — F03.90 UNSPECIFIED DEMENTIA WITHOUT BEHAVIORAL DISTURBANCE: ICD-10-CM

## 2019-11-20 LAB
ANION GAP SERPL CALC-SCNC: 13 MMO/L — SIGNIFICANT CHANGE UP (ref 7–14)
BASOPHILS # BLD AUTO: 0.03 K/UL — SIGNIFICANT CHANGE UP (ref 0–0.2)
BASOPHILS NFR BLD AUTO: 0.4 % — SIGNIFICANT CHANGE UP (ref 0–2)
BUN SERPL-MCNC: 24 MG/DL — HIGH (ref 7–23)
CALCIUM SERPL-MCNC: 10.2 MG/DL — SIGNIFICANT CHANGE UP (ref 8.4–10.5)
CHLORIDE SERPL-SCNC: 112 MMOL/L — HIGH (ref 98–107)
CO2 SERPL-SCNC: 23 MMOL/L — SIGNIFICANT CHANGE UP (ref 22–31)
CREAT SERPL-MCNC: 0.91 MG/DL — SIGNIFICANT CHANGE UP (ref 0.5–1.3)
EOSINOPHIL # BLD AUTO: 0.12 K/UL — SIGNIFICANT CHANGE UP (ref 0–0.5)
EOSINOPHIL NFR BLD AUTO: 1.5 % — SIGNIFICANT CHANGE UP (ref 0–6)
GLUCOSE BLDC GLUCOMTR-MCNC: 177 MG/DL — HIGH (ref 70–99)
GLUCOSE BLDC GLUCOMTR-MCNC: 188 MG/DL — HIGH (ref 70–99)
GLUCOSE BLDC GLUCOMTR-MCNC: 207 MG/DL — HIGH (ref 70–99)
GLUCOSE BLDC GLUCOMTR-MCNC: 213 MG/DL — HIGH (ref 70–99)
GLUCOSE SERPL-MCNC: 214 MG/DL — HIGH (ref 70–99)
HCT VFR BLD CALC: 40.4 % — SIGNIFICANT CHANGE UP (ref 34.5–45)
HGB BLD-MCNC: 12.5 G/DL — SIGNIFICANT CHANGE UP (ref 11.5–15.5)
IMM GRANULOCYTES NFR BLD AUTO: 0.3 % — SIGNIFICANT CHANGE UP (ref 0–1.5)
LYMPHOCYTES # BLD AUTO: 1.02 K/UL — SIGNIFICANT CHANGE UP (ref 1–3.3)
LYMPHOCYTES # BLD AUTO: 12.8 % — LOW (ref 13–44)
MAGNESIUM SERPL-MCNC: 2 MG/DL — SIGNIFICANT CHANGE UP (ref 1.6–2.6)
MCHC RBC-ENTMCNC: 25.2 PG — LOW (ref 27–34)
MCHC RBC-ENTMCNC: 30.9 % — LOW (ref 32–36)
MCV RBC AUTO: 81.3 FL — SIGNIFICANT CHANGE UP (ref 80–100)
MONOCYTES # BLD AUTO: 0.52 K/UL — SIGNIFICANT CHANGE UP (ref 0–0.9)
MONOCYTES NFR BLD AUTO: 6.5 % — SIGNIFICANT CHANGE UP (ref 2–14)
NEUTROPHILS # BLD AUTO: 6.28 K/UL — SIGNIFICANT CHANGE UP (ref 1.8–7.4)
NEUTROPHILS NFR BLD AUTO: 78.5 % — HIGH (ref 43–77)
NRBC # FLD: 0 K/UL — SIGNIFICANT CHANGE UP (ref 0–0)
PHOSPHATE SERPL-MCNC: 2.9 MG/DL — SIGNIFICANT CHANGE UP (ref 2.5–4.5)
PLATELET # BLD AUTO: 357 K/UL — SIGNIFICANT CHANGE UP (ref 150–400)
PMV BLD: 10.9 FL — SIGNIFICANT CHANGE UP (ref 7–13)
POTASSIUM SERPL-MCNC: 4.4 MMOL/L — SIGNIFICANT CHANGE UP (ref 3.5–5.3)
POTASSIUM SERPL-SCNC: 4.4 MMOL/L — SIGNIFICANT CHANGE UP (ref 3.5–5.3)
RBC # BLD: 4.97 M/UL — SIGNIFICANT CHANGE UP (ref 3.8–5.2)
RBC # FLD: 15.9 % — HIGH (ref 10.3–14.5)
SODIUM SERPL-SCNC: 148 MMOL/L — HIGH (ref 135–145)
WBC # BLD: 7.99 K/UL — SIGNIFICANT CHANGE UP (ref 3.8–10.5)
WBC # FLD AUTO: 7.99 K/UL — SIGNIFICANT CHANGE UP (ref 3.8–10.5)

## 2019-11-20 PROCEDURE — 99233 SBSQ HOSP IP/OBS HIGH 50: CPT

## 2019-11-20 PROCEDURE — 99232 SBSQ HOSP IP/OBS MODERATE 35: CPT

## 2019-11-20 RX ORDER — SODIUM CHLORIDE 9 MG/ML
1000 INJECTION, SOLUTION INTRAVENOUS
Refills: 0 | Status: DISCONTINUED | OUTPATIENT
Start: 2019-11-20 | End: 2019-11-23

## 2019-11-20 RX ORDER — SENNA PLUS 8.6 MG/1
2 TABLET ORAL AT BEDTIME
Refills: 0 | Status: DISCONTINUED | OUTPATIENT
Start: 2019-11-20 | End: 2019-12-04

## 2019-11-20 RX ORDER — POLYETHYLENE GLYCOL 3350 17 G/17G
17 POWDER, FOR SOLUTION ORAL
Refills: 0 | Status: DISCONTINUED | OUTPATIENT
Start: 2019-11-20 | End: 2019-12-04

## 2019-11-20 RX ORDER — TRAZODONE HCL 50 MG
50 TABLET ORAL AT BEDTIME
Refills: 0 | Status: DISCONTINUED | OUTPATIENT
Start: 2019-11-20 | End: 2019-12-04

## 2019-11-20 RX ADMIN — SODIUM CHLORIDE 75 MILLILITER(S): 9 INJECTION, SOLUTION INTRAVENOUS at 08:37

## 2019-11-20 RX ADMIN — SENNA PLUS 2 TABLET(S): 8.6 TABLET ORAL at 21:00

## 2019-11-20 RX ADMIN — SODIUM CHLORIDE 50 MILLILITER(S): 9 INJECTION, SOLUTION INTRAVENOUS at 07:48

## 2019-11-20 RX ADMIN — Medication 10 MILLIGRAM(S): at 17:28

## 2019-11-20 RX ADMIN — Medication 50 MILLIGRAM(S): at 21:01

## 2019-11-20 RX ADMIN — Medication 1: at 07:48

## 2019-11-20 RX ADMIN — ENOXAPARIN SODIUM 40 MILLIGRAM(S): 100 INJECTION SUBCUTANEOUS at 11:41

## 2019-11-20 RX ADMIN — Medication 2: at 11:41

## 2019-11-20 RX ADMIN — POLYETHYLENE GLYCOL 3350 17 GRAM(S): 17 POWDER, FOR SOLUTION ORAL at 16:25

## 2019-11-20 RX ADMIN — SIMVASTATIN 20 MILLIGRAM(S): 20 TABLET, FILM COATED ORAL at 21:01

## 2019-11-20 RX ADMIN — HALOPERIDOL DECANOATE 1 MILLIGRAM(S): 100 INJECTION INTRAMUSCULAR at 19:09

## 2019-11-20 RX ADMIN — Medication 1: at 16:25

## 2019-11-20 RX ADMIN — LITHIUM CARBONATE 300 MILLIGRAM(S): 300 TABLET, EXTENDED RELEASE ORAL at 21:01

## 2019-11-20 NOTE — PROGRESS NOTE ADULT - SUBJECTIVE AND OBJECTIVE BOX
[facility] Division of Hospital Medicine  Denia Trejo MD  Pager (M-F, 8A-5P):  In-house pager [phone]; Long-range pager [phone]  Other Times:  Please page Hospitalist in Charge -  In-house pager [phone]    Patient is a 69y old  Female who presents with a chief complaint of AMS, behavioral disturbance (19 Nov 2019 16:58)      SUBJECTIVE / OVERNIGHT EVENTS: No problems reported over night. ...  ADDITIONAL REVIEW OF SYSTEMS:    MEDICATIONS  (STANDING):  dextrose 5%. 1000 milliLiter(s) (50 mL/Hr) IV Continuous <Continuous>  dextrose 5%. 1000 milliLiter(s) (75 mL/Hr) IV Continuous <Continuous>  dextrose 50% Injectable 12.5 Gram(s) IV Push once  dextrose 50% Injectable 25 Gram(s) IV Push once  dextrose 50% Injectable 25 Gram(s) IV Push once  enoxaparin Injectable 40 milliGRAM(s) SubCutaneous daily  haloperidol     Tablet 1 milliGRAM(s) Oral <User Schedule>  haloperidol     Tablet 1 milliGRAM(s) Oral <User Schedule>  insulin lispro (HumaLOG) corrective regimen sliding scale   SubCutaneous three times a day before meals  insulin lispro (HumaLOG) corrective regimen sliding scale   SubCutaneous at bedtime  lithium SR (LITHOBID) 300 milliGRAM(s) Oral at bedtime  LORazepam     Tablet 0.25 milliGRAM(s) Oral at bedtime  polyethylene glycol 3350 17 Gram(s) Oral daily  simvastatin 20 milliGRAM(s) Oral at bedtime  traZODone 50 milliGRAM(s) Oral at bedtime    MEDICATIONS  (PRN):  dextrose 40% Gel 15 Gram(s) Oral once PRN Blood Glucose LESS THAN 70 milliGRAM(s)/deciliter  glucagon  Injectable 1 milliGRAM(s) IntraMuscular once PRN Glucose LESS THAN 70 milligrams/deciliter    CAPILLARY BLOOD GLUCOSE  POCT Blood Glucose.: 188 mg/dL (20 Nov 2019 07:29)  POCT Blood Glucose.: 266 mg/dL (19 Nov 2019 21:58)  POCT Blood Glucose.: 175 mg/dL (19 Nov 2019 16:32)  POCT Blood Glucose.: 192 mg/dL (19 Nov 2019 11:27)    I&O's Summary    19 Nov 2019 07:01  -  20 Nov 2019 07:00  --------------------------------------------------------  IN: 675 mL / OUT: 0 mL / NET: 675 mL    PHYSICAL EXAM:  Vital Signs Last 24 Hrs  T(C): 36.9 (20 Nov 2019 05:08), Max: 36.9 (20 Nov 2019 05:08)  T(F): 98.4 (20 Nov 2019 05:08), Max: 98.4 (20 Nov 2019 05:08)  HR: 74 (20 Nov 2019 05:08) (61 - 74)  BP: 114/68 (20 Nov 2019 05:08) (110/69 - 137/79)  BP(mean): --  RR: 18 (20 Nov 2019 05:08) (18 - 18)  SpO2: 100% (20 Nov 2019 05:08) (99% - 100%)    CONSTITUTIONAL: elderly female, sleeping in bed, wakes to voice  EYES: poor vision  NECK: Supple, no palpable masses; no thyromegaly  RESPIRATORY: Normal respiratory effort; lungs are clear to auscultation bilaterally  CARDIOVASCULAR: Regular rate and rhythm, normal S1 and S2, no murmur/rub/gallop; No lower extremity edema; Peripheral pulses are 2+ bilaterally  ABDOMEN: Nontender to palpation, normoactive bowel sounds, no rebound/guarding  PSYCH: sleepy, oriented to name    LABS:                        12.5   7.99  )-----------( 357      ( 20 Nov 2019 06:16 )             40.4     11-20    148<H>  |  112<H>  |  24<H>  ----------------------------<  214<H>  4.4   |  23  |  0.91    Ca    10.2      20 Nov 2019 06:16  Phos  2.9     11-20  Mg     2.0     11-20                  RADIOLOGY & ADDITIONAL TESTS:  Results Reviewed:   Imaging Personally Reviewed:  Electrocardiogram Personally Reviewed:    COORDINATION OF CARE:  Care Discussed with Consultants/Other Providers [Y/N]: RN, SW, CM, ACP re overall care   Prior or Outpatient Records Reviewed [Y/N]: [facility] Division of Hospital Medicine  Denia Trejo MD  Pager (M-F, 8A-5P):  In-house pager [phone]; Long-range pager [phone]  Other Times:  Please page Hospitalist in Charge -  In-house pager [phone]    Patient is a 69y old  Female who presents with a chief complaint of AMS, behavioral disturbance (19 Nov 2019 16:58)      SUBJECTIVE / OVERNIGHT EVENTS: No problems reported over night. Continues to be sleepy most of day.  RN was able to wake her to feed her but she quickly falls back to sleep.    ADDITIONAL REVIEW OF SYSTEMS:    MEDICATIONS  (STANDING):  dextrose 5%. 1000 milliLiter(s) (50 mL/Hr) IV Continuous <Continuous>  dextrose 5%. 1000 milliLiter(s) (75 mL/Hr) IV Continuous <Continuous>  dextrose 50% Injectable 12.5 Gram(s) IV Push once  dextrose 50% Injectable 25 Gram(s) IV Push once  dextrose 50% Injectable 25 Gram(s) IV Push once  enoxaparin Injectable 40 milliGRAM(s) SubCutaneous daily  haloperidol     Tablet 1 milliGRAM(s) Oral <User Schedule>  haloperidol     Tablet 1 milliGRAM(s) Oral <User Schedule>  insulin lispro (HumaLOG) corrective regimen sliding scale   SubCutaneous three times a day before meals  insulin lispro (HumaLOG) corrective regimen sliding scale   SubCutaneous at bedtime  lithium SR (LITHOBID) 300 milliGRAM(s) Oral at bedtime  LORazepam     Tablet 0.25 milliGRAM(s) Oral at bedtime  polyethylene glycol 3350 17 Gram(s) Oral daily  simvastatin 20 milliGRAM(s) Oral at bedtime  traZODone 50 milliGRAM(s) Oral at bedtime    MEDICATIONS  (PRN):  dextrose 40% Gel 15 Gram(s) Oral once PRN Blood Glucose LESS THAN 70 milliGRAM(s)/deciliter  glucagon  Injectable 1 milliGRAM(s) IntraMuscular once PRN Glucose LESS THAN 70 milligrams/deciliter    CAPILLARY BLOOD GLUCOSE  POCT Blood Glucose.: 188 mg/dL (20 Nov 2019 07:29)  POCT Blood Glucose.: 266 mg/dL (19 Nov 2019 21:58)  POCT Blood Glucose.: 175 mg/dL (19 Nov 2019 16:32)  POCT Blood Glucose.: 192 mg/dL (19 Nov 2019 11:27)    I&O's Summary    19 Nov 2019 07:01  -  20 Nov 2019 07:00  --------------------------------------------------------  IN: 675 mL / OUT: 0 mL / NET: 675 mL    PHYSICAL EXAM:  Vital Signs Last 24 Hrs  T(C): 36.9 (20 Nov 2019 05:08), Max: 36.9 (20 Nov 2019 05:08)  T(F): 98.4 (20 Nov 2019 05:08), Max: 98.4 (20 Nov 2019 05:08)  HR: 74 (20 Nov 2019 05:08) (61 - 74)  BP: 114/68 (20 Nov 2019 05:08) (110/69 - 137/79)  BP(mean): --  RR: 18 (20 Nov 2019 05:08) (18 - 18)  SpO2: 100% (20 Nov 2019 05:08) (99% - 100%)    CONSTITUTIONAL: elderly female, sleeping in bed, wakes to voice  EYES: poor vision  NECK: Supple, no palpable masses; no thyromegaly  RESPIRATORY: Normal respiratory effort; lungs are clear to auscultation bilaterally  CARDIOVASCULAR: Regular rate and rhythm, normal S1 and S2, no murmur/rub/gallop; No lower extremity edema; Peripheral pulses are 2+ bilaterally  ABDOMEN: Nontender to palpation, normoactive bowel sounds, no rebound/guarding  PSYCH: sleepy, oriented to name    LABS:                        12.5   7.99  )-----------( 357      ( 20 Nov 2019 06:16 )             40.4     11-20    148<H>  |  112<H>  |  24<H>  ----------------------------<  214<H>  4.4   |  23  |  0.91    Ca    10.2      20 Nov 2019 06:16  Phos  2.9     11-20  Mg     2.0     11-20    RADIOLOGY & ADDITIONAL TESTS:  Results Reviewed:   Imaging Personally Reviewed:  Electrocardiogram Personally Reviewed:    COORDINATION OF CARE:  Care Discussed with Consultants/Other Providers [Y/N]: RN, SW, CM, ACP re overall care   Prior or Outpatient Records Reviewed [Y/N]:

## 2019-11-20 NOTE — PROGRESS NOTE BEHAVIORAL HEALTH - NSBHCHARTREVIEWLAB_PSY_A_CORE FT
CBC Full  -  ( 20 Nov 2019 06:16 )  WBC Count : 7.99 K/uL  RBC Count : 4.97 M/uL  Hemoglobin : 12.5 g/dL  Hematocrit : 40.4 %  Platelet Count - Automated : 357 K/uL  Mean Cell Volume : 81.3 fL  Mean Cell Hemoglobin : 25.2 pg  Mean Cell Hemoglobin Concentration : 30.9 %  Auto Neutrophil # : 6.28 K/uL  Auto Lymphocyte # : 1.02 K/uL  Auto Monocyte # : 0.52 K/uL  Auto Eosinophil # : 0.12 K/uL  Auto Basophil # : 0.03 K/uL  Auto Neutrophil % : 78.5 %  Auto Lymphocyte % : 12.8 %  Auto Monocyte % : 6.5 %  Auto Eosinophil % : 1.5 %  Auto Basophil % : 0.4 %  11-20    148<H>  |  112<H>  |  24<H>  ----------------------------<  214<H>  4.4   |  23  |  0.91    Ca    10.2      20 Nov 2019 06:16  Phos  2.9     11-20  Mg     2.0     11-20

## 2019-11-20 NOTE — PROGRESS NOTE BEHAVIORAL HEALTH - NSBHCONSULTFOLLOWAFTERCARE_PSY_A_CORE FT
psychiatric f/u at rehab will be beneficial
will coordinate care with outpatient psychiatrist  f/u with dr gutierrez upon d/c at [facility]
f/u with dr gutierrez upon d/c at [facility]

## 2019-11-20 NOTE — PROGRESS NOTE BEHAVIORAL HEALTH - NSBHCHARTREVIEWVS_PSY_A_CORE FT
Vital Signs Last 24 Hrs  T(C): 36.6 (20 Nov 2019 13:47), Max: 36.9 (20 Nov 2019 05:08)  T(F): 97.8 (20 Nov 2019 13:47), Max: 98.4 (20 Nov 2019 05:08)  HR: 67 (20 Nov 2019 13:47) (66 - 74)  BP: 121/71 (20 Nov 2019 13:47) (110/69 - 121/71)  BP(mean): --  RR: 17 (20 Nov 2019 13:47) (17 - 18)  SpO2: 100% (20 Nov 2019 13:47) (99% - 100%)

## 2019-11-20 NOTE — CHART NOTE - NSCHARTNOTEFT_GEN_A_CORE
Pt observed to be drowsy and lethargic today as well, require total care and assist with feeding. Per discussion with Psych Dr Solano today will d/c Ativan and will change Trazadone 50mg po qhs to as needed. Haldol was decreased to 1mg po TID yesterday, will continue as ordered. Will continue to monitor pts clinical status.

## 2019-11-20 NOTE — PROGRESS NOTE BEHAVIORAL HEALTH - NSBHFUPINTERVALHXFT_PSY_A_CORE
met with the patient. Sleeping, does not engage much with interview as before. No cogwheeling or rigidity. No agitation, and no prn needs.   Care coordinated with medical team, and below plan was discussed.

## 2019-11-20 NOTE — PROGRESS NOTE BEHAVIORAL HEALTH - NSBHADMITCOUNSEL_PSY_A_CORE
client/family/caregiver education/importance of adherence to chosen treatment/risks and benefits of treatment options/instructions for management, treatment and follow up/risk factor reduction
instructions for management, treatment and follow up/importance of adherence to chosen treatment/client/family/caregiver education/risks and benefits of treatment options/risk factor reduction
importance of adherence to chosen treatment/risks and benefits of treatment options/client/family/caregiver education/instructions for management, treatment and follow up/risk factor reduction

## 2019-11-20 NOTE — PROGRESS NOTE ADULT - PROBLEM SELECTOR PLAN 3
resume D5W, encourage PO fluids -c/w  lithium hs; decreased haldol to 1 TID (hold for sedation)  - stop Ativan;  change trazodone to PRN  avoid Ambien/anticholinergics, day/night cues, supportive care  - Have d/w  seems dementia progressing, Hospice has evaluated, feels pt would be appropriate.  PT has recommended rehab and for now pt's  opting for rehab.

## 2019-11-20 NOTE — PROGRESS NOTE BEHAVIORAL HEALTH - SUMMARY
Patient is a 69 y.o. female , lives with , has home aid 4-6 hours a day, unemployed, legally blind, bedbound, multiple inpatient psych admissions, has psychiatrist in community at [facility], past psych hx of schizoaffective disorder depression type and possible dementia, PMHx of CKD and diabetes type 2 presenting to the ED with AMS and behavioral disturbance x 3 days. She has had increased aggression at home biting /home aid and pulling home aid's hair. In ED last night she received ativan and haldol for increased agitation. Psychiatry consulted for worsening dementia with agitation.     11/14- no agitation and patient is calm, no acute rn concerns  11/20- concerns for sedation, no agitation, no prn needs    Recommendations-  - EO  - Continue haldol 1 mg TID PO as ordered  - Continue lithium 300 mg at bedtime for now  - D/C Ativan  - Place Trazodone 50 mg at bedtime as a prn dosing  - Haldol 0.5 mg PRN PO/IM/IV for increased agitation.

## 2019-11-20 NOTE — PROGRESS NOTE ADULT - PROBLEM SELECTOR PLAN 2
-c/w  lithium hs, trazodone;  d/w psych decrease haldol to 1 TID (hold for sedation),  avoid Ambien/anticholinergics, day/night cues, supportive care  - Have d/w  seems dementia progressing, have asked Hospice to evaluate, also began discussion of goals of care.  PT has recommended rehab,  realistic will see if pt able to participate. For now pt's  opting for rehab in setting of infection, likely underlying dementia and schizoaffective d/o  - s/p abx for UTI  avoid Ambien/anticholinergics, day/night cues, supportive care  - tapering down psych meds

## 2019-11-20 NOTE — PROGRESS NOTE ADULT - PROBLEM SELECTOR PLAN 5
- c/w haldol TID, lithium hs, trazodone Met sepsis criteria on admission, completed 3d CTX, blood cx negative, urine culture was not sent  now wbc rising- repeat UA negative.  trial of iv fluids and recheck

## 2019-11-20 NOTE — PROGRESS NOTE BEHAVIORAL HEALTH - RISK ASSESSMENT
Patient has chronic mental health issues and impulsivity, now worsening cognition, declining physically, more lethargic, not agitated, no statements of si or hi, no evident psychosis.   Protective factors include supportive  and medication compliance.
Patient has chronic mental health issues and impulsivity. Protective factors include supportive  and medication compliance.
Patient has chronic mental health issues and impulsivity. Protective factors include supportive  and medication compliance.

## 2019-11-20 NOTE — PROGRESS NOTE ADULT - PROBLEM SELECTOR PLAN 4
Met sepsis criteria on admission, completed 3d CTX, blood cx negative, urine culture was not sent  now wbc rising- repeat UA negative.  trial of iv fluids and recheck - changing meds as noted above, monitor for any psychiatric decompensation

## 2019-11-20 NOTE — PROGRESS NOTE ADULT - PROBLEM SELECTOR PLAN 1
in setting of infection, likely underlying dementia and schizoaffective d/o  - s/p abx for UTI  avoid Ambien/anticholinergics, day/night cues, supportive care  - hold Haldol if sedated --> d/w psych decrease Haldol as recommended recurrent hypernatremia when decrease IVFs, encourage PO fluids, hopefully PO will improve as titrate down meds and becomes more alert

## 2019-11-21 LAB
ANION GAP SERPL CALC-SCNC: 9 MMO/L — SIGNIFICANT CHANGE UP (ref 7–14)
BUN SERPL-MCNC: 26 MG/DL — HIGH (ref 7–23)
CALCIUM SERPL-MCNC: 10.5 MG/DL — SIGNIFICANT CHANGE UP (ref 8.4–10.5)
CHLORIDE SERPL-SCNC: 110 MMOL/L — HIGH (ref 98–107)
CO2 SERPL-SCNC: 25 MMOL/L — SIGNIFICANT CHANGE UP (ref 22–31)
CREAT SERPL-MCNC: 0.96 MG/DL — SIGNIFICANT CHANGE UP (ref 0.5–1.3)
GLUCOSE BLDC GLUCOMTR-MCNC: 223 MG/DL — HIGH (ref 70–99)
GLUCOSE BLDC GLUCOMTR-MCNC: 246 MG/DL — HIGH (ref 70–99)
GLUCOSE BLDC GLUCOMTR-MCNC: 263 MG/DL — HIGH (ref 70–99)
GLUCOSE BLDC GLUCOMTR-MCNC: 273 MG/DL — HIGH (ref 70–99)
GLUCOSE SERPL-MCNC: 245 MG/DL — HIGH (ref 70–99)
MAGNESIUM SERPL-MCNC: 1.9 MG/DL — SIGNIFICANT CHANGE UP (ref 1.6–2.6)
PHOSPHATE SERPL-MCNC: 2.4 MG/DL — LOW (ref 2.5–4.5)
POTASSIUM SERPL-MCNC: 4.1 MMOL/L — SIGNIFICANT CHANGE UP (ref 3.5–5.3)
POTASSIUM SERPL-SCNC: 4.1 MMOL/L — SIGNIFICANT CHANGE UP (ref 3.5–5.3)
SODIUM SERPL-SCNC: 144 MMOL/L — SIGNIFICANT CHANGE UP (ref 135–145)

## 2019-11-21 PROCEDURE — 93010 ELECTROCARDIOGRAM REPORT: CPT

## 2019-11-21 PROCEDURE — 99232 SBSQ HOSP IP/OBS MODERATE 35: CPT

## 2019-11-21 RX ORDER — HALOPERIDOL DECANOATE 100 MG/ML
0.5 INJECTION INTRAMUSCULAR ONCE
Refills: 0 | Status: COMPLETED | OUTPATIENT
Start: 2019-11-21 | End: 2019-11-21

## 2019-11-21 RX ORDER — SODIUM,POTASSIUM PHOSPHATES 278-250MG
1 POWDER IN PACKET (EA) ORAL
Refills: 0 | Status: COMPLETED | OUTPATIENT
Start: 2019-11-21 | End: 2019-11-22

## 2019-11-21 RX ADMIN — POLYETHYLENE GLYCOL 3350 17 GRAM(S): 17 POWDER, FOR SOLUTION ORAL at 17:02

## 2019-11-21 RX ADMIN — Medication 1: at 21:27

## 2019-11-21 RX ADMIN — Medication 3: at 16:58

## 2019-11-21 RX ADMIN — HALOPERIDOL DECANOATE 1 MILLIGRAM(S): 100 INJECTION INTRAMUSCULAR at 21:26

## 2019-11-21 RX ADMIN — HALOPERIDOL DECANOATE 1 MILLIGRAM(S): 100 INJECTION INTRAMUSCULAR at 13:00

## 2019-11-21 RX ADMIN — SODIUM CHLORIDE 75 MILLILITER(S): 9 INJECTION, SOLUTION INTRAVENOUS at 19:53

## 2019-11-21 RX ADMIN — Medication 2: at 11:53

## 2019-11-21 RX ADMIN — LITHIUM CARBONATE 300 MILLIGRAM(S): 300 TABLET, EXTENDED RELEASE ORAL at 22:33

## 2019-11-21 RX ADMIN — Medication 1 PACKET(S): at 17:02

## 2019-11-21 RX ADMIN — Medication 2: at 08:03

## 2019-11-21 RX ADMIN — HALOPERIDOL DECANOATE 0.5 MILLIGRAM(S): 100 INJECTION INTRAMUSCULAR at 00:50

## 2019-11-21 RX ADMIN — SIMVASTATIN 20 MILLIGRAM(S): 20 TABLET, FILM COATED ORAL at 21:30

## 2019-11-21 RX ADMIN — SENNA PLUS 2 TABLET(S): 8.6 TABLET ORAL at 21:27

## 2019-11-21 RX ADMIN — ENOXAPARIN SODIUM 40 MILLIGRAM(S): 100 INJECTION SUBCUTANEOUS at 11:54

## 2019-11-21 RX ADMIN — POLYETHYLENE GLYCOL 3350 17 GRAM(S): 17 POWDER, FOR SOLUTION ORAL at 05:50

## 2019-11-21 RX ADMIN — Medication 1 PACKET(S): at 11:54

## 2019-11-21 NOTE — PROGRESS NOTE ADULT - PROBLEM SELECTOR PLAN 2
in setting of infection, likely underlying dementia and schizoaffective d/o  - s/p abx for UTI  avoid Ambien/anticholinergics, day/night cues, supportive care  - tapering down psych meds in setting of infection, likely underlying dementia and schizoaffective d/o  - s/p abx for UTI  avoid Ambien/anticholinergics, day/night cues, supportive care  - adjusting psych meds

## 2019-11-21 NOTE — PROGRESS NOTE ADULT - PROBLEM SELECTOR PLAN 1
recurrent hypernatremia when decrease IVFs, encourage PO fluids, hopefully PO will improve as titrate down meds and becomes more alert

## 2019-11-21 NOTE — PROGRESS NOTE ADULT - PROBLEM SELECTOR PLAN 3
-c/w  lithium hs; decreased haldol to 1 TID (hold for sedation)  - stop Ativan;  change trazodone to PRN  avoid Ambien/anticholinergics, day/night cues, supportive care  - Have d/w  seems dementia progressing, Hospice has evaluated, feels pt would be appropriate.  PT has recommended rehab and for now pt's  opting for rehab.

## 2019-11-21 NOTE — PROGRESS NOTE ADULT - SUBJECTIVE AND OBJECTIVE BOX
[facility] Division of Hospital Medicine  Denia Trejo MD  Pager (M-F, 8A-5P):  In-house pager [phone]; Long-range pager [phone]  Other Times:  Please page Hospitalist in Charge -  In-house pager [phone]    Patient is a 69y old  Female who presents with a chief complaint of AMS, behavioral disturbance (20 Nov 2019 09:33)      SUBJECTIVE / OVERNIGHT EVENTS: ...  ADDITIONAL REVIEW OF SYSTEMS:    MEDICATIONS  (STANDING):  dextrose 5%. 1000 milliLiter(s) (50 mL/Hr) IV Continuous <Continuous>  dextrose 5%. 1000 milliLiter(s) (75 mL/Hr) IV Continuous <Continuous>  dextrose 50% Injectable 12.5 Gram(s) IV Push once  dextrose 50% Injectable 25 Gram(s) IV Push once  dextrose 50% Injectable 25 Gram(s) IV Push once  enoxaparin Injectable 40 milliGRAM(s) SubCutaneous daily  haloperidol     Tablet 1 milliGRAM(s) Oral <User Schedule>  haloperidol     Tablet 1 milliGRAM(s) Oral <User Schedule>  insulin lispro (HumaLOG) corrective regimen sliding scale   SubCutaneous three times a day before meals  insulin lispro (HumaLOG) corrective regimen sliding scale   SubCutaneous at bedtime  lithium SR (LITHOBID) 300 milliGRAM(s) Oral at bedtime  polyethylene glycol 3350 17 Gram(s) Oral two times a day  senna 2 Tablet(s) Oral at bedtime  simvastatin 20 milliGRAM(s) Oral at bedtime    MEDICATIONS  (PRN):  dextrose 40% Gel 15 Gram(s) Oral once PRN Blood Glucose LESS THAN 70 milliGRAM(s)/deciliter  glucagon  Injectable 1 milliGRAM(s) IntraMuscular once PRN Glucose LESS THAN 70 milligrams/deciliter  traZODone 50 milliGRAM(s) Oral at bedtime PRN Insomnia      CAPILLARY BLOOD GLUCOSE      POCT Blood Glucose.: 246 mg/dL (21 Nov 2019 07:28)  POCT Blood Glucose.: 213 mg/dL (20 Nov 2019 22:20)  POCT Blood Glucose.: 177 mg/dL (20 Nov 2019 16:20)  POCT Blood Glucose.: 207 mg/dL (20 Nov 2019 11:25)    I&O's Summary    20 Nov 2019 07:01  -  21 Nov 2019 07:00  --------------------------------------------------------  IN: 900 mL / OUT: 0 mL / NET: 900 mL        PHYSICAL EXAM:  Vital Signs Last 24 Hrs  T(C): 36.6 (21 Nov 2019 05:48), Max: 37.1 (20 Nov 2019 21:50)  T(F): 97.9 (21 Nov 2019 05:48), Max: 98.8 (20 Nov 2019 21:50)  HR: 60 (21 Nov 2019 05:48) (60 - 72)  BP: 140/70 (21 Nov 2019 05:48) (121/71 - 153/77)  BP(mean): --  RR: 18 (21 Nov 2019 05:48) (17 - 18)  SpO2: 100% (21 Nov 2019 05:48) (100% - 100%)    CONSTITUTIONAL: elderly female, sleeping in bed, wakes to voice  EYES: poor vision  NECK: Supple, no palpable masses; no thyromegaly  RESPIRATORY: Normal respiratory effort; lungs are clear to auscultation bilaterally  CARDIOVASCULAR: Regular rate and rhythm, normal S1 and S2, no murmur/rub/gallop; No lower extremity edema; Peripheral pulses are 2+ bilaterally  ABDOMEN: Nontender to palpation, normoactive bowel sounds, no rebound/guarding  PSYCH: sleepy, oriented to name    LABS:                        12.5   7.99  )-----------( 357      ( 20 Nov 2019 06:16 )             40.4     11-21    144  |  110<H>  |  26<H>  ----------------------------<  245<H>  4.1   |  25  |  0.96    Ca    10.5      21 Nov 2019 07:55  Phos  2.4     11-21  Mg     1.9     11-21                  RADIOLOGY & ADDITIONAL TESTS:  Results Reviewed:   Imaging Personally Reviewed:  Electrocardiogram Personally Reviewed:    COORDINATION OF CARE:  Care Discussed with Consultants/Other Providers [Y/N]: RN, SW, CM, ACP, psych re overall care   Prior or Outpatient Records Reviewed [Y/N]: [facility] Division of Hospital Medicine  Denia Trejo MD  Pager (M-F, 8A-5P):  In-house pager [phone]; Long-range pager [phone]  Other Times:  Please page Hospitalist in Charge -  In-house pager [phone]    Patient is a 69y old  Female who presents with a chief complaint of AMS, behavioral disturbance (20 Nov 2019 09:33)      SUBJECTIVE / OVERNIGHT EVENTS: Got restless last night, required PRN.  Sleep in am, more alert/restless in afternoon.  ADDITIONAL REVIEW OF SYSTEMS:    MEDICATIONS  (STANDING):  dextrose 5%. 1000 milliLiter(s) (50 mL/Hr) IV Continuous <Continuous>  dextrose 5%. 1000 milliLiter(s) (75 mL/Hr) IV Continuous <Continuous>  dextrose 50% Injectable 12.5 Gram(s) IV Push once  dextrose 50% Injectable 25 Gram(s) IV Push once  dextrose 50% Injectable 25 Gram(s) IV Push once  enoxaparin Injectable 40 milliGRAM(s) SubCutaneous daily  haloperidol     Tablet 1 milliGRAM(s) Oral <User Schedule>  haloperidol     Tablet 1 milliGRAM(s) Oral <User Schedule>  insulin lispro (HumaLOG) corrective regimen sliding scale   SubCutaneous three times a day before meals  insulin lispro (HumaLOG) corrective regimen sliding scale   SubCutaneous at bedtime  lithium SR (LITHOBID) 300 milliGRAM(s) Oral at bedtime  polyethylene glycol 3350 17 Gram(s) Oral two times a day  senna 2 Tablet(s) Oral at bedtime  simvastatin 20 milliGRAM(s) Oral at bedtime    MEDICATIONS  (PRN):  dextrose 40% Gel 15 Gram(s) Oral once PRN Blood Glucose LESS THAN 70 milliGRAM(s)/deciliter  glucagon  Injectable 1 milliGRAM(s) IntraMuscular once PRN Glucose LESS THAN 70 milligrams/deciliter  traZODone 50 milliGRAM(s) Oral at bedtime PRN Insomnia      CAPILLARY BLOOD GLUCOSE      POCT Blood Glucose.: 246 mg/dL (21 Nov 2019 07:28)  POCT Blood Glucose.: 213 mg/dL (20 Nov 2019 22:20)  POCT Blood Glucose.: 177 mg/dL (20 Nov 2019 16:20)  POCT Blood Glucose.: 207 mg/dL (20 Nov 2019 11:25)    I&O's Summary    20 Nov 2019 07:01 - 21 Nov 2019 07:00  --------------------------------------------------------  IN: 900 mL / OUT: 0 mL / NET: 900 mL        PHYSICAL EXAM:  Vital Signs Last 24 Hrs  T(C): 36.6 (21 Nov 2019 05:48), Max: 37.1 (20 Nov 2019 21:50)  T(F): 97.9 (21 Nov 2019 05:48), Max: 98.8 (20 Nov 2019 21:50)  HR: 60 (21 Nov 2019 05:48) (60 - 72)  BP: 140/70 (21 Nov 2019 05:48) (121/71 - 153/77)  BP(mean): --  RR: 18 (21 Nov 2019 05:48) (17 - 18)  SpO2: 100% (21 Nov 2019 05:48) (100% - 100%)    CONSTITUTIONAL: elderly female, sleeping in bed, wakes to voice  EYES: poor vision  NECK: Supple, no palpable masses; no thyromegaly  RESPIRATORY: Normal respiratory effort; lungs are clear to auscultation bilaterally  CARDIOVASCULAR: Regular rate and rhythm, normal S1 and S2, no murmur/rub/gallop; No lower extremity edema; Peripheral pulses are 2+ bilaterally  ABDOMEN: Nontender to palpation, normoactive bowel sounds, no rebound/guarding  PSYCH: sleepy, oriented to name    LABS:                        12.5   7.99  )-----------( 357      ( 20 Nov 2019 06:16 )             40.4     11-21    144  |  110<H>  |  26<H>  ----------------------------<  245<H>  4.1   |  25  |  0.96    Ca    10.5      21 Nov 2019 07:55  Phos  2.4     11-21  Mg     1.9     11-21                  RADIOLOGY & ADDITIONAL TESTS:  Results Reviewed:   Imaging Personally Reviewed:  Electrocardiogram Personally Reviewed:    COORDINATION OF CARE:  Care Discussed with Consultants/Other Providers [Y/N]: RN, SW, CM, ACP, psych re overall care   Prior or Outpatient Records Reviewed [Y/N]:

## 2019-11-21 NOTE — PROGRESS NOTE ADULT - PROBLEM SELECTOR PLAN 5
Met sepsis criteria on admission, completed 3d CTX, blood cx negative, urine culture was not sent  now wbc rising- repeat UA negative.  trial of iv fluids and recheck Met sepsis criteria on admission, completed 3d CTX, blood cx negative, urine culture was not sent.

## 2019-11-22 LAB
ANION GAP SERPL CALC-SCNC: 10 MMO/L — SIGNIFICANT CHANGE UP (ref 7–14)
BUN SERPL-MCNC: 20 MG/DL — SIGNIFICANT CHANGE UP (ref 7–23)
CALCIUM SERPL-MCNC: 10 MG/DL — SIGNIFICANT CHANGE UP (ref 8.4–10.5)
CHLORIDE SERPL-SCNC: 114 MMOL/L — HIGH (ref 98–107)
CO2 SERPL-SCNC: 22 MMOL/L — SIGNIFICANT CHANGE UP (ref 22–31)
CREAT SERPL-MCNC: 0.94 MG/DL — SIGNIFICANT CHANGE UP (ref 0.5–1.3)
GLUCOSE BLDC GLUCOMTR-MCNC: 224 MG/DL — HIGH (ref 70–99)
GLUCOSE BLDC GLUCOMTR-MCNC: 241 MG/DL — HIGH (ref 70–99)
GLUCOSE BLDC GLUCOMTR-MCNC: 248 MG/DL — HIGH (ref 70–99)
GLUCOSE BLDC GLUCOMTR-MCNC: 267 MG/DL — HIGH (ref 70–99)
GLUCOSE SERPL-MCNC: 412 MG/DL — HIGH (ref 70–99)
HCT VFR BLD CALC: 41.7 % — SIGNIFICANT CHANGE UP (ref 34.5–45)
HGB BLD-MCNC: 12.4 G/DL — SIGNIFICANT CHANGE UP (ref 11.5–15.5)
MAGNESIUM SERPL-MCNC: 1.7 MG/DL — SIGNIFICANT CHANGE UP (ref 1.6–2.6)
MCHC RBC-ENTMCNC: 24.5 PG — LOW (ref 27–34)
MCHC RBC-ENTMCNC: 29.7 % — LOW (ref 32–36)
MCV RBC AUTO: 82.2 FL — SIGNIFICANT CHANGE UP (ref 80–100)
NRBC # FLD: 0 K/UL — SIGNIFICANT CHANGE UP (ref 0–0)
PHOSPHATE SERPL-MCNC: 2.6 MG/DL — SIGNIFICANT CHANGE UP (ref 2.5–4.5)
PLATELET # BLD AUTO: 328 K/UL — SIGNIFICANT CHANGE UP (ref 150–400)
PMV BLD: 10.7 FL — SIGNIFICANT CHANGE UP (ref 7–13)
POTASSIUM SERPL-MCNC: 3.9 MMOL/L — SIGNIFICANT CHANGE UP (ref 3.5–5.3)
POTASSIUM SERPL-SCNC: 3.9 MMOL/L — SIGNIFICANT CHANGE UP (ref 3.5–5.3)
RBC # BLD: 5.07 M/UL — SIGNIFICANT CHANGE UP (ref 3.8–5.2)
RBC # FLD: 16.5 % — HIGH (ref 10.3–14.5)
SODIUM SERPL-SCNC: 146 MMOL/L — HIGH (ref 135–145)
WBC # BLD: 9.88 K/UL — SIGNIFICANT CHANGE UP (ref 3.8–10.5)
WBC # FLD AUTO: 9.88 K/UL — SIGNIFICANT CHANGE UP (ref 3.8–10.5)

## 2019-11-22 PROCEDURE — 99232 SBSQ HOSP IP/OBS MODERATE 35: CPT

## 2019-11-22 PROCEDURE — 93010 ELECTROCARDIOGRAM REPORT: CPT

## 2019-11-22 RX ORDER — INSULIN GLARGINE 100 [IU]/ML
5 INJECTION, SOLUTION SUBCUTANEOUS AT BEDTIME
Refills: 0 | Status: DISCONTINUED | OUTPATIENT
Start: 2019-11-22 | End: 2019-11-23

## 2019-11-22 RX ADMIN — LITHIUM CARBONATE 300 MILLIGRAM(S): 300 TABLET, EXTENDED RELEASE ORAL at 21:38

## 2019-11-22 RX ADMIN — POLYETHYLENE GLYCOL 3350 17 GRAM(S): 17 POWDER, FOR SOLUTION ORAL at 16:51

## 2019-11-22 RX ADMIN — Medication 2: at 08:17

## 2019-11-22 RX ADMIN — INSULIN GLARGINE 5 UNIT(S): 100 INJECTION, SOLUTION SUBCUTANEOUS at 22:23

## 2019-11-22 RX ADMIN — SENNA PLUS 2 TABLET(S): 8.6 TABLET ORAL at 21:39

## 2019-11-22 RX ADMIN — POLYETHYLENE GLYCOL 3350 17 GRAM(S): 17 POWDER, FOR SOLUTION ORAL at 05:07

## 2019-11-22 RX ADMIN — ENOXAPARIN SODIUM 40 MILLIGRAM(S): 100 INJECTION SUBCUTANEOUS at 16:51

## 2019-11-22 RX ADMIN — Medication 2: at 16:49

## 2019-11-22 RX ADMIN — SIMVASTATIN 20 MILLIGRAM(S): 20 TABLET, FILM COATED ORAL at 21:38

## 2019-11-22 RX ADMIN — Medication 1 PACKET(S): at 08:20

## 2019-11-22 RX ADMIN — Medication 3: at 11:53

## 2019-11-22 RX ADMIN — HALOPERIDOL DECANOATE 1 MILLIGRAM(S): 100 INJECTION INTRAMUSCULAR at 21:38

## 2019-11-22 RX ADMIN — HALOPERIDOL DECANOATE 1 MILLIGRAM(S): 100 INJECTION INTRAMUSCULAR at 08:18

## 2019-11-22 NOTE — PROGRESS NOTE ADULT - PROBLEM SELECTOR PLAN 1
recurrent hypernatremia when decrease IVFs, encourage PO fluids, hopefully PO will improve as adjust meds and becomes more alert  - unfortunately still requiring IVFs, d/w , concerned indicative of progression of underlying dementia  - check Li level, check I&Os ??could be some DI from lithium

## 2019-11-22 NOTE — PROGRESS NOTE ADULT - PROBLEM SELECTOR PLAN 3
-c/w  lithium hs; decreased haldol to 1 TID (hold for sedation)  - stopped Ativan;  change trazodone to PRN  avoid Ambien/anticholinergics, day/night cues, supportive care  - Have d/w  seems dementia progressing, Hospice has evaluated, feels pt would be appropriate.  PT has recommended rehab and for now pt's  opting for rehab although concerned pt will continue to decline

## 2019-11-22 NOTE — PROGRESS NOTE ADULT - SUBJECTIVE AND OBJECTIVE BOX
[facility] Division of Hospital Medicine  Denia Trejo MD  Pager (M-F, 8A-5P):  In-house pager [phone]; Long-range pager [phone]  Other Times:  Please page Hospitalist in Charge -  In-house pager [phone]    Patient is a 69y old  Female who presents with a chief complaint of AMS, behavioral disturbance (21 Nov 2019 08:52)      SUBJECTIVE / OVERNIGHT EVENTS: Earlier this am when pt was being fed, she was pulling off clothes. Later seen with  at bedside, pt was licking his hand.  Given baby doll with she was biting on it.  ADDITIONAL REVIEW OF SYSTEMS:    MEDICATIONS  (STANDING):  dextrose 5%. 1000 milliLiter(s) (50 mL/Hr) IV Continuous <Continuous>  dextrose 5%. 1000 milliLiter(s) (75 mL/Hr) IV Continuous <Continuous>  dextrose 50% Injectable 12.5 Gram(s) IV Push once  dextrose 50% Injectable 25 Gram(s) IV Push once  dextrose 50% Injectable 25 Gram(s) IV Push once  enoxaparin Injectable 40 milliGRAM(s) SubCutaneous daily  haloperidol     Tablet 1 milliGRAM(s) Oral <User Schedule>  haloperidol     Tablet 1 milliGRAM(s) Oral <User Schedule>  insulin glargine Injectable (LANTUS) 5 Unit(s) SubCutaneous at bedtime  insulin lispro (HumaLOG) corrective regimen sliding scale   SubCutaneous three times a day before meals  insulin lispro (HumaLOG) corrective regimen sliding scale   SubCutaneous at bedtime  lithium SR (LITHOBID) 300 milliGRAM(s) Oral at bedtime  polyethylene glycol 3350 17 Gram(s) Oral two times a day  senna 2 Tablet(s) Oral at bedtime  simvastatin 20 milliGRAM(s) Oral at bedtime    MEDICATIONS  (PRN):  dextrose 40% Gel 15 Gram(s) Oral once PRN Blood Glucose LESS THAN 70 milliGRAM(s)/deciliter  glucagon  Injectable 1 milliGRAM(s) IntraMuscular once PRN Glucose LESS THAN 70 milligrams/deciliter  traZODone 50 milliGRAM(s) Oral at bedtime PRN Insomnia      CAPILLARY BLOOD GLUCOSE      POCT Blood Glucose.: 241 mg/dL (22 Nov 2019 16:38)  POCT Blood Glucose.: 267 mg/dL (22 Nov 2019 11:22)  POCT Blood Glucose.: 224 mg/dL (22 Nov 2019 07:23)  POCT Blood Glucose.: 273 mg/dL (21 Nov 2019 20:35)    I&O's Summary    21 Nov 2019 07:01  -  22 Nov 2019 07:00  --------------------------------------------------------  IN: 975 mL / OUT: 0 mL / NET: 975 mL        PHYSICAL EXAM:  Vital Signs Last 24 Hrs  T(C): 36.6 (22 Nov 2019 11:03), Max: 36.7 (21 Nov 2019 20:29)  T(F): 97.8 (22 Nov 2019 11:03), Max: 98 (21 Nov 2019 20:29)  HR: 88 (22 Nov 2019 11:03) (80 - 88)  BP: 158/89 (22 Nov 2019 11:03) (125/69 - 158/89)  BP(mean): --  RR: 18 (22 Nov 2019 11:03) (18 - 18)  SpO2: 98% (22 Nov 2019 11:03) (98% - 100%)    CONSTITUTIONAL: elderly female, sleeping in bed, wakes to voice  EYES: poor vision  NECK: Supple, no palpable masses; no thyromegaly  RESPIRATORY: Normal respiratory effort; lungs are clear to auscultation bilaterally  CARDIOVASCULAR: Regular rate and rhythm, normal S1 and S2, no murmur/rub/gallop; No lower extremity edema; Peripheral pulses are 2+ bilaterally  ABDOMEN: Nontender to palpation, normoactive bowel sounds, no rebound/guarding  PSYCH: sleepy, oriented to name    LABS:                        12.4   9.88  )-----------( 328      ( 22 Nov 2019 09:25 )             41.7     11-22    146<H>  |  114<H>  |  20  ----------------------------<  412<H>  3.9   |  22  |  0.94    Ca    10.0      22 Nov 2019 09:25  Phos  2.6     11-22  Mg     1.7     11-22                  RADIOLOGY & ADDITIONAL TESTS:  Results Reviewed:   Imaging Personally Reviewed:  Electrocardiogram Personally Reviewed:    COORDINATION OF CARE:  Care Discussed with Consultants/Other Providers [Y/N]: RN, SW, CM, ACP re overall care   Prior or Outpatient Records Reviewed [Y/N]:

## 2019-11-22 NOTE — PROGRESS NOTE ADULT - PROBLEM SELECTOR PLAN 2
in setting of infection, underlying dementia and schizoaffective d/o, s/p abx for UTI but confusion persists, may be indicative of progression of underlying dementia   - avoid Ambien/anticholinergics, day/night cues, supportive care

## 2019-11-22 NOTE — PROGRESS NOTE ADULT - ATTENDING COMMENTS
d/w  re overall care.  Pt deemed appropriate for Hospice but  wishes to pursue rehab first.  Have started discussion of goals of care with , gave him copy of MOLST form to reviewe.

## 2019-11-23 LAB
ANION GAP SERPL CALC-SCNC: 11 MMO/L — SIGNIFICANT CHANGE UP (ref 7–14)
BUN SERPL-MCNC: 18 MG/DL — SIGNIFICANT CHANGE UP (ref 7–23)
CALCIUM SERPL-MCNC: 10 MG/DL — SIGNIFICANT CHANGE UP (ref 8.4–10.5)
CHLORIDE SERPL-SCNC: 112 MMOL/L — HIGH (ref 98–107)
CO2 SERPL-SCNC: 25 MMOL/L — SIGNIFICANT CHANGE UP (ref 22–31)
CREAT SERPL-MCNC: 0.89 MG/DL — SIGNIFICANT CHANGE UP (ref 0.5–1.3)
GLUCOSE BLDC GLUCOMTR-MCNC: 180 MG/DL — HIGH (ref 70–99)
GLUCOSE BLDC GLUCOMTR-MCNC: 213 MG/DL — HIGH (ref 70–99)
GLUCOSE BLDC GLUCOMTR-MCNC: 263 MG/DL — HIGH (ref 70–99)
GLUCOSE BLDC GLUCOMTR-MCNC: 288 MG/DL — HIGH (ref 70–99)
GLUCOSE SERPL-MCNC: 246 MG/DL — HIGH (ref 70–99)
HCT VFR BLD CALC: 39.7 % — SIGNIFICANT CHANGE UP (ref 34.5–45)
HGB BLD-MCNC: 12.2 G/DL — SIGNIFICANT CHANGE UP (ref 11.5–15.5)
LITHIUM SERPL-MCNC: 0.57 MMOL/L — LOW (ref 0.6–1.2)
MAGNESIUM SERPL-MCNC: 1.9 MG/DL — SIGNIFICANT CHANGE UP (ref 1.6–2.6)
MCHC RBC-ENTMCNC: 25.2 PG — LOW (ref 27–34)
MCHC RBC-ENTMCNC: 30.7 % — LOW (ref 32–36)
MCV RBC AUTO: 82 FL — SIGNIFICANT CHANGE UP (ref 80–100)
NRBC # FLD: 0 K/UL — SIGNIFICANT CHANGE UP (ref 0–0)
PHOSPHATE SERPL-MCNC: 2.8 MG/DL — SIGNIFICANT CHANGE UP (ref 2.5–4.5)
PLATELET # BLD AUTO: 295 K/UL — SIGNIFICANT CHANGE UP (ref 150–400)
PMV BLD: 11.6 FL — SIGNIFICANT CHANGE UP (ref 7–13)
POTASSIUM SERPL-MCNC: 4 MMOL/L — SIGNIFICANT CHANGE UP (ref 3.5–5.3)
POTASSIUM SERPL-SCNC: 4 MMOL/L — SIGNIFICANT CHANGE UP (ref 3.5–5.3)
RBC # BLD: 4.84 M/UL — SIGNIFICANT CHANGE UP (ref 3.8–5.2)
RBC # FLD: 16.6 % — HIGH (ref 10.3–14.5)
SODIUM SERPL-SCNC: 148 MMOL/L — HIGH (ref 135–145)
WBC # BLD: 9.68 K/UL — SIGNIFICANT CHANGE UP (ref 3.8–10.5)
WBC # FLD AUTO: 9.68 K/UL — SIGNIFICANT CHANGE UP (ref 3.8–10.5)

## 2019-11-23 PROCEDURE — 93010 ELECTROCARDIOGRAM REPORT: CPT

## 2019-11-23 PROCEDURE — 99232 SBSQ HOSP IP/OBS MODERATE 35: CPT

## 2019-11-23 RX ORDER — SODIUM CHLORIDE 9 MG/ML
1000 INJECTION, SOLUTION INTRAVENOUS
Refills: 0 | Status: DISCONTINUED | OUTPATIENT
Start: 2019-11-23 | End: 2019-11-24

## 2019-11-23 RX ORDER — INSULIN GLARGINE 100 [IU]/ML
8 INJECTION, SOLUTION SUBCUTANEOUS AT BEDTIME
Refills: 0 | Status: DISCONTINUED | OUTPATIENT
Start: 2019-11-23 | End: 2019-11-24

## 2019-11-23 RX ADMIN — SODIUM CHLORIDE 100 MILLILITER(S): 9 INJECTION, SOLUTION INTRAVENOUS at 23:17

## 2019-11-23 RX ADMIN — SIMVASTATIN 20 MILLIGRAM(S): 20 TABLET, FILM COATED ORAL at 22:15

## 2019-11-23 RX ADMIN — SENNA PLUS 2 TABLET(S): 8.6 TABLET ORAL at 22:15

## 2019-11-23 RX ADMIN — Medication 2: at 07:16

## 2019-11-23 RX ADMIN — LITHIUM CARBONATE 300 MILLIGRAM(S): 300 TABLET, EXTENDED RELEASE ORAL at 22:15

## 2019-11-23 RX ADMIN — POLYETHYLENE GLYCOL 3350 17 GRAM(S): 17 POWDER, FOR SOLUTION ORAL at 05:49

## 2019-11-23 RX ADMIN — Medication 3: at 11:50

## 2019-11-23 RX ADMIN — HALOPERIDOL DECANOATE 1 MILLIGRAM(S): 100 INJECTION INTRAMUSCULAR at 07:00

## 2019-11-23 RX ADMIN — POLYETHYLENE GLYCOL 3350 17 GRAM(S): 17 POWDER, FOR SOLUTION ORAL at 17:00

## 2019-11-23 RX ADMIN — INSULIN GLARGINE 8 UNIT(S): 100 INJECTION, SOLUTION SUBCUTANEOUS at 22:14

## 2019-11-23 RX ADMIN — ENOXAPARIN SODIUM 40 MILLIGRAM(S): 100 INJECTION SUBCUTANEOUS at 11:04

## 2019-11-23 RX ADMIN — HALOPERIDOL DECANOATE 1 MILLIGRAM(S): 100 INJECTION INTRAMUSCULAR at 21:32

## 2019-11-23 RX ADMIN — Medication 3: at 16:42

## 2019-11-23 RX ADMIN — SODIUM CHLORIDE 100 MILLILITER(S): 9 INJECTION, SOLUTION INTRAVENOUS at 14:26

## 2019-11-23 RX ADMIN — SODIUM CHLORIDE 75 MILLILITER(S): 9 INJECTION, SOLUTION INTRAVENOUS at 05:49

## 2019-11-23 NOTE — PROGRESS NOTE ADULT - SUBJECTIVE AND OBJECTIVE BOX
[facility]  Division of Hospital Medicine  Kalli Paulino MD  Pager: [phone]      Patient is a 69y old  Female who presents with a chief complaint of AMS, behavioral disturbance (22 Nov 2019 19:25)      SUBJECTIVE / OVERNIGHT EVENTS: Patient was resting comfortably in bed this am. Appeared comfortable and in no pain.   ADDITIONAL REVIEW OF SYSTEMS:    MEDICATIONS  (STANDING):  dextrose 5%. 1000 milliLiter(s) (50 mL/Hr) IV Continuous <Continuous>  dextrose 5%. 1000 milliLiter(s) (75 mL/Hr) IV Continuous <Continuous>  dextrose 50% Injectable 12.5 Gram(s) IV Push once  dextrose 50% Injectable 25 Gram(s) IV Push once  dextrose 50% Injectable 25 Gram(s) IV Push once  enoxaparin Injectable 40 milliGRAM(s) SubCutaneous daily  haloperidol     Tablet 1 milliGRAM(s) Oral <User Schedule>  haloperidol     Tablet 1 milliGRAM(s) Oral <User Schedule>  insulin glargine Injectable (LANTUS) 8 Unit(s) SubCutaneous at bedtime  insulin lispro (HumaLOG) corrective regimen sliding scale   SubCutaneous three times a day before meals  insulin lispro (HumaLOG) corrective regimen sliding scale   SubCutaneous at bedtime  lithium SR (LITHOBID) 300 milliGRAM(s) Oral at bedtime  polyethylene glycol 3350 17 Gram(s) Oral two times a day  senna 2 Tablet(s) Oral at bedtime  simvastatin 20 milliGRAM(s) Oral at bedtime    MEDICATIONS  (PRN):  dextrose 40% Gel 15 Gram(s) Oral once PRN Blood Glucose LESS THAN 70 milliGRAM(s)/deciliter  glucagon  Injectable 1 milliGRAM(s) IntraMuscular once PRN Glucose LESS THAN 70 milligrams/deciliter  traZODone 50 milliGRAM(s) Oral at bedtime PRN Insomnia      CAPILLARY BLOOD GLUCOSE      POCT Blood Glucose.: 288 mg/dL (23 Nov 2019 11:35)  POCT Blood Glucose.: 213 mg/dL (23 Nov 2019 07:07)  POCT Blood Glucose.: 248 mg/dL (22 Nov 2019 22:21)  POCT Blood Glucose.: 241 mg/dL (22 Nov 2019 16:38)    I&O's Summary    22 Nov 2019 07:01  -  23 Nov 2019 07:00  --------------------------------------------------------  IN: 1980 mL / OUT: 0 mL / NET: 1980 mL        PHYSICAL EXAM:  Vital Signs Last 24 Hrs  T(C): 36.4 (23 Nov 2019 11:02), Max: 36.4 (22 Nov 2019 21:34)  T(F): 97.6 (23 Nov 2019 11:02), Max: 97.6 (22 Nov 2019 21:34)  HR: 63 (23 Nov 2019 11:02) (60 - 87)  BP: 110/55 (23 Nov 2019 11:02) (110/55 - 147/86)  BP(mean): --  RR: 17 (23 Nov 2019 11:02) (17 - 18)  SpO2: 99% (23 Nov 2019 11:02) (98% - 99%)  CONSTITUTIONAL: elderly female, sleeping in bed, wakes to voice  EYES: poor vision  NECK: Supple, no palpable masses; no thyromegaly  RESPIRATORY: Normal respiratory effort; lungs are clear to auscultation bilaterally  CARDIOVASCULAR: Regular rate and rhythm, normal S1 and S2, no murmur/rub/gallop; No lower extremity edema; Peripheral pulses are 2+ bilaterally  ABDOMEN: Nontender to palpation, normoactive bowel sounds, no rebound/guarding  PSYCH: sleepy, oriented to name    LABS:                        12.2   9.68  )-----------( 295      ( 23 Nov 2019 06:10 )             39.7     11-23    148<H>  |  112<H>  |  18  ----------------------------<  246<H>  4.0   |  25  |  0.89    Ca    10.0      23 Nov 2019 06:10  Phos  2.8     11-23  Mg     1.9     11-23                  RADIOLOGY & ADDITIONAL TESTS:  Results Reviewed:   Imaging Personally Reviewed:  Electrocardiogram Personally Reviewed:    COORDINATION OF CARE:  Care Discussed with Consultants/Other Providers [Y/N]:  Prior or Outpatient Records Reviewed [Y/N]:

## 2019-11-23 NOTE — PROGRESS NOTE ADULT - PROBLEM SELECTOR PLAN 7
-FS qac and hs with SSI  - AM finger sticks not at goal,  Increased lantus to 8 units from 5 units at bedtime

## 2019-11-23 NOTE — PROGRESS NOTE ADULT - PROBLEM SELECTOR PLAN 1
recurrent hypernatremia when decrease IVFs, encourage PO fluids, hopefully PO will improve as adjust meds and becomes more alert  - unfortunately still requiring IVFs, d/w , concerned indicative of progression of underlying dementia  - Worsening hypernatremia to 148 from 146 today, will increase IVF rate from 75 to 100cc (11/23)   -  Li level,  low at .57;   - Will monitor;  I&Os ??could be some DI from lithium

## 2019-11-24 LAB
ANION GAP SERPL CALC-SCNC: 12 MMO/L — SIGNIFICANT CHANGE UP (ref 7–14)
BASOPHILS # BLD AUTO: 0.04 K/UL — SIGNIFICANT CHANGE UP (ref 0–0.2)
BASOPHILS NFR BLD AUTO: 0.4 % — SIGNIFICANT CHANGE UP (ref 0–2)
BUN SERPL-MCNC: 18 MG/DL — SIGNIFICANT CHANGE UP (ref 7–23)
CALCIUM SERPL-MCNC: 10.6 MG/DL — HIGH (ref 8.4–10.5)
CHLORIDE SERPL-SCNC: 104 MMOL/L — SIGNIFICANT CHANGE UP (ref 98–107)
CO2 SERPL-SCNC: 26 MMOL/L — SIGNIFICANT CHANGE UP (ref 22–31)
CREAT SERPL-MCNC: 0.93 MG/DL — SIGNIFICANT CHANGE UP (ref 0.5–1.3)
EOSINOPHIL # BLD AUTO: 0.19 K/UL — SIGNIFICANT CHANGE UP (ref 0–0.5)
EOSINOPHIL NFR BLD AUTO: 1.8 % — SIGNIFICANT CHANGE UP (ref 0–6)
GLUCOSE BLDC GLUCOMTR-MCNC: 171 MG/DL — HIGH (ref 70–99)
GLUCOSE BLDC GLUCOMTR-MCNC: 194 MG/DL — HIGH (ref 70–99)
GLUCOSE BLDC GLUCOMTR-MCNC: 218 MG/DL — HIGH (ref 70–99)
GLUCOSE BLDC GLUCOMTR-MCNC: 225 MG/DL — HIGH (ref 70–99)
GLUCOSE SERPL-MCNC: 220 MG/DL — HIGH (ref 70–99)
HCT VFR BLD CALC: 43.7 % — SIGNIFICANT CHANGE UP (ref 34.5–45)
HGB BLD-MCNC: 13 G/DL — SIGNIFICANT CHANGE UP (ref 11.5–15.5)
IMM GRANULOCYTES NFR BLD AUTO: 0.4 % — SIGNIFICANT CHANGE UP (ref 0–1.5)
LYMPHOCYTES # BLD AUTO: 2.28 K/UL — SIGNIFICANT CHANGE UP (ref 1–3.3)
LYMPHOCYTES # BLD AUTO: 22.1 % — SIGNIFICANT CHANGE UP (ref 13–44)
MAGNESIUM SERPL-MCNC: 1.9 MG/DL — SIGNIFICANT CHANGE UP (ref 1.6–2.6)
MCHC RBC-ENTMCNC: 24.3 PG — LOW (ref 27–34)
MCHC RBC-ENTMCNC: 29.7 % — LOW (ref 32–36)
MCV RBC AUTO: 81.5 FL — SIGNIFICANT CHANGE UP (ref 80–100)
MONOCYTES # BLD AUTO: 0.66 K/UL — SIGNIFICANT CHANGE UP (ref 0–0.9)
MONOCYTES NFR BLD AUTO: 6.4 % — SIGNIFICANT CHANGE UP (ref 2–14)
NEUTROPHILS # BLD AUTO: 7.1 K/UL — SIGNIFICANT CHANGE UP (ref 1.8–7.4)
NEUTROPHILS NFR BLD AUTO: 68.9 % — SIGNIFICANT CHANGE UP (ref 43–77)
NRBC # FLD: 0 K/UL — SIGNIFICANT CHANGE UP (ref 0–0)
PHOSPHATE SERPL-MCNC: 2.9 MG/DL — SIGNIFICANT CHANGE UP (ref 2.5–4.5)
PLATELET # BLD AUTO: 308 K/UL — SIGNIFICANT CHANGE UP (ref 150–400)
PMV BLD: 12.2 FL — SIGNIFICANT CHANGE UP (ref 7–13)
POTASSIUM SERPL-MCNC: 4 MMOL/L — SIGNIFICANT CHANGE UP (ref 3.5–5.3)
POTASSIUM SERPL-SCNC: 4 MMOL/L — SIGNIFICANT CHANGE UP (ref 3.5–5.3)
RBC # BLD: 5.36 M/UL — HIGH (ref 3.8–5.2)
RBC # FLD: 16.3 % — HIGH (ref 10.3–14.5)
SODIUM SERPL-SCNC: 142 MMOL/L — SIGNIFICANT CHANGE UP (ref 135–145)
WBC # BLD: 10.31 K/UL — SIGNIFICANT CHANGE UP (ref 3.8–10.5)
WBC # FLD AUTO: 10.31 K/UL — SIGNIFICANT CHANGE UP (ref 3.8–10.5)

## 2019-11-24 PROCEDURE — 99232 SBSQ HOSP IP/OBS MODERATE 35: CPT

## 2019-11-24 RX ORDER — SODIUM CHLORIDE 9 MG/ML
1000 INJECTION, SOLUTION INTRAVENOUS
Refills: 0 | Status: DISCONTINUED | OUTPATIENT
Start: 2019-11-24 | End: 2019-11-25

## 2019-11-24 RX ORDER — INSULIN GLARGINE 100 [IU]/ML
10 INJECTION, SOLUTION SUBCUTANEOUS AT BEDTIME
Refills: 0 | Status: DISCONTINUED | OUTPATIENT
Start: 2019-11-24 | End: 2019-12-04

## 2019-11-24 RX ORDER — INSULIN LISPRO 100/ML
2 VIAL (ML) SUBCUTANEOUS
Refills: 0 | Status: DISCONTINUED | OUTPATIENT
Start: 2019-11-24 | End: 2019-11-27

## 2019-11-24 RX ADMIN — LITHIUM CARBONATE 300 MILLIGRAM(S): 300 TABLET, EXTENDED RELEASE ORAL at 21:12

## 2019-11-24 RX ADMIN — Medication 2 UNIT(S): at 17:09

## 2019-11-24 RX ADMIN — Medication 2: at 17:14

## 2019-11-24 RX ADMIN — Medication 1: at 12:00

## 2019-11-24 RX ADMIN — POLYETHYLENE GLYCOL 3350 17 GRAM(S): 17 POWDER, FOR SOLUTION ORAL at 05:58

## 2019-11-24 RX ADMIN — Medication 2: at 07:54

## 2019-11-24 RX ADMIN — HALOPERIDOL DECANOATE 1 MILLIGRAM(S): 100 INJECTION INTRAMUSCULAR at 07:55

## 2019-11-24 RX ADMIN — SENNA PLUS 2 TABLET(S): 8.6 TABLET ORAL at 21:12

## 2019-11-24 RX ADMIN — SODIUM CHLORIDE 100 MILLILITER(S): 9 INJECTION, SOLUTION INTRAVENOUS at 07:55

## 2019-11-24 RX ADMIN — INSULIN GLARGINE 10 UNIT(S): 100 INJECTION, SOLUTION SUBCUTANEOUS at 21:29

## 2019-11-24 RX ADMIN — HALOPERIDOL DECANOATE 1 MILLIGRAM(S): 100 INJECTION INTRAMUSCULAR at 21:12

## 2019-11-24 RX ADMIN — POLYETHYLENE GLYCOL 3350 17 GRAM(S): 17 POWDER, FOR SOLUTION ORAL at 17:09

## 2019-11-24 RX ADMIN — SODIUM CHLORIDE 75 MILLILITER(S): 9 INJECTION, SOLUTION INTRAVENOUS at 12:02

## 2019-11-24 RX ADMIN — SODIUM CHLORIDE 75 MILLILITER(S): 9 INJECTION, SOLUTION INTRAVENOUS at 21:12

## 2019-11-24 RX ADMIN — ENOXAPARIN SODIUM 40 MILLIGRAM(S): 100 INJECTION SUBCUTANEOUS at 12:01

## 2019-11-24 RX ADMIN — SIMVASTATIN 20 MILLIGRAM(S): 20 TABLET, FILM COATED ORAL at 21:12

## 2019-11-24 RX ADMIN — HALOPERIDOL DECANOATE 1 MILLIGRAM(S): 100 INJECTION INTRAMUSCULAR at 17:09

## 2019-11-24 RX ADMIN — Medication 2 UNIT(S): at 12:00

## 2019-11-24 NOTE — PROGRESS NOTE ADULT - PROBLEM SELECTOR PLAN 1
recurrent hypernatremia when decrease IVFs, encourage PO fluids, hopefully PO will improve as adjust meds and becomes more alert  - unfortunately still requiring IVFs, d/w , concerned indicative of progression of underlying dementia  - improved after rate increase, adjust rate back to 75 cc/hr with goal of DC tomorrow  - continue to encourage po free water  -  Li level, low at .57;   - Will monitor;  I&Os, question if could be some DI from lithium

## 2019-11-24 NOTE — PROGRESS NOTE ADULT - PROBLEM SELECTOR PLAN 7
Blood sugars above goal  - increase lantus to 10 units, add premeal 2 units TID  - FS qac and hs with SSI

## 2019-11-24 NOTE — PROGRESS NOTE ADULT - ASSESSMENT
69F h/o T2DM, schizoaffective disorder, depression, ?dementia (AAOx1 name), legally blind, presenting with altered mental status for 3 days and increased agitation at home, a/f sepsis (inc HR, leukocytosis) 2/2 UTI, metabolic encephalopathy.

## 2019-11-24 NOTE — PROGRESS NOTE ADULT - SUBJECTIVE AND OBJECTIVE BOX
[facility] Division of Hospital Medicine  Gris Ortez DO  Pager: [phone]  Other Times:  [phone]    Patient is a 69y old  Female who presents with a chief complaint of AMS, behavioral disturbance (23 Nov 2019 14:13)    SUBJECTIVE / OVERNIGHT EVENTS:  Patient seen denying any complaints.     ADDITIONAL REVIEW OF SYSTEMS:    MEDICATIONS  (STANDING):  dextrose 5%. 1000 milliLiter(s) (50 mL/Hr) IV Continuous <Continuous>  dextrose 5%. 1000 milliLiter(s) (100 mL/Hr) IV Continuous <Continuous>  dextrose 50% Injectable 12.5 Gram(s) IV Push once  dextrose 50% Injectable 25 Gram(s) IV Push once  dextrose 50% Injectable 25 Gram(s) IV Push once  enoxaparin Injectable 40 milliGRAM(s) SubCutaneous daily  haloperidol     Tablet 1 milliGRAM(s) Oral <User Schedule>  haloperidol     Tablet 1 milliGRAM(s) Oral <User Schedule>  insulin glargine Injectable (LANTUS) 8 Unit(s) SubCutaneous at bedtime  insulin lispro (HumaLOG) corrective regimen sliding scale   SubCutaneous three times a day before meals  insulin lispro (HumaLOG) corrective regimen sliding scale   SubCutaneous at bedtime  lithium SR (LITHOBID) 300 milliGRAM(s) Oral at bedtime  polyethylene glycol 3350 17 Gram(s) Oral two times a day  senna 2 Tablet(s) Oral at bedtime  simvastatin 20 milliGRAM(s) Oral at bedtime    MEDICATIONS  (PRN):  dextrose 40% Gel 15 Gram(s) Oral once PRN Blood Glucose LESS THAN 70 milliGRAM(s)/deciliter  glucagon  Injectable 1 milliGRAM(s) IntraMuscular once PRN Glucose LESS THAN 70 milligrams/deciliter  traZODone 50 milliGRAM(s) Oral at bedtime PRN Insomnia      CAPILLARY BLOOD GLUCOSE      POCT Blood Glucose.: 225 mg/dL (24 Nov 2019 07:39)  POCT Blood Glucose.: 180 mg/dL (23 Nov 2019 22:09)  POCT Blood Glucose.: 263 mg/dL (23 Nov 2019 16:35)  POCT Blood Glucose.: 288 mg/dL (23 Nov 2019 11:35)    I&O's Summary    23 Nov 2019 07:01  -  24 Nov 2019 07:00  --------------------------------------------------------  IN: 2560 mL / OUT: 2200 mL / NET: 360 mL        PHYSICAL EXAM:  Vital Signs Last 24 Hrs  T(C): 36.3 (24 Nov 2019 05:56), Max: 36.5 (23 Nov 2019 20:39)  T(F): 97.4 (24 Nov 2019 05:56), Max: 97.7 (23 Nov 2019 20:39)  HR: 73 (24 Nov 2019 05:56) (63 - 73)  BP: 144/70 (24 Nov 2019 05:56) (110/55 - 144/70)  BP(mean): --  RR: 18 (24 Nov 2019 05:56) (17 - 18)  SpO2: 100% (24 Nov 2019 05:56) (99% - 100%)  CONSTITUTIONAL: elderly female, sleeping in bed, cooperative with exam  EYES: poor vision  NECK: Supple, no palpable masses; no thyromegaly  RESPIRATORY: Normal respiratory effort; lungs are clear to auscultation bilaterally  CARDIOVASCULAR: Regular rate and rhythm, normal S1 and S2, no murmur/rub/gallop; No lower extremity edema; Peripheral pulses are 2+ bilaterally  ABDOMEN: Nontender to palpation, normoactive bowel sounds, no rebound/guarding  PSYCH: sleepy, oriented to name    LABS:                        13.0   10.31 )-----------( 308      ( 24 Nov 2019 06:15 )             43.7     11-24    142  |  104  |  18  ----------------------------<  220<H>  4.0   |  26  |  0.93    Ca    10.6<H>      24 Nov 2019 06:15  Phos  2.9     11-24  Mg     1.9     11-24                  RADIOLOGY & ADDITIONAL TESTS:  Results Reviewed:   Imaging Personally Reviewed:  Electrocardiogram Personally Reviewed:    COORDINATION OF CARE:  Care Discussed with Consultants/Other Providers [Y/N]:  Prior or Outpatient Records Reviewed [Y/N]:

## 2019-11-25 LAB
ANION GAP SERPL CALC-SCNC: 14 MMO/L — SIGNIFICANT CHANGE UP (ref 7–14)
BASOPHILS # BLD AUTO: 0.03 K/UL — SIGNIFICANT CHANGE UP (ref 0–0.2)
BASOPHILS NFR BLD AUTO: 0.2 % — SIGNIFICANT CHANGE UP (ref 0–2)
BUN SERPL-MCNC: 18 MG/DL — SIGNIFICANT CHANGE UP (ref 7–23)
CALCIUM SERPL-MCNC: 10.4 MG/DL — SIGNIFICANT CHANGE UP (ref 8.4–10.5)
CHLORIDE SERPL-SCNC: 104 MMOL/L — SIGNIFICANT CHANGE UP (ref 98–107)
CO2 SERPL-SCNC: 25 MMOL/L — SIGNIFICANT CHANGE UP (ref 22–31)
CREAT SERPL-MCNC: 0.96 MG/DL — SIGNIFICANT CHANGE UP (ref 0.5–1.3)
EOSINOPHIL # BLD AUTO: 0.03 K/UL — SIGNIFICANT CHANGE UP (ref 0–0.5)
EOSINOPHIL NFR BLD AUTO: 0.2 % — SIGNIFICANT CHANGE UP (ref 0–6)
GLUCOSE BLDC GLUCOMTR-MCNC: 168 MG/DL — HIGH (ref 70–99)
GLUCOSE BLDC GLUCOMTR-MCNC: 195 MG/DL — HIGH (ref 70–99)
GLUCOSE BLDC GLUCOMTR-MCNC: 225 MG/DL — HIGH (ref 70–99)
GLUCOSE SERPL-MCNC: 262 MG/DL — HIGH (ref 70–99)
HCT VFR BLD CALC: 42 % — SIGNIFICANT CHANGE UP (ref 34.5–45)
HGB BLD-MCNC: 13.2 G/DL — SIGNIFICANT CHANGE UP (ref 11.5–15.5)
IMM GRANULOCYTES NFR BLD AUTO: 0.5 % — SIGNIFICANT CHANGE UP (ref 0–1.5)
LYMPHOCYTES # BLD AUTO: 1.18 K/UL — SIGNIFICANT CHANGE UP (ref 1–3.3)
LYMPHOCYTES # BLD AUTO: 8.6 % — LOW (ref 13–44)
MAGNESIUM SERPL-MCNC: 2 MG/DL — SIGNIFICANT CHANGE UP (ref 1.6–2.6)
MCHC RBC-ENTMCNC: 25.1 PG — LOW (ref 27–34)
MCHC RBC-ENTMCNC: 31.4 % — LOW (ref 32–36)
MCV RBC AUTO: 80 FL — SIGNIFICANT CHANGE UP (ref 80–100)
MONOCYTES # BLD AUTO: 0.68 K/UL — SIGNIFICANT CHANGE UP (ref 0–0.9)
MONOCYTES NFR BLD AUTO: 5 % — SIGNIFICANT CHANGE UP (ref 2–14)
NEUTROPHILS # BLD AUTO: 11.74 K/UL — HIGH (ref 1.8–7.4)
NEUTROPHILS NFR BLD AUTO: 85.5 % — HIGH (ref 43–77)
NRBC # FLD: 0 K/UL — SIGNIFICANT CHANGE UP (ref 0–0)
PHOSPHATE SERPL-MCNC: 3 MG/DL — SIGNIFICANT CHANGE UP (ref 2.5–4.5)
PLATELET # BLD AUTO: 321 K/UL — SIGNIFICANT CHANGE UP (ref 150–400)
PMV BLD: 11.6 FL — SIGNIFICANT CHANGE UP (ref 7–13)
POTASSIUM SERPL-MCNC: 4.1 MMOL/L — SIGNIFICANT CHANGE UP (ref 3.5–5.3)
POTASSIUM SERPL-SCNC: 4.1 MMOL/L — SIGNIFICANT CHANGE UP (ref 3.5–5.3)
RBC # BLD: 5.25 M/UL — HIGH (ref 3.8–5.2)
RBC # FLD: 16.4 % — HIGH (ref 10.3–14.5)
SODIUM SERPL-SCNC: 143 MMOL/L — SIGNIFICANT CHANGE UP (ref 135–145)
WBC # BLD: 13.73 K/UL — HIGH (ref 3.8–10.5)
WBC # FLD AUTO: 13.73 K/UL — HIGH (ref 3.8–10.5)

## 2019-11-25 PROCEDURE — 99232 SBSQ HOSP IP/OBS MODERATE 35: CPT

## 2019-11-25 RX ADMIN — POLYETHYLENE GLYCOL 3350 17 GRAM(S): 17 POWDER, FOR SOLUTION ORAL at 17:01

## 2019-11-25 RX ADMIN — HALOPERIDOL DECANOATE 1 MILLIGRAM(S): 100 INJECTION INTRAMUSCULAR at 20:27

## 2019-11-25 RX ADMIN — Medication 1: at 11:42

## 2019-11-25 RX ADMIN — HALOPERIDOL DECANOATE 1 MILLIGRAM(S): 100 INJECTION INTRAMUSCULAR at 13:12

## 2019-11-25 RX ADMIN — Medication 2 UNIT(S): at 17:01

## 2019-11-25 RX ADMIN — SIMVASTATIN 20 MILLIGRAM(S): 20 TABLET, FILM COATED ORAL at 21:05

## 2019-11-25 RX ADMIN — LITHIUM CARBONATE 300 MILLIGRAM(S): 300 TABLET, EXTENDED RELEASE ORAL at 21:05

## 2019-11-25 RX ADMIN — POLYETHYLENE GLYCOL 3350 17 GRAM(S): 17 POWDER, FOR SOLUTION ORAL at 07:55

## 2019-11-25 RX ADMIN — Medication 2: at 07:54

## 2019-11-25 RX ADMIN — Medication 1: at 17:01

## 2019-11-25 RX ADMIN — Medication 2 UNIT(S): at 07:54

## 2019-11-25 RX ADMIN — SENNA PLUS 2 TABLET(S): 8.6 TABLET ORAL at 21:05

## 2019-11-25 RX ADMIN — HALOPERIDOL DECANOATE 1 MILLIGRAM(S): 100 INJECTION INTRAMUSCULAR at 07:55

## 2019-11-25 RX ADMIN — ENOXAPARIN SODIUM 40 MILLIGRAM(S): 100 INJECTION SUBCUTANEOUS at 11:42

## 2019-11-25 RX ADMIN — Medication 1: at 22:28

## 2019-11-25 RX ADMIN — INSULIN GLARGINE 10 UNIT(S): 100 INJECTION, SOLUTION SUBCUTANEOUS at 22:28

## 2019-11-25 RX ADMIN — Medication 2 UNIT(S): at 11:42

## 2019-11-25 NOTE — PROGRESS NOTE ADULT - PROBLEM SELECTOR PLAN 3
-c/w  lithium hs; decreased haldol to 1 TID (hold for sedation)  - stopped Ativan; trazodone PRN  - avoid Ambien/anticholinergics, day/night cues, supportive care  - Have d/w  seems dementia progressing, Hospice has evaluated, feels pt would be appropriate.  PT has recommended rehab and for now pt's  opting for rehab although concerned pt will continue to decline

## 2019-11-25 NOTE — PROGRESS NOTE ADULT - PROBLEM SELECTOR PLAN 7
Blood sugars better controlled  - continue lantus 10 units, premeal 2 units TID  - FS qac and hs with SSI

## 2019-11-25 NOTE — PROGRESS NOTE ADULT - PROBLEM SELECTOR PLAN 1
Recurrent hypernatremia when decrease IVFs, encourage PO fluids, hopefully PO will improve as adjust meds and becomes more alert  Improved  - DC IVF  - continue to encourage po free water  -  Li level, low at .57;   - Will monitor;  I&Os, question if could be some DI from lithium

## 2019-11-25 NOTE — PROGRESS NOTE ADULT - ASSESSMENT
69F h/o T2DM, schizoaffective disorder, depression, ?dementia (AAOx1 name), legally blind, presenting with altered mental status for 3 days and increased agitation at home, a/f sepsis (inc HR, leukocytosis) 2/2 UTI, metabolic encephalopathy. 69F h/o T2DM, schizoaffective disorder, depression, ?dementia (AAOx1 name), legally blind, presenting with altered mental status for 3 days and increased agitation at home, a/f sepsis (inc HR, leukocytosis) 2/2 UTI, metabolic encephalopathy.      #Leukocytosis:  Afebrile without infectious symptoms  - continue to monitor CBC

## 2019-11-25 NOTE — PROGRESS NOTE ADULT - SUBJECTIVE AND OBJECTIVE BOX
[facility] Division of Hospital Medicine  Gris Ortez DO  Pager: [phone]  Other Times:  [phone]    Patient is a 69y old  Female who presents with a chief complaint of AMS, behavioral disturbance (24 Nov 2019 10:53)    SUBJECTIVE / OVERNIGHT EVENTS:  Patient seen denying any complaints.     ADDITIONAL REVIEW OF SYSTEMS:    MEDICATIONS  (STANDING):  dextrose 5%. 1000 milliLiter(s) (50 mL/Hr) IV Continuous <Continuous>  dextrose 5%. 1000 milliLiter(s) (75 mL/Hr) IV Continuous <Continuous>  dextrose 50% Injectable 12.5 Gram(s) IV Push once  dextrose 50% Injectable 25 Gram(s) IV Push once  dextrose 50% Injectable 25 Gram(s) IV Push once  enoxaparin Injectable 40 milliGRAM(s) SubCutaneous daily  haloperidol     Tablet 1 milliGRAM(s) Oral <User Schedule>  haloperidol     Tablet 1 milliGRAM(s) Oral <User Schedule>  insulin glargine Injectable (LANTUS) 10 Unit(s) SubCutaneous at bedtime  insulin lispro (HumaLOG) corrective regimen sliding scale   SubCutaneous three times a day before meals  insulin lispro (HumaLOG) corrective regimen sliding scale   SubCutaneous at bedtime  insulin lispro Injectable (HumaLOG) 2 Unit(s) SubCutaneous three times a day before meals  lithium SR (LITHOBID) 300 milliGRAM(s) Oral at bedtime  polyethylene glycol 3350 17 Gram(s) Oral two times a day  senna 2 Tablet(s) Oral at bedtime  simvastatin 20 milliGRAM(s) Oral at bedtime    MEDICATIONS  (PRN):  dextrose 40% Gel 15 Gram(s) Oral once PRN Blood Glucose LESS THAN 70 milliGRAM(s)/deciliter  glucagon  Injectable 1 milliGRAM(s) IntraMuscular once PRN Glucose LESS THAN 70 milligrams/deciliter  traZODone 50 milliGRAM(s) Oral at bedtime PRN Insomnia      CAPILLARY BLOOD GLUCOSE      POCT Blood Glucose.: 168 mg/dL (25 Nov 2019 11:40)  POCT Blood Glucose.: 225 mg/dL (25 Nov 2019 07:23)  POCT Blood Glucose.: 194 mg/dL (24 Nov 2019 21:17)  POCT Blood Glucose.: 218 mg/dL (24 Nov 2019 17:03)    I&O's Summary    24 Nov 2019 07:01  -  25 Nov 2019 07:00  --------------------------------------------------------  IN: 2505 mL / OUT: 1150 mL / NET: 1355 mL    25 Nov 2019 07:01  -  25 Nov 2019 15:21  --------------------------------------------------------  IN: 120 mL / OUT: 0 mL / NET: 120 mL        PHYSICAL EXAM:  Vital Signs Last 24 Hrs  T(C): 36.3 (25 Nov 2019 11:44), Max: 36.6 (24 Nov 2019 19:48)  T(F): 97.3 (25 Nov 2019 11:44), Max: 97.9 (24 Nov 2019 19:48)  HR: 61 (25 Nov 2019 11:44) (61 - 87)  BP: 119/80 (25 Nov 2019 11:44) (119/80 - 153/85)  BP(mean): --  RR: 18 (25 Nov 2019 11:44) (17 - 18)  SpO2: 98% (25 Nov 2019 11:44) (98% - 100%)  CONSTITUTIONAL: elderly female, sleeping in bed, cooperative with exam  EYES: poor vision  NECK: Supple, no palpable masses; no thyromegaly  RESPIRATORY: Normal respiratory effort; lungs are clear to auscultation bilaterally  CARDIOVASCULAR: Regular rate and rhythm, normal S1 and S2, no murmur/rub/gallop; No lower extremity edema; Peripheral pulses are 2+ bilaterally  ABDOMEN: Nontender to palpation, normoactive bowel sounds, no rebound/guarding  PSYCH: sleepy, oriented to name      LABS:                        13.2   13.73 )-----------( 321      ( 25 Nov 2019 05:40 )             42.0     11-25    143  |  104  |  18  ----------------------------<  262<H>  4.1   |  25  |  0.96    Ca    10.4      25 Nov 2019 05:40  Phos  3.0     11-25  Mg     2.0     11-25                  RADIOLOGY & ADDITIONAL TESTS:  Results Reviewed:   Imaging Personally Reviewed:  Electrocardiogram Personally Reviewed:    COORDINATION OF CARE:  Care Discussed with Consultants/Other Providers [Y/N]:  Prior or Outpatient Records Reviewed [Y/N]:

## 2019-11-26 LAB
ALBUMIN SERPL ELPH-MCNC: 3.6 G/DL — SIGNIFICANT CHANGE UP (ref 3.3–5)
ALP SERPL-CCNC: 99 U/L — SIGNIFICANT CHANGE UP (ref 40–120)
ALT FLD-CCNC: 20 U/L — SIGNIFICANT CHANGE UP (ref 4–33)
ANION GAP SERPL CALC-SCNC: 11 MMO/L — SIGNIFICANT CHANGE UP (ref 7–14)
APPEARANCE UR: CLEAR — SIGNIFICANT CHANGE UP
AST SERPL-CCNC: 20 U/L — SIGNIFICANT CHANGE UP (ref 4–32)
BASOPHILS # BLD AUTO: 0.04 K/UL — SIGNIFICANT CHANGE UP (ref 0–0.2)
BASOPHILS NFR BLD AUTO: 0.2 % — SIGNIFICANT CHANGE UP (ref 0–2)
BILIRUB SERPL-MCNC: 0.6 MG/DL — SIGNIFICANT CHANGE UP (ref 0.2–1.2)
BILIRUB UR-MCNC: NEGATIVE — SIGNIFICANT CHANGE UP
BLOOD UR QL VISUAL: NEGATIVE — SIGNIFICANT CHANGE UP
BUN SERPL-MCNC: 25 MG/DL — HIGH (ref 7–23)
CALCIUM SERPL-MCNC: 10.5 MG/DL — SIGNIFICANT CHANGE UP (ref 8.4–10.5)
CHLORIDE SERPL-SCNC: 109 MMOL/L — HIGH (ref 98–107)
CO2 SERPL-SCNC: 26 MMOL/L — SIGNIFICANT CHANGE UP (ref 22–31)
COLOR SPEC: SIGNIFICANT CHANGE UP
CREAT SERPL-MCNC: 1.06 MG/DL — SIGNIFICANT CHANGE UP (ref 0.5–1.3)
EOSINOPHIL # BLD AUTO: 0.12 K/UL — SIGNIFICANT CHANGE UP (ref 0–0.5)
EOSINOPHIL NFR BLD AUTO: 0.7 % — SIGNIFICANT CHANGE UP (ref 0–6)
GLUCOSE SERPL-MCNC: 223 MG/DL — HIGH (ref 70–99)
GLUCOSE UR-MCNC: NEGATIVE — SIGNIFICANT CHANGE UP
HCT VFR BLD CALC: 43 % — SIGNIFICANT CHANGE UP (ref 34.5–45)
HGB BLD-MCNC: 13.1 G/DL — SIGNIFICANT CHANGE UP (ref 11.5–15.5)
IMM GRANULOCYTES NFR BLD AUTO: 0.6 % — SIGNIFICANT CHANGE UP (ref 0–1.5)
KETONES UR-MCNC: NEGATIVE — SIGNIFICANT CHANGE UP
LEUKOCYTE ESTERASE UR-ACNC: NEGATIVE — SIGNIFICANT CHANGE UP
LYMPHOCYTES # BLD AUTO: 1.9 K/UL — SIGNIFICANT CHANGE UP (ref 1–3.3)
LYMPHOCYTES # BLD AUTO: 11.8 % — LOW (ref 13–44)
MAGNESIUM SERPL-MCNC: 2.1 MG/DL — SIGNIFICANT CHANGE UP (ref 1.6–2.6)
MCHC RBC-ENTMCNC: 24.6 PG — LOW (ref 27–34)
MCHC RBC-ENTMCNC: 30.5 % — LOW (ref 32–36)
MCV RBC AUTO: 80.8 FL — SIGNIFICANT CHANGE UP (ref 80–100)
MONOCYTES # BLD AUTO: 0.92 K/UL — HIGH (ref 0–0.9)
MONOCYTES NFR BLD AUTO: 5.7 % — SIGNIFICANT CHANGE UP (ref 2–14)
NEUTROPHILS # BLD AUTO: 13.07 K/UL — HIGH (ref 1.8–7.4)
NEUTROPHILS NFR BLD AUTO: 81 % — HIGH (ref 43–77)
NITRITE UR-MCNC: NEGATIVE — SIGNIFICANT CHANGE UP
NRBC # FLD: 0 K/UL — SIGNIFICANT CHANGE UP (ref 0–0)
PH UR: 6 — SIGNIFICANT CHANGE UP (ref 5–8)
PHOSPHATE SERPL-MCNC: 3 MG/DL — SIGNIFICANT CHANGE UP (ref 2.5–4.5)
PLATELET # BLD AUTO: 325 K/UL — SIGNIFICANT CHANGE UP (ref 150–400)
PMV BLD: 11.1 FL — SIGNIFICANT CHANGE UP (ref 7–13)
POTASSIUM SERPL-MCNC: 4.1 MMOL/L — SIGNIFICANT CHANGE UP (ref 3.5–5.3)
POTASSIUM SERPL-SCNC: 4.1 MMOL/L — SIGNIFICANT CHANGE UP (ref 3.5–5.3)
PROT SERPL-MCNC: 7.4 G/DL — SIGNIFICANT CHANGE UP (ref 6–8.3)
PROT UR-MCNC: NEGATIVE — SIGNIFICANT CHANGE UP
RBC # BLD: 5.32 M/UL — HIGH (ref 3.8–5.2)
RBC # FLD: 16.9 % — HIGH (ref 10.3–14.5)
SODIUM SERPL-SCNC: 146 MMOL/L — HIGH (ref 135–145)
SP GR SPEC: 1.01 — SIGNIFICANT CHANGE UP (ref 1–1.04)
UROBILINOGEN FLD QL: NORMAL — SIGNIFICANT CHANGE UP
WBC # BLD: 16.14 K/UL — HIGH (ref 3.8–10.5)
WBC # FLD AUTO: 16.14 K/UL — HIGH (ref 3.8–10.5)

## 2019-11-26 PROCEDURE — 71045 X-RAY EXAM CHEST 1 VIEW: CPT | Mod: 26

## 2019-11-26 PROCEDURE — 99232 SBSQ HOSP IP/OBS MODERATE 35: CPT

## 2019-11-26 RX ORDER — SODIUM CHLORIDE 9 MG/ML
1000 INJECTION, SOLUTION INTRAVENOUS
Refills: 0 | Status: DISCONTINUED | OUTPATIENT
Start: 2019-11-26 | End: 2019-11-27

## 2019-11-26 RX ADMIN — HALOPERIDOL DECANOATE 1 MILLIGRAM(S): 100 INJECTION INTRAMUSCULAR at 08:03

## 2019-11-26 RX ADMIN — LITHIUM CARBONATE 300 MILLIGRAM(S): 300 TABLET, EXTENDED RELEASE ORAL at 21:23

## 2019-11-26 RX ADMIN — INSULIN GLARGINE 10 UNIT(S): 100 INJECTION, SOLUTION SUBCUTANEOUS at 22:10

## 2019-11-26 RX ADMIN — HALOPERIDOL DECANOATE 1 MILLIGRAM(S): 100 INJECTION INTRAMUSCULAR at 21:23

## 2019-11-26 RX ADMIN — HALOPERIDOL DECANOATE 1 MILLIGRAM(S): 100 INJECTION INTRAMUSCULAR at 13:02

## 2019-11-26 RX ADMIN — ENOXAPARIN SODIUM 40 MILLIGRAM(S): 100 INJECTION SUBCUTANEOUS at 11:28

## 2019-11-26 RX ADMIN — SIMVASTATIN 20 MILLIGRAM(S): 20 TABLET, FILM COATED ORAL at 21:23

## 2019-11-26 RX ADMIN — Medication 2 UNIT(S): at 16:56

## 2019-11-26 RX ADMIN — Medication 2 UNIT(S): at 08:03

## 2019-11-26 RX ADMIN — Medication 1: at 08:02

## 2019-11-26 RX ADMIN — Medication 2: at 11:28

## 2019-11-26 RX ADMIN — Medication 2: at 22:10

## 2019-11-26 RX ADMIN — Medication 2: at 16:56

## 2019-11-26 RX ADMIN — SODIUM CHLORIDE 75 MILLILITER(S): 9 INJECTION, SOLUTION INTRAVENOUS at 13:01

## 2019-11-26 RX ADMIN — SENNA PLUS 2 TABLET(S): 8.6 TABLET ORAL at 21:23

## 2019-11-26 RX ADMIN — POLYETHYLENE GLYCOL 3350 17 GRAM(S): 17 POWDER, FOR SOLUTION ORAL at 17:00

## 2019-11-26 RX ADMIN — Medication 2 UNIT(S): at 11:28

## 2019-11-26 RX ADMIN — POLYETHYLENE GLYCOL 3350 17 GRAM(S): 17 POWDER, FOR SOLUTION ORAL at 05:02

## 2019-11-26 NOTE — PROGRESS NOTE ADULT - ASSESSMENT
69F h/o T2DM, schizoaffective disorder, depression, ?dementia (AAOx1 name), legally blind, presenting with altered mental status for 3 days and increased agitation at home, a/f sepsis (inc HR, leukocytosis) 2/2 UTI, metabolic encephalopathy.      #Leukocytosis:  Afebrile without infectious symptoms  s/p treatment for UTI, completed 3 days of IV ceftriaxone, no urine cx was sent  - F/U repeat UA, urine cx, CXR  - continue to monitor CBC, if fever will empirically start IV meropenem to cover for ESBL 69F h/o T2DM, schizoaffective disorder, depression, ?dementia (AAOx1 name), legally blind, presenting with altered mental status for 3 days and increased agitation at home, a/f sepsis (inc HR, leukocytosis) 2/2 UTI, metabolic encephalopathy.      #Leukocytosis:  Afebrile without infectious symptoms  s/p treatment for UTI, completed 3 days of IV ceftriaxone, no urine cx was sent  - F/U repeat UA, urine cx, CXR  - continue to monitor CBC, if fever will empirically start IV meropenem to cover for ESBL    #Protein calorie malnutrition:  Patient cachectic with evidence of severe malnutrition  Nutrition consult placed, will attempt to readdress GOC with

## 2019-11-26 NOTE — PROGRESS NOTE ADULT - PROBLEM SELECTOR PLAN 9





07/20/04/17/16 07/20/18














Review of Systems





- Review of Systems


Constitutional: absent: Fatigue, Fevers


Eyes: absent: Vision Changes


ENT: absent: Hearing Changes


Respiratory: absent: SOB, Cough


Cardiovascular: absent: Chest Pain


Gastrointestinal: absent: Abdominal Pain, Nausea, Vomiting


Skin: Rash, Pruritis, Skin Lesions, Cellulitis.  absent: Laceration, Abscess, 

Ulcer


Neurological: absent: Headache, Dizziness


Psychiatric: absent: Anxiety, Depression, Suicidal Ideation





Physical Exam


Vital Signs Reviewed: Yes


Vital Signs











  Temp Pulse Resp BP Pulse Ox


 


 07/20/





07/20/18 21:00


-Pregnancy 

dermatologist 07/20/18 21:01











- PA / NP / Resident Statement


MD/DO has reviewed & agrees with the documentation as recorded.





Disposition/Present on Arrival





- Present on Arrival


Any Indicators Present on Arrival: No


History of DVT/PE: No


History of Uncontrolled Diabetes: No


Urinary Catheter: No


History of Decub. Ulcer: No


History Surgical Site Infection Following: None





- Disposition


Have Diagnosis and Disposition been Completed?: Yes


Diagnosis: 


 Insect bite, Cellulitis





Disposition: HOME/ ROUTINE


Disposition Time: 21:02


Patient 

dermatologist within 2 days, return to the ER for any new or worsening signs or 

symptoms. 


Prescriptions: 


Cephalexin [Keflex] 500 mg PO QID #28 capsule


Cetirizine HCl [Zyrtec] 10 mg PO DAILY PRN #7 capsule


 PRN Reason: Other


Ibuprofen [Motrin] 600 mg PO TID PRN #21 tab


 PRN Reason: Other


Triamcinolone  0.025 % [Triamcinolone  0.025 % Cream] 1 appl TOP BID #15 g


Referrals: 


Lolly Flores MD [Staff Provider] - Follow up with primary


[facility] [place] Follow up with primary


Forms:  WORK NOTE -improve score 2, dvt ppx: lovenox   diet: cc  dispo: rehab

## 2019-11-26 NOTE — CHART NOTE - NSCHARTNOTEFT_GEN_A_CORE
Source: Patient [ ]    Family [x ]   at bedside    other [ x] chart review     Malnutrition f/u. Pt is A&Ox1 spoke with . Pt is pending d/c to rehab. He states her appetite has improved; she is drinking Glucerna 2x daily (440kcals, 20g protein). No GI distress (nausea/vomiting/diarrhea/constipation.) constipation resolved with bowel regimen.  No chewing or swallowing difficulties at this time. POCT remains in 200s.     Diet, Regular:   Consistent Carbohydrate {No Snacks} (CSTCHO)  Supplement Feeding Modality:  Oral  Glucerna Shake Cans or Servings Per Day:  1       Frequency:  Two Times a day (11-20-19 @ 09:20)    PO intake:  < 50% [ ] 50-75% [ ]   % [x ]  other :  Current Weight:   11/26: 118.3 pounds   11/23: 112.8 pounds   11/19: 110.6 3   11/14: 111.7 pounds     Edema: 2+ B/L legs   Pressure Injuries: none     __________________ Pertinent Medications__________________   MEDICATIONS  (STANDING):  dextrose 5%. 1000 milliLiter(s) (50 mL/Hr) IV Continuous <Continuous>  dextrose 5%. 1000 milliLiter(s) (75 mL/Hr) IV Continuous <Continuous>  dextrose 50% Injectable 12.5 Gram(s) IV Push once  dextrose 50% Injectable 25 Gram(s) IV Push once  dextrose 50% Injectable 25 Gram(s) IV Push once  enoxaparin Injectable 40 milliGRAM(s) SubCutaneous daily  haloperidol     Tablet 1 milliGRAM(s) Oral <User Schedule>  haloperidol     Tablet 1 milliGRAM(s) Oral <User Schedule>  insulin glargine Injectable (LANTUS) 10 Unit(s) SubCutaneous at bedtime  insulin lispro (HumaLOG) corrective regimen sliding scale   SubCutaneous three times a day before meals  insulin lispro (HumaLOG) corrective regimen sliding scale   SubCutaneous at bedtime  insulin lispro Injectable (HumaLOG) 2 Unit(s) SubCutaneous three times a day before meals  lithium SR (LITHOBID) 300 milliGRAM(s) Oral at bedtime  polyethylene glycol 3350 17 Gram(s) Oral two times a day  senna 2 Tablet(s) Oral at bedtime  simvastatin 20 milliGRAM(s) Oral at bedtime    MEDICATIONS  (PRN):  dextrose 40% Gel 15 Gram(s) Oral once PRN Blood Glucose LESS THAN 70 milliGRAM(s)/deciliter  glucagon  Injectable 1 milliGRAM(s) IntraMuscular once PRN Glucose LESS THAN 70 milligrams/deciliter  traZODone 50 milliGRAM(s) Oral at bedtime PRN Insomnia      __________________ Pertinent Labs__________________   11-26 Na146 mmol/L<H> Glu 223 mg/dL<H> K+ 4.1 mmol/L Cr  1.06 mg/dL BUN 25 mg/dL<H> 11-26 Phos 3.0 mg/dL 11-26 Alb 3.6 g/dL 11-12 [id number] 7.5 %<H>            Estimated Needs:   [x ] no change since previous assessment  [ ] recalculated:       Previous Nutrition Diagnosis: Moderate protein calorie malnutrition     Nutrition Diagnosis is [x ] ongoing  [ ] resolved [ ] not applicable     Recommendations:  1. Continue consistent carbohydrate diet   2. Continue Glucerna 2x daily (440kcals, 20g protein)   3. Encourage PO intake and honor food preferences as able. Please help with menu selections and feeding assistance.       Monitoring and Evaluation:     [x ] PO intake [ x] Tolerance to diet prescription [x ] weights [x ] follow up per protocol  [ ] other:

## 2019-11-26 NOTE — PROGRESS NOTE ADULT - PROBLEM SELECTOR PLAN 5
Met sepsis criteria on admission, completed 3d CTX, blood cx negative, urine culture was not sent  - repeat UA, urine cx as above

## 2019-11-26 NOTE — PROGRESS NOTE ADULT - SUBJECTIVE AND OBJECTIVE BOX
[facility] Division of Hospital Medicine  Gris Ortez DO  Pager: [phone]  Other Times:  [phone]    Patient is a 69y old  Female who presents with a chief complaint of AMS, behavioral disturbance (25 Nov 2019 15:20)      SUBJECTIVE / OVERNIGHT EVENTS:  ADDITIONAL REVIEW OF SYSTEMS:    MEDICATIONS  (STANDING):  dextrose 5%. 1000 milliLiter(s) (50 mL/Hr) IV Continuous <Continuous>  dextrose 5%. 1000 milliLiter(s) (75 mL/Hr) IV Continuous <Continuous>  dextrose 50% Injectable 12.5 Gram(s) IV Push once  dextrose 50% Injectable 25 Gram(s) IV Push once  dextrose 50% Injectable 25 Gram(s) IV Push once  enoxaparin Injectable 40 milliGRAM(s) SubCutaneous daily  haloperidol     Tablet 1 milliGRAM(s) Oral <User Schedule>  haloperidol     Tablet 1 milliGRAM(s) Oral <User Schedule>  insulin glargine Injectable (LANTUS) 10 Unit(s) SubCutaneous at bedtime  insulin lispro (HumaLOG) corrective regimen sliding scale   SubCutaneous three times a day before meals  insulin lispro (HumaLOG) corrective regimen sliding scale   SubCutaneous at bedtime  insulin lispro Injectable (HumaLOG) 2 Unit(s) SubCutaneous three times a day before meals  lithium SR (LITHOBID) 300 milliGRAM(s) Oral at bedtime  polyethylene glycol 3350 17 Gram(s) Oral two times a day  senna 2 Tablet(s) Oral at bedtime  simvastatin 20 milliGRAM(s) Oral at bedtime    MEDICATIONS  (PRN):  dextrose 40% Gel 15 Gram(s) Oral once PRN Blood Glucose LESS THAN 70 milliGRAM(s)/deciliter  glucagon  Injectable 1 milliGRAM(s) IntraMuscular once PRN Glucose LESS THAN 70 milligrams/deciliter  traZODone 50 milliGRAM(s) Oral at bedtime PRN Insomnia      CAPILLARY BLOOD GLUCOSE      POCT Blood Glucose.: 210 mg/dL (26 Nov 2019 11:22)  POCT Blood Glucose.: 178 mg/dL (26 Nov 2019 07:36)  POCT Blood Glucose.: 251 mg/dL (25 Nov 2019 22:25)  POCT Blood Glucose.: 195 mg/dL (25 Nov 2019 16:43)    I&O's Summary    25 Nov 2019 07:01  -  26 Nov 2019 07:00  --------------------------------------------------------  IN: 120 mL / OUT: 1050 mL / NET: -930 mL    26 Nov 2019 07:01  -  26 Nov 2019 13:08  --------------------------------------------------------  IN: 237 mL / OUT: 0 mL / NET: 237 mL        PHYSICAL EXAM:  Vital Signs Last 24 Hrs  T(C): 36.5 (26 Nov 2019 11:31), Max: 37.3 (26 Nov 2019 05:01)  T(F): 97.7 (26 Nov 2019 11:31), Max: 99.2 (26 Nov 2019 05:01)  HR: 92 (26 Nov 2019 11:31) (92 - 97)  BP: 120/71 (26 Nov 2019 11:31) (120/71 - 142/74)  BP(mean): --  RR: 18 (26 Nov 2019 11:31) (18 - 18)  SpO2: 98% (26 Nov 2019 11:31) (98% - 99%)  CONSTITUTIONAL: NAD, well-developed, well-groomed  EYES: PERRLA; conjunctiva and sclera clear  ENMT: Moist oral mucosa, no pharyngeal injection or exudates; normal dentition  NECK: Supple, no palpable masses; no thyromegaly  RESPIRATORY: Normal respiratory effort; lungs are clear to auscultation bilaterally  CARDIOVASCULAR: Regular rate and rhythm, normal S1 and S2, no murmur/rub/gallop; No lower extremity edema; Peripheral pulses are 2+ bilaterally  ABDOMEN: Nontender to palpation, normoactive bowel sounds, no rebound/guarding; No hepatosplenomegaly  MUSCLOSKELETAL:  Normal gait; no clubbing or cyanosis of digits; no joint swelling or tenderness to palpation  PSYCH: A+O to person, place, and time; affect appropriate  NEUROLOGY: CN 2-12 are intact and symmetric; no gross sensory deficits;   SKIN: No rashes; no palpable lesions    LABS:                        13.1   16.14 )-----------( 325      ( 26 Nov 2019 05:15 )             43.0     11-26    146<H>  |  109<H>  |  25<H>  ----------------------------<  223<H>  4.1   |  26  |  1.06    Ca    10.5      26 Nov 2019 05:15  Phos  3.0     11-26  Mg     2.1     11-26    TPro  7.4  /  Alb  3.6  /  TBili  0.6  /  DBili  x   /  AST  20  /  ALT  20  /  AlkPhos  99  11-26                RADIOLOGY & ADDITIONAL TESTS:  Results Reviewed:   Imaging Personally Reviewed:  Electrocardiogram Personally Reviewed:    COORDINATION OF CARE:  Care Discussed with Consultants/Other Providers [Y/N]:  Prior or Outpatient Records Reviewed [Y/N]: [facility] Division of Hospital Medicine  Gris Ortez DO  Pager: [phone]  Other Times:  [phone]    Patient is a 69y old  Female who presents with a chief complaint of AMS, behavioral disturbance (25 Nov 2019 15:20)    SUBJECTIVE / OVERNIGHT EVENTS:  Patient seen denying any complaints.     ADDITIONAL REVIEW OF SYSTEMS:    MEDICATIONS  (STANDING):  dextrose 5%. 1000 milliLiter(s) (50 mL/Hr) IV Continuous <Continuous>  dextrose 5%. 1000 milliLiter(s) (75 mL/Hr) IV Continuous <Continuous>  dextrose 50% Injectable 12.5 Gram(s) IV Push once  dextrose 50% Injectable 25 Gram(s) IV Push once  dextrose 50% Injectable 25 Gram(s) IV Push once  enoxaparin Injectable 40 milliGRAM(s) SubCutaneous daily  haloperidol     Tablet 1 milliGRAM(s) Oral <User Schedule>  haloperidol     Tablet 1 milliGRAM(s) Oral <User Schedule>  insulin glargine Injectable (LANTUS) 10 Unit(s) SubCutaneous at bedtime  insulin lispro (HumaLOG) corrective regimen sliding scale   SubCutaneous three times a day before meals  insulin lispro (HumaLOG) corrective regimen sliding scale   SubCutaneous at bedtime  insulin lispro Injectable (HumaLOG) 2 Unit(s) SubCutaneous three times a day before meals  lithium SR (LITHOBID) 300 milliGRAM(s) Oral at bedtime  polyethylene glycol 3350 17 Gram(s) Oral two times a day  senna 2 Tablet(s) Oral at bedtime  simvastatin 20 milliGRAM(s) Oral at bedtime    MEDICATIONS  (PRN):  dextrose 40% Gel 15 Gram(s) Oral once PRN Blood Glucose LESS THAN 70 milliGRAM(s)/deciliter  glucagon  Injectable 1 milliGRAM(s) IntraMuscular once PRN Glucose LESS THAN 70 milligrams/deciliter  traZODone 50 milliGRAM(s) Oral at bedtime PRN Insomnia      CAPILLARY BLOOD GLUCOSE      POCT Blood Glucose.: 210 mg/dL (26 Nov 2019 11:22)  POCT Blood Glucose.: 178 mg/dL (26 Nov 2019 07:36)  POCT Blood Glucose.: 251 mg/dL (25 Nov 2019 22:25)  POCT Blood Glucose.: 195 mg/dL (25 Nov 2019 16:43)    I&O's Summary    25 Nov 2019 07:01  -  26 Nov 2019 07:00  --------------------------------------------------------  IN: 120 mL / OUT: 1050 mL / NET: -930 mL    26 Nov 2019 07:01  -  26 Nov 2019 13:08  --------------------------------------------------------  IN: 237 mL / OUT: 0 mL / NET: 237 mL        PHYSICAL EXAM:  Vital Signs Last 24 Hrs  T(C): 36.5 (26 Nov 2019 11:31), Max: 37.3 (26 Nov 2019 05:01)  T(F): 97.7 (26 Nov 2019 11:31), Max: 99.2 (26 Nov 2019 05:01)  HR: 92 (26 Nov 2019 11:31) (92 - 97)  BP: 120/71 (26 Nov 2019 11:31) (120/71 - 142/74)  BP(mean): --  RR: 18 (26 Nov 2019 11:31) (18 - 18)  SpO2: 98% (26 Nov 2019 11:31) (98% - 99%)  CONSTITUTIONAL: elderly female, sleeping in bed, cooperative with exam  EYES: poor vision  NECK: Supple, no palpable masses; no thyromegaly  RESPIRATORY: Normal respiratory effort; lungs are clear to auscultation bilaterally  CARDIOVASCULAR: Regular rate and rhythm, normal S1 and S2, no murmur/rub/gallop; No lower extremity edema; Peripheral pulses are 2+ bilaterally  ABDOMEN: Nontender to palpation, normoactive bowel sounds, no rebound/guarding  PSYCH: sleepy, oriented to name      LABS:                        13.1   16.14 )-----------( 325      ( 26 Nov 2019 05:15 )             43.0     11-26    146<H>  |  109<H>  |  25<H>  ----------------------------<  223<H>  4.1   |  26  |  1.06    Ca    10.5      26 Nov 2019 05:15  Phos  3.0     11-26  Mg     2.1     11-26    TPro  7.4  /  Alb  3.6  /  TBili  0.6  /  DBili  x   /  AST  20  /  ALT  20  /  AlkPhos  99  11-26                RADIOLOGY & ADDITIONAL TESTS:  Results Reviewed:   Imaging Personally Reviewed:  Electrocardiogram Personally Reviewed:    COORDINATION OF CARE:  Care Discussed with Consultants/Other Providers [Y/N]:  Prior or Outpatient Records Reviewed [Y/N]:

## 2019-11-26 NOTE — PROGRESS NOTE ADULT - PROBLEM SELECTOR PLAN 1
Recurrent hypernatremia when decrease IVFs, encourage PO fluids, hopefully PO will improve as adjust meds and becomes more alert  Trending up when off IVF  - restart IV D5W  - continue to encourage po free water  -  Li level, low at .57;   - Will monitor;  I&Os, question if could be some DI from lithium

## 2019-11-26 NOTE — PROGRESS NOTE ADULT - PROBLEM SELECTOR PLAN 3
- c/w  lithium hs; decreased haldol to 1 TID (hold for sedation)  - stopped Ativan; trazodone PRN  - avoid Ambien/anticholinergics, day/night cues, supportive care  - Have d/w  seems dementia progressing, Hospice has evaluated, feels pt would be appropriate.  PT has recommended rehab and for now pt's  opting for rehab although concerned pt will continue to decline

## 2019-11-27 LAB
ALBUMIN SERPL ELPH-MCNC: 3.4 G/DL — SIGNIFICANT CHANGE UP (ref 3.3–5)
ALP SERPL-CCNC: 90 U/L — SIGNIFICANT CHANGE UP (ref 40–120)
ALT FLD-CCNC: 25 U/L — SIGNIFICANT CHANGE UP (ref 4–33)
ANION GAP SERPL CALC-SCNC: 13 MMO/L — SIGNIFICANT CHANGE UP (ref 7–14)
AST SERPL-CCNC: 22 U/L — SIGNIFICANT CHANGE UP (ref 4–32)
BASOPHILS # BLD AUTO: 0.06 K/UL — SIGNIFICANT CHANGE UP (ref 0–0.2)
BASOPHILS NFR BLD AUTO: 0.5 % — SIGNIFICANT CHANGE UP (ref 0–2)
BILIRUB SERPL-MCNC: 0.5 MG/DL — SIGNIFICANT CHANGE UP (ref 0.2–1.2)
BUN SERPL-MCNC: 24 MG/DL — HIGH (ref 7–23)
CALCIUM SERPL-MCNC: 10.4 MG/DL — SIGNIFICANT CHANGE UP (ref 8.4–10.5)
CHLORIDE SERPL-SCNC: 110 MMOL/L — HIGH (ref 98–107)
CO2 SERPL-SCNC: 27 MMOL/L — SIGNIFICANT CHANGE UP (ref 22–31)
CREAT SERPL-MCNC: 0.93 MG/DL — SIGNIFICANT CHANGE UP (ref 0.5–1.3)
EOSINOPHIL # BLD AUTO: 0.26 K/UL — SIGNIFICANT CHANGE UP (ref 0–0.5)
EOSINOPHIL NFR BLD AUTO: 2.3 % — SIGNIFICANT CHANGE UP (ref 0–6)
GLUCOSE SERPL-MCNC: 202 MG/DL — HIGH (ref 70–99)
HCT VFR BLD CALC: 42.8 % — SIGNIFICANT CHANGE UP (ref 34.5–45)
HGB BLD-MCNC: 12.6 G/DL — SIGNIFICANT CHANGE UP (ref 11.5–15.5)
IMM GRANULOCYTES NFR BLD AUTO: 0.4 % — SIGNIFICANT CHANGE UP (ref 0–1.5)
LYMPHOCYTES # BLD AUTO: 19.8 % — SIGNIFICANT CHANGE UP (ref 13–44)
LYMPHOCYTES # BLD AUTO: 2.24 K/UL — SIGNIFICANT CHANGE UP (ref 1–3.3)
MAGNESIUM SERPL-MCNC: 2.1 MG/DL — SIGNIFICANT CHANGE UP (ref 1.6–2.6)
MCHC RBC-ENTMCNC: 24.6 PG — LOW (ref 27–34)
MCHC RBC-ENTMCNC: 29.4 % — LOW (ref 32–36)
MCV RBC AUTO: 83.4 FL — SIGNIFICANT CHANGE UP (ref 80–100)
MONOCYTES # BLD AUTO: 0.65 K/UL — SIGNIFICANT CHANGE UP (ref 0–0.9)
MONOCYTES NFR BLD AUTO: 5.7 % — SIGNIFICANT CHANGE UP (ref 2–14)
NEUTROPHILS # BLD AUTO: 8.06 K/UL — HIGH (ref 1.8–7.4)
NEUTROPHILS NFR BLD AUTO: 71.3 % — SIGNIFICANT CHANGE UP (ref 43–77)
NRBC # FLD: 0 K/UL — SIGNIFICANT CHANGE UP (ref 0–0)
PHOSPHATE SERPL-MCNC: 3.1 MG/DL — SIGNIFICANT CHANGE UP (ref 2.5–4.5)
PLATELET # BLD AUTO: 313 K/UL — SIGNIFICANT CHANGE UP (ref 150–400)
PMV BLD: 11.6 FL — SIGNIFICANT CHANGE UP (ref 7–13)
POTASSIUM SERPL-MCNC: 3.9 MMOL/L — SIGNIFICANT CHANGE UP (ref 3.5–5.3)
POTASSIUM SERPL-SCNC: 3.9 MMOL/L — SIGNIFICANT CHANGE UP (ref 3.5–5.3)
PROT SERPL-MCNC: 7 G/DL — SIGNIFICANT CHANGE UP (ref 6–8.3)
RBC # BLD: 5.13 M/UL — SIGNIFICANT CHANGE UP (ref 3.8–5.2)
RBC # FLD: 17.1 % — HIGH (ref 10.3–14.5)
SODIUM SERPL-SCNC: 150 MMOL/L — HIGH (ref 135–145)
SPECIMEN SOURCE: SIGNIFICANT CHANGE UP
SPECIMEN SOURCE: SIGNIFICANT CHANGE UP
WBC # BLD: 11.31 K/UL — HIGH (ref 3.8–10.5)
WBC # FLD AUTO: 11.31 K/UL — HIGH (ref 3.8–10.5)

## 2019-11-27 PROCEDURE — 99232 SBSQ HOSP IP/OBS MODERATE 35: CPT

## 2019-11-27 RX ORDER — SODIUM CHLORIDE 9 MG/ML
1000 INJECTION, SOLUTION INTRAVENOUS
Refills: 0 | Status: DISCONTINUED | OUTPATIENT
Start: 2019-11-27 | End: 2019-11-28

## 2019-11-27 RX ORDER — INSULIN LISPRO 100/ML
4 VIAL (ML) SUBCUTANEOUS
Refills: 0 | Status: DISCONTINUED | OUTPATIENT
Start: 2019-11-27 | End: 2019-12-04

## 2019-11-27 RX ADMIN — Medication 4: at 17:10

## 2019-11-27 RX ADMIN — HALOPERIDOL DECANOATE 1 MILLIGRAM(S): 100 INJECTION INTRAMUSCULAR at 19:35

## 2019-11-27 RX ADMIN — LITHIUM CARBONATE 300 MILLIGRAM(S): 300 TABLET, EXTENDED RELEASE ORAL at 21:27

## 2019-11-27 RX ADMIN — POLYETHYLENE GLYCOL 3350 17 GRAM(S): 17 POWDER, FOR SOLUTION ORAL at 05:35

## 2019-11-27 RX ADMIN — Medication 4 UNIT(S): at 12:16

## 2019-11-27 RX ADMIN — Medication 2 UNIT(S): at 07:58

## 2019-11-27 RX ADMIN — ENOXAPARIN SODIUM 40 MILLIGRAM(S): 100 INJECTION SUBCUTANEOUS at 12:18

## 2019-11-27 RX ADMIN — INSULIN GLARGINE 10 UNIT(S): 100 INJECTION, SOLUTION SUBCUTANEOUS at 21:32

## 2019-11-27 RX ADMIN — SENNA PLUS 2 TABLET(S): 8.6 TABLET ORAL at 21:27

## 2019-11-27 RX ADMIN — Medication 1: at 07:58

## 2019-11-27 RX ADMIN — Medication 4 UNIT(S): at 17:10

## 2019-11-27 RX ADMIN — HALOPERIDOL DECANOATE 1 MILLIGRAM(S): 100 INJECTION INTRAMUSCULAR at 07:59

## 2019-11-27 RX ADMIN — SODIUM CHLORIDE 100 MILLILITER(S): 9 INJECTION, SOLUTION INTRAVENOUS at 12:18

## 2019-11-27 RX ADMIN — Medication 1: at 12:16

## 2019-11-27 RX ADMIN — HALOPERIDOL DECANOATE 1 MILLIGRAM(S): 100 INJECTION INTRAMUSCULAR at 15:09

## 2019-11-27 RX ADMIN — SIMVASTATIN 20 MILLIGRAM(S): 20 TABLET, FILM COATED ORAL at 21:27

## 2019-11-27 NOTE — PROGRESS NOTE ADULT - ASSESSMENT
69F h/o T2DM, schizoaffective disorder, depression, ?dementia (AAOx1 name), legally blind, presenting with altered mental status for 3 days and increased agitation at home, a/f sepsis (inc HR, leukocytosis) 2/2 UTI, metabolic encephalopathy.      #Leukocytosis:  Afebrile without infectious symptoms, down-trending  s/p treatment for UTI, completed 3 days of IV ceftriaxone, no urine cx was sent  - negative repeat UA, repeat blood cx NTD    #Protein calorie malnutrition:  Patient cachectic with evidence of moderate malnutrition  Nutrition consult placed, will attempt to readdress GOC with

## 2019-11-27 NOTE — PROGRESS NOTE ADULT - SUBJECTIVE AND OBJECTIVE BOX
[facility] Division of Hospital Medicine  Gris Ortez DO  Pager: [phone]  Other Times:  [phone]    Patient is a 69y old  Female who presents with a chief complaint of AMS, behavioral disturbance (2019 13:08)    SUBJECTIVE / OVERNIGHT EVENTS:  Patient seen very lethargic, when asked how she is doing, she responds "prem."    ADDITIONAL REVIEW OF SYSTEMS:    MEDICATIONS  (STANDING):  dextrose 5%. 1000 milliLiter(s) (50 mL/Hr) IV Continuous <Continuous>  dextrose 5%. 1000 milliLiter(s) (100 mL/Hr) IV Continuous <Continuous>  dextrose 50% Injectable 12.5 Gram(s) IV Push once  dextrose 50% Injectable 25 Gram(s) IV Push once  dextrose 50% Injectable 25 Gram(s) IV Push once  enoxaparin Injectable 40 milliGRAM(s) SubCutaneous daily  haloperidol     Tablet 1 milliGRAM(s) Oral <User Schedule>  haloperidol     Tablet 1 milliGRAM(s) Oral <User Schedule>  insulin glargine Injectable (LANTUS) 10 Unit(s) SubCutaneous at bedtime  insulin lispro (HumaLOG) corrective regimen sliding scale   SubCutaneous three times a day before meals  insulin lispro (HumaLOG) corrective regimen sliding scale   SubCutaneous at bedtime  insulin lispro Injectable (HumaLOG) 4 Unit(s) SubCutaneous three times a day before meals  lithium SR (LITHOBID) 300 milliGRAM(s) Oral at bedtime  polyethylene glycol 3350 17 Gram(s) Oral two times a day  senna 2 Tablet(s) Oral at bedtime  simvastatin 20 milliGRAM(s) Oral at bedtime    MEDICATIONS  (PRN):  dextrose 40% Gel 15 Gram(s) Oral once PRN Blood Glucose LESS THAN 70 milliGRAM(s)/deciliter  glucagon  Injectable 1 milliGRAM(s) IntraMuscular once PRN Glucose LESS THAN 70 milligrams/deciliter  traZODone 50 milliGRAM(s) Oral at bedtime PRN Insomnia      CAPILLARY BLOOD GLUCOSE      POCT Blood Glucose.: 187 mg/dL (2019 12:07)  POCT Blood Glucose.: 179 mg/dL (2019 07:40)  POCT Blood Glucose.: 311 mg/dL (2019 22:07)  POCT Blood Glucose.: 222 mg/dL (2019 16:31)    I&O's Summary    2019 07:01  -  2019 07:00  --------------------------------------------------------  IN: 1719 mL / OUT: 1400 mL / NET: 319 mL    2019 07:  -  2019 13:05  --------------------------------------------------------  IN: 440 mL / OUT: 1000 mL / NET: -560 mL        PHYSICAL EXAM:  Vital Signs Last 24 Hrs  T(C): 36.6 (2019 12:58), Max: 36.7 (2019 20:40)  T(F): 97.9 (2019 12:58), Max: 98 (2019 20:40)  HR: 110 (2019 12:58) (66 - 110)  BP: 150/90 (2019 12:58) (113/69 - 150/90)  BP(mean): --  RR: 18 (2019 12:58) (18 - 18)  SpO2: 99% (2019 12:58) (99% - 100%)  CONSTITUTIONAL: elderly female, sleeping in bed, cooperative with exam  EYES: poor vision  NECK: Supple, no palpable masses; no thyromegaly  RESPIRATORY: Normal respiratory effort; lungs are clear to auscultation bilaterally  CARDIOVASCULAR: Regular rate and rhythm, normal S1 and S2, no murmur/rub/gallop; No lower extremity edema; Peripheral pulses are 2+ bilaterally  ABDOMEN: Nontender to palpation, normoactive bowel sounds, no rebound/guarding  PSYCH: sleepy, oriented to name    LABS:                        12.6   11.31 )-----------( 313      ( 2019 05:45 )             42.8         150<H>  |  110<H>  |  24<H>  ----------------------------<  202<H>  3.9   |  27  |  0.93    Ca    10.4      2019 05:45  Phos  3.1       Mg     2.1         TPro  7.0  /  Alb  3.4  /  TBili  0.5  /  DBili  x   /  AST  22  /  ALT  25  /  AlkPhos  90            Urinalysis Basic - ( 2019 13:40 )    Color: LIGHT YELLOW / Appearance: CLEAR / S.011 / pH: 6.0  Gluc: NEGATIVE / Ketone: NEGATIVE  / Bili: NEGATIVE / Urobili: NORMAL   Blood: NEGATIVE / Protein: NEGATIVE / Nitrite: NEGATIVE   Leuk Esterase: NEGATIVE / RBC: x / WBC x   Sq Epi: x / Non Sq Epi: x / Bacteria: x          RADIOLOGY & ADDITIONAL TESTS:  Results Reviewed:   Imaging Personally Reviewed:  Electrocardiogram Personally Reviewed:    COORDINATION OF CARE:  Care Discussed with Consultants/Other Providers [Y/N]:  Prior or Outpatient Records Reviewed [Y/N]:

## 2019-11-27 NOTE — PROGRESS NOTE ADULT - PROBLEM SELECTOR PLAN 1
Recurrent hypernatremia when decrease IVFs, encourage PO fluids, hopefully PO will improve as adjust meds and becomes more alert  Trending up when off IVF  - increase rate of IV D5W  - continue to encourage po free water  -  Li level, low at .57;   - Will monitor;  I&Os, question if could be some DI from lithium

## 2019-11-27 NOTE — PROGRESS NOTE ADULT - PROBLEM SELECTOR PLAN 3
- c/w  lithium hs; decreased haldol to 1 TID (hold for sedation)  - stopped Ativan; trazodone PRN  - avoid Ambien/anticholinergics, day/night cues, supportive care  - Have d/w  seems dementia progressing, Hospice has evaluated, feels pt would be appropriate.  PT has recommended rehab and for now pt's  opting for rehab although concerned pt will continue to decline, will attempt to readdress GOC today

## 2019-11-27 NOTE — PROGRESS NOTE ADULT - PROBLEM SELECTOR PLAN 5
Met sepsis criteria on admission, completed 3d CTX, blood cx negative, urine culture was not sent  - repeat UA negative

## 2019-11-28 LAB
ALBUMIN SERPL ELPH-MCNC: 3.6 G/DL — SIGNIFICANT CHANGE UP (ref 3.3–5)
ALP SERPL-CCNC: 96 U/L — SIGNIFICANT CHANGE UP (ref 40–120)
ALT FLD-CCNC: 23 U/L — SIGNIFICANT CHANGE UP (ref 4–33)
ANION GAP SERPL CALC-SCNC: 11 MMO/L — SIGNIFICANT CHANGE UP (ref 7–14)
AST SERPL-CCNC: 19 U/L — SIGNIFICANT CHANGE UP (ref 4–32)
BASOPHILS # BLD AUTO: 0.06 K/UL — SIGNIFICANT CHANGE UP (ref 0–0.2)
BASOPHILS NFR BLD AUTO: 0.5 % — SIGNIFICANT CHANGE UP (ref 0–2)
BILIRUB SERPL-MCNC: 0.5 MG/DL — SIGNIFICANT CHANGE UP (ref 0.2–1.2)
BUN SERPL-MCNC: 27 MG/DL — HIGH (ref 7–23)
CALCIUM SERPL-MCNC: 10.4 MG/DL — SIGNIFICANT CHANGE UP (ref 8.4–10.5)
CHLORIDE SERPL-SCNC: 108 MMOL/L — HIGH (ref 98–107)
CO2 SERPL-SCNC: 28 MMOL/L — SIGNIFICANT CHANGE UP (ref 22–31)
CREAT SERPL-MCNC: 0.9 MG/DL — SIGNIFICANT CHANGE UP (ref 0.5–1.3)
EOSINOPHIL # BLD AUTO: 0.24 K/UL — SIGNIFICANT CHANGE UP (ref 0–0.5)
EOSINOPHIL NFR BLD AUTO: 2 % — SIGNIFICANT CHANGE UP (ref 0–6)
GLUCOSE SERPL-MCNC: 310 MG/DL — HIGH (ref 70–99)
HCT VFR BLD CALC: 42.3 % — SIGNIFICANT CHANGE UP (ref 34.5–45)
HGB BLD-MCNC: 12.7 G/DL — SIGNIFICANT CHANGE UP (ref 11.5–15.5)
IMM GRANULOCYTES NFR BLD AUTO: 0.3 % — SIGNIFICANT CHANGE UP (ref 0–1.5)
LYMPHOCYTES # BLD AUTO: 1.89 K/UL — SIGNIFICANT CHANGE UP (ref 1–3.3)
LYMPHOCYTES # BLD AUTO: 15.8 % — SIGNIFICANT CHANGE UP (ref 13–44)
MAGNESIUM SERPL-MCNC: 2 MG/DL — SIGNIFICANT CHANGE UP (ref 1.6–2.6)
MCHC RBC-ENTMCNC: 25 PG — LOW (ref 27–34)
MCHC RBC-ENTMCNC: 30 % — LOW (ref 32–36)
MCV RBC AUTO: 83.1 FL — SIGNIFICANT CHANGE UP (ref 80–100)
MONOCYTES # BLD AUTO: 0.74 K/UL — SIGNIFICANT CHANGE UP (ref 0–0.9)
MONOCYTES NFR BLD AUTO: 6.2 % — SIGNIFICANT CHANGE UP (ref 2–14)
NEUTROPHILS # BLD AUTO: 8.99 K/UL — HIGH (ref 1.8–7.4)
NEUTROPHILS NFR BLD AUTO: 75.2 % — SIGNIFICANT CHANGE UP (ref 43–77)
NRBC # FLD: 0 K/UL — SIGNIFICANT CHANGE UP (ref 0–0)
PHOSPHATE SERPL-MCNC: 2.6 MG/DL — SIGNIFICANT CHANGE UP (ref 2.5–4.5)
PLATELET # BLD AUTO: 322 K/UL — SIGNIFICANT CHANGE UP (ref 150–400)
PMV BLD: 11.9 FL — SIGNIFICANT CHANGE UP (ref 7–13)
POTASSIUM SERPL-MCNC: 4 MMOL/L — SIGNIFICANT CHANGE UP (ref 3.5–5.3)
POTASSIUM SERPL-SCNC: 4 MMOL/L — SIGNIFICANT CHANGE UP (ref 3.5–5.3)
PROT SERPL-MCNC: 7 G/DL — SIGNIFICANT CHANGE UP (ref 6–8.3)
RBC # BLD: 5.09 M/UL — SIGNIFICANT CHANGE UP (ref 3.8–5.2)
RBC # FLD: 16.9 % — HIGH (ref 10.3–14.5)
SODIUM SERPL-SCNC: 147 MMOL/L — HIGH (ref 135–145)
WBC # BLD: 11.96 K/UL — HIGH (ref 3.8–10.5)
WBC # FLD AUTO: 11.96 K/UL — HIGH (ref 3.8–10.5)

## 2019-11-28 PROCEDURE — 99232 SBSQ HOSP IP/OBS MODERATE 35: CPT

## 2019-11-28 RX ORDER — SODIUM CHLORIDE 9 MG/ML
1000 INJECTION, SOLUTION INTRAVENOUS
Refills: 0 | Status: DISCONTINUED | OUTPATIENT
Start: 2019-11-28 | End: 2019-11-30

## 2019-11-28 RX ADMIN — HALOPERIDOL DECANOATE 1 MILLIGRAM(S): 100 INJECTION INTRAMUSCULAR at 07:38

## 2019-11-28 RX ADMIN — Medication 50 MILLIGRAM(S): at 01:16

## 2019-11-28 RX ADMIN — Medication 2: at 11:54

## 2019-11-28 RX ADMIN — Medication 0: at 22:53

## 2019-11-28 RX ADMIN — SODIUM CHLORIDE 100 MILLILITER(S): 9 INJECTION, SOLUTION INTRAVENOUS at 09:28

## 2019-11-28 RX ADMIN — SENNA PLUS 2 TABLET(S): 8.6 TABLET ORAL at 21:10

## 2019-11-28 RX ADMIN — SIMVASTATIN 20 MILLIGRAM(S): 20 TABLET, FILM COATED ORAL at 21:08

## 2019-11-28 RX ADMIN — Medication 2: at 17:06

## 2019-11-28 RX ADMIN — LITHIUM CARBONATE 300 MILLIGRAM(S): 300 TABLET, EXTENDED RELEASE ORAL at 23:10

## 2019-11-28 RX ADMIN — POLYETHYLENE GLYCOL 3350 17 GRAM(S): 17 POWDER, FOR SOLUTION ORAL at 05:30

## 2019-11-28 RX ADMIN — Medication 4 UNIT(S): at 17:06

## 2019-11-28 RX ADMIN — INSULIN GLARGINE 10 UNIT(S): 100 INJECTION, SOLUTION SUBCUTANEOUS at 22:52

## 2019-11-28 RX ADMIN — SODIUM CHLORIDE 100 MILLILITER(S): 9 INJECTION, SOLUTION INTRAVENOUS at 21:03

## 2019-11-28 RX ADMIN — Medication 4: at 07:41

## 2019-11-28 RX ADMIN — Medication 4 UNIT(S): at 11:55

## 2019-11-28 RX ADMIN — HALOPERIDOL DECANOATE 1 MILLIGRAM(S): 100 INJECTION INTRAMUSCULAR at 13:10

## 2019-11-28 RX ADMIN — HALOPERIDOL DECANOATE 1 MILLIGRAM(S): 100 INJECTION INTRAMUSCULAR at 21:08

## 2019-11-28 RX ADMIN — POLYETHYLENE GLYCOL 3350 17 GRAM(S): 17 POWDER, FOR SOLUTION ORAL at 17:06

## 2019-11-28 RX ADMIN — Medication 4 UNIT(S): at 07:41

## 2019-11-28 RX ADMIN — ENOXAPARIN SODIUM 40 MILLIGRAM(S): 100 INJECTION SUBCUTANEOUS at 10:58

## 2019-11-28 NOTE — PROGRESS NOTE ADULT - PROBLEM SELECTOR PLAN 10
Transitions of Care Status:  1.  Name of PCP: Dr. Pnea   2.  PCP Contacted on Admission: [x ] Y    [ ] N  -emailed   3.  PCP contacted at Discharge: [ ] Y    [ ] N    [ ] N/A  4.  Post-Discharge Appointment Date and Location:  5.  Summary of Handoff given to PCP:

## 2019-11-28 NOTE — PROGRESS NOTE ADULT - PROBLEM SELECTOR PLAN 3
- c/w  lithium hs; decreased haldol to 1 TID (hold for sedation)  - stopped Ativan; trazodone PRN  - avoid Ambien/anticholinergics, day/night cues, supportive care  - Prior team has d/w  seems dementia progressing, Hospice has evaluated, feels pt would be appropriate.  PT has recommended rehab and for now pt's  opting for rehab although concerned pt will continue to decline, will need to readdress GOC

## 2019-11-28 NOTE — PROGRESS NOTE ADULT - ASSESSMENT
69F h/o T2DM, schizoaffective disorder, depression, ?dementia (AAOx1 name), legally blind, presenting with altered mental status for 3 days and increased agitation at home, a/f sepsis (inc HR, leukocytosis) 2/2 UTI, metabolic encephalopathy.      #Leukocytosis:  Afebrile without infectious symptoms  s/p treatment for UTI, completed 3 days of IV ceftriaxone, no urine cx was sent  - negative repeat UA, repeat blood cx NTD    #Protein calorie malnutrition:  Patient cachectic with evidence of moderate malnutrition  On glucerna, need to address GOC with

## 2019-11-28 NOTE — PROGRESS NOTE ADULT - PROBLEM SELECTOR PLAN 7
AM FS high   - increase lantus 12 units, premeal 4 units TID  - FS qac and hs with SSI AM FS high likely due to D5 IVF  - lantus 10 units, premeal 4 units TID  - FS qac and hs with SSI

## 2019-11-28 NOTE — PROGRESS NOTE ADULT - PROBLEM SELECTOR PLAN 1
Recurrent hypernatremia when decrease IVFs, encourage PO fluids, hopefully PO will improve as adjust meds and becomes more alert  c/w IV D5W, monitor Na  - continue to encourage po free water  -  Li level, low at .57;   - Will monitor I&Os

## 2019-11-28 NOTE — PROGRESS NOTE ADULT - SUBJECTIVE AND OBJECTIVE BOX
Patient is a 69y old  Female who presents with a chief complaint of AMS, behavioral disturbance (27 Nov 2019 13:05)      SUBJECTIVE / OVERNIGHT EVENTS:  Pt was seen in AM. Pt lying in bed, no distress, not engaged in interview. d/w RN, no event.     MEDICATIONS  (STANDING):  dextrose 5%. 1000 milliLiter(s) (50 mL/Hr) IV Continuous <Continuous>  dextrose 5%. 1000 milliLiter(s) (100 mL/Hr) IV Continuous <Continuous>  dextrose 50% Injectable 12.5 Gram(s) IV Push once  dextrose 50% Injectable 25 Gram(s) IV Push once  dextrose 50% Injectable 25 Gram(s) IV Push once  enoxaparin Injectable 40 milliGRAM(s) SubCutaneous daily  haloperidol     Tablet 1 milliGRAM(s) Oral <User Schedule>  haloperidol     Tablet 1 milliGRAM(s) Oral <User Schedule>  insulin glargine Injectable (LANTUS) 10 Unit(s) SubCutaneous at bedtime  insulin lispro (HumaLOG) corrective regimen sliding scale   SubCutaneous three times a day before meals  insulin lispro (HumaLOG) corrective regimen sliding scale   SubCutaneous at bedtime  insulin lispro Injectable (HumaLOG) 4 Unit(s) SubCutaneous three times a day before meals  lithium SR (LITHOBID) 300 milliGRAM(s) Oral at bedtime  polyethylene glycol 3350 17 Gram(s) Oral two times a day  senna 2 Tablet(s) Oral at bedtime  simvastatin 20 milliGRAM(s) Oral at bedtime    MEDICATIONS  (PRN):  dextrose 40% Gel 15 Gram(s) Oral once PRN Blood Glucose LESS THAN 70 milliGRAM(s)/deciliter  glucagon  Injectable 1 milliGRAM(s) IntraMuscular once PRN Glucose LESS THAN 70 milligrams/deciliter  traZODone 50 milliGRAM(s) Oral at bedtime PRN Insomnia      T(C): 36.8 (11-28-19 @ 10:56), Max: 36.8 (11-28-19 @ 10:56)  HR: 95 (11-28-19 @ 10:56) (88 - 107)  BP: 134/87 (11-28-19 @ 10:56) (134/87 - 152/84)  RR: 18 (11-28-19 @ 10:56) (18 - 18)  SpO2: 98% (11-28-19 @ 10:56) (97% - 99%)  CAPILLARY BLOOD GLUCOSE      POCT Blood Glucose.: 213 mg/dL (28 Nov 2019 11:34)  POCT Blood Glucose.: 305 mg/dL (28 Nov 2019 07:11)  POCT Blood Glucose.: 230 mg/dL (27 Nov 2019 21:23)  POCT Blood Glucose.: 325 mg/dL (27 Nov 2019 16:27)    I&O's Summary    27 Nov 2019 07:01  -  28 Nov 2019 07:00  --------------------------------------------------------  IN: 1540 mL / OUT: 2000 mL / NET: -460 mL    28 Nov 2019 07:01  -  28 Nov 2019 15:53  --------------------------------------------------------  IN: 0 mL / OUT: 300 mL / NET: -300 mL        PHYSICAL EXAM:  GENERAL: NAD  HEAD:  Atraumatic, Normocephalic  EYES: conjunctiva and sclera clear  NECK: No JVD  CHEST/LUNG: Clear to auscultation bilaterally; No wheeze  HEART: s1 s2, regular rhythm and rate   ABDOMEN: Soft, Nontender, Nondistended; Bowel sounds present  EXTREMITIES:  2+ Peripheral Pulses, No clubbing, cyanosis, or edema  PSYCH: calm   NEUROLOGY: non-focal  SKIN: No rashes or lesions    LABS:                        12.7   11.96 )-----------( 322      ( 28 Nov 2019 05:40 )             42.3     11-28    147<H>  |  108<H>  |  27<H>  ----------------------------<  310<H>  4.0   |  28  |  0.90    Ca    10.4      28 Nov 2019 05:40  Phos  2.6     11-28  Mg     2.0     11-28    TPro  7.0  /  Alb  3.6  /  TBili  0.5  /  DBili  x   /  AST  19  /  ALT  23  /  AlkPhos  96  11-28              RADIOLOGY & ADDITIONAL TESTS:    Imaging Personally Reviewed:    Consultant(s) Notes Reviewed:      Care Discussed with Consultants/Other Providers:

## 2019-11-29 LAB
ALBUMIN SERPL ELPH-MCNC: 3.5 G/DL — SIGNIFICANT CHANGE UP (ref 3.3–5)
ALP SERPL-CCNC: 99 U/L — SIGNIFICANT CHANGE UP (ref 40–120)
ALT FLD-CCNC: 19 U/L — SIGNIFICANT CHANGE UP (ref 4–33)
ANION GAP SERPL CALC-SCNC: 11 MMO/L — SIGNIFICANT CHANGE UP (ref 7–14)
AST SERPL-CCNC: 17 U/L — SIGNIFICANT CHANGE UP (ref 4–32)
BASOPHILS # BLD AUTO: 0.05 K/UL — SIGNIFICANT CHANGE UP (ref 0–0.2)
BASOPHILS NFR BLD AUTO: 0.6 % — SIGNIFICANT CHANGE UP (ref 0–2)
BILIRUB SERPL-MCNC: 0.6 MG/DL — SIGNIFICANT CHANGE UP (ref 0.2–1.2)
BUN SERPL-MCNC: 17 MG/DL — SIGNIFICANT CHANGE UP (ref 7–23)
CALCIUM SERPL-MCNC: 10.5 MG/DL — SIGNIFICANT CHANGE UP (ref 8.4–10.5)
CHLORIDE SERPL-SCNC: 105 MMOL/L — SIGNIFICANT CHANGE UP (ref 98–107)
CO2 SERPL-SCNC: 27 MMOL/L — SIGNIFICANT CHANGE UP (ref 22–31)
CREAT SERPL-MCNC: 0.86 MG/DL — SIGNIFICANT CHANGE UP (ref 0.5–1.3)
EOSINOPHIL # BLD AUTO: 0.22 K/UL — SIGNIFICANT CHANGE UP (ref 0–0.5)
EOSINOPHIL NFR BLD AUTO: 2.5 % — SIGNIFICANT CHANGE UP (ref 0–6)
GLUCOSE SERPL-MCNC: 165 MG/DL — HIGH (ref 70–99)
HCT VFR BLD CALC: 40 % — SIGNIFICANT CHANGE UP (ref 34.5–45)
HGB BLD-MCNC: 12.5 G/DL — SIGNIFICANT CHANGE UP (ref 11.5–15.5)
IMM GRANULOCYTES NFR BLD AUTO: 0.2 % — SIGNIFICANT CHANGE UP (ref 0–1.5)
LYMPHOCYTES # BLD AUTO: 1.8 K/UL — SIGNIFICANT CHANGE UP (ref 1–3.3)
LYMPHOCYTES # BLD AUTO: 20.1 % — SIGNIFICANT CHANGE UP (ref 13–44)
MAGNESIUM SERPL-MCNC: 2 MG/DL — SIGNIFICANT CHANGE UP (ref 1.6–2.6)
MCHC RBC-ENTMCNC: 25.3 PG — LOW (ref 27–34)
MCHC RBC-ENTMCNC: 31.3 % — LOW (ref 32–36)
MCV RBC AUTO: 81 FL — SIGNIFICANT CHANGE UP (ref 80–100)
MONOCYTES # BLD AUTO: 0.54 K/UL — SIGNIFICANT CHANGE UP (ref 0–0.9)
MONOCYTES NFR BLD AUTO: 6 % — SIGNIFICANT CHANGE UP (ref 2–14)
NEUTROPHILS # BLD AUTO: 6.32 K/UL — SIGNIFICANT CHANGE UP (ref 1.8–7.4)
NEUTROPHILS NFR BLD AUTO: 70.6 % — SIGNIFICANT CHANGE UP (ref 43–77)
NRBC # FLD: 0 K/UL — SIGNIFICANT CHANGE UP (ref 0–0)
PHOSPHATE SERPL-MCNC: 3.4 MG/DL — SIGNIFICANT CHANGE UP (ref 2.5–4.5)
PLATELET # BLD AUTO: 272 K/UL — SIGNIFICANT CHANGE UP (ref 150–400)
PMV BLD: 12.3 FL — SIGNIFICANT CHANGE UP (ref 7–13)
POTASSIUM SERPL-MCNC: 4.7 MMOL/L — SIGNIFICANT CHANGE UP (ref 3.5–5.3)
POTASSIUM SERPL-SCNC: 4.7 MMOL/L — SIGNIFICANT CHANGE UP (ref 3.5–5.3)
PROT SERPL-MCNC: 7 G/DL — SIGNIFICANT CHANGE UP (ref 6–8.3)
RBC # BLD: 4.94 M/UL — SIGNIFICANT CHANGE UP (ref 3.8–5.2)
RBC # FLD: 16.8 % — HIGH (ref 10.3–14.5)
SODIUM SERPL-SCNC: 143 MMOL/L — SIGNIFICANT CHANGE UP (ref 135–145)
WBC # BLD: 8.95 K/UL — SIGNIFICANT CHANGE UP (ref 3.8–10.5)
WBC # FLD AUTO: 8.95 K/UL — SIGNIFICANT CHANGE UP (ref 3.8–10.5)

## 2019-11-29 PROCEDURE — 99232 SBSQ HOSP IP/OBS MODERATE 35: CPT

## 2019-11-29 PROCEDURE — 99497 ADVNCD CARE PLAN 30 MIN: CPT

## 2019-11-29 RX ADMIN — Medication 1: at 08:23

## 2019-11-29 RX ADMIN — Medication 4 UNIT(S): at 12:24

## 2019-11-29 RX ADMIN — HALOPERIDOL DECANOATE 1 MILLIGRAM(S): 100 INJECTION INTRAMUSCULAR at 13:30

## 2019-11-29 RX ADMIN — POLYETHYLENE GLYCOL 3350 17 GRAM(S): 17 POWDER, FOR SOLUTION ORAL at 08:23

## 2019-11-29 RX ADMIN — POLYETHYLENE GLYCOL 3350 17 GRAM(S): 17 POWDER, FOR SOLUTION ORAL at 21:02

## 2019-11-29 RX ADMIN — Medication 4 UNIT(S): at 17:12

## 2019-11-29 RX ADMIN — Medication 50 MILLIGRAM(S): at 21:02

## 2019-11-29 RX ADMIN — ENOXAPARIN SODIUM 40 MILLIGRAM(S): 100 INJECTION SUBCUTANEOUS at 12:25

## 2019-11-29 RX ADMIN — INSULIN GLARGINE 10 UNIT(S): 100 INJECTION, SOLUTION SUBCUTANEOUS at 22:58

## 2019-11-29 RX ADMIN — Medication 1: at 17:12

## 2019-11-29 RX ADMIN — HALOPERIDOL DECANOATE 1 MILLIGRAM(S): 100 INJECTION INTRAMUSCULAR at 21:02

## 2019-11-29 RX ADMIN — HALOPERIDOL DECANOATE 1 MILLIGRAM(S): 100 INJECTION INTRAMUSCULAR at 08:23

## 2019-11-29 RX ADMIN — Medication 4 UNIT(S): at 08:23

## 2019-11-29 RX ADMIN — SIMVASTATIN 20 MILLIGRAM(S): 20 TABLET, FILM COATED ORAL at 21:03

## 2019-11-29 RX ADMIN — LITHIUM CARBONATE 300 MILLIGRAM(S): 300 TABLET, EXTENDED RELEASE ORAL at 23:00

## 2019-11-29 RX ADMIN — SENNA PLUS 2 TABLET(S): 8.6 TABLET ORAL at 21:03

## 2019-11-29 NOTE — PROGRESS NOTE ADULT - PROBLEM SELECTOR PLAN 3
- c/w  lithium hs; decreased haldol to 1 TID (hold for sedation)  - stopped Ativan; trazodone PRN  - avoid Ambien/anticholinergics, day/night cues, supportive care  - Prior team has d/w  seems dementia progressing    #GOC/Advanced care planning:  Hospice has evaluated, feels pt would be appropriate.  PT has recommended rehab, advanced care planning with , over 30 minutes spent at bedside, decision made for patient to be discharged with home hospice services including IV D5W @ 50 cc/hr to compensate for decreased po free water intake

## 2019-11-29 NOTE — PROGRESS NOTE ADULT - ASSESSMENT
69F h/o T2DM, schizoaffective disorder, depression, ?dementia (AAOx1 name), legally blind, presenting with altered mental status for 3 days and increased agitation at home, a/f sepsis (inc HR, leukocytosis) 2/2 UTI, metabolic encephalopathy.      #Leukocytosis:  Afebrile without infectious symptoms  s/p treatment for UTI, completed 3 days of IV ceftriaxone, no urine cx was sent  - negative repeat UA, repeat blood cx NTD    #Protein calorie malnutrition:  Patient cachectic with evidence of moderate malnutrition  On glucerna

## 2019-11-29 NOTE — PROGRESS NOTE ADULT - SUBJECTIVE AND OBJECTIVE BOX
[facility] Division of Hospital Medicine  Gris Ortez DO  Pager: [phone]  Other Times:  [phone]    Patient is a 69y old  Female who presents with a chief complaint of AMS, behavioral disturbance (28 Nov 2019 15:53)    SUBJECTIVE / OVERNIGHT EVENTS:  Patient seen denying any complaints.    ADDITIONAL REVIEW OF SYSTEMS:    MEDICATIONS  (STANDING):  dextrose 5%. 1000 milliLiter(s) (50 mL/Hr) IV Continuous <Continuous>  dextrose 5%. 1000 milliLiter(s) (100 mL/Hr) IV Continuous <Continuous>  dextrose 50% Injectable 12.5 Gram(s) IV Push once  dextrose 50% Injectable 25 Gram(s) IV Push once  dextrose 50% Injectable 25 Gram(s) IV Push once  enoxaparin Injectable 40 milliGRAM(s) SubCutaneous daily  haloperidol     Tablet 1 milliGRAM(s) Oral <User Schedule>  haloperidol     Tablet 1 milliGRAM(s) Oral <User Schedule>  insulin glargine Injectable (LANTUS) 10 Unit(s) SubCutaneous at bedtime  insulin lispro (HumaLOG) corrective regimen sliding scale   SubCutaneous three times a day before meals  insulin lispro (HumaLOG) corrective regimen sliding scale   SubCutaneous at bedtime  insulin lispro Injectable (HumaLOG) 4 Unit(s) SubCutaneous three times a day before meals  lithium SR (LITHOBID) 300 milliGRAM(s) Oral at bedtime  polyethylene glycol 3350 17 Gram(s) Oral two times a day  senna 2 Tablet(s) Oral at bedtime  simvastatin 20 milliGRAM(s) Oral at bedtime    MEDICATIONS  (PRN):  dextrose 40% Gel 15 Gram(s) Oral once PRN Blood Glucose LESS THAN 70 milliGRAM(s)/deciliter  glucagon  Injectable 1 milliGRAM(s) IntraMuscular once PRN Glucose LESS THAN 70 milligrams/deciliter  traZODone 50 milliGRAM(s) Oral at bedtime PRN Insomnia      CAPILLARY BLOOD GLUCOSE      POCT Blood Glucose.: 147 mg/dL (29 Nov 2019 11:25)  POCT Blood Glucose.: 171 mg/dL (29 Nov 2019 07:32)  POCT Blood Glucose.: 241 mg/dL (28 Nov 2019 22:17)  POCT Blood Glucose.: 208 mg/dL (28 Nov 2019 16:55)    I&O's Summary    28 Nov 2019 07:01  -  29 Nov 2019 07:00  --------------------------------------------------------  IN: 995 mL / OUT: 1425 mL / NET: -430 mL        PHYSICAL EXAM:  Vital Signs Last 24 Hrs  T(C): 37.1 (29 Nov 2019 06:30), Max: 37.8 (28 Nov 2019 20:34)  T(F): 98.8 (29 Nov 2019 06:30), Max: 100.1 (28 Nov 2019 20:34)  HR: 80 (29 Nov 2019 06:30) (80 - 91)  BP: 130/90 (29 Nov 2019 06:30) (130/90 - 131/82)  BP(mean): --  RR: 17 (29 Nov 2019 06:30) (17 - 18)  SpO2: 99% (29 Nov 2019 06:30) (99% - 99%)  PHYSICAL EXAM:  GENERAL: NAD  HEAD:  Atraumatic, Normocephalic  EYES: conjunctiva and sclera clear  NECK: No JVD  CHEST/LUNG: Clear to auscultation bilaterally; No wheeze  HEART: s1 s2, regular rhythm and rate   ABDOMEN: Soft, Nontender, Nondistended; Bowel sounds present  EXTREMITIES:  2+ Peripheral Pulses, No clubbing, cyanosis, or edema  PSYCH: calm   NEUROLOGY: non-focal  SKIN: No rashes or lesions    LABS:                        12.5   8.95  )-----------( 272      ( 29 Nov 2019 05:57 )             40.0     11-29    143  |  105  |  17  ----------------------------<  165<H>  4.7   |  27  |  0.86    Ca    10.5      29 Nov 2019 05:57  Phos  3.4     11-29  Mg     2.0     11-29    TPro  7.0  /  Alb  3.5  /  TBili  0.6  /  DBili  x   /  AST  17  /  ALT  19  /  AlkPhos  99  11-29              Culture - Blood (collected 26 Nov 2019 16:30)  Source: BLOOD PERIPHERAL  Preliminary Report (28 Nov 2019 16:30):    NO ORGANISMS ISOLATED    NO ORGANISMS ISOLATED AT 48 HRS.    Culture - Blood (collected 26 Nov 2019 16:23)  Source: BLOOD PERIPHERAL  Preliminary Report (28 Nov 2019 16:23):    NO ORGANISMS ISOLATED    NO ORGANISMS ISOLATED AT 48 HRS.        RADIOLOGY & ADDITIONAL TESTS:  Results Reviewed:   Imaging Personally Reviewed:  Electrocardiogram Personally Reviewed:    COORDINATION OF CARE:  Care Discussed with Consultants/Other Providers [Y/N]:  Prior or Outpatient Records Reviewed [Y/N]:

## 2019-11-29 NOTE — PROGRESS NOTE ADULT - PROBLEM SELECTOR PLAN 1
Recurrent hypernatremia when decrease IVFs, encourage PO fluids, hopefully PO will improve as adjust meds and becomes more alert  c/w IV D5W, monitor Na  - continue to encourage po free water  -  Li level, low at .57  - Will monitor I&Os

## 2019-11-30 LAB
ANION GAP SERPL CALC-SCNC: 12 MMO/L — SIGNIFICANT CHANGE UP (ref 7–14)
BUN SERPL-MCNC: 19 MG/DL — SIGNIFICANT CHANGE UP (ref 7–23)
CALCIUM SERPL-MCNC: 10.8 MG/DL — HIGH (ref 8.4–10.5)
CHLORIDE SERPL-SCNC: 110 MMOL/L — HIGH (ref 98–107)
CO2 SERPL-SCNC: 26 MMOL/L — SIGNIFICANT CHANGE UP (ref 22–31)
CREAT SERPL-MCNC: 0.94 MG/DL — SIGNIFICANT CHANGE UP (ref 0.5–1.3)
GLUCOSE SERPL-MCNC: 196 MG/DL — HIGH (ref 70–99)
HCT VFR BLD CALC: 41.1 % — SIGNIFICANT CHANGE UP (ref 34.5–45)
HGB BLD-MCNC: 12.7 G/DL — SIGNIFICANT CHANGE UP (ref 11.5–15.5)
MAGNESIUM SERPL-MCNC: 2.2 MG/DL — SIGNIFICANT CHANGE UP (ref 1.6–2.6)
MCHC RBC-ENTMCNC: 25.1 PG — LOW (ref 27–34)
MCHC RBC-ENTMCNC: 30.9 % — LOW (ref 32–36)
MCV RBC AUTO: 81.4 FL — SIGNIFICANT CHANGE UP (ref 80–100)
NRBC # FLD: 0 K/UL — SIGNIFICANT CHANGE UP (ref 0–0)
PHOSPHATE SERPL-MCNC: 3.1 MG/DL — SIGNIFICANT CHANGE UP (ref 2.5–4.5)
PLATELET # BLD AUTO: 351 K/UL — SIGNIFICANT CHANGE UP (ref 150–400)
PMV BLD: 11.1 FL — SIGNIFICANT CHANGE UP (ref 7–13)
POTASSIUM SERPL-MCNC: 3.9 MMOL/L — SIGNIFICANT CHANGE UP (ref 3.5–5.3)
POTASSIUM SERPL-SCNC: 3.9 MMOL/L — SIGNIFICANT CHANGE UP (ref 3.5–5.3)
RBC # BLD: 5.05 M/UL — SIGNIFICANT CHANGE UP (ref 3.8–5.2)
RBC # FLD: 16.9 % — HIGH (ref 10.3–14.5)
SODIUM SERPL-SCNC: 148 MMOL/L — HIGH (ref 135–145)
WBC # BLD: 8.03 K/UL — SIGNIFICANT CHANGE UP (ref 3.8–10.5)
WBC # FLD AUTO: 8.03 K/UL — SIGNIFICANT CHANGE UP (ref 3.8–10.5)

## 2019-11-30 PROCEDURE — 99232 SBSQ HOSP IP/OBS MODERATE 35: CPT

## 2019-11-30 RX ORDER — SODIUM CHLORIDE 9 MG/ML
1000 INJECTION, SOLUTION INTRAVENOUS
Refills: 0 | Status: DISCONTINUED | OUTPATIENT
Start: 2019-11-30 | End: 2019-12-01

## 2019-11-30 RX ADMIN — LITHIUM CARBONATE 300 MILLIGRAM(S): 300 TABLET, EXTENDED RELEASE ORAL at 21:55

## 2019-11-30 RX ADMIN — HALOPERIDOL DECANOATE 1 MILLIGRAM(S): 100 INJECTION INTRAMUSCULAR at 13:34

## 2019-11-30 RX ADMIN — Medication 4 UNIT(S): at 16:59

## 2019-11-30 RX ADMIN — SIMVASTATIN 20 MILLIGRAM(S): 20 TABLET, FILM COATED ORAL at 21:55

## 2019-11-30 RX ADMIN — SODIUM CHLORIDE 125 MILLILITER(S): 9 INJECTION, SOLUTION INTRAVENOUS at 19:34

## 2019-11-30 RX ADMIN — POLYETHYLENE GLYCOL 3350 17 GRAM(S): 17 POWDER, FOR SOLUTION ORAL at 04:40

## 2019-11-30 RX ADMIN — ENOXAPARIN SODIUM 40 MILLIGRAM(S): 100 INJECTION SUBCUTANEOUS at 13:34

## 2019-11-30 RX ADMIN — INSULIN GLARGINE 10 UNIT(S): 100 INJECTION, SOLUTION SUBCUTANEOUS at 21:55

## 2019-11-30 RX ADMIN — HALOPERIDOL DECANOATE 1 MILLIGRAM(S): 100 INJECTION INTRAMUSCULAR at 07:10

## 2019-11-30 RX ADMIN — POLYETHYLENE GLYCOL 3350 17 GRAM(S): 17 POWDER, FOR SOLUTION ORAL at 17:00

## 2019-11-30 RX ADMIN — Medication 1: at 16:59

## 2019-11-30 RX ADMIN — HALOPERIDOL DECANOATE 1 MILLIGRAM(S): 100 INJECTION INTRAMUSCULAR at 19:23

## 2019-11-30 RX ADMIN — SENNA PLUS 2 TABLET(S): 8.6 TABLET ORAL at 21:55

## 2019-11-30 NOTE — PROGRESS NOTE ADULT - PROBLEM SELECTOR PLAN 1
Recurrent hypernatremia when decrease IVFs, encourage PO fluids, discussed with , this is an unfortunate reality of her progressive dementia, ok to go to hospice with IV D5W @ 50 cc/hr (max rate they can do)  Increase rate of IV D5W to 125 cc/hr, monitor Na  - continue to encourage po free water  -  Li level, low at .57  - Will monitor I&Os

## 2019-11-30 NOTE — ED PROVIDER NOTE - NS ED ATTENDING STATEMENT MOD
pt c/o "stabbing" pains R-side of chest since noon,  pt took ASA 81mg at 5pm.  pt also c/o headache.  (PMH- HTN, GERD) Attending Only

## 2019-11-30 NOTE — PROGRESS NOTE ADULT - SUBJECTIVE AND OBJECTIVE BOX
[facility] Division of Hospital Medicine  Gris Ortez DO  Pager: [phone]  Other Times:  [phone]    Patient is a 69y old  Female who presents with a chief complaint of AMS, behavioral disturbance (29 Nov 2019 12:47)      SUBJECTIVE / OVERNIGHT EVENTS:  ADDITIONAL REVIEW OF SYSTEMS:    MEDICATIONS  (STANDING):  dextrose 5%. 1000 milliLiter(s) (50 mL/Hr) IV Continuous <Continuous>  dextrose 5%. 1000 milliLiter(s) (125 mL/Hr) IV Continuous <Continuous>  dextrose 50% Injectable 12.5 Gram(s) IV Push once  dextrose 50% Injectable 25 Gram(s) IV Push once  dextrose 50% Injectable 25 Gram(s) IV Push once  enoxaparin Injectable 40 milliGRAM(s) SubCutaneous daily  haloperidol     Tablet 1 milliGRAM(s) Oral <User Schedule>  haloperidol     Tablet 1 milliGRAM(s) Oral <User Schedule>  insulin glargine Injectable (LANTUS) 10 Unit(s) SubCutaneous at bedtime  insulin lispro (HumaLOG) corrective regimen sliding scale   SubCutaneous three times a day before meals  insulin lispro (HumaLOG) corrective regimen sliding scale   SubCutaneous at bedtime  insulin lispro Injectable (HumaLOG) 4 Unit(s) SubCutaneous three times a day before meals  lithium SR (LITHOBID) 300 milliGRAM(s) Oral at bedtime  polyethylene glycol 3350 17 Gram(s) Oral two times a day  senna 2 Tablet(s) Oral at bedtime  simvastatin 20 milliGRAM(s) Oral at bedtime    MEDICATIONS  (PRN):  dextrose 40% Gel 15 Gram(s) Oral once PRN Blood Glucose LESS THAN 70 milliGRAM(s)/deciliter  glucagon  Injectable 1 milliGRAM(s) IntraMuscular once PRN Glucose LESS THAN 70 milligrams/deciliter  traZODone 50 milliGRAM(s) Oral at bedtime PRN Insomnia      CAPILLARY BLOOD GLUCOSE      POCT Blood Glucose.: 178 mg/dL (30 Nov 2019 11:19)  POCT Blood Glucose.: 179 mg/dL (30 Nov 2019 07:23)  POCT Blood Glucose.: 206 mg/dL (29 Nov 2019 22:16)  POCT Blood Glucose.: 185 mg/dL (29 Nov 2019 16:57)    I&O's Summary    29 Nov 2019 07:01  -  30 Nov 2019 07:00  --------------------------------------------------------  IN: 0 mL / OUT: 900 mL / NET: -900 mL        PHYSICAL EXAM:  Vital Signs Last 24 Hrs  T(C): 36.4 (30 Nov 2019 05:01), Max: 36.4 (29 Nov 2019 21:00)  T(F): 97.6 (30 Nov 2019 05:01), Max: 97.6 (30 Nov 2019 05:01)  HR: 64 (30 Nov 2019 05:01) (63 - 80)  BP: 110/70 (30 Nov 2019 05:01) (100/58 - 110/70)  BP(mean): --  RR: 17 (30 Nov 2019 05:01) (16 - 18)  SpO2: 100% (30 Nov 2019 05:01) (100% - 100%)  CONSTITUTIONAL: NAD, well-developed, well-groomed  EYES: PERRLA; conjunctiva and sclera clear  ENMT: Moist oral mucosa, no pharyngeal injection or exudates; normal dentition  NECK: Supple, no palpable masses; no thyromegaly  RESPIRATORY: Normal respiratory effort; lungs are clear to auscultation bilaterally  CARDIOVASCULAR: Regular rate and rhythm, normal S1 and S2, no murmur/rub/gallop; No lower extremity edema; Peripheral pulses are 2+ bilaterally  ABDOMEN: Nontender to palpation, normoactive bowel sounds, no rebound/guarding; No hepatosplenomegaly  MUSCLOSKELETAL:  Normal gait; no clubbing or cyanosis of digits; no joint swelling or tenderness to palpation  PSYCH: A+O to person, place, and time; affect appropriate  NEUROLOGY: CN 2-12 are intact and symmetric; no gross sensory deficits;   SKIN: No rashes; no palpable lesions    LABS:                        12.7   8.03  )-----------( 351      ( 30 Nov 2019 04:58 )             41.1     11-30    148<H>  |  110<H>  |  19  ----------------------------<  196<H>  3.9   |  26  |  0.94    Ca    10.8<H>      30 Nov 2019 04:58  Phos  3.1     11-30  Mg     2.2     11-30    TPro  7.0  /  Alb  3.5  /  TBili  0.6  /  DBili  x   /  AST  17  /  ALT  19  /  AlkPhos  99  11-29                RADIOLOGY & ADDITIONAL TESTS:  Results Reviewed:   Imaging Personally Reviewed:  Electrocardiogram Personally Reviewed:    COORDINATION OF CARE:  Care Discussed with Consultants/Other Providers [Y/N]:  Prior or Outpatient Records Reviewed [Y/N]: [facility] Division of Hospital Medicine  Gris Ortez DO  Pager: [phone]  Other Times:  [phone]    Patient is a 69y old  Female who presents with a chief complaint of AMS, behavioral disturbance (29 Nov 2019 12:47)    SUBJECTIVE / OVERNIGHT EVENTS:  Patient seen denying any complaints.    ADDITIONAL REVIEW OF SYSTEMS:    MEDICATIONS  (STANDING):  dextrose 5%. 1000 milliLiter(s) (50 mL/Hr) IV Continuous <Continuous>  dextrose 5%. 1000 milliLiter(s) (125 mL/Hr) IV Continuous <Continuous>  dextrose 50% Injectable 12.5 Gram(s) IV Push once  dextrose 50% Injectable 25 Gram(s) IV Push once  dextrose 50% Injectable 25 Gram(s) IV Push once  enoxaparin Injectable 40 milliGRAM(s) SubCutaneous daily  haloperidol     Tablet 1 milliGRAM(s) Oral <User Schedule>  haloperidol     Tablet 1 milliGRAM(s) Oral <User Schedule>  insulin glargine Injectable (LANTUS) 10 Unit(s) SubCutaneous at bedtime  insulin lispro (HumaLOG) corrective regimen sliding scale   SubCutaneous three times a day before meals  insulin lispro (HumaLOG) corrective regimen sliding scale   SubCutaneous at bedtime  insulin lispro Injectable (HumaLOG) 4 Unit(s) SubCutaneous three times a day before meals  lithium SR (LITHOBID) 300 milliGRAM(s) Oral at bedtime  polyethylene glycol 3350 17 Gram(s) Oral two times a day  senna 2 Tablet(s) Oral at bedtime  simvastatin 20 milliGRAM(s) Oral at bedtime    MEDICATIONS  (PRN):  dextrose 40% Gel 15 Gram(s) Oral once PRN Blood Glucose LESS THAN 70 milliGRAM(s)/deciliter  glucagon  Injectable 1 milliGRAM(s) IntraMuscular once PRN Glucose LESS THAN 70 milligrams/deciliter  traZODone 50 milliGRAM(s) Oral at bedtime PRN Insomnia      CAPILLARY BLOOD GLUCOSE      POCT Blood Glucose.: 178 mg/dL (30 Nov 2019 11:19)  POCT Blood Glucose.: 179 mg/dL (30 Nov 2019 07:23)  POCT Blood Glucose.: 206 mg/dL (29 Nov 2019 22:16)  POCT Blood Glucose.: 185 mg/dL (29 Nov 2019 16:57)    I&O's Summary    29 Nov 2019 07:01  -  30 Nov 2019 07:00  --------------------------------------------------------  IN: 0 mL / OUT: 900 mL / NET: -900 mL        PHYSICAL EXAM:  Vital Signs Last 24 Hrs  T(C): 36.4 (30 Nov 2019 05:01), Max: 36.4 (29 Nov 2019 21:00)  T(F): 97.6 (30 Nov 2019 05:01), Max: 97.6 (30 Nov 2019 05:01)  HR: 64 (30 Nov 2019 05:01) (63 - 80)  BP: 110/70 (30 Nov 2019 05:01) (100/58 - 110/70)  BP(mean): --  RR: 17 (30 Nov 2019 05:01) (16 - 18)  SpO2: 100% (30 Nov 2019 05:01) (100% - 100%)  GENERAL: NAD, lethargic but responds appropriately   HEAD:  Atraumatic, Normocephalic  EYES: conjunctiva and sclera clear  NECK: No JVD  CHEST/LUNG: Clear to auscultation bilaterally; No wheeze  HEART: s1 s2, regular rhythm and rate   ABDOMEN: Soft, Nontender, Nondistended; Bowel sounds present  EXTREMITIES:  2+ Peripheral Pulses, No clubbing, cyanosis, or edema  PSYCH: calm   NEUROLOGY: non-focal  SKIN: No rashes or lesions    LABS:                        12.7   8.03  )-----------( 351      ( 30 Nov 2019 04:58 )             41.1     11-30    148<H>  |  110<H>  |  19  ----------------------------<  196<H>  3.9   |  26  |  0.94    Ca    10.8<H>      30 Nov 2019 04:58  Phos  3.1     11-30  Mg     2.2     11-30    TPro  7.0  /  Alb  3.5  /  TBili  0.6  /  DBili  x   /  AST  17  /  ALT  19  /  AlkPhos  99  11-29                RADIOLOGY & ADDITIONAL TESTS:  Results Reviewed:   Imaging Personally Reviewed:  Electrocardiogram Personally Reviewed:    COORDINATION OF CARE:  Care Discussed with Consultants/Other Providers [Y/N]:  Prior or Outpatient Records Reviewed [Y/N]:

## 2019-12-01 LAB
ANION GAP SERPL CALC-SCNC: 10 MMO/L — SIGNIFICANT CHANGE UP (ref 7–14)
BACTERIA BLD CULT: SIGNIFICANT CHANGE UP
BACTERIA BLD CULT: SIGNIFICANT CHANGE UP
BUN SERPL-MCNC: 23 MG/DL — SIGNIFICANT CHANGE UP (ref 7–23)
CALCIUM SERPL-MCNC: 9.7 MG/DL — SIGNIFICANT CHANGE UP (ref 8.4–10.5)
CHLORIDE SERPL-SCNC: 106 MMOL/L — SIGNIFICANT CHANGE UP (ref 98–107)
CO2 SERPL-SCNC: 25 MMOL/L — SIGNIFICANT CHANGE UP (ref 22–31)
CREAT SERPL-MCNC: 0.97 MG/DL — SIGNIFICANT CHANGE UP (ref 0.5–1.3)
GLUCOSE SERPL-MCNC: 202 MG/DL — HIGH (ref 70–99)
HCT VFR BLD CALC: 38.8 % — SIGNIFICANT CHANGE UP (ref 34.5–45)
HGB BLD-MCNC: 12.3 G/DL — SIGNIFICANT CHANGE UP (ref 11.5–15.5)
MAGNESIUM SERPL-MCNC: 1.9 MG/DL — SIGNIFICANT CHANGE UP (ref 1.6–2.6)
MCHC RBC-ENTMCNC: 25.8 PG — LOW (ref 27–34)
MCHC RBC-ENTMCNC: 31.7 % — LOW (ref 32–36)
MCV RBC AUTO: 81.3 FL — SIGNIFICANT CHANGE UP (ref 80–100)
NRBC # FLD: 0 K/UL — SIGNIFICANT CHANGE UP (ref 0–0)
PHOSPHATE SERPL-MCNC: 2.9 MG/DL — SIGNIFICANT CHANGE UP (ref 2.5–4.5)
PLATELET # BLD AUTO: 297 K/UL — SIGNIFICANT CHANGE UP (ref 150–400)
PMV BLD: 11.1 FL — SIGNIFICANT CHANGE UP (ref 7–13)
POTASSIUM SERPL-MCNC: 4 MMOL/L — SIGNIFICANT CHANGE UP (ref 3.5–5.3)
POTASSIUM SERPL-SCNC: 4 MMOL/L — SIGNIFICANT CHANGE UP (ref 3.5–5.3)
RBC # BLD: 4.77 M/UL — SIGNIFICANT CHANGE UP (ref 3.8–5.2)
RBC # FLD: 16.8 % — HIGH (ref 10.3–14.5)
SODIUM SERPL-SCNC: 141 MMOL/L — SIGNIFICANT CHANGE UP (ref 135–145)
WBC # BLD: 7.67 K/UL — SIGNIFICANT CHANGE UP (ref 3.8–10.5)
WBC # FLD AUTO: 7.67 K/UL — SIGNIFICANT CHANGE UP (ref 3.8–10.5)

## 2019-12-01 PROCEDURE — 99232 SBSQ HOSP IP/OBS MODERATE 35: CPT

## 2019-12-01 RX ORDER — SODIUM CHLORIDE 9 MG/ML
1000 INJECTION, SOLUTION INTRAVENOUS
Refills: 0 | Status: DISCONTINUED | OUTPATIENT
Start: 2019-12-01 | End: 2019-12-03

## 2019-12-01 RX ADMIN — SODIUM CHLORIDE 100 MILLILITER(S): 9 INJECTION, SOLUTION INTRAVENOUS at 11:52

## 2019-12-01 RX ADMIN — INSULIN GLARGINE 10 UNIT(S): 100 INJECTION, SOLUTION SUBCUTANEOUS at 21:37

## 2019-12-01 RX ADMIN — Medication 4 UNIT(S): at 17:03

## 2019-12-01 RX ADMIN — LITHIUM CARBONATE 300 MILLIGRAM(S): 300 TABLET, EXTENDED RELEASE ORAL at 21:35

## 2019-12-01 RX ADMIN — SODIUM CHLORIDE 100 MILLILITER(S): 9 INJECTION, SOLUTION INTRAVENOUS at 21:35

## 2019-12-01 RX ADMIN — Medication 4: at 17:02

## 2019-12-01 RX ADMIN — HALOPERIDOL DECANOATE 1 MILLIGRAM(S): 100 INJECTION INTRAMUSCULAR at 21:35

## 2019-12-01 RX ADMIN — SIMVASTATIN 20 MILLIGRAM(S): 20 TABLET, FILM COATED ORAL at 21:35

## 2019-12-01 RX ADMIN — POLYETHYLENE GLYCOL 3350 17 GRAM(S): 17 POWDER, FOR SOLUTION ORAL at 05:05

## 2019-12-01 RX ADMIN — ENOXAPARIN SODIUM 40 MILLIGRAM(S): 100 INJECTION SUBCUTANEOUS at 11:52

## 2019-12-01 NOTE — PROGRESS NOTE ADULT - SUBJECTIVE AND OBJECTIVE BOX
[facility] Division of Hospital Medicine  Gris Ortez DO  Pager: [phone]  Other Times:  [phone]    Patient is a 69y old  Female who presents with a chief complaint of AMS, behavioral disturbance (30 Nov 2019 11:54)    SUBJECTIVE / OVERNIGHT EVENTS:  Patient seen denying any complaints.     ADDITIONAL REVIEW OF SYSTEMS:    MEDICATIONS  (STANDING):  dextrose 5%. 1000 milliLiter(s) (50 mL/Hr) IV Continuous <Continuous>  dextrose 5%. 1000 milliLiter(s) (100 mL/Hr) IV Continuous <Continuous>  dextrose 50% Injectable 12.5 Gram(s) IV Push once  dextrose 50% Injectable 25 Gram(s) IV Push once  dextrose 50% Injectable 25 Gram(s) IV Push once  enoxaparin Injectable 40 milliGRAM(s) SubCutaneous daily  haloperidol     Tablet 1 milliGRAM(s) Oral <User Schedule>  haloperidol     Tablet 1 milliGRAM(s) Oral <User Schedule>  insulin glargine Injectable (LANTUS) 10 Unit(s) SubCutaneous at bedtime  insulin lispro (HumaLOG) corrective regimen sliding scale   SubCutaneous three times a day before meals  insulin lispro (HumaLOG) corrective regimen sliding scale   SubCutaneous at bedtime  insulin lispro Injectable (HumaLOG) 4 Unit(s) SubCutaneous three times a day before meals  lithium SR (LITHOBID) 300 milliGRAM(s) Oral at bedtime  polyethylene glycol 3350 17 Gram(s) Oral two times a day  senna 2 Tablet(s) Oral at bedtime  simvastatin 20 milliGRAM(s) Oral at bedtime    MEDICATIONS  (PRN):  dextrose 40% Gel 15 Gram(s) Oral once PRN Blood Glucose LESS THAN 70 milliGRAM(s)/deciliter  glucagon  Injectable 1 milliGRAM(s) IntraMuscular once PRN Glucose LESS THAN 70 milligrams/deciliter  traZODone 50 milliGRAM(s) Oral at bedtime PRN Insomnia      CAPILLARY BLOOD GLUCOSE      POCT Blood Glucose.: 147 mg/dL (01 Dec 2019 11:38)  POCT Blood Glucose.: 152 mg/dL (01 Dec 2019 07:43)  POCT Blood Glucose.: 189 mg/dL (30 Nov 2019 21:43)  POCT Blood Glucose.: 195 mg/dL (30 Nov 2019 16:38)    I&O's Summary    30 Nov 2019 07:01  -  01 Dec 2019 07:00  --------------------------------------------------------  IN: 1500 mL / OUT: 1800 mL / NET: -300 mL        PHYSICAL EXAM:  Vital Signs Last 24 Hrs  T(C): 36.5 (01 Dec 2019 05:03), Max: 36.6 (30 Nov 2019 19:52)  T(F): 97.7 (01 Dec 2019 05:03), Max: 97.8 (30 Nov 2019 19:52)  HR: 60 (01 Dec 2019 05:03) (60 - 83)  BP: 109/61 (01 Dec 2019 05:03) (109/61 - 138/80)  BP(mean): --  RR: 18 (01 Dec 2019 05:03) (17 - 18)  SpO2: 100% (01 Dec 2019 05:03) (100% - 100%)  GENERAL: NAD, lethargic but responds appropriately   HEAD:  Atraumatic, Normocephalic  EYES: conjunctiva and sclera clear  NECK: No JVD  CHEST/LUNG: Clear to auscultation bilaterally; No wheeze  HEART: s1 s2, regular rhythm and rate   ABDOMEN: Soft, Nontender, Nondistended; Bowel sounds present  EXTREMITIES:  2+ Peripheral Pulses, No clubbing, cyanosis, or edema  PSYCH: calm   NEUROLOGY: non-focal  SKIN: No rashes or lesions    LABS:                        12.3   7.67  )-----------( 297      ( 01 Dec 2019 05:50 )             38.8     12-01    141  |  106  |  23  ----------------------------<  202<H>  4.0   |  25  |  0.97    Ca    9.7      01 Dec 2019 05:50  Phos  2.9     12-01  Mg     1.9     12-01                  RADIOLOGY & ADDITIONAL TESTS:  Results Reviewed:   Imaging Personally Reviewed:  Electrocardiogram Personally Reviewed:    COORDINATION OF CARE:  Care Discussed with Consultants/Other Providers [Y/N]:  Prior or Outpatient Records Reviewed [Y/N]:

## 2019-12-01 NOTE — PROGRESS NOTE ADULT - PROBLEM SELECTOR PLAN 1
Recurrent hypernatremia when decrease IVFs, encourage PO fluids, discussed with , this is an unfortunate reality of her progressive dementia, ok to go to hospice with IV D5W @ 50 cc/hr (max rate they can do)  Continue IV D5W, monitor Na  - continue to encourage po free water  -  Li level, low at .57  - Will monitor I&Os

## 2019-12-02 LAB
ANION GAP SERPL CALC-SCNC: 11 MMO/L — SIGNIFICANT CHANGE UP (ref 7–14)
BUN SERPL-MCNC: 21 MG/DL — SIGNIFICANT CHANGE UP (ref 7–23)
CALCIUM SERPL-MCNC: 9.9 MG/DL — SIGNIFICANT CHANGE UP (ref 8.4–10.5)
CHLORIDE SERPL-SCNC: 103 MMOL/L — SIGNIFICANT CHANGE UP (ref 98–107)
CO2 SERPL-SCNC: 24 MMOL/L — SIGNIFICANT CHANGE UP (ref 22–31)
CREAT SERPL-MCNC: 0.92 MG/DL — SIGNIFICANT CHANGE UP (ref 0.5–1.3)
GLUCOSE BLDC GLUCOMTR-MCNC: 194 MG/DL — HIGH (ref 70–99)
GLUCOSE BLDC GLUCOMTR-MCNC: 195 MG/DL — HIGH (ref 70–99)
GLUCOSE SERPL-MCNC: 223 MG/DL — HIGH (ref 70–99)
HCT VFR BLD CALC: 38.8 % — SIGNIFICANT CHANGE UP (ref 34.5–45)
HGB BLD-MCNC: 11.8 G/DL — SIGNIFICANT CHANGE UP (ref 11.5–15.5)
MAGNESIUM SERPL-MCNC: 1.9 MG/DL — SIGNIFICANT CHANGE UP (ref 1.6–2.6)
MCHC RBC-ENTMCNC: 24.5 PG — LOW (ref 27–34)
MCHC RBC-ENTMCNC: 30.4 % — LOW (ref 32–36)
MCV RBC AUTO: 80.7 FL — SIGNIFICANT CHANGE UP (ref 80–100)
NRBC # FLD: 0 K/UL — SIGNIFICANT CHANGE UP (ref 0–0)
PHOSPHATE SERPL-MCNC: 2.1 MG/DL — LOW (ref 2.5–4.5)
PLATELET # BLD AUTO: 307 K/UL — SIGNIFICANT CHANGE UP (ref 150–400)
PMV BLD: 11.5 FL — SIGNIFICANT CHANGE UP (ref 7–13)
POTASSIUM SERPL-MCNC: 3.7 MMOL/L — SIGNIFICANT CHANGE UP (ref 3.5–5.3)
POTASSIUM SERPL-SCNC: 3.7 MMOL/L — SIGNIFICANT CHANGE UP (ref 3.5–5.3)
RBC # BLD: 4.81 M/UL — SIGNIFICANT CHANGE UP (ref 3.8–5.2)
RBC # FLD: 16.4 % — HIGH (ref 10.3–14.5)
SODIUM SERPL-SCNC: 138 MMOL/L — SIGNIFICANT CHANGE UP (ref 135–145)
WBC # BLD: 9.58 K/UL — SIGNIFICANT CHANGE UP (ref 3.8–10.5)
WBC # FLD AUTO: 9.58 K/UL — SIGNIFICANT CHANGE UP (ref 3.8–10.5)

## 2019-12-02 PROCEDURE — 99233 SBSQ HOSP IP/OBS HIGH 50: CPT

## 2019-12-02 RX ADMIN — POLYETHYLENE GLYCOL 3350 17 GRAM(S): 17 POWDER, FOR SOLUTION ORAL at 16:49

## 2019-12-02 RX ADMIN — Medication 1: at 08:14

## 2019-12-02 RX ADMIN — SENNA PLUS 2 TABLET(S): 8.6 TABLET ORAL at 21:37

## 2019-12-02 RX ADMIN — Medication 1: at 11:37

## 2019-12-02 RX ADMIN — HALOPERIDOL DECANOATE 1 MILLIGRAM(S): 100 INJECTION INTRAMUSCULAR at 12:44

## 2019-12-02 RX ADMIN — Medication 4 UNIT(S): at 08:14

## 2019-12-02 RX ADMIN — SIMVASTATIN 20 MILLIGRAM(S): 20 TABLET, FILM COATED ORAL at 21:37

## 2019-12-02 RX ADMIN — HALOPERIDOL DECANOATE 1 MILLIGRAM(S): 100 INJECTION INTRAMUSCULAR at 21:37

## 2019-12-02 RX ADMIN — Medication 4 UNIT(S): at 16:50

## 2019-12-02 RX ADMIN — Medication 1: at 16:50

## 2019-12-02 RX ADMIN — Medication 4 UNIT(S): at 11:37

## 2019-12-02 RX ADMIN — LITHIUM CARBONATE 300 MILLIGRAM(S): 300 TABLET, EXTENDED RELEASE ORAL at 21:37

## 2019-12-02 RX ADMIN — HALOPERIDOL DECANOATE 1 MILLIGRAM(S): 100 INJECTION INTRAMUSCULAR at 08:14

## 2019-12-02 RX ADMIN — INSULIN GLARGINE 10 UNIT(S): 100 INJECTION, SOLUTION SUBCUTANEOUS at 21:37

## 2019-12-02 RX ADMIN — ENOXAPARIN SODIUM 40 MILLIGRAM(S): 100 INJECTION SUBCUTANEOUS at 12:44

## 2019-12-02 NOTE — PROGRESS NOTE ADULT - PROBLEM SELECTOR PLAN 9
-improve score 2, dvt ppx: lovenox   diet: Protein calorie malnutrition: Patient cachectic with evidence of moderate malnutrition. On glucerna  dispo: home hospice

## 2019-12-02 NOTE — PROGRESS NOTE ADULT - PROBLEM SELECTOR PLAN 1
Recurrent hypernatremia when IVF is stopped or decreased, encourage PO fluids, discussed with , this is an unfortunate reality of her progressive dementia, ok to go to hospice with IV D5W @ 50 cc/hr (max rate they can do)  Continue IV D5W, monitor Na  - continue to encourage po free water  -  Li level, low at .57  - Will monitor I&Os

## 2019-12-02 NOTE — GOALS OF CARE CONVERSATION - ADVANCED CARE PLANNING - CONVERSATION DETAILS
[facility]    Met w/ pt's spouse Lauri. He is in agreement with a home hospice plan of care. He signed consents today.     DME order (bed, air mattress, walker and commode) placed for delivery Tues 12/3/19.     HCN RN following for care planning.
[facility]    [facility]N RN met with pt's spouse. Hospice evaluation in progress. Folder of information provided.     HCN RN continues to follow

## 2019-12-02 NOTE — PROGRESS NOTE ADULT - PROBLEM SELECTOR PLAN 3
- c/w  lithium hs; decreased haldol to 1 TID (hold for sedation)  - stopped Ativan; trazodone PRN  - avoid Ambien/anticholinergics, day/night cues, supportive care  - Prior team has d/w  seems dementia progressing    #GOC/Advanced care planning:  Hospice has evaluated, feels pt would be appropriate.  PT has recommended rehab, decision made for patient to be discharged with home hospice services including IV D5W @ 50 cc/hr to compensate for decreased po free water intake

## 2019-12-02 NOTE — PROGRESS NOTE ADULT - SUBJECTIVE AND OBJECTIVE BOX
Patient is a 69y old  Female who presents with a chief complaint of AMS, behavioral disturbance (30 Nov 2019 11:54)    SUBJECTIVE / OVERNIGHT EVENTS: No overnight events. Sleeping during my evaluation but arousable. States that she feels well.       ADDITIONAL REVIEW OF SYSTEMS:    MEDICATIONS  (STANDING):  dextrose 5%. 1000 milliLiter(s) (50 mL/Hr) IV Continuous <Continuous>  dextrose 5%. 1000 milliLiter(s) (100 mL/Hr) IV Continuous <Continuous>  dextrose 50% Injectable 12.5 Gram(s) IV Push once  dextrose 50% Injectable 25 Gram(s) IV Push once  dextrose 50% Injectable 25 Gram(s) IV Push once  enoxaparin Injectable 40 milliGRAM(s) SubCutaneous daily  haloperidol     Tablet 1 milliGRAM(s) Oral <User Schedule>  haloperidol     Tablet 1 milliGRAM(s) Oral <User Schedule>  insulin glargine Injectable (LANTUS) 10 Unit(s) SubCutaneous at bedtime  insulin lispro (HumaLOG) corrective regimen sliding scale   SubCutaneous three times a day before meals  insulin lispro (HumaLOG) corrective regimen sliding scale   SubCutaneous at bedtime  insulin lispro Injectable (HumaLOG) 4 Unit(s) SubCutaneous three times a day before meals  lithium SR (LITHOBID) 300 milliGRAM(s) Oral at bedtime  polyethylene glycol 3350 17 Gram(s) Oral two times a day  senna 2 Tablet(s) Oral at bedtime  simvastatin 20 milliGRAM(s) Oral at bedtime    MEDICATIONS  (PRN):  dextrose 40% Gel 15 Gram(s) Oral once PRN Blood Glucose LESS THAN 70 milliGRAM(s)/deciliter  glucagon  Injectable 1 milliGRAM(s) IntraMuscular once PRN Glucose LESS THAN 70 milligrams/deciliter  traZODone 50 milliGRAM(s) Oral at bedtime PRN Insomnia    CAPILLARY BLOOD GLUCOSE      POCT Blood Glucose.: 167 mg/dL (02 Dec 2019 11:27)  POCT Blood Glucose.: 199 mg/dL (02 Dec 2019 07:30)  POCT Blood Glucose.: 147 mg/dL (01 Dec 2019 21:22)  POCT Blood Glucose.: 302 mg/dL (01 Dec 2019 16:26)        PHYSICAL EXAM:  Vital Signs Last 24 Hrs  T(C): 36.7 (02 Dec 2019 13:00), Max: 36.9 (01 Dec 2019 22:44)  T(F): 98.1 (02 Dec 2019 13:00), Max: 98.4 (01 Dec 2019 22:44)  HR: 66 (02 Dec 2019 13:00) (63 - 80)  BP: 108/70 (02 Dec 2019 13:00) (107/62 - 132/63)  BP(mean): --  RR: 18 (02 Dec 2019 13:00) (18 - 18)  SpO2: 100% (02 Dec 2019 13:00) (100% - 100%)  GENERAL: NAD, lethargic but responds appropriately   HEAD:  Atraumatic, Normocephalic  EYES: conjunctiva and sclera clear  NECK: No JVD  CHEST/LUNG: Clear to auscultation bilaterally; No wheeze  HEART: s1 s2, regular rhythm and rate   ABDOMEN: Soft, Nontender, Nondistended; Bowel sounds present  EXTREMITIES:  2+ Peripheral Pulses, No clubbing, cyanosis, or edema  PSYCH: calm   NEUROLOGY: non-focal  SKIN: No rashes or lesions    LABS:                          11.8   9.58  )-----------( 307      ( 02 Dec 2019 05:30 )             38.8   12-02    138  |  103  |  21  ----------------------------<  223<H>  3.7   |  24  |  0.92    Ca    9.9      02 Dec 2019 05:30  Phos  2.1     12-02  Mg     1.9     12-02        RADIOLOGY & ADDITIONAL TESTS:  Results Reviewed:   Imaging Personally Reviewed:  Electrocardiogram Personally Reviewed:    COORDINATION OF CARE:  Care Discussed with Consultants/Other Providers [Y/N]:  Prior or Outpatient Records Reviewed [Y/N]:

## 2019-12-03 LAB
ANION GAP SERPL CALC-SCNC: 14 MMO/L — SIGNIFICANT CHANGE UP (ref 7–14)
BUN SERPL-MCNC: 22 MG/DL — SIGNIFICANT CHANGE UP (ref 7–23)
CALCIUM SERPL-MCNC: 10.9 MG/DL — HIGH (ref 8.4–10.5)
CHLORIDE SERPL-SCNC: 111 MMOL/L — HIGH (ref 98–107)
CO2 SERPL-SCNC: 23 MMOL/L — SIGNIFICANT CHANGE UP (ref 22–31)
CREAT SERPL-MCNC: 0.96 MG/DL — SIGNIFICANT CHANGE UP (ref 0.5–1.3)
GLUCOSE BLDC GLUCOMTR-MCNC: 124 MG/DL — HIGH (ref 70–99)
GLUCOSE BLDC GLUCOMTR-MCNC: 144 MG/DL — HIGH (ref 70–99)
GLUCOSE BLDC GLUCOMTR-MCNC: 156 MG/DL — HIGH (ref 70–99)
GLUCOSE BLDC GLUCOMTR-MCNC: 239 MG/DL — HIGH (ref 70–99)
GLUCOSE SERPL-MCNC: 179 MG/DL — HIGH (ref 70–99)
HCT VFR BLD CALC: 43.1 % — SIGNIFICANT CHANGE UP (ref 34.5–45)
HGB BLD-MCNC: 13.6 G/DL — SIGNIFICANT CHANGE UP (ref 11.5–15.5)
MAGNESIUM SERPL-MCNC: 2.1 MG/DL — SIGNIFICANT CHANGE UP (ref 1.6–2.6)
MCHC RBC-ENTMCNC: 25.4 PG — LOW (ref 27–34)
MCHC RBC-ENTMCNC: 31.6 % — LOW (ref 32–36)
MCV RBC AUTO: 80.4 FL — SIGNIFICANT CHANGE UP (ref 80–100)
NRBC # FLD: 0 K/UL — SIGNIFICANT CHANGE UP (ref 0–0)
PHOSPHATE SERPL-MCNC: 2.5 MG/DL — SIGNIFICANT CHANGE UP (ref 2.5–4.5)
PLATELET # BLD AUTO: 370 K/UL — SIGNIFICANT CHANGE UP (ref 150–400)
PMV BLD: 11.1 FL — SIGNIFICANT CHANGE UP (ref 7–13)
POTASSIUM SERPL-MCNC: 4.1 MMOL/L — SIGNIFICANT CHANGE UP (ref 3.5–5.3)
POTASSIUM SERPL-SCNC: 4.1 MMOL/L — SIGNIFICANT CHANGE UP (ref 3.5–5.3)
RBC # BLD: 5.36 M/UL — HIGH (ref 3.8–5.2)
RBC # FLD: 16.7 % — HIGH (ref 10.3–14.5)
SODIUM SERPL-SCNC: 148 MMOL/L — HIGH (ref 135–145)
WBC # BLD: 11.54 K/UL — HIGH (ref 3.8–10.5)
WBC # FLD AUTO: 11.54 K/UL — HIGH (ref 3.8–10.5)

## 2019-12-03 PROCEDURE — 99232 SBSQ HOSP IP/OBS MODERATE 35: CPT

## 2019-12-03 RX ORDER — FUROSEMIDE 40 MG
1 TABLET ORAL
Qty: 0 | Refills: 0 | DISCHARGE

## 2019-12-03 RX ORDER — POTASSIUM CHLORIDE 20 MEQ
1 PACKET (EA) ORAL
Qty: 0 | Refills: 0 | DISCHARGE

## 2019-12-03 RX ORDER — SODIUM CHLORIDE 9 MG/ML
1000 INJECTION, SOLUTION INTRAVENOUS
Refills: 0 | Status: DISCONTINUED | OUTPATIENT
Start: 2019-12-03 | End: 2019-12-04

## 2019-12-03 RX ADMIN — ENOXAPARIN SODIUM 40 MILLIGRAM(S): 100 INJECTION SUBCUTANEOUS at 11:55

## 2019-12-03 RX ADMIN — SODIUM CHLORIDE 100 MILLILITER(S): 9 INJECTION, SOLUTION INTRAVENOUS at 22:17

## 2019-12-03 RX ADMIN — POLYETHYLENE GLYCOL 3350 17 GRAM(S): 17 POWDER, FOR SOLUTION ORAL at 06:33

## 2019-12-03 RX ADMIN — SIMVASTATIN 20 MILLIGRAM(S): 20 TABLET, FILM COATED ORAL at 21:49

## 2019-12-03 RX ADMIN — HALOPERIDOL DECANOATE 1 MILLIGRAM(S): 100 INJECTION INTRAMUSCULAR at 21:49

## 2019-12-03 RX ADMIN — SENNA PLUS 2 TABLET(S): 8.6 TABLET ORAL at 21:49

## 2019-12-03 RX ADMIN — Medication 4 UNIT(S): at 11:53

## 2019-12-03 RX ADMIN — Medication 2: at 16:53

## 2019-12-03 RX ADMIN — Medication 1: at 07:38

## 2019-12-03 RX ADMIN — HALOPERIDOL DECANOATE 1 MILLIGRAM(S): 100 INJECTION INTRAMUSCULAR at 08:34

## 2019-12-03 RX ADMIN — Medication 50 MILLIGRAM(S): at 00:33

## 2019-12-03 RX ADMIN — LITHIUM CARBONATE 300 MILLIGRAM(S): 300 TABLET, EXTENDED RELEASE ORAL at 21:49

## 2019-12-03 RX ADMIN — Medication 4 UNIT(S): at 07:38

## 2019-12-03 RX ADMIN — SODIUM CHLORIDE 100 MILLILITER(S): 9 INJECTION, SOLUTION INTRAVENOUS at 13:22

## 2019-12-03 RX ADMIN — INSULIN GLARGINE 10 UNIT(S): 100 INJECTION, SOLUTION SUBCUTANEOUS at 21:49

## 2019-12-03 RX ADMIN — Medication 4 UNIT(S): at 16:53

## 2019-12-03 RX ADMIN — HALOPERIDOL DECANOATE 1 MILLIGRAM(S): 100 INJECTION INTRAMUSCULAR at 13:02

## 2019-12-03 NOTE — PROGRESS NOTE ADULT - PROBLEM SELECTOR PLAN 7
AM FS high likely due to D5 IVF  - lantus 10 units, premeal 4 units TID  - FS qac and hs with SSI  -on discharge restart metformin. No need to be discharged on insulin

## 2019-12-03 NOTE — PROGRESS NOTE ADULT - SUBJECTIVE AND OBJECTIVE BOX
Patient is a 69y old  Female who presents with a chief complaint of AMS, behavioral disturbance (30 Nov 2019 11:54)    SUBJECTIVE / OVERNIGHT EVENTS: No overnight events. Responds to questions but confused. Laying in bed.     ADDITIONAL REVIEW OF SYSTEMS:    MEDICATIONS  (STANDING):  dextrose 5%. 1000 milliLiter(s) (50 mL/Hr) IV Continuous <Continuous>  dextrose 5%. 1000 milliLiter(s) (100 mL/Hr) IV Continuous <Continuous>  dextrose 50% Injectable 12.5 Gram(s) IV Push once  dextrose 50% Injectable 25 Gram(s) IV Push once  dextrose 50% Injectable 25 Gram(s) IV Push once  enoxaparin Injectable 40 milliGRAM(s) SubCutaneous daily  haloperidol     Tablet 1 milliGRAM(s) Oral <User Schedule>  haloperidol     Tablet 1 milliGRAM(s) Oral <User Schedule>  insulin glargine Injectable (LANTUS) 10 Unit(s) SubCutaneous at bedtime  insulin lispro (HumaLOG) corrective regimen sliding scale   SubCutaneous three times a day before meals  insulin lispro (HumaLOG) corrective regimen sliding scale   SubCutaneous at bedtime  insulin lispro Injectable (HumaLOG) 4 Unit(s) SubCutaneous three times a day before meals  lithium SR (LITHOBID) 300 milliGRAM(s) Oral at bedtime  polyethylene glycol 3350 17 Gram(s) Oral two times a day  senna 2 Tablet(s) Oral at bedtime  simvastatin 20 milliGRAM(s) Oral at bedtime    MEDICATIONS  (PRN):  dextrose 40% Gel 15 Gram(s) Oral once PRN Blood Glucose LESS THAN 70 milliGRAM(s)/deciliter  glucagon  Injectable 1 milliGRAM(s) IntraMuscular once PRN Glucose LESS THAN 70 milligrams/deciliter  traZODone 50 milliGRAM(s) Oral at bedtime PRN Insomnia    CAPILLARY BLOOD GLUCOSE      POCT Blood Glucose.: 156 mg/dL (03 Dec 2019 07:31)  POCT Blood Glucose.: 194 mg/dL (02 Dec 2019 21:27)  POCT Blood Glucose.: 195 mg/dL (02 Dec 2019 16:21)  POCT Blood Glucose.: 167 mg/dL (02 Dec 2019 11:27)    PHYSICAL EXAM:  Vital Signs Last 24 Hrs  T(C): 36.5 (03 Dec 2019 05:11), Max: 36.7 (02 Dec 2019 13:00)  T(F): 97.7 (03 Dec 2019 05:11), Max: 98.1 (02 Dec 2019 13:00)  HR: 72 (03 Dec 2019 05:11) (66 - 83)  BP: 125/65 (03 Dec 2019 05:11) (108/70 - 144/58)  BP(mean): --  RR: 16 (03 Dec 2019 05:11) (16 - 18)  SpO2: 100% (03 Dec 2019 05:11) (100% - 100%)  GENERAL: NAD, lethargic but responds appropriately   HEAD:  Atraumatic, Normocephalic  EYES: conjunctiva and sclera clear  NECK: No JVD  CHEST/LUNG: Clear to auscultation bilaterally; No wheeze  HEART: s1 s2, regular rhythm and rate   ABDOMEN: Soft, Nontender, Nondistended; Bowel sounds present  EXTREMITIES:  2+ Peripheral Pulses, No clubbing, cyanosis, or edema  PSYCH: calm   NEUROLOGY: non-focal  SKIN: No rashes or lesions    LABS:                          13.6   11.54 )-----------( 370      ( 03 Dec 2019 04:50 )             43.1   12-03    148<H>  |  111<H>  |  22  ----------------------------<  179<H>  4.1   |  23  |  0.96    Ca    10.9<H>      03 Dec 2019 04:45  Phos  2.5     12-03  Mg     2.1     12-03    RADIOLOGY & ADDITIONAL TESTS:  Results Reviewed:   Imaging Personally Reviewed:  Electrocardiogram Personally Reviewed:    COORDINATION OF CARE:  Care Discussed with Consultants/Other Providers [Y/N]:  Prior or Outpatient Records Reviewed [Y/N]:

## 2019-12-03 NOTE — PROGRESS NOTE ADULT - PROBLEM SELECTOR PLAN 3
- c/w  lithium hs; decreased haldol to 1 TID (hold for sedation)  - stopped Ativan; trazodone PRN  - avoid Ambien/anticholinergics, day/night cues, supportive care  - Prior team has d/w  seems dementia progressing    #GOC/Advanced care planning:  Hospice has evaluated, feels pt would be appropriate.  PT has recommended rehab, decision made for patient to be discharged with home hospice services.  understands that patient needs to be encouraged to drink water.

## 2019-12-03 NOTE — PROGRESS NOTE ADULT - PROBLEM SELECTOR PLAN 1
Recurrent hypernatremia when IVF is stopped or decreased, encourage PO fluids, discussed with , this is an unfortunate reality of her progressive dementia. Encourage PO hydration as much as possible. Patient able to take PO fluids when encouraged.  -  Li level, low at .57  - Will monitor I&Os

## 2019-12-04 VITALS
HEART RATE: 70 BPM | TEMPERATURE: 98 F | SYSTOLIC BLOOD PRESSURE: 125 MMHG | RESPIRATION RATE: 17 BRPM | OXYGEN SATURATION: 98 % | DIASTOLIC BLOOD PRESSURE: 75 MMHG

## 2019-12-04 LAB
GLUCOSE BLDC GLUCOMTR-MCNC: 118 MG/DL — HIGH (ref 70–99)
GLUCOSE BLDC GLUCOMTR-MCNC: 144 MG/DL — HIGH (ref 70–99)

## 2019-12-04 PROCEDURE — 99239 HOSP IP/OBS DSCHRG MGMT >30: CPT

## 2019-12-04 RX ORDER — TRAZODONE HCL 50 MG
1 TABLET ORAL
Qty: 30 | Refills: 0
Start: 2019-12-04 | End: 2020-01-02

## 2019-12-04 RX ORDER — HALOPERIDOL DECANOATE 100 MG/ML
1 INJECTION INTRAMUSCULAR
Qty: 0 | Refills: 0 | DISCHARGE

## 2019-12-04 RX ORDER — HALOPERIDOL DECANOATE 100 MG/ML
1 INJECTION INTRAMUSCULAR
Qty: 90 | Refills: 0
Start: 2019-12-04 | End: 2020-01-02

## 2019-12-04 RX ORDER — HALOPERIDOL DECANOATE 100 MG/ML
1 INJECTION INTRAMUSCULAR
Qty: 60 | Refills: 0
Start: 2019-12-04 | End: 2020-01-02

## 2019-12-04 RX ORDER — TRAZODONE HCL 50 MG
1 TABLET ORAL
Qty: 0 | Refills: 0 | DISCHARGE

## 2019-12-04 RX ADMIN — SODIUM CHLORIDE 100 MILLILITER(S): 9 INJECTION, SOLUTION INTRAVENOUS at 08:22

## 2019-12-04 RX ADMIN — ENOXAPARIN SODIUM 40 MILLIGRAM(S): 100 INJECTION SUBCUTANEOUS at 13:34

## 2019-12-04 RX ADMIN — POLYETHYLENE GLYCOL 3350 17 GRAM(S): 17 POWDER, FOR SOLUTION ORAL at 07:16

## 2019-12-04 RX ADMIN — HALOPERIDOL DECANOATE 1 MILLIGRAM(S): 100 INJECTION INTRAMUSCULAR at 13:34

## 2019-12-04 NOTE — PROGRESS NOTE ADULT - SUBJECTIVE AND OBJECTIVE BOX
Patient is a 69y old  Female who presents with a chief complaint of AMS, behavioral disturbance (30 Nov 2019 11:54)    SUBJECTIVE / OVERNIGHT EVENTS: No overnight events. Responds to questions but confused. Laying in bed.     ADDITIONAL REVIEW OF SYSTEMS:    MEDICATIONS  (STANDING):  dextrose 5%. 1000 milliLiter(s) (50 mL/Hr) IV Continuous <Continuous>  dextrose 5%. 1000 milliLiter(s) (100 mL/Hr) IV Continuous <Continuous>  dextrose 50% Injectable 12.5 Gram(s) IV Push once  dextrose 50% Injectable 25 Gram(s) IV Push once  dextrose 50% Injectable 25 Gram(s) IV Push once  enoxaparin Injectable 40 milliGRAM(s) SubCutaneous daily  haloperidol     Tablet 1 milliGRAM(s) Oral <User Schedule>  haloperidol     Tablet 1 milliGRAM(s) Oral <User Schedule>  insulin glargine Injectable (LANTUS) 10 Unit(s) SubCutaneous at bedtime  insulin lispro (HumaLOG) corrective regimen sliding scale   SubCutaneous three times a day before meals  insulin lispro (HumaLOG) corrective regimen sliding scale   SubCutaneous at bedtime  insulin lispro Injectable (HumaLOG) 4 Unit(s) SubCutaneous three times a day before meals  lithium SR (LITHOBID) 300 milliGRAM(s) Oral at bedtime  polyethylene glycol 3350 17 Gram(s) Oral two times a day  senna 2 Tablet(s) Oral at bedtime  simvastatin 20 milliGRAM(s) Oral at bedtime    MEDICATIONS  (PRN):  dextrose 40% Gel 15 Gram(s) Oral once PRN Blood Glucose LESS THAN 70 milliGRAM(s)/deciliter  glucagon  Injectable 1 milliGRAM(s) IntraMuscular once PRN Glucose LESS THAN 70 milligrams/deciliter  traZODone 50 milliGRAM(s) Oral at bedtime PRN Insomnia    CAPILLARY BLOOD GLUCOSE      POCT Blood Glucose.: 118 mg/dL (04 Dec 2019 11:28)  POCT Blood Glucose.: 144 mg/dL (04 Dec 2019 07:43)  POCT Blood Glucose.: 124 mg/dL (03 Dec 2019 21:40)  POCT Blood Glucose.: 239 mg/dL (03 Dec 2019 16:28)    Vital Signs Last 24 Hrs  T(C): 36.4 (04 Dec 2019 05:58), Max: 36.6 (03 Dec 2019 19:31)  T(F): 97.6 (04 Dec 2019 05:58), Max: 97.8 (03 Dec 2019 19:31)  HR: 64 (04 Dec 2019 05:58) (56 - 83)  BP: 105/54 (04 Dec 2019 05:58) (102/60 - 107/69)  BP(mean): --  RR: 18 (04 Dec 2019 05:58) (17 - 18)  SpO2: 100% (04 Dec 2019 05:58) (98% - 100%)    GENERAL: NAD, lethargic but responds appropriately   HEAD:  Atraumatic, Normocephalic  EYES: conjunctiva and sclera clear  NECK: No JVD  CHEST/LUNG: Clear to auscultation bilaterally; No wheeze  HEART: s1 s2, regular rhythm and rate   ABDOMEN: Soft, Nontender, Nondistended; Bowel sounds present  EXTREMITIES:  2+ Peripheral Pulses, No clubbing, cyanosis, or edema  PSYCH: calm   NEUROLOGY: non-focal  SKIN: No rashes or lesions    LABS:      RADIOLOGY & ADDITIONAL TESTS:  Results Reviewed:   Imaging Personally Reviewed:  Electrocardiogram Personally Reviewed:    COORDINATION OF CARE:  Care Discussed with Consultants/Other Providers [Y/N]:  Prior or Outpatient Records Reviewed [Y/N]:

## 2019-12-04 NOTE — PROGRESS NOTE ADULT - PROBLEM SELECTOR PLAN 7
-on discharge restart metformin. No need to be discharged on insulin  -insulin was only needed while patient was getting d5w IVF

## 2019-12-04 NOTE — PROGRESS NOTE ADULT - PROBLEM SELECTOR PROBLEM 10
Discharge planning issues

## 2020-11-19 ENCOUNTER — TRANSCRIPTION ENCOUNTER (OUTPATIENT)
Age: 70
End: 2020-11-19

## 2020-11-23 ENCOUNTER — MED ADMIN CHARGE (OUTPATIENT)
Age: 70
End: 2020-11-23

## 2020-11-23 ENCOUNTER — APPOINTMENT (OUTPATIENT)
Dept: HOME HEALTH SERVICES | Facility: HOME HEALTH | Age: 70
End: 2020-11-23
Payer: MEDICARE

## 2020-11-23 ENCOUNTER — APPOINTMENT (OUTPATIENT)
Dept: HOME HEALTH SERVICES | Facility: HOME HEALTH | Age: 70
End: 2020-11-23

## 2020-11-23 DIAGNOSIS — Z81.8 FAMILY HISTORY OF OTHER MENTAL AND BEHAVIORAL DISORDERS: ICD-10-CM

## 2020-11-23 DIAGNOSIS — Z83.3 FAMILY HISTORY OF DIABETES MELLITUS: ICD-10-CM

## 2020-11-23 DIAGNOSIS — F02.80 ALZHEIMER'S DISEASE, UNSPECIFIED: ICD-10-CM

## 2020-11-23 DIAGNOSIS — Z78.9 OTHER SPECIFIED HEALTH STATUS: ICD-10-CM

## 2020-11-23 DIAGNOSIS — G30.9 ALZHEIMER'S DISEASE, UNSPECIFIED: ICD-10-CM

## 2020-11-23 PROCEDURE — G0008: CPT

## 2020-11-23 PROCEDURE — 99345 HOME/RES VST NEW HIGH MDM 75: CPT | Mod: 25

## 2020-11-23 PROCEDURE — 90662 IIV NO PRSV INCREASED AG IM: CPT

## 2020-11-23 PROCEDURE — G0506: CPT

## 2020-11-25 ENCOUNTER — TRANSCRIPTION ENCOUNTER (OUTPATIENT)
Age: 70
End: 2020-11-25

## 2020-11-26 VITALS — TEMPERATURE: 98.8 F | RESPIRATION RATE: 16 BRPM

## 2020-11-26 PROBLEM — Z81.8 FH: MENTAL ILLNESS: Status: ACTIVE | Noted: 2020-11-26

## 2020-11-26 PROBLEM — Z78.9 NON-SMOKER: Status: ACTIVE | Noted: 2020-11-26

## 2020-11-26 PROBLEM — Z83.3 FAMILY HISTORY OF DIABETES MELLITUS: Status: ACTIVE | Noted: 2020-11-26

## 2020-11-26 PROBLEM — Z81.8 FAMILY HISTORY OF SCHIZOPHRENIA: Status: ACTIVE | Noted: 2020-11-26

## 2020-11-26 NOTE — CHRONIC CARE ASSESSMENT
[Limited decision making ability] : limited decision making ability [PPS Score: ____] : Palliative Performance Scale (PPS) Score: [unfilled] [de-identified] : n/a [de-identified] : adequate

## 2020-11-26 NOTE — HEALTH RISK ASSESSMENT
[HRA Reviewed] : Health risk assessment reviewed [Full assistance needed] : managing finances [Two or more falls in past year] : Patient reported two or more falls in the past year [Yes] : The patient does have visual impairment [TimeGetUpGo] : n/a

## 2020-11-26 NOTE — PHYSICAL EXAM
[No Acute Distress] : no acute distress [Well Nourished] : well nourished [Normal Sclera/Conjunctiva] : normal sclera/conjunctiva [Normal Outer Ear/Nose] : the ears and nose were normal in appearance [Supple] : the neck was supple [No Respiratory Distress] : no respiratory distress [Clear to Auscultation] : lungs were clear to auscultation bilaterally [No Accessory Muscle Use] : no accessory muscle use [Normal Rate] : heart rate was normal  [Regular Rhythm] : with a regular rhythm [Normal S1, S2] : normal S1 and S2 [No Edema] : there was no peripheral edema [Non Tender] : non-tender [Soft] : abdomen soft [Not Distended] : not distended [No Hernias] : no hernia was discovered [No Joint Swelling] : no joint swelling seen [No Motor Deficits] : the motor exam was normal [de-identified] : not cooperative w/ exam. pale  [de-identified] : legally blind  [de-identified] : bruises on b/l legs from falls.  [de-identified] : legally blind in both eyes  [de-identified] : unable to assess 2/2 dementia.

## 2020-11-26 NOTE — COUNSELING
[Normal Weight - ( BMI  <25 )] : normal weight - ( BMI  <25 ) [Continue diet as tolerated] : continue diet as tolerated based on goals of care [Non - Smoker] : non-smoker [Smoke/CO Detectors] : smoke/CO detectors [Use assistive device to avoid falls] : use assistive device to avoid falls [___] : [unfilled] [] : foot exam [Not Recommended] : Aspirin use not recommended due to overall prognosis [Improve mobility] : improve mobility [Decrease stress] : decrease stress [Minimize unnecessary interventions] : minimize unnecessary interventions [Maintain functional ability] : maintain functional ability [Likely to achieve goals/desired outcomes] : likely to achieve goals/desired outcomes [Patient/Caregiver has ___ understanding of disease process] : patient/caregiver has [unfilled] understanding of disease process [Advanced Directives discussed: ____] : Advanced directives discussed: [unfilled] [Completed Medical Orders for Life-Sustaining Treatment] : completed medical orders for life-sustaining treatment [Full Code] : Code Status: Full Code [No Limitations] : Treatment Guidelines: No limitations [DNI] : Intubation: DNI [[facility]: _____[facility]: [unfilled] [_____] : HCP: [unfilled] [ - New patient with 2 or more chronic conditions; CCM discussed and patient-centered care plan established] : New patient with 2 or more chronic conditions; CCM discussed and patient-centered care plan established

## 2020-11-26 NOTE — REASON FOR VISIT
[Initial Evaluation] : an initial evaluation [Spouse] : spouse [Pre-Visit Preparation] : pre-visit preparation was done [Intercurrent Specialty/Sub-specialty Visits] : the patient has no intercurrent specialty/sub-specialty visits [FreeTextEntry1] : dementia, schizophrenia, DM2

## 2020-11-26 NOTE — HISTORY OF PRESENT ILLNESS
[Spouse] : spouse [FreeTextEntry1] : dementia  [FreeTextEntry2] : Patient denies fever, cough, trouble breathing, rash and vomiting. Patient has not been in close contact with someone who is COVID positive. N95 mask, gloves and eye wear worn during visit: Y \par Total face to face time with patient: 15 minutes. \par \par 71 yo F w/ PMHx dementia, DM2, schizophrenia, CHF, legal blindness 2/2 glaucoma, DLD, \par \par Dementia: minimally verbal, when speak, does not usually make sense. Has restlessness/paces a lot. Sometimes becomes agitated. Somtimes cries at night or yells. Knows her own name, sometimes forgets who  is. \par CHF: Has chronic swelling in ankles and lower legs. \par DM2: doesn't check sugars. \par Occasional UTIs: Becomes irritable w/ UTIs. \par \par Appetite/dysphagia/weight: Good appetite. Has 2 glucernas daily. Weight is stable. Eats in small bites. Coughs w/ puree and thin liquids \par Constipation: Has 4-5 bmx weekly, controlled on Senna. Had blood in stool last week x1 episode. Spouse to notify office if recurs. \par Has stool and bladder incotninence, uses pullups \par Sleep: sleeps 7-8 h nightly. \par Skin: Bruises 2/2 falls. \par Gait/Falls: Ambulates w/ assistance. Does not remember to use walker. Has had 4 falls in past year. Fell this past Saturday because tried to sit down, but there was no chair behind her. W/ previous falls, has hit head and head nosebleeds/ bruises. Per spouse, no injuries. Uses w/c outside house. \par DME: has walker, w/c, commode *uses at night), hospital bed, oxygen PRN, hand rails \par \par Has not had PNA shot. \par Lives w/ spouse.

## 2020-12-01 ENCOUNTER — NON-APPOINTMENT (OUTPATIENT)
Age: 70
End: 2020-12-01

## 2020-12-01 LAB
25(OH)D3 SERPL-MCNC: 146 NG/ML
ALBUMIN SERPL ELPH-MCNC: 4.6 G/DL
ALP BLD-CCNC: 85 U/L
ALT SERPL-CCNC: 12 U/L
ANION GAP SERPL CALC-SCNC: 14 MMOL/L
AST SERPL-CCNC: 18 U/L
BASOPHILS # BLD AUTO: 0.06 K/UL
BASOPHILS NFR BLD AUTO: 0.7 %
BILIRUB SERPL-MCNC: 0.5 MG/DL
BUN SERPL-MCNC: 25 MG/DL
CALCIUM SERPL-MCNC: 10.7 MG/DL
CHLORIDE SERPL-SCNC: 107 MMOL/L
CHOLEST SERPL-MCNC: 156 MG/DL
CO2 SERPL-SCNC: 26 MMOL/L
CREAT SERPL-MCNC: 0.98 MG/DL
EOSINOPHIL # BLD AUTO: 0.17 K/UL
EOSINOPHIL NFR BLD AUTO: 1.9 %
ESTIMATED AVERAGE GLUCOSE: 131 MG/DL
FOLATE SERPL-MCNC: >20 NG/ML
GLUCOSE SERPL-MCNC: 124 MG/DL
HBA1C MFR BLD HPLC: 6.2 %
HCT VFR BLD CALC: 44.9 %
HDLC SERPL-MCNC: 78 MG/DL
HGB BLD-MCNC: 13.2 G/DL
IMM GRANULOCYTES NFR BLD AUTO: 0.2 %
LDLC SERPL CALC-MCNC: 58 MG/DL
LITHIUM SERPL-SCNC: 0.71 MMOL/L
LYMPHOCYTES # BLD AUTO: 2.13 K/UL
LYMPHOCYTES NFR BLD AUTO: 23.3 %
MAN DIFF?: NORMAL
MCHC RBC-ENTMCNC: 26.9 PG
MCHC RBC-ENTMCNC: 29.4 GM/DL
MCV RBC AUTO: 91.4 FL
MONOCYTES # BLD AUTO: 0.69 K/UL
MONOCYTES NFR BLD AUTO: 7.5 %
NEUTROPHILS # BLD AUTO: 6.09 K/UL
NEUTROPHILS NFR BLD AUTO: 66.4 %
NONHDLC SERPL-MCNC: 78 MG/DL
PLATELET # BLD AUTO: 327 K/UL
POTASSIUM SERPL-SCNC: 5.5 MMOL/L
PROT SERPL-MCNC: 7.1 G/DL
RBC # BLD: 4.91 M/UL
RBC # FLD: 15.4 %
SODIUM SERPL-SCNC: 148 MMOL/L
TRIGL SERPL-MCNC: 103 MG/DL
TSH SERPL-ACNC: 3.31 UIU/ML
VIT B12 SERPL-MCNC: 1585 PG/ML
WBC # FLD AUTO: 9.16 K/UL

## 2020-12-15 ENCOUNTER — NON-APPOINTMENT (OUTPATIENT)
Age: 70
End: 2020-12-15

## 2020-12-17 ENCOUNTER — NON-APPOINTMENT (OUTPATIENT)
Age: 70
End: 2020-12-17

## 2020-12-18 ENCOUNTER — NON-APPOINTMENT (OUTPATIENT)
Age: 70
End: 2020-12-18

## 2020-12-18 LAB — SARS-COV-2 N GENE NPH QL NAA+PROBE: NOT DETECTED

## 2020-12-29 NOTE — BEHAVIORAL HEALTH ASSESSMENT NOTE - PRIOR MEDICATION SIDE EFFECTS OR ADVERSE REACTIONS
Medical Week 2 Survey      Responses   [facility] patient discharged from?  [facility]   Does the patient have one of the following disease processes/diagnoses(primary or secondary)?  Other   Week 2 attempt successful?  Yes   Call start time  1309   Discharge diagnosis  Bilateral pulmonary emboli (main segmental branches and extending into the right main pulmonary artery) without cor pulmonale    Call end time  1312   Meds reviewed with patient/caregiver?  Yes   Is the patient having any side effects they believe may be caused by any medication additions or changes?  No   Does the patient have all medications ordered at discharge?  Yes   Is the patient taking all medications as directed (includes completed medication regime)?  Yes   Has the patient kept scheduled appointments due by today?  N/A   Comments  fu appt 1/4 with PCP   Psychosocial issues?  No   Comments  Pt still having mild SOA but has improved since DC.    What is the patient's perception of their health status since discharge?  Improving   Is the patient/caregiver able to teach back the hierarchy of who to call/visit for symptoms/problems? PCP, Specialist, Home health nurse, Urgent Care, ED, 911  Yes   Week 2 Call Completed?  Yes          Ligia Sellers RN  
None known

## 2020-12-31 PROBLEM — G30.9 ALZHEIMER'S DEMENTIA: Status: RESOLVED | Noted: 2020-11-26 | Resolved: 2020-11-26

## 2021-01-04 ENCOUNTER — APPOINTMENT (OUTPATIENT)
Dept: HOME HEALTH SERVICES | Facility: HOME HEALTH | Age: 71
End: 2021-01-04

## 2021-02-08 ENCOUNTER — NON-APPOINTMENT (OUTPATIENT)
Age: 71
End: 2021-02-08

## 2021-02-22 ENCOUNTER — APPOINTMENT (OUTPATIENT)
Dept: HOME HEALTH SERVICES | Facility: HOME HEALTH | Age: 71
End: 2021-02-22
Payer: MEDICARE

## 2021-02-22 PROCEDURE — 99349 HOME/RES VST EST MOD MDM 40: CPT | Mod: 25,95

## 2021-02-22 PROCEDURE — G0439: CPT | Mod: 95

## 2021-02-22 NOTE — CHRONIC CARE ASSESSMENT
[Limited decision making ability] : limited decision making ability [PPS Score: ____] : Palliative Performance Scale (PPS) Score: [unfilled] [de-identified] : n/a [de-identified] : adequate

## 2021-02-22 NOTE — HISTORY OF PRESENT ILLNESS
[Spouse] : spouse [FreeTextEntry1] : dementia  [FreeTextEntry2] : ILIA STEVENS is being seen for a visit provided via telehealth via [[employer]]. This visit was first attempted using [employer] telehealth real-time audio visual technology, but was unable to be completed.\par ILIA STEVENS was located at their home, [address]\par [address]\[place] [address], at the time of the visit.\par The [facility] clinician, KAYCEE CANADA, was located remotely at their home in [place] at the time of the visit. \par The patient, ILIA STEVENS, and [facility] clinician, KAYCEE CANADA, participated in the telehealth encounter.\par Other participants included: spouse \par ILIA STEVENS (Aug 18 1950) or his/her representative consents to the use of telehealth. All questions related to telehealth answered\par  \par 71 yo F w/ PMHx dementia, DM2, schizophrenia, CHF, legal blindness 2/2 glaucoma, DLD, \par \par Dementia: minimally verbal, when speak, does not usually make sense. Has restlessness/paces a lot. Sometimes becomes agitated. Sometimes cries at night or yells. Knows her own name, sometimes forgets who  is. \par Restlessness > one year. Variable. Worse in the evening. Paces, grabs objects. Not violent. May have worsened slightly over past few months. Lorazepam sometimes mildly helps 3-4x daily, sometimes not..Gives 0.5mL every 4-6h. Rarely gives 1mL. Does not make patient sleepy. \par CHF: Has chronic swelling in ankles and lower legs. No swelling on exam today.\par DM2: doesn't check sugars. \par \par Occasional UTIs: Becomes irritable w/ UTIs. No urinary sx. \par \par Appetite/dysphagia/weight: Good appetite. Has 2 glucernas daily. Weight is stable. Eats in small bites. Coughs w/ puree and thin liquids Uses thickit occasionally. \par Constipation: Has 4-5 bmx weekly, controlled on Senna. Had blood in stool last week x1 episode. Spouse to notify office if recurs. \par Has stool and bladder incontinence, uses pullups \par Sleep: Variable. sleeps 7-8 h nightly. \par Skin: Bruises on legs 2/2 falls. \par Gait/Falls: Ambulates w/ assistance. Does not remember to use walker. Has had 4 falls in past year. Fell this past Saturday because tried to sit down, but there was no chair behind her. Fell onto rear yesterday. Per spouse, has bruises on legs, but no other injuries. Uses w/c outside house. \par DME: has walker, w/c, commode *uses at night), hospital bed, oxygen PRN, hand rails \par Chronic intermittent cough and post-nasal drip. \par \par Denies B/L leg swelling, SOB, fever. \par \par Has not had PNA shot. \par Lives w/ spouse. \par HHAs 6h on Sat, 7h on Sund, 4h M-F, has additional private HHA Tu-Thurs for 4 more hours

## 2021-02-22 NOTE — PHYSICAL EXAM
[No Acute Distress] : no acute distress [Well Nourished] : well nourished [Normal Sclera/Conjunctiva] : normal sclera/conjunctiva [Normal Outer Ear/Nose] : the ears and nose were normal in appearance [Supple] : the neck was supple [No Respiratory Distress] : no respiratory distress [Clear to Auscultation] : lungs were clear to auscultation bilaterally [No Accessory Muscle Use] : no accessory muscle use [Normal Rate] : heart rate was normal  [Regular Rhythm] : with a regular rhythm [Normal S1, S2] : normal S1 and S2 [No Edema] : there was no peripheral edema [Non Tender] : non-tender [Soft] : abdomen soft [Not Distended] : not distended [No Hernias] : no hernia was discovered [No Joint Swelling] : no joint swelling seen [No Motor Deficits] : the motor exam was normal [de-identified] : not cooperative w/ exam. pale  [de-identified] : legally blind  [de-identified] : bruises on b/l legs from falls.  [de-identified] : legally blind in both eyes  [de-identified] : unable to assess 2/2 dementia.

## 2021-02-22 NOTE — COUNSELING
[Normal Weight - ( BMI  <25 )] : normal weight - ( BMI  <25 ) [Continue diet as tolerated] : continue diet as tolerated based on goals of care [Non - Smoker] : non-smoker [Smoke/CO Detectors] : smoke/CO detectors [Use assistive device to avoid falls] : use assistive device to avoid falls [___] : [unfilled] [] : foot exam [Not Recommended] : Aspirin use not recommended due to overall prognosis [Improve mobility] : improve mobility [Decrease stress] : decrease stress [Minimize unnecessary interventions] : minimize unnecessary interventions [Maintain functional ability] : maintain functional ability [Patient/Caregiver has ___ understanding of disease process] : patient/caregiver has [unfilled] understanding of disease process [Likely to achieve goals/desired outcomes] : likely to achieve goals/desired outcomes [Advanced Directives discussed: ____] : Advanced directives discussed: [unfilled] [Completed Medical Orders for Life-Sustaining Treatment] : completed medical orders for life-sustaining treatment [Full Code] : Code Status: Full Code [No Limitations] : Treatment Guidelines: No limitations [DNI] : Intubation: DNI [Last Verification Date: _____] : [facility] Completion/last verification date: [unfilled] [_____] : HCP: [unfilled] [Established patient, extensive review of history, medical and functional status, risk factors and patient education] : Established patient, extensive review of history, medical and functional status, risk factors and patient education and counseling provided for subsequent annual wellness visit

## 2021-03-01 NOTE — PROGRESS NOTE ADULT - PROBLEM SELECTOR PROBLEM 1
Jose La is a 25year old female  No LMP recorded. (Menstrual status: IUD - Intrauterine Device). Patient presents with:  Gyn Exam: ANNUAL EXAM  Last seen in 2018 for annual and pap. Pap was normal. Will do pap today. Had Mirena placed 2017.  A Physical activity        Days per week: Not on file        Minutes per session: Not on file      Stress: Not on file    Relationships      Social connections        Talks on phone: Not on file        Gets together: Not on file        Attends [employer]  Cardiovascular:  denies chest pain or palpitations  Respiratory:  denies shortness of breath  Gastrointestinal:  denies heartburn, abdominal pain, diarrhea or constipation  Genitourinary:  denies dysuria, incontinence, abnormal vaginal discharge, v Urinary tract infection without hematuria, site unspecified gynecological exam    Screening for STD (sexually transmitted disease)  -     CHLAMYDIA/GONOCOCCUS, MARINO; Future  -     TRICH VAG BY MARINO; Future    IUD check up    Cervical cancer screening          Pap done today-reviewed new pap guidelines.   Safe sexual p

## 2021-03-31 ENCOUNTER — APPOINTMENT (OUTPATIENT)
Dept: HOME HEALTH SERVICES | Facility: HOME HEALTH | Age: 71
End: 2021-03-31

## 2021-04-01 ENCOUNTER — NON-APPOINTMENT (OUTPATIENT)
Age: 71
End: 2021-04-01

## 2021-04-14 ENCOUNTER — NON-APPOINTMENT (OUTPATIENT)
Age: 71
End: 2021-04-14

## 2021-05-10 ENCOUNTER — APPOINTMENT (OUTPATIENT)
Dept: HOME HEALTH SERVICES | Facility: HOME HEALTH | Age: 71
End: 2021-05-10
Payer: MEDICARE

## 2021-05-10 VITALS
RESPIRATION RATE: 16 BRPM | HEART RATE: 97 BPM | SYSTOLIC BLOOD PRESSURE: 128 MMHG | DIASTOLIC BLOOD PRESSURE: 77 MMHG | TEMPERATURE: 98 F

## 2021-05-10 PROCEDURE — 99349 HOME/RES VST EST MOD MDM 40: CPT

## 2021-05-10 NOTE — PAST MEDICAL HISTORY
September 22, 2017                   [facility]  Urgent Care  [address], [address]  [facility] [address]  Phone: [phone]  Fax: [phone]   September 22, 2017     Patient: Ankit Ibarra   YOB: 1965   Date of Visit: 9/22/2017       To Whom it May Concern:    Please excuse for 3 days unless well enough to return sooner.      If you have any questions or concerns, please don't hesitate to call.    Sincerely,         Yuly Berg MA     
[PMH Reviewed and Updated] : past medical history reviewed and updated

## 2021-05-10 NOTE — COUNSELING
[Normal Weight - ( BMI  <25 )] : normal weight - ( BMI  <25 ) [Continue diet as tolerated] : continue diet as tolerated based on goals of care [Non - Smoker] : non-smoker [Smoke/CO Detectors] : smoke/CO detectors [Use assistive device to avoid falls] : use assistive device to avoid falls [___] : [unfilled] [] : foot exam [Not Recommended] : Aspirin use not recommended due to overall prognosis [Improve mobility] : improve mobility [Decrease stress] : decrease stress [Minimize unnecessary interventions] : minimize unnecessary interventions [Maintain functional ability] : maintain functional ability [Likely to achieve goals/desired outcomes] : likely to achieve goals/desired outcomes [Patient/Caregiver has ___ understanding of disease process] : patient/caregiver has [unfilled] understanding of disease process [Advanced Directives discussed: ____] : Advanced directives discussed: [unfilled] [Completed Medical Orders for Life-Sustaining Treatment] : completed medical orders for life-sustaining treatment [Full Code] : Code Status: Full Code [No Limitations] : Treatment Guidelines: No limitations [DNI] : Intubation: DNI [Last Verification Date: _____] : [email or web address] Completion/last verification date: [unfilled] [_____] : HCP: [unfilled]

## 2021-05-10 NOTE — HISTORY OF PRESENT ILLNESS
[Spouse] : spouse [FreeTextEntry1] : dementia  [FreeTextEntry2] : Patient denies fever, cough, trouble breathing, rash and vomiting. Patient has not been in close contact with someone who is COVID positive. N95 mask, gloves and eye wear worn during visit: Y \par Total face to face time with patient: 10 minutes. \par  \par 69 yo F w/ PMHx dementia, DM2, schizophrenia, CHF, legal blindness 2/2 glaucoma, DLD, \par Got Silverio & Silverio vaccine from [employer] on 3/24/21 \par Dementia: minimally verbal, when speak, does not usually make sense. Has restlessness/paces a lot. Sometimes becomes agitated. Sometimes agitated when constipated. Spouse declines lexapor at this time. Sometimes cries at night or yells. Knows her own name, sometimes forgets who  is. \par Restlessness > one year. Variable. Worse in the evening. Paces, grabs objects. Not violent. May have worsened slightly over past few months. Lorazepam sometimes mildly helps 3-4x daily, sometimes not..Gives 0.5mL every 4-6h. Rarely gives 1mL. Does not make patient sleepy. \par CHF: Has chronic swelling in ankles and lower legs. No swelling on exam today.\par DM2: doesn't check sugars. \par \par Occasional UTIs: Becomes irritable w/ UTIs. No urinary sx. \par \par Appetite/dysphagia/weight: Good appetite. Has 2 glucernas daily. Weight is stable. Eats in small bites. Coughs w/ puree and thin liquids Uses thickit occasionally. \par Constipation: Has 4-5 bmx weekly, controlled on Senna. \par Has stool and bladder incontinence, uses pullups \par Sleep: Variable. sleeps 7-8 h nightly. \par Skin: Bruises on legs 2/2 falls. \par Gait/Falls: Ambulates w/ assistance. Does not remember to use walker. No recent falls. Some near-falls that spouse has been able to prevent. \par DME: has walker, w/c, commode *uses at night), hospital bed, oxygen PRN, hand rails \par Chronic intermittent cough and post-nasal drip. \par \par Denies B/L leg swelling, SOB, fever. \par \par Has not had PNA shot. \par Lives w/ spouse. \par HHAs 6h on Sat, 7h on Sund, 4h M-F, has additional private HHA Tu-Thurs for 4 more hours

## 2021-05-10 NOTE — CHRONIC CARE ASSESSMENT
[PPS Score: ____] : Palliative Performance Scale (PPS) Score: [unfilled] [Limited decision making ability] : limited decision making ability [de-identified] : n/a [de-identified] : adequate

## 2021-05-10 NOTE — REASON FOR VISIT
[Spouse] : spouse [Pre-Visit Preparation] : pre-visit preparation was done [Follow-Up] : a follow-up visit [Intercurrent Specialty/Sub-specialty Visits] : the patient has no intercurrent specialty/sub-specialty visits [FreeTextEntry1] : dementia, schizophrenia, DM2

## 2021-05-10 NOTE — PHYSICAL EXAM
[No Acute Distress] : no acute distress [Well Nourished] : well nourished [Normal Sclera/Conjunctiva] : normal sclera/conjunctiva [Normal Outer Ear/Nose] : the ears and nose were normal in appearance [Supple] : the neck was supple [No Respiratory Distress] : no respiratory distress [Clear to Auscultation] : lungs were clear to auscultation bilaterally [No Accessory Muscle Use] : no accessory muscle use [Normal Rate] : heart rate was normal  [Regular Rhythm] : with a regular rhythm [Normal S1, S2] : normal S1 and S2 [No Edema] : there was no peripheral edema [Non Tender] : non-tender [Soft] : abdomen soft [Not Distended] : not distended [No Hernias] : no hernia was discovered [No Joint Swelling] : no joint swelling seen [No Motor Deficits] : the motor exam was normal [No Skin Lesions] : no skin lesions [de-identified] : not cooperative w/ exam. pale  [de-identified] : legally blind  [de-identified] : legally blind in both eyes  [de-identified] : unable to assess 2/2 dementia.

## 2021-06-16 ENCOUNTER — APPOINTMENT (OUTPATIENT)
Dept: HOME HEALTH SERVICES | Facility: HOME HEALTH | Age: 71
End: 2021-06-16

## 2021-06-17 ENCOUNTER — NON-APPOINTMENT (OUTPATIENT)
Age: 71
End: 2021-06-17

## 2021-06-17 RX ORDER — HALOPERIDOL 1 MG/1
1 TABLET ORAL
Qty: 120 | Refills: 3 | Status: DISCONTINUED | COMMUNITY
Start: 2020-11-26 | End: 2021-06-17

## 2021-06-22 ENCOUNTER — TRANSCRIPTION ENCOUNTER (OUTPATIENT)
Age: 71
End: 2021-06-22

## 2021-08-17 ENCOUNTER — APPOINTMENT (OUTPATIENT)
Dept: HOME HEALTH SERVICES | Facility: HOME HEALTH | Age: 71
End: 2021-08-17
Payer: MEDICARE

## 2021-08-17 VITALS — TEMPERATURE: 97.1 F | RESPIRATION RATE: 16 BRPM

## 2021-08-17 PROCEDURE — 99349 HOME/RES VST EST MOD MDM 40: CPT

## 2021-08-17 NOTE — COUNSELING
[Normal Weight - ( BMI  <25 )] : normal weight - ( BMI  <25 ) [Continue diet as tolerated] : continue diet as tolerated based on goals of care [Non - Smoker] : non-smoker [Smoke/CO Detectors] : smoke/CO detectors [Use assistive device to avoid falls] : use assistive device to avoid falls [___] : [unfilled] [] : foot exam [Not Recommended] : Aspirin use not recommended due to overall prognosis [Improve mobility] : improve mobility [Decrease stress] : decrease stress [Minimize unnecessary interventions] : minimize unnecessary interventions [Maintain functional ability] : maintain functional ability [Likely to achieve goals/desired outcomes] : likely to achieve goals/desired outcomes [Patient/Caregiver has ___ understanding of disease process] : patient/caregiver has [unfilled] understanding of disease process [Advanced Directives discussed: ____] : Advanced directives discussed: [unfilled] [Completed Medical Orders for Life-Sustaining Treatment] : completed medical orders for life-sustaining treatment [Full Code] : Code Status: Full Code [No Limitations] : Treatment Guidelines: No limitations [DNI] : Intubation: DNI [[facility]: _____[facility]last [facility]: [unfilled] [_____] : HCP: [unfilled]

## 2021-08-19 ENCOUNTER — NON-APPOINTMENT (OUTPATIENT)
Age: 71
End: 2021-08-19

## 2021-08-19 LAB
25(OH)D3 SERPL-MCNC: 81.1 NG/ML
ALBUMIN SERPL ELPH-MCNC: 4.3 G/DL
ALP BLD-CCNC: 70 U/L
ALT SERPL-CCNC: 15 U/L
ANION GAP SERPL CALC-SCNC: 14 MMOL/L
AST SERPL-CCNC: 22 U/L
BASOPHILS # BLD AUTO: 0.04 K/UL
BASOPHILS NFR BLD AUTO: 0.4 %
BILIRUB SERPL-MCNC: 0.2 MG/DL
BUN SERPL-MCNC: 25 MG/DL
CALCIUM SERPL-MCNC: 10.6 MG/DL
CHLORIDE SERPL-SCNC: 102 MMOL/L
CO2 SERPL-SCNC: 24 MMOL/L
CREAT SERPL-MCNC: 0.93 MG/DL
EOSINOPHIL # BLD AUTO: 0.11 K/UL
EOSINOPHIL NFR BLD AUTO: 1 %
ESTIMATED AVERAGE GLUCOSE: 143 MG/DL
GLUCOSE SERPL-MCNC: 151 MG/DL
HBA1C MFR BLD HPLC: 6.6 %
HCT VFR BLD CALC: 39.6 %
HGB BLD-MCNC: 12.2 G/DL
IMM GRANULOCYTES NFR BLD AUTO: 0.2 %
LITHIUM SERPL-SCNC: 0.48 MMOL/L
LYMPHOCYTES # BLD AUTO: 1.74 K/UL
LYMPHOCYTES NFR BLD AUTO: 15.4 %
MAN DIFF?: NORMAL
MCHC RBC-ENTMCNC: 26.5 PG
MCHC RBC-ENTMCNC: 30.8 GM/DL
MCV RBC AUTO: 86.1 FL
MONOCYTES # BLD AUTO: 0.75 K/UL
MONOCYTES NFR BLD AUTO: 6.6 %
NEUTROPHILS # BLD AUTO: 8.67 K/UL
NEUTROPHILS NFR BLD AUTO: 76.4 %
PLATELET # BLD AUTO: 347 K/UL
POTASSIUM SERPL-SCNC: 4.3 MMOL/L
PROT SERPL-MCNC: 6.9 G/DL
RBC # BLD: 4.6 M/UL
RBC # FLD: 14.6 %
SODIUM SERPL-SCNC: 141 MMOL/L
TSH SERPL-ACNC: 2.64 UIU/ML
WBC # FLD AUTO: 11.33 K/UL

## 2021-08-20 NOTE — PROGRESS NOTE ADULT - REASON FOR ADMISSION
AMS, behavioral disturbance
Negative

## 2021-09-09 NOTE — ADDENDUM
[FreeTextEntry1] : Transport w/c: ILIA STEVENS requires the use of a Transport wheelchair for mobility. Ms. STEVENS has the diagnosis of dementia, which severely limits her ability to ambulate and thus a transport wheelchair will greatly benefit the patient and assist her with activities of daily living in their home such as toileting, dressing, bathing and grooming. A cane or walker has been ruled out. Ms. STEVENS has not expressed an unwillingness to use the wheelchair in her home, which provides adequate space for maneuvering the mobility device. She has a caregiver who is available, willing and able to provide assistance with the Transport wheelchair. Patient cannot self-propel a standard wheelchair. \par  \par Hospital bed: Ms. ILIA STEVENS requires positioning of the body to alleviate pain and pressure in ways not feasible in an ordinary bedShe requires frequent changes in body position due to dementia She also requires the head of the bed to be elevated more than 30 degrees due to dementia and to avoid problems with aspiration.\par  \par

## 2021-09-09 NOTE — CHRONIC CARE ASSESSMENT
[PPS Score: ____] : Palliative Performance Scale (PPS) Score: [unfilled] [Limited decision making ability] : limited decision making ability [de-identified] : n/a [de-identified] : adequate

## 2021-09-09 NOTE — REASON FOR VISIT
[Follow-Up] : a follow-up visit [Spouse] : spouse [Pre-Visit Preparation] : pre-visit preparation was done [Intercurrent Specialty/Sub-specialty Visits] : the patient has no intercurrent specialty/sub-specialty visits [FreeTextEntry1] : dementia, schizophrenia, DM2

## 2021-09-09 NOTE — PHYSICAL EXAM
[No Acute Distress] : no acute distress [Well Nourished] : well nourished [Normal Sclera/Conjunctiva] : normal sclera/conjunctiva [Normal Outer Ear/Nose] : the ears and nose were normal in appearance [Supple] : the neck was supple [No Respiratory Distress] : no respiratory distress [Clear to Auscultation] : lungs were clear to auscultation bilaterally [No Accessory Muscle Use] : no accessory muscle use [Normal Rate] : heart rate was normal  [Regular Rhythm] : with a regular rhythm [Normal S1, S2] : normal S1 and S2 [No Edema] : there was no peripheral edema [Non Tender] : non-tender [Soft] : abdomen soft [Not Distended] : not distended [No Hernias] : no hernia was discovered [No Joint Swelling] : no joint swelling seen [No Skin Lesions] : no skin lesions [No Motor Deficits] : the motor exam was normal [de-identified] : legally blind  [de-identified] : not cooperative w/ exam. pale  [de-identified] : legally blind in both eyes  [de-identified] : unable to assess 2/2 dementia.

## 2021-09-09 NOTE — HISTORY OF PRESENT ILLNESS
[Spouse] : spouse [FreeTextEntry1] : dementia  [FreeTextEntry2] : Patient denies fever, cough, trouble breathing, rash and vomiting. Patient has not been in close contact with someone who is COVID positive. N95 mask, gloves and eye wear worn during visit: Y \par Total face to face time with patient: 15 minutes. \par \par 71 yo F w/ PMHx dementia, DM2, schizophrenia, CHF, legal blindness 2/2 glaucoma, DLD,  \par MOLST: Full code, yes to feeding tube for now. Spouse feels conflicted about MOLST b/c he does not know patient's wishes and b/c [employer] is important to both of them. of note, patient's sister was on a feeding tube in a SNF at the end of her life. Assured spouse that this is part of an ongoing discussion\par Will order annual labs. \par Has small healing bruise from above right eyelid due to HHA trying to prevent patient from losing balance. No injury to eye. \par Dementia: minimally verbal, when speak, does not usually make sense. Has restlessness/paces a lot. Sometimes becomes agitated. Sometimes agitated when constipated. Spouse declines lexapro at this time. Sometimes cries at night or yells. Knows her own name, sometimes forgets who  is. \par Restlessness > one year. Variable. Worse in the evening. Paces, grabs objects. Not violent.. Lorazepam somewhat helps..Gives 1mL twice daily. Does not make patient sleepy. \par CHF:. No swelling on exam today.\par DM2: doesn't check sugars. \par \par Occasional UTIs: Becomes irritable w/ UTIs. No urinary sx. \par \par Appetite/dysphagia/weight: Good appetite. Has 2 glucernas daily. Weight is stable. Eats in small bites. Coughs w/ puree and thin liquids Uses thickit occasionally. \par Constipation: Has 4-5 bmx weekly, controlled on Senna 1 tab PRN. \par Urination: No issues. \par Has bladder and bowel accidents. \par Has stool and bladder incontinence, uses pullups \par Sleep: Variable. Spouse currently trying Trazodone 75mg PO QD. \par Skin: Bruises on legs 2/2 falls. \par Gait/Falls: Ambulates w/ assistance. Does not remember to use walker. No recent falls. Some near-falls that spouse has been able to prevent. \par DME: has walker, w/c, commode *uses at night), hospital bed, oxygen PRN, hand rails \par Chronic intermittent cough and post-nasal drip. \par \par Denies B/L leg swelling, SOB, fever, vomiting \par \par Has not had PNA shot. \par Lives w/ spouse. \par HHAs 6h on Sat, 7h on Sund, 4h M-F, has additional private HHA Tu-Thurs for 4 more hours \par \par [employer] is important to patient and spouse.

## 2021-09-28 ENCOUNTER — NON-APPOINTMENT (OUTPATIENT)
Age: 71
End: 2021-09-28

## 2021-09-28 ENCOUNTER — APPOINTMENT (OUTPATIENT)
Dept: HOME HEALTH SERVICES | Facility: HOME HEALTH | Age: 71
End: 2021-09-28

## 2021-10-26 ENCOUNTER — NON-APPOINTMENT (OUTPATIENT)
Age: 71
End: 2021-10-26

## 2021-11-02 ENCOUNTER — APPOINTMENT (OUTPATIENT)
Dept: HOME HEALTH SERVICES | Facility: HOME HEALTH | Age: 71
End: 2021-11-02
Payer: MEDICARE

## 2021-11-02 ENCOUNTER — MED ADMIN CHARGE (OUTPATIENT)
Age: 71
End: 2021-11-02

## 2021-11-02 VITALS — TEMPERATURE: 97.2 F | HEART RATE: 77 BPM | RESPIRATION RATE: 16 BRPM | OXYGEN SATURATION: 97 %

## 2021-11-02 PROCEDURE — 99349 HOME/RES VST EST MOD MDM 40: CPT | Mod: 25

## 2021-11-02 PROCEDURE — 90662 IIV NO PRSV INCREASED AG IM: CPT

## 2021-11-02 PROCEDURE — G0008: CPT

## 2021-11-02 NOTE — COUNSELING
[Normal Weight - ( BMI  <25 )] : normal weight - ( BMI  <25 ) [Continue diet as tolerated] : continue diet as tolerated based on goals of care [Non - Smoker] : non-smoker [Smoke/CO Detectors] : smoke/CO detectors [Use assistive device to avoid falls] : use assistive device to avoid falls [___] : [unfilled] [] : foot exam [Not Recommended] : Aspirin use not recommended due to overall prognosis [Improve mobility] : improve mobility [Decrease stress] : decrease stress [Minimize unnecessary interventions] : minimize unnecessary interventions [Maintain functional ability] : maintain functional ability [Likely to achieve goals/desired outcomes] : likely to achieve goals/desired outcomes [Patient/Caregiver has ___ understanding of disease process] : patient/caregiver has [unfilled] understanding of disease process [Advanced Directives discussed: ____] : Advanced directives discussed: [unfilled] [Completed Medical Orders for Life-Sustaining Treatment] : completed medical orders for life-sustaining treatment [Full Code] : Code Status: Full Code [No Limitations] : Treatment Guidelines: No limitations [DNI] : Intubation: DNI [[facility] Date: _____] [facility]last verification date: [unfilled] [_____] : HCP: [unfilled]

## 2021-11-12 NOTE — ADDENDUM
[FreeTextEntry1] : hospital bed: Ms. ILIA STEVENS requires positioning of the body to alleviate pain and pressure in ways not feasible in an ordinary bedShe requires frequent changes in body position due to dementia She also requires the head of the bed to be elevated more than 30 degrees due to dementia and to avoid problems with aspiration.\par  \par Transport w/c: ILIA STEVENS requires the use of a Transport wheelchair for mobility. Ms. STEVENS has the diagnosis of dementia which severely limits her ability to ambulate and thus a transport wheelchair will greatly benefit the patient and assist her with activities of daily living in their home such as toileting, dressing, bathing and grooming. A cane or walker has been ruled out. Ms. STEVENS has not expressed an unwillingness to use the wheelchair in her home, which provides adequate space for maneuvering the mobility device. She has a caregiver who is available, willing and able to provide assistance with the Transport wheelchair. Patient cannot self-propel a standard wheelchair. \par   \par

## 2021-11-12 NOTE — PHYSICAL EXAM
[No Acute Distress] : no acute distress [Well Nourished] : well nourished [Normal Sclera/Conjunctiva] : normal sclera/conjunctiva [Normal Outer Ear/Nose] : the ears and nose were normal in appearance [Supple] : the neck was supple [No Respiratory Distress] : no respiratory distress [Clear to Auscultation] : lungs were clear to auscultation bilaterally [No Accessory Muscle Use] : no accessory muscle use [Normal Rate] : heart rate was normal  [Regular Rhythm] : with a regular rhythm [Normal S1, S2] : normal S1 and S2 [No Edema] : there was no peripheral edema [Non Tender] : non-tender [Soft] : abdomen soft [Not Distended] : not distended [No Hernias] : no hernia was discovered [No Joint Swelling] : no joint swelling seen [No Skin Lesions] : no skin lesions [No Motor Deficits] : the motor exam was normal [de-identified] : not cooperative w/ exam. pale  [de-identified] : legally blind  [de-identified] : legally blind in both eyes  [de-identified] : unable to assess 2/2 dementia.

## 2021-11-12 NOTE — HISTORY OF PRESENT ILLNESS
[Spouse] : spouse [FreeTextEntry1] : dementia  [FreeTextEntry2] : Patient denies fever, cough, trouble breathing, rash and vomiting. Patient has not been in close contact with someone who is COVID positive. N95 mask, gloves and eye wear worn during visit: Y \par Total face to face time with patient: 15 minutes. \par \par 71 yo F w/ PMHx dementia, DM2, schizophrenia, CHF, legal blindness 2/2 glaucoma, DLD,  \par \par Dementia: minimally verbal, when speak, does not usually make sense. Has restlessness/paces a lot. Sometimes becomes agitated. Sometimes agitated when constipated. Spouse declines lexapro at this time. Sometimes cries at night or yells. Now cyring more often (ie., "mamma!")  Knows her own name, sometimes forgets who  is. \par Restlessness > one year. Variable. Worse in the evening. Paces, grabs objects. Not violent.. Lorazepam somewhat helps..Gives 2mL total of lorazepam daily. Does not make patient sleepy. \par CHF:. No swelling on exam today.\par DM2: doesn't check sugars. Occasionally pt has difficulty swallowing metformin ER. In future, if becomes more frequent, can switch to metformin 500mg regular BID in future. l\par \par Occasional UTIs: Becomes irritable w/ UTIs. No urinary sx. \par \par Appetite/dysphagia/weight: Good appetite. Has 2 glucernas daily. Weight is stable. Eats in small bites. Coughs w/ puree and thin liquids Uses thickit occasionally. \par Constipation: Has 4-5 bmx weekly, controlled on Senna 1 tab PRN. \par Urination: No issues. \par Has bladder and bowel accidents. \par Has stool and bladder incontinence, uses pullups \par Sleep: Variable. Sometimes only sleeps one hour at night. On Trazodone 75mg PO QD. \par Skin: Bruises on legs 2/2 falls. \par Gait/Falls: Ambulates w/ assistance. Does not remember to use walker. No recent falls. Some near-falls that spouse has been able to prevent. \par DME: has walker, w/c, commode *uses at night), hospital bed, oxygen PRN, hand rails \par Chronic intermittent cough and post-nasal drip. \par \par Denies B/L leg swelling, SOB, fever, vomiting \par \par Has not had PNA shot. \par Lives w/ spouse. \par HHAs 6h on Sat, 7h on Sund, 4h M-F, has additional private HHA Tu-Thurs for 4 more hours \par \par [employer] is important to patient and spouse.

## 2021-11-12 NOTE — CHRONIC CARE ASSESSMENT
[PPS Score: ____] : Palliative Performance Scale (PPS) Score: [unfilled] [Limited decision making ability] : limited decision making ability [de-identified] : n/a [de-identified] : adequate

## 2021-11-17 ENCOUNTER — LABORATORY RESULT (OUTPATIENT)
Age: 71
End: 2021-11-17

## 2021-11-18 ENCOUNTER — NON-APPOINTMENT (OUTPATIENT)
Age: 71
End: 2021-11-18

## 2021-12-02 ENCOUNTER — NON-APPOINTMENT (OUTPATIENT)
Age: 71
End: 2021-12-02

## 2021-12-02 LAB
ALBUMIN SERPL ELPH-MCNC: 3.8 G/DL
ANION GAP SERPL CALC-SCNC: 12 MMOL/L
BUN SERPL-MCNC: 22 MG/DL
CALCIUM SERPL-MCNC: 10.1 MG/DL
CHLORIDE SERPL-SCNC: 106 MMOL/L
CO2 SERPL-SCNC: 25 MMOL/L
CREAT SERPL-MCNC: 0.96 MG/DL
GLUCOSE SERPL-MCNC: 133 MG/DL
PHOSPHATE SERPL-MCNC: 3.9 MG/DL
POTASSIUM SERPL-SCNC: 4.6 MMOL/L
SODIUM SERPL-SCNC: 142 MMOL/L

## 2021-12-06 NOTE — PROGRESS NOTE ADULT - PROBLEM SELECTOR PROBLEM 7
Appt. Made for 10:40 today with Dr. Renny Fuchs Type 2 diabetes mellitus without complication, without long-term current use of insulin

## 2021-12-09 ENCOUNTER — TRANSCRIPTION ENCOUNTER (OUTPATIENT)
Age: 71
End: 2021-12-09

## 2021-12-30 ENCOUNTER — TRANSCRIPTION ENCOUNTER (OUTPATIENT)
Age: 71
End: 2021-12-30

## 2022-01-14 ENCOUNTER — RX RENEWAL (OUTPATIENT)
Age: 72
End: 2022-01-14

## 2022-02-09 ENCOUNTER — NON-APPOINTMENT (OUTPATIENT)
Age: 72
End: 2022-02-09

## 2022-02-22 ENCOUNTER — APPOINTMENT (OUTPATIENT)
Dept: HOME HEALTH SERVICES | Facility: HOME HEALTH | Age: 72
End: 2022-02-22
Payer: MEDICARE

## 2022-02-22 VITALS
HEART RATE: 77 BPM | SYSTOLIC BLOOD PRESSURE: 119 MMHG | DIASTOLIC BLOOD PRESSURE: 64 MMHG | TEMPERATURE: 95.9 F | RESPIRATION RATE: 16 BRPM

## 2022-02-22 DIAGNOSIS — K59.09 OTHER CONSTIPATION: ICD-10-CM

## 2022-02-22 PROCEDURE — G0439: CPT

## 2022-02-22 PROCEDURE — 99349 HOME/RES VST EST MOD MDM 40: CPT | Mod: 25

## 2022-02-22 NOTE — REASON FOR VISIT
[Spouse] : spouse [Pre-Visit Preparation] : pre-visit preparation was done [Subsequent Annual Medicare Wellness Visit] : a subsequent annual Medicare wellness visit [Intercurrent Specialty/Sub-specialty Visits] : the patient has no intercurrent specialty/sub-specialty visits [FreeTextEntry1] : dementia, schizophrenia, DM2

## 2022-02-22 NOTE — HISTORY OF PRESENT ILLNESS
[Spouse] : spouse [FreeTextEntry1] : dementia  [FreeTextEntry2] : Patient denies fever, cough, trouble breathing, rash and vomiting. Patient has not been in close contact with someone who is COVID positive. N95 mask, gloves and eye wear worn during visit: Y \par Total face to face time with patient: 15 minutes. \par \par 72 yo F w/ PMHx dementia, DM2, schizophrenia, CHF, legal blindness 2/2 glaucoma, DLD,  \par \par Needs labs at next visit. \par \par Dementia: minimally verbal, when speak, does not usually make sense. Has restlessness/paces a lot. Sometimes becomes agitated. Sometimes agitated when constipated. Knows her own name, sometimes forgets who  is. Has increased debility. Needs total assistance w/ ambulating. \par Restlessness > one year. Variable. Worse in the evening. Paces, grabs objects. Not violent.. Lorazepam somewhat helps..Gives 2mL total of lorazepam daily. Does not make patient sleepy. \par CHF:. No swelling on exam today.\par DM2: doesn't check sugars. Occasionally pt has difficulty swallowing metformin ER. In future, if becomes more frequent, can switch to metformin 500mg regular BID in future. \par \par Occasional UTIs: Becomes irritable w/ UTIs. No urinary sx. \par \par Appetite/dysphagia/weight: Good appetite. Has 2 glucernas daily. Weight is stable. Eats in small bites. Coughs w/ puree and thin liquids Uses thickit occasionally. \par Constipation: Has 4-5 bmx weekly, controlled on Senna 1 tab PRN. \par Urination: No issues. \par Has bladder and bowel accidents. \par Has stool and bladder incontinence, uses pullups \par Sleep: Variable. Sometimes only sleeps one hour at night. On Trazodone 75mg PO QD. \par Skin: Bruises on legs 2/2 falls. \par Gait/Falls: Ambulates w/ assistance. Does not remember to use walker. No recent falls. Some near-falls that spouse has been able to prevent. \par DME: has walker, w/c, commode *uses at night), hospital bed, oxygen PRN, hand rails \par Chronic intermittent cough and post-nasal drip. \par \par MOLST: no changes. \par \par Denies B/L leg swelling, SOB, fever, vomiting \par \par Has not had PNA shot. \par Lives w/ spouse. \par HHAs: Coverage ~10-11 h daily. out of pocket. \par \par [employer] is important to patient and spouse.

## 2022-02-22 NOTE — COUNSELING
[Normal Weight - ( BMI  <25 )] : normal weight - ( BMI  <25 ) [Continue diet as tolerated] : continue diet as tolerated based on goals of care [Non - Smoker] : non-smoker [Smoke/CO Detectors] : smoke/CO detectors [Use assistive device to avoid falls] : use assistive device to avoid falls [___] : [unfilled] [] : foot exam [Not Recommended] : Aspirin use not recommended due to overall prognosis [Improve mobility] : improve mobility [Decrease stress] : decrease stress [Minimize unnecessary interventions] : minimize unnecessary interventions [Maintain functional ability] : maintain functional ability [Likely to achieve goals/desired outcomes] : likely to achieve goals/desired outcomes [Patient/Caregiver has ___ understanding of disease process] : patient/caregiver has [unfilled] understanding of disease process [Advanced Directives discussed: ____] : Advanced directives discussed: [unfilled] [Completed Medical Orders for Life-Sustaining Treatment] : completed medical orders for life-sustaining treatment [Full Code] : Code Status: Full Code [No Limitations] : Treatment Guidelines: No limitations [DNI] : Intubation: DNI [[facility] Date: _____] [facility]last verification date: [unfilled] [_____] : HCP: [unfilled] [Established patient, extensive review of history, medical and functional status, risk factors and patient education] : Established patient, extensive review of history, medical and functional status, risk factors and patient education and counseling provided for subsequent annual wellness visit

## 2022-02-22 NOTE — CHRONIC CARE ASSESSMENT
[Limited decision making ability] : limited decision making ability [PPS Score: ____] : Palliative Performance Scale (PPS) Score: [unfilled] [de-identified] : n/a [de-identified] : adequate

## 2022-02-22 NOTE — PHYSICAL EXAM
[No Acute Distress] : no acute distress [Well Nourished] : well nourished [Normal Sclera/Conjunctiva] : normal sclera/conjunctiva [Normal Outer Ear/Nose] : the ears and nose were normal in appearance [Supple] : the neck was supple [No Respiratory Distress] : no respiratory distress [Clear to Auscultation] : lungs were clear to auscultation bilaterally [No Accessory Muscle Use] : no accessory muscle use [Normal Rate] : heart rate was normal  [Regular Rhythm] : with a regular rhythm [Normal S1, S2] : normal S1 and S2 [No Edema] : there was no peripheral edema [Non Tender] : non-tender [Soft] : abdomen soft [Not Distended] : not distended [No Hernias] : no hernia was discovered [No Joint Swelling] : no joint swelling seen [No Skin Lesions] : no skin lesions [No Motor Deficits] : the motor exam was normal [de-identified] : not cooperative w/ exam. pale  [de-identified] : legally blind  [de-identified] : legally blind in both eyes  [de-identified] : unable to assess 2/2 dementia.

## 2022-04-01 ENCOUNTER — APPOINTMENT (OUTPATIENT)
Dept: HOME HEALTH SERVICES | Facility: HOME HEALTH | Age: 72
End: 2022-04-01

## 2022-04-15 NOTE — PROGRESS NOTE ADULT - PROBLEM SELECTOR PLAN 9
Anesthesia Evaluation     Patient summary reviewed and Nursing notes reviewed   no history of anesthetic complications:  NPO Solid Status: > 8 hours  NPO Liquid Status: > 2 hours           Airway   Mallampati: II  TM distance: >3 FB  Neck ROM: full  No difficulty expected  Dental      Pulmonary - negative pulmonary ROS and normal exam    breath sounds clear to auscultation  Cardiovascular - normal exam  Exercise tolerance: good (4-7 METS)    Rhythm: regular  Rate: normal    (+) hypertension,       Neuro/Psych- negative ROS  GI/Hepatic/Renal/Endo    (+)  GERD,      Musculoskeletal     Abdominal    Substance History - negative use     OB/GYN negative ob/gyn ROS         Other   arthritis,                      Anesthesia Plan    ASA 2     general   total IV anesthesia(Patient understands anesthesia not responsible for dental damage.)  intravenous induction     Anesthetic plan, all risks, benefits, and alternatives have been provided, discussed and informed consent has been obtained with: patient.    Plan discussed with CRNA.        CODE STATUS:        -improve score 2, dvt ppx: lovenox   diet: cc  dispo: rehab

## 2022-04-24 ENCOUNTER — TRANSCRIPTION ENCOUNTER (OUTPATIENT)
Age: 72
End: 2022-04-24

## 2022-04-25 ENCOUNTER — TRANSCRIPTION ENCOUNTER (OUTPATIENT)
Age: 72
End: 2022-04-25

## 2022-04-25 ENCOUNTER — RX RENEWAL (OUTPATIENT)
Age: 72
End: 2022-04-25

## 2022-04-26 ENCOUNTER — NON-APPOINTMENT (OUTPATIENT)
Age: 72
End: 2022-04-26

## 2022-05-11 NOTE — ED ADULT TRIAGE NOTE - WEIGHT IN LBS
Phone Number Called: [phone]    Call outcome: Spoke to patient regarding message below.    Message: Relayed that information to Dav Alex.   [phone]

## 2022-05-31 NOTE — ED ADULT NURSE NOTE - RELATIONSHIP TO PATIENT
Headache    A headache is pain or discomfort felt around the head or neck area. The specific cause of a headache may not be found as there are many types including tension headaches, migraine headaches, and cluster headaches. Watch your condition for any changes. Things you can do to manage your pain include taking over the counter and prescription medications as instructed by your health care provider, lying down in a dark quiet room, limiting stress, getting regular sleep, and refraining from alcohol and tobacco products.    SEEK IMMEDIATE MEDICAL CARE IF YOU EXPERIENCE THE FOLLOWING SYMPTOMS: fever, vomiting, stiff neck, loss of vision, problems with speech, muscle weakness, loss of balance, trouble walking, pass out, or confusion.
spouse

## 2022-06-05 NOTE — PROGRESS NOTE ADULT - PROBLEM SELECTOR PLAN 10
Transitions of Care Status:  1.  Name of PCP: Dr. Pena   2.  PCP Contacted on Admission: [x ] Y    [ ] N  -emailed   3.  PCP contacted at Discharge: [ ] Y    [ ] N    [ ] N/A  4.  Post-Discharge Appointment Date and Location:  5.  Summary of Handoff given to PCP: negative...

## 2022-06-15 NOTE — ED BEHAVIORAL HEALTH ASSESSMENT NOTE - NS ED [facility]
Department of Emergency Medicine   ED  Provider Note  Admit Date/RoomTime: 6/15/2022  2:55 PM  ED Room: 23/23          History of Present Illness:  6/15/22, Time: 3:14 PM EDT  Chief Complaint   Patient presents with    AICD Problem     FIRED 2X UNSURE WHY HE HAS ONE, [employer] DEVICE AND BLAYNE IS DOCTOR     Sonja Bear is a 80 y.o. male presenting to the ED for AICD fire. Apparently, the patient had just got home from doctor's appointment. He was eating lunch then he sat down subsequently. He felt his face get briefly numb and tingly and felt like he was potentially going to pass out. His AICD then fired. 20 minutes later it fired again apparently. The cardiology clinic called his home phone and stated he had an episode of either V. fib or Juanita Duel. The patient denies any complaints at this time he states he feels well. He has had multiple V. fib and V. tach or episodes in the past she is status post TAVR. He now is on amiodarone 200 twice daily which he has been compliant with. He is also medically compliant with his Xarelto. Other makes symptoms better or worse they are moderate in severity there constant and now resolved. Review of Systems:  Review of systems obtained and negative unless stated otherwise above in the HPI.    --------------------------------------------- PAST HISTORY ---------------------------------------------  Past Medical History:  has a past medical history of A-fib ([facility] [address] Arrhythmia, Carotid artery stenosis, CHF (congestive heart failure) ([address] Heart valve problem, Hyperlipidemia, Hypertension, ICD (implantable cardiac defibrillator) in place, MI (mitral incompetence), and MI (myocardial infarction) ([address] Past Surgical History:  has a past surgical history that includes Varicose vein surgery (1970's); Coronary artery bypass graft (05/23/2003 03/16/2007); Diagnostic Cardiac Cath Lab Procedure (2007);  Diagnostic Cardiac Cath Lab Procedure (03/29/2008); joint replacement (2011); Cardiac defibrillator placement (2007. 2008); Cardiac defibrillator placement (07/28/2016); Cardiac catheterization (Right, 03/03/2017); other surgical history (03/29/2017); Upper gastrointestinal endoscopy (N/A, 09/03/2019); Upper gastrointestinal endoscopy (N/A, 02/18/2020); Colonoscopy (N/A, 02/24/2020); Cardioversion (10/29/2020); Cardiac surgery (N/A, 09/04/2021); Upper gastrointestinal endoscopy (N/A, 03/14/2022); Upper gastrointestinal endoscopy (N/A, 03/16/2022); Colonoscopy (N/A, 05/04/2022); and Cardioversion (05/12/2022). Social History:  reports that he quit smoking about 49 years ago. He has a 1.50 pack-year smoking history. He has never used smokeless tobacco. He reports that he does not drink alcohol and does not use drugs. Family History: family history is not on file. . Unless otherwise noted, family history is non contributory    The patients home medications have been reviewed. Allergies: Patient has no known allergies. I have reviewed the past medical history, past surgical history, social history, and family history    ---------------------------------------------------PHYSICAL EXAM--------------------------------------    Constitutional: [Appears in no distress]  Head: [Normocephalic, atraumatic]   Eyes: [Non-icteric slcera, no conjunctival injection]  ENT: [Moist mucous membranes,]  Neck: [Trachea midline, no JVD]  Respiratory: [Nonlabored respirations. Lungs clear to auscultation bilaterally, no wheezes, rales, or rhonchi.]   Cardiovascular:  [Regular rate. Regular rhythm. No murmurs, no gallops, no rubs.]   Gastrointestinal:  [Abdomen Soft, Non tender, Non distended.  No rebound tenderness, guarding, or rigidity.]  Extremities: [No lower extremity edema]  Genitourinary: [No CVA tenderness, no suprapubic tenderness]  Musculoskeletal: [Moves all extremities, no deformity]  Skin: Is alert and sternotomy incision scar  Neurologic: [Alert, symmetric facies, no aphasia]    -------------------------------------------------- RESULTS -------------------------------------------------  I have personally reviewed all laboratory and imaging results for this patient. Results are listed below.      LABS: (Lab results interpreted by me)  Results for orders placed or performed during the hospital encounter of 03/39/68   Basic Metabolic Panel   Result Value Ref Range    Sodium 136 132 - 146 mmol/L    Potassium 4.2 3.5 - 5.0 mmol/L    Chloride 93 (L) 98 - 107 mmol/L    CO2 27 22 - 29 mmol/L    Anion Gap 16 7 - 16 mmol/L    Glucose 112 (H) 74 - 99 mg/dL    BUN 22 6 - 23 mg/dL    CREATININE 1.7 (H) 0.7 - 1.2 mg/dL    GFR Non-African American 39 >=60 mL/min/1.73    GFR African American 47     Calcium 8.8 8.6 - 10.2 mg/dL   CBC with Auto Differential   Result Value Ref Range    WBC 8.8 4.5 - 11.5 E9/L    RBC 4.50 3.80 - 5.80 E12/L    Hemoglobin 12.8 12.5 - 16.5 g/dL    Hematocrit 38.7 37.0 - 54.0 %    MCV 86.0 80.0 - 99.9 fL    MCH 28.4 26.0 - 35.0 pg    MCHC 33.1 32.0 - 34.5 %    RDW 15.0 11.5 - 15.0 fL    Platelets 754 391 - 882 E9/L    MPV 10.7 7.0 - 12.0 fL    Neutrophils % 78.2 43.0 - 80.0 %    Immature Granulocytes % 0.3 0.0 - 5.0 %    Lymphocytes % 13.6 (L) 20.0 - 42.0 %    Monocytes % 6.9 2.0 - 12.0 %    Eosinophils % 0.7 0.0 - 6.0 %    Basophils % 0.3 0.0 - 2.0 %    Neutrophils Absolute 6.86 1.80 - 7.30 E9/L    Immature Granulocytes # 0.03 E9/L    Lymphocytes Absolute 1.19 (L) 1.50 - 4.00 E9/L    Monocytes Absolute 0.61 0.10 - 0.95 E9/L    Eosinophils Absolute 0.06 0.05 - 0.50 E9/L    Basophils Absolute 0.03 0.00 - 0.20 E9/L   Magnesium   Result Value Ref Range    Magnesium 2.1 1.6 - 2.6 mg/dL   APTT   Result Value Ref Range    aPTT 38.4 (H) 24.5 - 35.1 sec   Protime-INR   Result Value Ref Range    Protime 17.0 (H) 9.3 - 12.4 sec    INR 1.5    Troponin   Result Value Ref Range    Troponin, High Sensitivity 24 (H) 0 - 11 ng/L   Troponin   Result Value Ref Range    Troponin, High Sensitivity 26 (H) 0 - 11 ng/L   EKG 12 Lead   Result Value Ref Range    Ventricular Rate 70 BPM    Atrial Rate 70 BPM    P-R Interval 326 ms    QRS Duration 134 ms    Q-T Interval 430 ms    QTc Calculation (Bazett) 464 ms    R Axis -84 degrees    T Axis 28 degrees   ,       RADIOLOGY:  Interpreted by Radiologist unless otherwise specified  XR CHEST PORTABLE   Final Result   No acute process. Cardiomegaly. ------------------------- NURSING NOTES AND VITALS REVIEWED ---------------------------   The nursing notes within the ED encounter and vital signs as below have been reviewed by myself  BP (!) 155/70   Pulse 71   Temp 98.3 °F (36.8 °C) (Axillary)   Resp 18   Ht 5' 7\" (1.702 m)   Wt 170 lb (77.1 kg)   SpO2 93%   BMI 26.63 kg/m²     Oxygen Saturation Interpretation: [Normal]    The patients available past medical records and past encounters were reviewed. ------------------------------ ED COURSE/MEDICAL DECISION MAKING----------------------  Medications - No data to display     Re-Evaluations:  ED Course as of 06/15/22 1828   Wed Srinivas 15, 2022   3268 Dr. Korin Churchill states the patient is able to be discharged if stable after observation [JV]      ED Course User Index  [JV] Yolanda Huerta MD         This patient's ED course included: [a personal history and physicial examination and re-evaluation prior to disposition]    This patient has [remained hemodynamically stable] during their ED course. Consultations:  [Electrophysiology]    Medical Decision Making:   Patient presents emerged part with a forementioned complaint. He feels well at present. He will be maintained on cardiac telemetry. Basic labs as well as an EKG are obtained. Patient likely will need a medication adjustment. Labs and imaging reviewed patient does have mild elevation of creatinine at 1.7 and this is slightly above the patient's normal baseline which is previously about 1.0-1.2.     Patient's electrophysiologist saw her in the emergency department. She recommended period of observation in the emergency department. He can be safely discharged home if he does not have any further events. She reviewed and apparently reprogrammed his Medtronic AICD. Pt had two terminated episodes of VF. EKG:  EKG is interpreted myself as a ventricular rate of 70 beats. There is pacer spikes noted throughout the tracing preceding QRS complex. There is RSR prime pattern consistent with right bundle branch block which is chronic. No ST segment elevation or depression no be diagnostic of ACS at this time and this EKG is unchanged from prior. Interpreted by myself as an atrial paced rhythm no ischemia or infarct evident. Per electrophysiology with plans to increase the patient's amiodarone to 200 mg 3 times daily. Patient will follow up with Dr. Bipin Castellanos in clinic. Patient was monitored on telemetry for 4 hours in the emergency department. He is discharged with outpatient follow-up. He is given return precautions as well. States he has ample amounts of his medication at home does not need a increase in his number of amiodarone tablets at this time. Counseling: The emergency provider has spoken with the patient and discussed todays results, in addition to providing specific details for the plan of care and counseling regarding the diagnosis and prognosis. Questions are answered at this time and they are agreeable with the plan.       --------------------------------- IMPRESSION AND DISPOSITION ---------------------------------    IMPRESSION  1. AICD discharge        DISPOSITION  Disposition: [Discharge to home]  Patient condition is [stable]    Dr. Sandrine BURNETT am the primary provider of record    Sandrine Evans DO  Emergency Medicine    NOTE: This report was transcribed using voice recognition software.  Every effort was made to ensure accuracy; however, inadvertent computerized transcription errors may be present         Robby Bhatia, DO  06/15/22 Iliana Woods, DO  06/15/22 Beverley Marsh None known

## 2022-06-16 ENCOUNTER — RX RENEWAL (OUTPATIENT)
Age: 72
End: 2022-06-16

## 2022-06-23 ENCOUNTER — APPOINTMENT (OUTPATIENT)
Dept: HOME HEALTH SERVICES | Facility: HOME HEALTH | Age: 72
End: 2022-06-23

## 2022-07-08 ENCOUNTER — NON-APPOINTMENT (OUTPATIENT)
Age: 72
End: 2022-07-08

## 2022-07-12 ENCOUNTER — LABORATORY RESULT (OUTPATIENT)
Age: 72
End: 2022-07-12

## 2022-07-14 ENCOUNTER — NON-APPOINTMENT (OUTPATIENT)
Age: 72
End: 2022-07-14

## 2022-07-14 DIAGNOSIS — E87.0 HYPEROSMOLALITY AND HYPERNATREMIA: ICD-10-CM

## 2022-07-18 ENCOUNTER — RX RENEWAL (OUTPATIENT)
Age: 72
End: 2022-07-18

## 2022-08-02 ENCOUNTER — LABORATORY RESULT (OUTPATIENT)
Age: 72
End: 2022-08-02

## 2022-08-09 NOTE — COUNSELING
[Normal Weight - ( BMI  <25 )] : normal weight - ( BMI  <25 ) [Continue diet as tolerated] : continue diet as tolerated based on goals of care [Non - Smoker] : non-smoker [Smoke/CO Detectors] : smoke/CO detectors [Use assistive device to avoid falls] : use assistive device to avoid falls [___] : [unfilled] [] : foot exam [Not Recommended] : Aspirin use not recommended due to overall prognosis [Improve mobility] : improve mobility [Decrease stress] : decrease stress [Minimize unnecessary interventions] : minimize unnecessary interventions [Maintain functional ability] : maintain functional ability [Likely to achieve goals/desired outcomes] : likely to achieve goals/desired outcomes [Patient/Caregiver has ___ understanding of disease process] : patient/caregiver has [unfilled] understanding of disease process [Advanced Directives discussed: ____] : Advanced directives discussed: [unfilled] [Completed Medical Orders for Life-Sustaining Treatment] : completed medical orders for life-sustaining treatment [Full Code] : Code Status: Full Code [No Limitations] : Treatment Guidelines: No limitations [DNI] : Intubation: DNI [[facility] Date: _____[facility]: [unfilled] [_____] : HCP: [unfilled]

## 2022-08-10 ENCOUNTER — APPOINTMENT (OUTPATIENT)
Dept: HOME HEALTH SERVICES | Facility: HOME HEALTH | Age: 72
End: 2022-08-10

## 2022-08-10 VITALS — OXYGEN SATURATION: 97 % | HEART RATE: 73 BPM | SYSTOLIC BLOOD PRESSURE: 122 MMHG | DIASTOLIC BLOOD PRESSURE: 74 MMHG

## 2022-08-10 PROCEDURE — 99349 HOME/RES VST EST MOD MDM 40: CPT

## 2022-08-31 NOTE — PHYSICAL EXAM
[No Acute Distress] : no acute distress [Well Nourished] : well nourished [Normal Sclera/Conjunctiva] : normal sclera/conjunctiva [Normal Outer Ear/Nose] : the ears and nose were normal in appearance [Supple] : the neck was supple [No Respiratory Distress] : no respiratory distress [Clear to Auscultation] : lungs were clear to auscultation bilaterally [No Accessory Muscle Use] : no accessory muscle use [Normal Rate] : heart rate was normal  [Regular Rhythm] : with a regular rhythm [Normal S1, S2] : normal S1 and S2 [No Edema] : there was no peripheral edema [Non Tender] : non-tender [Soft] : abdomen soft [Not Distended] : not distended [No Hernias] : no hernia was discovered [No Joint Swelling] : no joint swelling seen [No Skin Lesions] : no skin lesions [No Motor Deficits] : the motor exam was normal [de-identified] : not cooperative w/ exam. pale  [de-identified] : legally blind  [de-identified] : legally blind in both eyes  [de-identified] : unable to assess 2/2 dementia.

## 2022-08-31 NOTE — HISTORY OF PRESENT ILLNESS
[Spouse] : spouse [FreeTextEntry1] : dementia  [FreeTextEntry2] : COVID SCREEN:\par Patient or caretaker denies fever, cough, trouble breathing, rash, vomiting. Patient has not been in close contact with anyone who is COVID-19 positive, or suspected of having COVID-19.\par \par N95 mask, gloves, eye wear and gown (if indicated) used during visit: Yes.\par \par Total face to face time with patient is 45 min.\par \par HPI:\par 72yo F with PMH dementia, DM2, schizophrenia, CHF, legal blindness 2/2 glaucoma, DLD. Seen today for routine followup visit and introduction to new [facility].\par \par Social/Home Environment:\par -Lives w/ spouse. \par -HHAs: Coverage ~10-11 h daily. OOP\par -[employer] is important to patient and spouse. \par \par Interval Events:\par -Seen with  Lauri\par -Has not had PNA shot. \par -Distressed about elevated A1C\par -Reconciled meds. Pt on numerous agents for mood control.\par -H/o rectal prolapse surgery x2, 2.5ft of colon removed\par -Legally blind since 2001, used to work for [employer]\par -Less physically active, gained some pounds, discussed aging's role in A1C elevation\par -Metformin 500mg ER BID, keeping the same but will follow\par -Na has corrected, was 153 now 143, monitoring\par -Discussed Novavax, and consideration around getting 2nd booster\par -Discussed h/o ECT, 's suspicions this contributed to her developing dementia. Discussed that this is not an answer known by provider. Dementia following ECT was not an increased risk on study.\par \par Subjective:\par 1. Appetite/Weight: Good appetite. Has 2 Gucernas daily. Weight has been stable recently. Eats in small bites. Coughs w/ puree and thin liquids Uses thickit occasionally. \par 2. Gait/Falls: Ambulates w/ assistance. Does not remember to use walker. No recent falls. Some near-falls that spouse has been able to prevent. \par 3. Sleep: Variable. Sometimes only sleeps one hour at night. On Trazodone 75mg PO QD. \par 4. BMs: Has 4-5 bmx weekly, controlled on Senna 1 tab PRN. \par 5. Urine: No issues. Diaper dependent.\par 6. Skin: Bruises on legs 2/2 falls. \par 7. Mood/Memory: Anxious appearing, seated on couch, reassurable by aide. Does not engage with writer.\par \par DME: Walker, w/c, commode *uses at night), hospital bed, oxygen PRN, hand rails

## 2022-08-31 NOTE — CHRONIC CARE ASSESSMENT
[PPS Score: ____] : Palliative Performance Scale (PPS) Score: [unfilled] [Limited decision making ability] : limited decision making ability [de-identified] : n/a [de-identified] : adequate

## 2022-09-06 ENCOUNTER — RX RENEWAL (OUTPATIENT)
Age: 72
End: 2022-09-06

## 2022-09-18 ENCOUNTER — RX RENEWAL (OUTPATIENT)
Age: 72
End: 2022-09-18

## 2022-09-20 ENCOUNTER — APPOINTMENT (OUTPATIENT)
Dept: HOME HEALTH SERVICES | Facility: HOME HEALTH | Age: 72
End: 2022-09-20

## 2022-09-28 ENCOUNTER — NON-APPOINTMENT (OUTPATIENT)
Age: 72
End: 2022-09-28

## 2022-09-29 ENCOUNTER — NON-APPOINTMENT (OUTPATIENT)
Age: 72
End: 2022-09-29

## 2022-10-29 ENCOUNTER — NON-APPOINTMENT (OUTPATIENT)
Age: 72
End: 2022-10-29

## 2022-10-29 ENCOUNTER — TRANSCRIPTION ENCOUNTER (OUTPATIENT)
Age: 72
End: 2022-10-29

## 2022-10-30 ENCOUNTER — TRANSCRIPTION ENCOUNTER (OUTPATIENT)
Age: 72
End: 2022-10-30

## 2022-10-31 ENCOUNTER — APPOINTMENT (OUTPATIENT)
Dept: HOME HEALTH SERVICES | Facility: HOME HEALTH | Age: 72
End: 2022-10-31

## 2022-10-31 VITALS
OXYGEN SATURATION: 98 % | DIASTOLIC BLOOD PRESSURE: 76 MMHG | RESPIRATION RATE: 18 BRPM | TEMPERATURE: 98.6 F | HEART RATE: 84 BPM | SYSTOLIC BLOOD PRESSURE: 124 MMHG

## 2022-10-31 RX ORDER — GENTAMICIN SULFATE 3 MG/ML
0.3 SOLUTION OPHTHALMIC
Qty: 1 | Refills: 0 | Status: COMPLETED | COMMUNITY
Start: 2020-11-26 | End: 2022-10-31

## 2022-11-01 ENCOUNTER — LABORATORY RESULT (OUTPATIENT)
Age: 72
End: 2022-11-01

## 2022-11-01 ENCOUNTER — TRANSCRIPTION ENCOUNTER (OUTPATIENT)
Age: 72
End: 2022-11-01

## 2022-11-02 ENCOUNTER — NON-APPOINTMENT (OUTPATIENT)
Age: 72
End: 2022-11-02

## 2022-11-02 ENCOUNTER — TRANSCRIPTION ENCOUNTER (OUTPATIENT)
Age: 72
End: 2022-11-02

## 2022-11-02 ENCOUNTER — APPOINTMENT (OUTPATIENT)
Dept: HOME HEALTH SERVICES | Facility: HOME HEALTH | Age: 72
End: 2022-11-02

## 2022-11-02 PROCEDURE — 99348 HOME/RES VST EST LOW MDM 30: CPT | Mod: 95

## 2022-11-03 ENCOUNTER — LABORATORY RESULT (OUTPATIENT)
Age: 72
End: 2022-11-03

## 2022-11-05 ENCOUNTER — TRANSCRIPTION ENCOUNTER (OUTPATIENT)
Age: 72
End: 2022-11-05

## 2022-11-07 ENCOUNTER — LABORATORY RESULT (OUTPATIENT)
Age: 72
End: 2022-11-07

## 2022-11-07 ENCOUNTER — RX RENEWAL (OUTPATIENT)
Age: 72
End: 2022-11-07

## 2022-11-07 DIAGNOSIS — D64.9 ANEMIA, UNSPECIFIED: ICD-10-CM

## 2022-11-08 ENCOUNTER — NON-APPOINTMENT (OUTPATIENT)
Age: 72
End: 2022-11-08

## 2022-11-10 ENCOUNTER — APPOINTMENT (OUTPATIENT)
Dept: HOME HEALTH SERVICES | Facility: HOME HEALTH | Age: 72
End: 2022-11-10

## 2022-11-10 VITALS — HEART RATE: 68 BPM | OXYGEN SATURATION: 97 %

## 2022-11-10 VITALS — DIASTOLIC BLOOD PRESSURE: 66 MMHG | HEART RATE: 70 BPM | OXYGEN SATURATION: 98 % | SYSTOLIC BLOOD PRESSURE: 110 MMHG

## 2022-11-10 DIAGNOSIS — R60.9 EDEMA, UNSPECIFIED: ICD-10-CM

## 2022-11-10 PROCEDURE — G0008: CPT

## 2022-11-10 PROCEDURE — 90662 IIV NO PRSV INCREASED AG IM: CPT

## 2022-11-10 PROCEDURE — 99348 HOME/RES VST EST LOW MDM 30: CPT | Mod: 25

## 2022-11-13 PROBLEM — R60.9 PERIPHERAL EDEMA: Status: ACTIVE | Noted: 2022-11-10

## 2022-11-13 NOTE — CHRONIC CARE ASSESSMENT
[PPS Score: ____] : Palliative Performance Scale (PPS) Score: [unfilled] [Limited decision making ability] : limited decision making ability [de-identified] : n/a [de-identified] : adequate

## 2022-11-13 NOTE — PHYSICAL EXAM
[No Acute Distress] : no acute distress [Well Nourished] : well nourished [Normal Sclera/Conjunctiva] : normal sclera/conjunctiva [Normal Outer Ear/Nose] : the ears and nose were normal in appearance [Supple] : the neck was supple [No Respiratory Distress] : no respiratory distress [Clear to Auscultation] : lungs were clear to auscultation bilaterally [No Accessory Muscle Use] : no accessory muscle use [Normal Rate] : heart rate was normal  [Regular Rhythm] : with a regular rhythm [Normal S1, S2] : normal S1 and S2 [No Edema] : there was no peripheral edema [Non Tender] : non-tender [Soft] : abdomen soft [Not Distended] : not distended [No Hernias] : no hernia was discovered [No Joint Swelling] : no joint swelling seen [No Skin Lesions] : no skin lesions [No Motor Deficits] : the motor exam was normal [de-identified] : not cooperative w/ exam. pale  [de-identified] : legally blind  [de-identified] : legally blind in both eyes  [de-identified] : unable to assess 2/2 dementia.

## 2022-11-13 NOTE — HISTORY OF PRESENT ILLNESS
[Spouse] : spouse [FreeTextEntry1] : dementia  [FreeTextEntry2] : COVID SCREEN:\par Patient or caretaker denies fever, cough, trouble breathing, rash, vomiting. Patient has not been in close contact with anyone who is COVID-19 positive, or suspected of having COVID-19.\par \par N95 mask, gloves, eye wear and gown (if indicated) used during visit: Yes.\par \par Total face to face time with patient is 30 min.\par \par HPI:\par 73yo F with PMH dementia, DM2, schizophrenia, CHF, legal blindness 2/2 glaucoma, DLD. Seen today for routine followup visit and introduction to new [facility] PCP.\par \par Social/Home Environment:\par -Lives w/ spouse. \par -HHAs: Coverage ~10-11 h daily. OOP\par -[employer] is important to patient and spouse. \par \par Interval Events:\par -Seen with  Lauri\par -Slowly improving from peak of UTI. \par -Will add A1C to Monday labs. Taking metformin ER 500mg 2 tabs daily. Sugar was quite elevated when she had infection, but has now improved. 7.4% previously which was above patient's baseline.\par -Fluzone given\par \par Subjective:\par 1. Appetite/Weight: Good appetite. Has 2 Gucernas daily. Weight has been stable recently. Eats in small bites. Coughs w/ puree and thin liquids Uses thickit occasionally. \par 2. Gait/Falls: Ambulates w/ assistance. Does not remember to use walker. No recent falls. Some near-falls that spouse has been able to prevent. \par 3. Sleep: Variable. Sometimes only sleeps one hour at night. On Trazodone 75mg PO QD. \par 4. BMs: Has 4-5 bmx weekly, controlled on Senna 1 tab PRN. \par 5. Urine: No issues. Diaper dependent.\par 6. Skin: Bruises on legs 2/2 falls. \par 7. Mood/Memory: Anxious appearing, seated on couch, reassurable by aide. Does not engage with writer.\par \par DME: Walker, w/c, commode *uses at night), hospital bed, oxygen PRN, hand rails \par \par COMPRESSION STOCKING MEASUREMENTS:\par \par LEFT:\par Calf length: 46cm\par Calf circumference: 32cm\par Ankle circumference: 23cm\par \par RIGHT:\par Calf length: 46cm\par Calf circumference: 34cm\par Ankle circumference: 23cm

## 2022-11-14 ENCOUNTER — LABORATORY RESULT (OUTPATIENT)
Age: 72
End: 2022-11-14

## 2022-11-14 DIAGNOSIS — D72.829 ELEVATED WHITE BLOOD CELL COUNT, UNSPECIFIED: ICD-10-CM

## 2022-11-15 ENCOUNTER — NON-APPOINTMENT (OUTPATIENT)
Age: 72
End: 2022-11-15

## 2022-11-17 ENCOUNTER — LABORATORY RESULT (OUTPATIENT)
Age: 72
End: 2022-11-17

## 2022-11-18 ENCOUNTER — TRANSCRIPTION ENCOUNTER (OUTPATIENT)
Age: 72
End: 2022-11-18

## 2022-11-22 ENCOUNTER — LABORATORY RESULT (OUTPATIENT)
Age: 72
End: 2022-11-22

## 2022-11-22 ENCOUNTER — NON-APPOINTMENT (OUTPATIENT)
Age: 72
End: 2022-11-22

## 2022-11-23 ENCOUNTER — TRANSCRIPTION ENCOUNTER (OUTPATIENT)
Age: 72
End: 2022-11-23

## 2022-11-23 ENCOUNTER — NON-APPOINTMENT (OUTPATIENT)
Age: 72
End: 2022-11-23

## 2022-11-30 ENCOUNTER — APPOINTMENT (OUTPATIENT)
Dept: HOME HEALTH SERVICES | Facility: HOME HEALTH | Age: 72
End: 2022-11-30

## 2022-11-30 VITALS — DIASTOLIC BLOOD PRESSURE: 80 MMHG | SYSTOLIC BLOOD PRESSURE: 122 MMHG

## 2022-11-30 PROCEDURE — 0124A: CPT

## 2022-12-08 ENCOUNTER — RX RENEWAL (OUTPATIENT)
Age: 72
End: 2022-12-08

## 2022-12-14 ENCOUNTER — NON-APPOINTMENT (OUTPATIENT)
Age: 72
End: 2022-12-14

## 2022-12-16 ENCOUNTER — APPOINTMENT (OUTPATIENT)
Dept: HOME HEALTH SERVICES | Facility: HOME HEALTH | Age: 72
End: 2022-12-16

## 2022-12-20 ENCOUNTER — RX RENEWAL (OUTPATIENT)
Age: 72
End: 2022-12-20

## 2022-12-24 ENCOUNTER — RX RENEWAL (OUTPATIENT)
Age: 72
End: 2022-12-24

## 2022-12-27 ENCOUNTER — RX RENEWAL (OUTPATIENT)
Age: 72
End: 2022-12-27

## 2023-01-09 ENCOUNTER — RX RENEWAL (OUTPATIENT)
Age: 73
End: 2023-01-09

## 2023-01-17 ENCOUNTER — RX RENEWAL (OUTPATIENT)
Age: 73
End: 2023-01-17

## 2023-01-31 ENCOUNTER — NON-APPOINTMENT (OUTPATIENT)
Age: 73
End: 2023-01-31

## 2023-02-01 ENCOUNTER — TRANSCRIPTION ENCOUNTER (OUTPATIENT)
Age: 73
End: 2023-02-01

## 2023-02-01 ENCOUNTER — APPOINTMENT (OUTPATIENT)
Dept: HOME HEALTH SERVICES | Facility: HOME HEALTH | Age: 73
End: 2023-02-01

## 2023-02-01 ENCOUNTER — NON-APPOINTMENT (OUTPATIENT)
Age: 73
End: 2023-02-01

## 2023-02-01 DIAGNOSIS — K05.10 CHRONIC GINGIVITIS, PLAQUE INDUCED: ICD-10-CM

## 2023-02-06 ENCOUNTER — TRANSCRIPTION ENCOUNTER (OUTPATIENT)
Age: 73
End: 2023-02-06

## 2023-03-08 ENCOUNTER — APPOINTMENT (OUTPATIENT)
Dept: HOME HEALTH SERVICES | Facility: HOME HEALTH | Age: 73
End: 2023-03-08
Payer: MEDICARE

## 2023-03-08 VITALS — DIASTOLIC BLOOD PRESSURE: 64 MMHG | SYSTOLIC BLOOD PRESSURE: 96 MMHG | HEART RATE: 82 BPM | OXYGEN SATURATION: 97 %

## 2023-03-08 DIAGNOSIS — J30.9 ALLERGIC RHINITIS, UNSPECIFIED: ICD-10-CM

## 2023-03-08 PROCEDURE — G0506: CPT

## 2023-03-08 PROCEDURE — G0439: CPT

## 2023-03-08 RX ORDER — AMOXICILLIN AND CLAVULANATE POTASSIUM 875; 125 MG/1; MG/1
875-125 TABLET, COATED ORAL
Qty: 14 | Refills: 1 | Status: COMPLETED | COMMUNITY
Start: 2023-02-01 | End: 2023-03-08

## 2023-03-10 ENCOUNTER — APPOINTMENT (OUTPATIENT)
Dept: HOME HEALTH SERVICES | Facility: HOME HEALTH | Age: 73
End: 2023-03-10

## 2023-03-17 ENCOUNTER — RX RENEWAL (OUTPATIENT)
Age: 73
End: 2023-03-17

## 2023-03-22 ENCOUNTER — RX RENEWAL (OUTPATIENT)
Age: 73
End: 2023-03-22

## 2023-04-05 ENCOUNTER — TRANSCRIPTION ENCOUNTER (OUTPATIENT)
Age: 73
End: 2023-04-05

## 2023-04-14 ENCOUNTER — LABORATORY RESULT (OUTPATIENT)
Age: 73
End: 2023-04-14

## 2023-04-14 ENCOUNTER — APPOINTMENT (OUTPATIENT)
Dept: HOME HEALTH SERVICES | Facility: HOME HEALTH | Age: 73
End: 2023-04-14

## 2023-04-18 ENCOUNTER — LABORATORY RESULT (OUTPATIENT)
Age: 73
End: 2023-04-18

## 2023-04-18 ENCOUNTER — TRANSCRIPTION ENCOUNTER (OUTPATIENT)
Age: 73
End: 2023-04-18

## 2023-04-18 ENCOUNTER — NON-APPOINTMENT (OUTPATIENT)
Age: 73
End: 2023-04-18

## 2023-04-19 ENCOUNTER — LABORATORY RESULT (OUTPATIENT)
Age: 73
End: 2023-04-19

## 2023-04-23 NOTE — CHRONIC CARE ASSESSMENT
[Limited decision making ability] : limited decision making ability [PPS Score: ____] : Palliative Performance Scale (PPS) Score: [unfilled] [de-identified] : adequate  [de-identified] : n/a

## 2023-04-23 NOTE — REASON FOR VISIT
[Spouse] : spouse [Pre-Visit Preparation] : pre-visit preparation was done [Subsequent Annual Medicare Wellness Visit] : a subsequent annual Medicare wellness visit [Intercurrent Specialty/Sub-specialty Visits] : the patient has no intercurrent specialty/sub-specialty visits [FreeTextEntry1] : dementia, schizophrenia, DM2  [FreeTextEntry2] : chart review

## 2023-04-23 NOTE — HISTORY OF PRESENT ILLNESS
[Spouse] : spouse [FreeTextEntry1] : dementia  [FreeTextEntry2] : COVID SCREEN:\par Patient or caretaker denies fever, cough, trouble breathing, rash, vomiting. Patient has not been in close contact with anyone who is COVID-19 positive, or suspected of having COVID-19.\par \par N95 mask, gloves, eye wear and gown (if indicated) used during visit: Yes.\par \par Total face to face time with patient is 30 min.\par \par HPI:\par 71yo F with PMH dementia, DM2, schizophrenia, CHF, legal blindness 2/2 glaucoma, DLD. Seen today for routine followup visit and introduction to new [facility]CP.\par \par Social/Home Environment:\par -Lives w/ spouse. \par -HHAs: Coverage ~10-11 h daily. OOP\par -[employer] is important to patient and spouse. \par \par Interval Events:\par -Seen with  Lauri\par -Chronic rhinitis, runny nose\par -R upper gum pain suspect, prescribed chlorhexidine\par -Slight interim worse sleep problems, but not a major change.\par -No labs at this time\par \par Subjective:\par 1. Appetite/Weight: Good appetite. Has 2 Gucernas daily. Weight has been stable recently. Eats in small bites. Coughs w/ puree and thin liquids Uses thickit occasionally. \par 2. Gait/Falls: Ambulates w/ assistance. Does not remember to use walker. No recent falls. Some near-falls that spouse has been able to prevent. \par 3. Sleep: Variable. Sometimes only sleeps one hour at night. On Trazodone 75mg PO QD. \par 4. BMs: Has 4-5 bmx weekly, controlled on Senna 1 tab PRN. \par 5. Urine: No issues. Diaper dependent.\par 6. Skin: Bruises on legs 2/2 falls. \par 7. Mood/Memory: Anxious appearing, seated on couch, reassurable by aide. Does not engage with writer.\par \par DME: Walker, w/c, commode *uses at night), hospital bed, oxygen PRN, hand rails \par \par ADVANCE CARE PLANNING:\par 1. Total Time Spent: 20 min\par 2. Participants: Myself,  Lauri\par 3. Discussion: Goals of Care, Advanced Directives, Health Care Agency, and Disease trajectory\par 4. Disease Trajectory: Discussed and reviewed that patient's health is currently stable, and the prognosis moving forward is unclear.\par 5. Prognosis, Based On Clinician Best Judgment: Indeterminate\par 6. Goals of Care: 1) Extend life, by hospitalization and aggressive measures if needed, 2) Avoid discomfort, 3) Manage medical problems at home where safely possible.\par 7. HCA: N/A\par 8. MOLST Completed: CPR yes, intubation and trial ventilation, do hospitalize, no limitation on medical interventions, long-term feeding tube, trial IVF, use antibiotics.\par 9. Hospice Benefit discussion deferred at this time.

## 2023-04-23 NOTE — PHYSICAL EXAM
[No Acute Distress] : no acute distress [Well Nourished] : well nourished [Normal Sclera/Conjunctiva] : normal sclera/conjunctiva [Normal Outer Ear/Nose] : the ears and nose were normal in appearance [Supple] : the neck was supple [No Respiratory Distress] : no respiratory distress [Clear to Auscultation] : lungs were clear to auscultation bilaterally [No Accessory Muscle Use] : no accessory muscle use [Normal Rate] : heart rate was normal  [Regular Rhythm] : with a regular rhythm [Normal S1, S2] : normal S1 and S2 [No Edema] : there was no peripheral edema [Non Tender] : non-tender [Soft] : abdomen soft [Not Distended] : not distended [No Hernias] : no hernia was discovered [No Joint Swelling] : no joint swelling seen [No Skin Lesions] : no skin lesions [No Motor Deficits] : the motor exam was normal [de-identified] : not cooperative w/ exam. pale  [de-identified] : legally blind  [de-identified] : unable to assess 2/2 dementia.  [de-identified] : legally blind in both eyes

## 2023-04-24 ENCOUNTER — LABORATORY RESULT (OUTPATIENT)
Age: 73
End: 2023-04-24

## 2023-04-25 ENCOUNTER — NON-APPOINTMENT (OUTPATIENT)
Age: 73
End: 2023-04-25

## 2023-04-25 DIAGNOSIS — Z79.899 OTHER LONG TERM (CURRENT) DRUG THERAPY: ICD-10-CM

## 2023-05-03 NOTE — ED ADULT NURSE NOTE - CARDIO WDL
Pt will participate in groups and milieu treatment structure of the unit Normal rate, regular rhythm, normal S1, S2 heart sounds heard.

## 2023-05-08 ENCOUNTER — LABORATORY RESULT (OUTPATIENT)
Age: 73
End: 2023-05-08

## 2023-05-10 ENCOUNTER — NON-APPOINTMENT (OUTPATIENT)
Age: 73
End: 2023-05-10

## 2023-05-12 ENCOUNTER — TRANSCRIPTION ENCOUNTER (OUTPATIENT)
Age: 73
End: 2023-05-12

## 2023-05-31 ENCOUNTER — NON-APPOINTMENT (OUTPATIENT)
Age: 73
End: 2023-05-31

## 2023-05-31 DIAGNOSIS — R05.8 OTHER SPECIFIED COUGH: ICD-10-CM

## 2023-06-05 ENCOUNTER — NON-APPOINTMENT (OUTPATIENT)
Age: 73
End: 2023-06-05

## 2023-06-11 LAB
ALBUMIN SERPL ELPH-MCNC: 4 G/DL
ANION GAP SERPL CALC-SCNC: 13 MMOL/L
BUN SERPL-MCNC: 21 MG/DL
CALCIUM SERPL-MCNC: 10.7 MG/DL
CHLORIDE SERPL-SCNC: 107 MMOL/L
CO2 SERPL-SCNC: 25 MMOL/L
CREAT SERPL-MCNC: 1.02 MG/DL
EGFR: 58 ML/MIN/1.73M2
GLUCOSE SERPL-MCNC: 184 MG/DL
LITHIUM SERPL-SCNC: 0.62 MMOL/L
PHOSPHATE SERPL-MCNC: 3.2 MG/DL
POTASSIUM SERPL-SCNC: 4.6 MMOL/L
SODIUM SERPL-SCNC: 145 MMOL/L

## 2023-06-15 ENCOUNTER — NON-APPOINTMENT (OUTPATIENT)
Age: 73
End: 2023-06-15

## 2023-06-15 ENCOUNTER — TRANSCRIPTION ENCOUNTER (OUTPATIENT)
Age: 73
End: 2023-06-15

## 2023-06-15 DIAGNOSIS — E16.2 HYPOGLYCEMIA, UNSPECIFIED: ICD-10-CM

## 2023-06-17 ENCOUNTER — LABORATORY RESULT (OUTPATIENT)
Age: 73
End: 2023-06-17

## 2023-06-20 ENCOUNTER — NON-APPOINTMENT (OUTPATIENT)
Age: 73
End: 2023-06-20

## 2023-06-20 ENCOUNTER — TRANSCRIPTION ENCOUNTER (OUTPATIENT)
Age: 73
End: 2023-06-20

## 2023-06-22 ENCOUNTER — APPOINTMENT (OUTPATIENT)
Dept: HOME HEALTH SERVICES | Facility: HOME HEALTH | Age: 73
End: 2023-06-22

## 2023-06-22 ENCOUNTER — NON-APPOINTMENT (OUTPATIENT)
Age: 73
End: 2023-06-22

## 2023-06-22 VITALS
RESPIRATION RATE: 16 BRPM | HEART RATE: 72 BPM | SYSTOLIC BLOOD PRESSURE: 121 MMHG | OXYGEN SATURATION: 99 % | DIASTOLIC BLOOD PRESSURE: 76 MMHG

## 2023-06-22 RX ORDER — LEVOFLOXACIN 750 MG/1
750 TABLET, FILM COATED ORAL DAILY
Qty: 7 | Refills: 0 | Status: COMPLETED | COMMUNITY
Start: 2022-10-30 | End: 2023-06-22

## 2023-06-23 ENCOUNTER — LABORATORY RESULT (OUTPATIENT)
Age: 73
End: 2023-06-23

## 2023-06-26 ENCOUNTER — NON-APPOINTMENT (OUTPATIENT)
Age: 73
End: 2023-06-26

## 2023-06-27 ENCOUNTER — TRANSCRIPTION ENCOUNTER (OUTPATIENT)
Age: 73
End: 2023-06-27

## 2023-06-27 ENCOUNTER — NON-APPOINTMENT (OUTPATIENT)
Age: 73
End: 2023-06-27

## 2023-06-27 LAB
ALBUMIN SERPL ELPH-MCNC: 3.6 G/DL
ANION GAP SERPL CALC-SCNC: 12 MMOL/L
BUN SERPL-MCNC: 27 MG/DL
CALCIUM SERPL-MCNC: 10.3 MG/DL
CHLORIDE SERPL-SCNC: 108 MMOL/L
CO2 SERPL-SCNC: 25 MMOL/L
CREAT SERPL-MCNC: 1.12 MG/DL
EGFR: 52 ML/MIN/1.73M2
GLUCOSE SERPL-MCNC: 223 MG/DL
PHOSPHATE SERPL-MCNC: 2.7 MG/DL
POTASSIUM SERPL-SCNC: 4.2 MMOL/L
SODIUM SERPL-SCNC: 145 MMOL/L

## 2023-06-28 ENCOUNTER — NON-APPOINTMENT (OUTPATIENT)
Age: 73
End: 2023-06-28

## 2023-06-30 ENCOUNTER — NON-APPOINTMENT (OUTPATIENT)
Age: 73
End: 2023-06-30

## 2023-07-03 ENCOUNTER — APPOINTMENT (OUTPATIENT)
Dept: HOME HEALTH SERVICES | Facility: HOME HEALTH | Age: 73
End: 2023-07-03

## 2023-07-05 ENCOUNTER — RX RENEWAL (OUTPATIENT)
Age: 73
End: 2023-07-05

## 2023-07-09 ENCOUNTER — NON-APPOINTMENT (OUTPATIENT)
Age: 73
End: 2023-07-09

## 2023-07-09 ENCOUNTER — TRANSCRIPTION ENCOUNTER (OUTPATIENT)
Age: 73
End: 2023-07-09

## 2023-07-11 LAB
ALBUMIN SERPL ELPH-MCNC: 3.4 G/DL
ANION GAP SERPL CALC-SCNC: 10 MMOL/L
BUN SERPL-MCNC: 34 MG/DL
CALCIUM SERPL-MCNC: 9.5 MG/DL
CHLORIDE SERPL-SCNC: 109 MMOL/L
CO2 SERPL-SCNC: 30 MMOL/L
CREAT SERPL-MCNC: 1.32 MG/DL
EGFR: 43 ML/MIN/1.73M2
GLUCOSE SERPL-MCNC: 109 MG/DL
PHOSPHATE SERPL-MCNC: 3.6 MG/DL
POTASSIUM SERPL-SCNC: 4.1 MMOL/L
SODIUM SERPL-SCNC: 148 MMOL/L

## 2023-07-12 ENCOUNTER — NON-APPOINTMENT (OUTPATIENT)
Age: 73
End: 2023-07-12

## 2023-07-15 ENCOUNTER — TRANSCRIPTION ENCOUNTER (OUTPATIENT)
Age: 73
End: 2023-07-15

## 2023-07-18 ENCOUNTER — NON-APPOINTMENT (OUTPATIENT)
Age: 73
End: 2023-07-18

## 2023-07-19 LAB
ALBUMIN SERPL ELPH-MCNC: 3.6 G/DL
ANION GAP SERPL CALC-SCNC: 12 MMOL/L
BUN SERPL-MCNC: 25 MG/DL
CALCIUM SERPL-MCNC: 10 MG/DL
CHLORIDE SERPL-SCNC: 111 MMOL/L
CO2 SERPL-SCNC: 25 MMOL/L
CREAT SERPL-MCNC: 1.15 MG/DL
EGFR: 51 ML/MIN/1.73M2
GLUCOSE SERPL-MCNC: 247 MG/DL
LITHIUM SERPL-SCNC: 0.78 MMOL/L
PHOSPHATE SERPL-MCNC: 3.1 MG/DL
POTASSIUM SERPL-SCNC: 4 MMOL/L
SARS-COV-2 N GENE NPH QL NAA+PROBE: DETECTED
SODIUM SERPL-SCNC: 147 MMOL/L

## 2023-07-24 NOTE — PROGRESS NOTE ADULT - PROBLEM SELECTOR PLAN 10
Called and spoke to pt, relayed message from PCP. Pt verbalized understanding and will call the spine and back center for recommendations.    Transitions of Care Status:  1.  Name of PCP: Dr. Pena   2.  PCP Contacted on Admission: [x ] Y    [ ] N  -emailed   3.  PCP contacted at Discharge: [ ] Y    [ ] N    [ ] N/A  4.  Post-Discharge Appointment Date and Location:  5.  Summary of Handoff given to PCP: Transitions of Care Status:  1.  Name of PCP: Dr. Pena   2.  PCP Contacted on Admission: [x ] Y    [ ] N  -emailed   3.  PCP contacted at Discharge: [ x] Y    [ ] N    [ ] emailed  4.  Post-Discharge Appointment Date and Location: N/A. Will have home hospice services  5.  Summary of Handoff given to PCP: Yes via email

## 2023-08-03 ENCOUNTER — RX RENEWAL (OUTPATIENT)
Age: 73
End: 2023-08-03

## 2023-08-08 LAB
ALBUMIN SERPL ELPH-MCNC: 4.1 G/DL
ALP BLD-CCNC: 58 U/L
ALT SERPL-CCNC: 14 U/L
ANION GAP SERPL CALC-SCNC: 17 MMOL/L
AST SERPL-CCNC: 20 U/L
BILIRUB SERPL-MCNC: 0.6 MG/DL
BUN SERPL-MCNC: 22 MG/DL
CALCIUM SERPL-MCNC: 10.5 MG/DL
CHLORIDE SERPL-SCNC: 108 MMOL/L
CHOLEST SERPL-MCNC: 123 MG/DL
CO2 SERPL-SCNC: 25 MMOL/L
CREAT SERPL-MCNC: 1.04 MG/DL
EGFR: 57 ML/MIN/1.73M2
ESTIMATED AVERAGE GLUCOSE: 154 MG/DL
GLUCOSE SERPL-MCNC: 157 MG/DL
HBA1C MFR BLD HPLC: 7 %
HDLC SERPL-MCNC: 61 MG/DL
LDLC SERPL CALC-MCNC: 42 MG/DL
LITHIUM SERPL-SCNC: 0.79 MMOL/L
NONHDLC SERPL-MCNC: 62 MG/DL
POTASSIUM SERPL-SCNC: 4.3 MMOL/L
PROT SERPL-MCNC: 6.6 G/DL
SODIUM SERPL-SCNC: 150 MMOL/L
TRIGL SERPL-MCNC: 114 MG/DL

## 2023-08-11 ENCOUNTER — APPOINTMENT (OUTPATIENT)
Dept: HOME HEALTH SERVICES | Facility: HOME HEALTH | Age: 73
End: 2023-08-11
Payer: MEDICARE

## 2023-08-11 VITALS
SYSTOLIC BLOOD PRESSURE: 130 MMHG | DIASTOLIC BLOOD PRESSURE: 80 MMHG | OXYGEN SATURATION: 96 % | HEART RATE: 89 BPM | RESPIRATION RATE: 14 BRPM

## 2023-08-11 DIAGNOSIS — Z20.822 CONTACT WITH AND (SUSPECTED) EXPOSURE TO COVID-19: ICD-10-CM

## 2023-08-11 DIAGNOSIS — Z71.89 OTHER SPECIFIED COUNSELING: ICD-10-CM

## 2023-08-11 DIAGNOSIS — Z92.29 PERSONAL HISTORY OF OTHER DRUG THERAPY: ICD-10-CM

## 2023-08-11 DIAGNOSIS — R53.83 OTHER FATIGUE: ICD-10-CM

## 2023-08-11 DIAGNOSIS — R39.9 UNSPECIFIED SYMPTOMS AND SIGNS INVOLVING THE GENITOURINARY SYSTEM: ICD-10-CM

## 2023-08-11 DIAGNOSIS — E87.1 HYPO-OSMOLALITY AND HYPONATREMIA: ICD-10-CM

## 2023-08-11 PROCEDURE — 99349 HOME/RES VST EST MOD MDM 40: CPT

## 2023-08-11 RX ORDER — AZITHROMYCIN 250 MG/1
250 TABLET, FILM COATED ORAL
Qty: 1 | Refills: 0 | Status: COMPLETED | COMMUNITY
Start: 2023-07-09 | End: 2023-08-11

## 2023-08-11 NOTE — COUNSELING
[Normal Weight - ( BMI  <25 )] : normal weight - ( BMI  <25 ) [Continue diet as tolerated] : continue diet as tolerated based on goals of care [Non - Smoker] : non-smoker [Smoke/CO Detectors] : smoke/CO detectors [Use assistive device to avoid falls] : use assistive device to avoid falls [___] : [unfilled] [Not Recommended] : Aspirin use not recommended due to overall prognosis [] : foot exam [Improve mobility] : improve mobility [Decrease stress] : decrease stress [Minimize unnecessary interventions] : minimize unnecessary interventions [Maintain functional ability] : maintain functional ability [Likely to achieve goals/desired outcomes] : likely to achieve goals/desired outcomes [Patient/Caregiver has ___ understanding of disease process] : patient/caregiver has [unfilled] understanding of disease process [Advanced Directives discussed: ____] : Advanced directives discussed: [unfilled] [Completed Medical Orders for Life-Sustaining Treatment] : completed medical orders for life-sustaining treatment [Full Code] : Code Status: Full Code [No Limitations] : Treatment Guidelines: No limitations [DNI] : Intubation: DNI [[facility] Ver[facility] Date: _____] [facility] Completion/last verification date: [unfilled] [_____] : HCP: [unfilled]

## 2023-08-13 ENCOUNTER — TRANSCRIPTION ENCOUNTER (OUTPATIENT)
Age: 73
End: 2023-08-13

## 2023-08-13 NOTE — REASON FOR VISIT
[Follow-Up] : a follow-up visit [Spouse] : spouse [Pre-Visit Preparation] : pre-visit preparation was done [Intercurrent Specialty/Sub-specialty Visits] : the patient has no intercurrent specialty/sub-specialty visits [FreeTextEntry1] : dementia, schizophrenia, DM2  [FreeTextEntry2] : chart review

## 2023-08-13 NOTE — CHRONIC CARE ASSESSMENT
[Limited decision making ability] : limited decision making ability [PPS Score: ____] : Palliative Performance Scale (PPS) Score: [unfilled] [de-identified] : adequate  [de-identified] : n/a

## 2023-08-13 NOTE — HISTORY OF PRESENT ILLNESS
[Spouse] : spouse [FreeTextEntry1] : dementia  [FreeTextEntry2] : PMH: Dementia, DM2, schizophrenia, CHF, legal blindness 2/2 glaucoma, DLD.  Purpose of visit: Routine f/u  Social/Home Environment: -Lives w/ spouse.  -HHAs: Coverage ~10-11 h daily. OOP -[employer] is important to patient and spouse.   Today's Visit and Review: -Seen with aide,  Lauri contacted by phone -Sodium a bit high, and tends to be chronically -Bruise R thigh, occasionally gets them, probably sustained while moving in bed.  concerned these are caused by meds, but writer feels they're unremarkable -No recent med changes -Labs every 1-2 weeks -Poor sleep last night, received lorazepam, receives rarely -Next labs 8/21/2023  -Schizoprenia/Dementia: Lithium 300mg QHS, lorazepam 0.5mg PRN, Lexapro 10mg daily, haldol 2mg BID, trazodone 100mg QHS. Typically hypernatremic due to sodium & diabetes insipidus, so labs every 2 weeks roughly to ensure it doesn't rise above 150mg/dl. -T2DM with HLD: Metformin ER 500mg BID, simvastatin 20mg QHS. -Chronic gingivitis: Chlorhexidine mouthwash BID -Peripheral edema: Mild, chronic gravity dependent, elevate when possible but with pt difficult to enforce.  DME: Walker, w/c, commode (uses at night), hospital bed, oxygen PRN, hand rails

## 2023-08-13 NOTE — PHYSICAL EXAM
[No Acute Distress] : no acute distress [Normal Sclera/Conjunctiva] : normal sclera/conjunctiva [Normal Outer Ear/Nose] : the ears and nose were normal in appearance [Supple] : the neck was supple [No Respiratory Distress] : no respiratory distress [Clear to Auscultation] : lungs were clear to auscultation bilaterally [No Accessory Muscle Use] : no accessory muscle use [Normal Rate] : heart rate was normal  [Regular Rhythm] : with a regular rhythm [Normal S1, S2] : normal S1 and S2 [No Edema] : there was no peripheral edema [Non Tender] : non-tender [Soft] : abdomen soft [Not Distended] : not distended [No Hernias] : no hernia was discovered [No Joint Swelling] : no joint swelling seen [No Skin Lesions] : no skin lesions [No Motor Deficits] : the motor exam was normal [de-identified] : Thin female, disengaged, seated on couch touching aide. [de-identified] : legally blind  [de-identified] : unable to assess 2/2 dementia.  [de-identified] : legally blind in both eyes

## 2023-08-14 DIAGNOSIS — R50.9 FEVER, UNSPECIFIED: ICD-10-CM

## 2023-08-15 ENCOUNTER — NON-APPOINTMENT (OUTPATIENT)
Age: 73
End: 2023-08-15

## 2023-08-15 LAB
ALBUMIN SERPL ELPH-MCNC: 4 G/DL
ANION GAP SERPL CALC-SCNC: 17 MMOL/L
BUN SERPL-MCNC: 40 MG/DL
CALCIUM SERPL-MCNC: 10.4 MG/DL
CHLORIDE SERPL-SCNC: 111 MMOL/L
CO2 SERPL-SCNC: 22 MMOL/L
CREAT SERPL-MCNC: 1.33 MG/DL
EGFR: 43 ML/MIN/1.73M2
GLUCOSE SERPL-MCNC: 156 MG/DL
PHOSPHATE SERPL-MCNC: 2.9 MG/DL
POTASSIUM SERPL-SCNC: 4.2 MMOL/L
SODIUM SERPL-SCNC: 149 MMOL/L

## 2023-08-22 ENCOUNTER — TRANSCRIPTION ENCOUNTER (OUTPATIENT)
Age: 73
End: 2023-08-22

## 2023-08-23 ENCOUNTER — TRANSCRIPTION ENCOUNTER (OUTPATIENT)
Age: 73
End: 2023-08-23

## 2023-08-23 LAB
ALBUMIN SERPL ELPH-MCNC: 4 G/DL
ANION GAP SERPL CALC-SCNC: 14 MMOL/L
BUN SERPL-MCNC: 30 MG/DL
CALCIUM SERPL-MCNC: 10.6 MG/DL
CHLORIDE SERPL-SCNC: 109 MMOL/L
CO2 SERPL-SCNC: 23 MMOL/L
CREAT SERPL-MCNC: 1.08 MG/DL
EGFR: 54 ML/MIN/1.73M2
GLUCOSE SERPL-MCNC: 266 MG/DL
LITHIUM SERPL-SCNC: 0.59 MMOL/L
PHOSPHATE SERPL-MCNC: 3 MG/DL
POTASSIUM SERPL-SCNC: 3.9 MMOL/L
SODIUM SERPL-SCNC: 146 MMOL/L

## 2023-08-24 ENCOUNTER — NON-APPOINTMENT (OUTPATIENT)
Age: 73
End: 2023-08-24

## 2023-09-13 LAB
ALBUMIN SERPL ELPH-MCNC: 3.9 G/DL
ANION GAP SERPL CALC-SCNC: 10 MMOL/L
BUN SERPL-MCNC: 21 MG/DL
CALCIUM SERPL-MCNC: 10 MG/DL
CHLORIDE SERPL-SCNC: 113 MMOL/L
CO2 SERPL-SCNC: 27 MMOL/L
CREAT SERPL-MCNC: 1.02 MG/DL
EGFR: 58 ML/MIN/1.73M2
GLUCOSE SERPL-MCNC: 121 MG/DL
LITHIUM SERPL-SCNC: 0.76 MMOL/L
PHOSPHATE SERPL-MCNC: 3.2 MG/DL
POTASSIUM SERPL-SCNC: 4.2 MMOL/L
SODIUM SERPL-SCNC: 149 MMOL/L

## 2023-09-25 ENCOUNTER — APPOINTMENT (OUTPATIENT)
Dept: HOME HEALTH SERVICES | Facility: HOME HEALTH | Age: 73
End: 2023-09-25

## 2023-09-27 ENCOUNTER — MED ADMIN CHARGE (OUTPATIENT)
Age: 73
End: 2023-09-27

## 2023-09-27 ENCOUNTER — APPOINTMENT (OUTPATIENT)
Dept: HOME HEALTH SERVICES | Facility: HOME HEALTH | Age: 73
End: 2023-09-27

## 2023-09-27 VITALS
SYSTOLIC BLOOD PRESSURE: 124 MMHG | HEART RATE: 68 BPM | DIASTOLIC BLOOD PRESSURE: 71 MMHG | OXYGEN SATURATION: 98 % | RESPIRATION RATE: 16 BRPM | TEMPERATURE: 97 F

## 2023-10-10 ENCOUNTER — TRANSCRIPTION ENCOUNTER (OUTPATIENT)
Age: 73
End: 2023-10-10

## 2023-10-10 ENCOUNTER — NON-APPOINTMENT (OUTPATIENT)
Age: 73
End: 2023-10-10

## 2023-10-10 LAB
ALBUMIN SERPL ELPH-MCNC: 4.1 G/DL
ANION GAP SERPL CALC-SCNC: 12 MMOL/L
BUN SERPL-MCNC: 29 MG/DL
CALCIUM SERPL-MCNC: 9.8 MG/DL
CHLORIDE SERPL-SCNC: 107 MMOL/L
CO2 SERPL-SCNC: 26 MMOL/L
CREAT SERPL-MCNC: 1.16 MG/DL
EGFR: 50 ML/MIN/1.73M2
GLUCOSE SERPL-MCNC: 203 MG/DL
HCT VFR BLD CALC: 39 %
HGB BLD-MCNC: 11.4 G/DL
IRON SATN MFR SERPL: 18 %
IRON SERPL-MCNC: 62 UG/DL
LITHIUM SERPL-SCNC: 0.77 MMOL/L
MCHC RBC-ENTMCNC: 26.3 PG
MCHC RBC-ENTMCNC: 29.2 GM/DL
MCV RBC AUTO: 90.1 FL
PHOSPHATE SERPL-MCNC: 3.4 MG/DL
PLATELET # BLD AUTO: 293 K/UL
POTASSIUM SERPL-SCNC: 4.8 MMOL/L
RBC # BLD: 4.33 M/UL
RBC # FLD: 15.7 %
SODIUM SERPL-SCNC: 145 MMOL/L
TIBC SERPL-MCNC: 341 UG/DL
UIBC SERPL-MCNC: 278 UG/DL
WBC # FLD AUTO: 12.39 K/UL

## 2023-10-11 ENCOUNTER — NON-APPOINTMENT (OUTPATIENT)
Age: 73
End: 2023-10-11

## 2023-10-12 ENCOUNTER — NON-APPOINTMENT (OUTPATIENT)
Age: 73
End: 2023-10-12

## 2023-10-17 ENCOUNTER — NON-APPOINTMENT (OUTPATIENT)
Age: 73
End: 2023-10-17

## 2023-10-17 ENCOUNTER — TRANSCRIPTION ENCOUNTER (OUTPATIENT)
Age: 73
End: 2023-10-17

## 2023-10-17 DIAGNOSIS — T14.8XXA OTHER INJURY OF UNSPECIFIED BODY REGION, INITIAL ENCOUNTER: ICD-10-CM

## 2023-10-26 ENCOUNTER — APPOINTMENT (OUTPATIENT)
Dept: HOME HEALTH SERVICES | Facility: HOME HEALTH | Age: 73
End: 2023-10-26

## 2023-11-08 LAB
ALBUMIN SERPL ELPH-MCNC: 4 G/DL
ANION GAP SERPL CALC-SCNC: 12 MMOL/L
BUN SERPL-MCNC: 30 MG/DL
CALCIUM SERPL-MCNC: 10.4 MG/DL
CHLORIDE SERPL-SCNC: 111 MMOL/L
CHOLEST SERPL-MCNC: 112 MG/DL
CO2 SERPL-SCNC: 26 MMOL/L
CREAT SERPL-MCNC: 1.11 MG/DL
EGFR: 52 ML/MIN/1.73M2
GLUCOSE SERPL-MCNC: 203 MG/DL
HCT VFR BLD CALC: 38.9 %
HDLC SERPL-MCNC: 54 MG/DL
HGB BLD-MCNC: 11.1 G/DL
LDLC SERPL CALC-MCNC: 41 MG/DL
LITHIUM SERPL-SCNC: 0.8 MMOL/L
MCHC RBC-ENTMCNC: 26.2 PG
MCHC RBC-ENTMCNC: 28.5 GM/DL
MCV RBC AUTO: 92 FL
NONHDLC SERPL-MCNC: 58 MG/DL
PHOSPHATE SERPL-MCNC: 3.3 MG/DL
PLATELET # BLD AUTO: 307 K/UL
POTASSIUM SERPL-SCNC: 4.5 MMOL/L
RBC # BLD: 4.23 M/UL
RBC # FLD: 15.5 %
SODIUM SERPL-SCNC: 149 MMOL/L
TRIGL SERPL-MCNC: 90 MG/DL
WBC # FLD AUTO: 14.73 K/UL

## 2023-11-09 ENCOUNTER — NON-APPOINTMENT (OUTPATIENT)
Age: 73
End: 2023-11-09

## 2023-11-14 ENCOUNTER — APPOINTMENT (OUTPATIENT)
Dept: HOME HEALTH SERVICES | Facility: HOME HEALTH | Age: 73
End: 2023-11-14
Payer: MEDICARE

## 2023-11-14 ENCOUNTER — NON-APPOINTMENT (OUTPATIENT)
Age: 73
End: 2023-11-14

## 2023-11-14 VITALS
OXYGEN SATURATION: 98 % | HEART RATE: 80 BPM | TEMPERATURE: 97.88 F | SYSTOLIC BLOOD PRESSURE: 140 MMHG | DIASTOLIC BLOOD PRESSURE: 82 MMHG | RESPIRATION RATE: 16 BRPM

## 2023-11-14 DIAGNOSIS — F51.04 PSYCHOPHYSIOLOGIC INSOMNIA: ICD-10-CM

## 2023-11-14 DIAGNOSIS — Z23 ENCOUNTER FOR IMMUNIZATION: ICD-10-CM

## 2023-11-14 PROCEDURE — 99349 HOME/RES VST EST MOD MDM 40: CPT

## 2023-11-14 PROCEDURE — 91322 SARSCOV2 VAC 50 MCG/0.5ML IM: CPT

## 2023-11-14 PROCEDURE — 90480 ADMN SARSCOV2 VAC 1/ONLY CMP: CPT

## 2023-11-14 RX ORDER — TRAZODONE HYDROCHLORIDE 100 MG/1
100 TABLET ORAL
Qty: 90 | Refills: 3 | Status: COMPLETED | COMMUNITY
Start: 2023-02-07 | End: 2023-11-14

## 2023-11-14 RX ORDER — LORATADINE 10 MG/1
10 TABLET ORAL
Qty: 1 | Refills: 6 | Status: COMPLETED | COMMUNITY
Start: 2023-05-31 | End: 2023-11-14

## 2023-11-14 RX ORDER — MUPIROCIN 20 MG/G
2 OINTMENT TOPICAL 3 TIMES DAILY
Qty: 1 | Refills: 2 | Status: COMPLETED | COMMUNITY
Start: 2023-10-17 | End: 2023-11-14

## 2023-11-16 PROBLEM — F51.04 CHRONIC INSOMNIA: Status: ACTIVE | Noted: 2023-02-07

## 2023-11-16 RX ORDER — AZELASTINE HYDROCHLORIDE 137 UG/1
0.1 SPRAY, METERED NASAL TWICE DAILY
Qty: 1 | Refills: 5 | Status: COMPLETED | COMMUNITY
Start: 2023-06-05 | End: 2023-11-16

## 2023-11-16 RX ORDER — GUAIFENESIN 100 MG/5ML
100 LIQUID ORAL
Refills: 0 | Status: COMPLETED | COMMUNITY
Start: 2023-05-31 | End: 2023-11-16

## 2023-11-16 RX ORDER — POLYETHYLENE GLYCOL 3350 17 G/17G
17 POWDER, FOR SOLUTION ORAL DAILY
Qty: 1 | Refills: 3 | Status: COMPLETED | COMMUNITY
Start: 2021-04-01 | End: 2023-11-16

## 2023-11-18 PROBLEM — Z23 ENCOUNTER FOR IMMUNIZATION: Status: ACTIVE | Noted: 2023-09-27 | Resolved: 2023-11-28

## 2023-11-24 ENCOUNTER — NON-APPOINTMENT (OUTPATIENT)
Age: 73
End: 2023-11-24

## 2023-12-01 ENCOUNTER — NON-APPOINTMENT (OUTPATIENT)
Age: 73
End: 2023-12-01

## 2023-12-01 DIAGNOSIS — R23.4 CHANGES IN SKIN TEXTURE: ICD-10-CM

## 2023-12-05 LAB
ALBUMIN SERPL ELPH-MCNC: 4 G/DL
ANION GAP SERPL CALC-SCNC: 10 MMOL/L
BUN SERPL-MCNC: 30 MG/DL
CALCIUM SERPL-MCNC: 10.4 MG/DL
CHLORIDE SERPL-SCNC: 111 MMOL/L
CO2 SERPL-SCNC: 28 MMOL/L
CREAT SERPL-MCNC: 1.08 MG/DL
EGFR: 54 ML/MIN/1.73M2
GLUCOSE SERPL-MCNC: 199 MG/DL
HCT VFR BLD CALC: 38.6 %
HGB BLD-MCNC: 11.2 G/DL
LITHIUM SERPL-SCNC: 0.79 MMOL/L
MCHC RBC-ENTMCNC: 25.7 PG
MCHC RBC-ENTMCNC: 29 GM/DL
MCV RBC AUTO: 88.5 FL
PHOSPHATE SERPL-MCNC: 3.1 MG/DL
PLATELET # BLD AUTO: 304 K/UL
POTASSIUM SERPL-SCNC: 5.1 MMOL/L
RBC # BLD: 4.36 M/UL
RBC # FLD: 16.1 %
SODIUM SERPL-SCNC: 148 MMOL/L
WBC # FLD AUTO: 9.86 K/UL

## 2023-12-15 ENCOUNTER — TRANSCRIPTION ENCOUNTER (OUTPATIENT)
Age: 73
End: 2023-12-15

## 2024-01-11 NOTE — ED ADULT NURSE NOTE - CAS EDN DISCHARGE ASSESSMENT
[SOB] : no shortness of breath [Dyspnea on exertion] : not dyspnea during exertion [Chest Discomfort] : no chest discomfort [Lower Ext Edema] : no extremity edema [Leg Claudication] : no intermittent leg claudication [Palpitations] : palpitations [Orthopnea] : no orthopnea [PND] : no PND [Syncope] : no syncope [Negative] : Neurological Patient baseline mental status/Awake

## 2024-01-22 ENCOUNTER — APPOINTMENT (OUTPATIENT)
Dept: HOME HEALTH SERVICES | Facility: HOME HEALTH | Age: 74
End: 2024-01-22
Payer: MEDICARE

## 2024-01-22 VITALS
RESPIRATION RATE: 18 BRPM | OXYGEN SATURATION: 98 % | SYSTOLIC BLOOD PRESSURE: 110 MMHG | HEART RATE: 86 BPM | TEMPERATURE: 97.88 F | DIASTOLIC BLOOD PRESSURE: 60 MMHG

## 2024-01-22 PROCEDURE — 99349 HOME/RES VST EST MOD MDM 40: CPT

## 2024-01-22 NOTE — HEALTH RISK ASSESSMENT
[HRA Reviewed] : Health risk assessment reviewed [Full assistance needed] : managing finances [No falls in past year] : Patient reported no falls in the past year [de-identified] : Chair/ bedbound

## 2024-01-22 NOTE — COUNSELING
[Normal Weight - ( BMI  <25 )] : normal weight - ( BMI  <25 ) [Continue diet as tolerated] : continue diet as tolerated based on goals of care [Non - Smoker] : non-smoker [Smoke/CO Detectors] : smoke/CO detectors [Use assistive device to avoid falls] : use assistive device to avoid falls [___] : [unfilled] [] : foot exam [Not Recommended] : Aspirin use not recommended due to overall prognosis [Improve mobility] : improve mobility [Decrease stress] : decrease stress [Minimize unnecessary interventions] : minimize unnecessary interventions [Maintain functional ability] : maintain functional ability [Likely to achieve goals/desired outcomes] : likely to achieve goals/desired outcomes [Patient/Caregiver has ___ understanding of disease process] : patient/caregiver has [unfilled] understanding of disease process [Advanced Directives discussed: ____] : Advanced directives discussed: [unfilled] [Completed Medical Orders for Life-Sustaining Treatment] : completed medical orders for life-sustaining treatment [Full Code] : Code Status: Full Code [No Limitations] : Treatment Guidelines: No limitations [DNI] : Intubation: DNI [[facility]: _____[facility] [[facility]filled] [_____] : HCP: [unfilled] [Trial of Intubation] : Intubation: Trial of Intubation

## 2024-01-22 NOTE — PHYSICAL EXAM
[No Acute Distress] : no acute distress [Normal Sclera/Conjunctiva] : normal sclera/conjunctiva [Normal Voice/Communication] : normal voice communication [Normal Outer Ear/Nose] : the ears and nose were normal in appearance [No JVD] : no jugular venous distention [Supple] : the neck was supple [No Respiratory Distress] : no respiratory distress [Clear to Auscultation] : lungs were clear to auscultation bilaterally [No Accessory Muscle Use] : no accessory muscle use [Normal Rate] : heart rate was normal  [Normal Bowel Sounds] : normal bowel sounds [Regular Rhythm] : with a regular rhythm [Non Tender] : non-tender [Soft] : abdomen soft [No CVA Tenderness] : no ~M costovertebral angle tenderness [No Skin Lesions] : no skin lesions [No Rash] : no rash [de-identified] : confused, not verbal, making noise at times.

## 2024-01-22 NOTE — HISTORY OF PRESENT ILLNESS
[A] : A [Patient is stable] : patient is stable [Education provided] : education provided [Spouse] : spouse [FreeTextEntry2] : COVID SCREEN: Patient or caretaker denies fever, cough, trouble breathing, rash, vomiting. Patient has not been in close contact with anyone who is COVID-19 positive or suspected of having COVID-19.  N95 mask, gloves, eye wear and gown (if indicated) used during visit: Yes.  PMH dementia, DM2, schizophrenia, CHF, legal blindness 2/2 glaucoma, DLD. Seen today for routine follow-up visit.  Social/Home Environment: -Lives w/ spouse.  -HHAs: Coverage ~10-11 h daily. OOP -[employer] isai[employer] is important to patient and spouse.   Interval Events: Patient noted to be calmer today. -Seen with  Lauri  Patient is restless Aide sitting at her side holding her hand. Patient with no fever, no cough, No SOB, spouse declined any symptoms during the week.  Lab order will follow up with family. Subjective: 1. Appetite/Weight: Good appetite. Has 2 Glucerna's daily. Weight has been stable recently. Eats in small bites. Coughs w/ puree and thin liquids Uses thicken Pouder occasionally.  2. Gait/Falls: Ambulates w/ assistance. Does not remember to use walker. No recent falls.  3. Sleep: Variable. Sometimes only sleeps one hour at night. On Trazodone  4. BMs: Has 4-5 bmx weekly, controlled on Senna 1 tab PRN.  5. Urine: No issues. Diaper dependent. 6. Skin: Intact 7. Mood/Memory: Anxious appearing, seated on couch, reassurable by aide. Does not engage with writer.  DME: Walker, w/c, commode *uses at night), hospital bed, oxygen PRN, handrails.   Medical Issues: -Dementia - Trazodone -DM2- Metformin does not check BG -schizophrenia - Haldol, Lithium Anxiety- Lexapro DLD- Atorvastatin  MOLST:  CPR yes, intubation and trial ventilation, do hospitalize, no limitation on medical interventions, long-term feeding tube, trial IVF, use antibiotics.  [FreeTextEntry1] : dementia, , DM2, schizophrenia, CHF, legal blindness 2/2 glaucoma,

## 2024-01-23 ENCOUNTER — NON-APPOINTMENT (OUTPATIENT)
Age: 74
End: 2024-01-23

## 2024-01-24 LAB
ALBUMIN SERPL ELPH-MCNC: 3.7 G/DL
ALP BLD-CCNC: 52 U/L
ALT SERPL-CCNC: 17 U/L
ANION GAP SERPL CALC-SCNC: 10 MMOL/L
AST SERPL-CCNC: 23 U/L
BILIRUB SERPL-MCNC: 0.4 MG/DL
BUN SERPL-MCNC: 19 MG/DL
CALCIUM SERPL-MCNC: 10 MG/DL
CHLORIDE SERPL-SCNC: 112 MMOL/L
CHOLEST SERPL-MCNC: 105 MG/DL
CO2 SERPL-SCNC: 25 MMOL/L
CREAT SERPL-MCNC: 1.15 MG/DL
EGFR: 50 ML/MIN/1.73M2
ESTIMATED AVERAGE GLUCOSE: 140 MG/DL
GLUCOSE SERPL-MCNC: 96 MG/DL
HBA1C MFR BLD HPLC: 6.5 %
HCT VFR BLD CALC: 36.2 %
HDLC SERPL-MCNC: 57 MG/DL
HGB BLD-MCNC: 10.8 G/DL
LDLC SERPL CALC-MCNC: 29 MG/DL
LITHIUM SERPL-SCNC: 0.82 MMOL/L
MCHC RBC-ENTMCNC: 27 PG
MCHC RBC-ENTMCNC: 29.8 GM/DL
MCV RBC AUTO: 90.5 FL
NONHDLC SERPL-MCNC: 48 MG/DL
PLATELET # BLD AUTO: 244 K/UL
POTASSIUM SERPL-SCNC: 4.5 MMOL/L
PROT SERPL-MCNC: 6 G/DL
RBC # BLD: 4 M/UL
RBC # FLD: 17.3 %
SODIUM SERPL-SCNC: 147 MMOL/L
TRIGL SERPL-MCNC: 103 MG/DL
WBC # FLD AUTO: 7.12 K/UL

## 2024-01-25 ENCOUNTER — NON-APPOINTMENT (OUTPATIENT)
Age: 74
End: 2024-01-25

## 2024-02-26 ENCOUNTER — NON-APPOINTMENT (OUTPATIENT)
Age: 74
End: 2024-02-26

## 2024-02-26 LAB — LITHIUM SERPL-SCNC: 0.84 MMOL/L

## 2024-03-14 ENCOUNTER — RX RENEWAL (OUTPATIENT)
Age: 74
End: 2024-03-14

## 2024-03-22 ENCOUNTER — LABORATORY RESULT (OUTPATIENT)
Age: 74
End: 2024-03-22

## 2024-05-01 ENCOUNTER — APPOINTMENT (OUTPATIENT)
Dept: HOME HEALTH SERVICES | Facility: HOME HEALTH | Age: 74
End: 2024-05-01
Payer: MEDICARE

## 2024-05-01 VITALS
DIASTOLIC BLOOD PRESSURE: 60 MMHG | RESPIRATION RATE: 16 BRPM | OXYGEN SATURATION: 96 % | SYSTOLIC BLOOD PRESSURE: 100 MMHG | TEMPERATURE: 98.06 F | HEART RATE: 78 BPM

## 2024-05-01 DIAGNOSIS — E11.69 TYPE 2 DIABETES MELLITUS WITH OTHER SPECIFIED COMPLICATION: ICD-10-CM

## 2024-05-01 DIAGNOSIS — E78.5 TYPE 2 DIABETES MELLITUS WITH OTHER SPECIFIED COMPLICATION: ICD-10-CM

## 2024-05-01 LAB — LITHIUM SERPL-SCNC: 0.9 MMOL/L

## 2024-05-01 PROCEDURE — 99349 HOME/RES VST EST MOD MDM 40: CPT

## 2024-05-01 NOTE — HISTORY OF PRESENT ILLNESS
[A] : A [Patient is stable] : patient is stable [Education provided] : education provided [Spouse] : spouse [FreeTextEntry1] : dementia, , DM2, schizophrenia, CHF, legal blindness 2/2 glaucoma, [FreeTextEntry2] : COVID SCREEN: Patient or caretaker denies fever, cough, trouble breathing, rash, vomiting. Patient has not been in close contact with anyone who is COVID-19 positive or suspected of having COVID-19.  N95 mask, gloves, eye wear and gown (if indicated) used during visit: Yes.  PMH dementia, DM2, schizophrenia, CHF, legal blindness 2/2 glaucoma, DLD. Seen today for routine follow-up visit.  Social/Home Environment: -Lives w/ spouse.  -HHAs: Coverage ~10-11 h daily. OOP -[employer] isaias is important to patient and spouse.   Interval Events: Patient noted to be calmer today na spouse confirm  patient has been better since last visit and using less PRN meds. -Seen HHA, Spoke to spouse Luari on the phone during visit.  Patient is restless Aide sitting at her side holding her hand. Patient with no fever, no cough, No SOB, spouse declined any symptoms during the week.  Went over Lithium level with spouse, spouse verbalized understanding.  Subjective: 1. Appetite/Weight: Good appetite. Has 2 Glucerna's daily. Weight has been stable recently. Eats in small bites. Coughs w/ puree and thin liquids Uses thicken Pouder occasionally.  2. Gait/Falls: Ambulates w/ assistance. Does not remember to use walker. No recent falls.  3. Sleep: Variable. Sometimes only sleeps one hour at night. On Trazodone  4. BMs: Has 4-5 bmx weekly, controlled on Senna 1 tab PRN.  5. Urine: No issues. Diaper dependent. 6. Skin: Intact 7. Mood/Memory: Anxious appearing, seated on couch, reassurable by aide. Does not engage with writer.  DME: Walker, w/c, commode *uses at night), hospital bed, oxygen PRN, handrails.   Medical Issues: -Dementia - Trazodone -DM2- Metformin does not check BG -schizophrenia - Haldol, Lithium Anxiety- Lexapro DLD- Atorvastatin  MOLST:  CPR yes, intubation and trial ventilation, do hospitalize, no limitation on medical interventions, long-term feeding tube, trial IVF, use antibiotics.

## 2024-05-01 NOTE — HEALTH RISK ASSESSMENT
[Full assistance needed] : managing finances [HRA Reviewed] : Health risk assessment reviewed [Patient not ambulatory (Wheelchair)] : Patient is not ambulatory (Wheelchair)

## 2024-05-01 NOTE — REVIEW OF SYSTEMS
[Negative] : Heme/Lymph [Memory Loss] : memory loss [FreeTextEntry9] : cchair bound [FreeTextEntry1] : As per HPI. Otherwise, rest of ROS negative.

## 2024-05-01 NOTE — PHYSICAL EXAM
[No Acute Distress] : no acute distress [Normal Voice/Communication] : normal voice communication [Normal Sclera/Conjunctiva] : normal sclera/conjunctiva [Normal Outer Ear/Nose] : the ears and nose were normal in appearance [No JVD] : no jugular venous distention [Supple] : the neck was supple [No Respiratory Distress] : no respiratory distress [Clear to Auscultation] : lungs were clear to auscultation bilaterally [No Accessory Muscle Use] : no accessory muscle use [Normal Rate] : heart rate was normal  [Regular Rhythm] : with a regular rhythm [Normal Bowel Sounds] : normal bowel sounds [Non Tender] : non-tender [Soft] : abdomen soft [No CVA Tenderness] : no ~M costovertebral angle tenderness [No Rash] : no rash [No Skin Lesions] : no skin lesions [de-identified] : confused, not verbal, making noise at times.

## 2024-05-01 NOTE — LETTER HEADER
[Care Plan reviewed every ___ weeks] : Care plan reviewed every [unfilled] weeks [Care Plan reviewed and provided to patient/caregiver] : Care plan reviewed and provided to patient/caregiver [Initiation or substantial revisions made to care plan involving mod/high medical decision making for complex CCM] : Initiation or substantial revisions made to care plan involving mod/high medical decision making for complex CCM [Patient/Caregiver agrees to have other providers send summary of their care to this office] : Patient/caregiver agrees to have other providers send summary of their care to this office

## 2024-05-01 NOTE — COUNSELING
[Normal Weight - ( BMI  <25 )] : normal weight - ( BMI  <25 ) [Continue diet as tolerated] : continue diet as tolerated based on goals of care [Non - Smoker] : non-smoker [Smoke/CO Detectors] : smoke/CO detectors [Use assistive device to avoid falls] : use assistive device to avoid falls [___] : [unfilled] [] : foot exam [Not Recommended] : Aspirin use not recommended due to overall prognosis [Improve mobility] : improve mobility [Decrease stress] : decrease stress [Minimize unnecessary interventions] : minimize unnecessary interventions [Maintain functional ability] : maintain functional ability [Likely to achieve goals/desired outcomes] : likely to achieve goals/desired outcomes [Patient/Caregiver has ___ understanding of disease process] : patient/caregiver has [unfilled] understanding of disease process [Advanced Directives discussed: ____] : Advanced directives discussed: [unfilled] [Completed Medical Orders for Life-Sustaining Treatment] : completed medical orders for life-sustaining treatment [Full Code] : Code Status: Full Code [No Limitations] : Treatment Guidelines: No limitations [Trial of Intubation] : Intubation: Trial of Intubation [[facility]: _____[facility]last verification date: [unfilled] [_____] : HCP: [unfilled]

## 2024-05-17 ENCOUNTER — NON-APPOINTMENT (OUTPATIENT)
Age: 74
End: 2024-05-17

## 2024-05-17 ENCOUNTER — APPOINTMENT (OUTPATIENT)
Dept: HOME HEALTH SERVICES | Facility: HOME HEALTH | Age: 74
End: 2024-05-17
Payer: MEDICARE

## 2024-05-17 ENCOUNTER — TRANSCRIPTION ENCOUNTER (OUTPATIENT)
Age: 74
End: 2024-05-17

## 2024-05-17 DIAGNOSIS — E11.9 TYPE 2 DIABETES MELLITUS W/OUT COMPLICATIONS: ICD-10-CM

## 2024-05-17 DIAGNOSIS — F20.9 SCHIZOPHRENIA, UNSPECIFIED: ICD-10-CM

## 2024-05-17 DIAGNOSIS — F03.90 UNSPECIFIED DEMENTIA W/OUT BEHAVIORAL DISTURBANCE: ICD-10-CM

## 2024-05-17 DIAGNOSIS — R41.82 ALTERED MENTAL STATUS, UNSPECIFIED: ICD-10-CM

## 2024-05-17 PROCEDURE — 99349 HOME/RES VST EST MOD MDM 40: CPT

## 2024-05-20 PROBLEM — F03.90 DEMENTIA: Status: ACTIVE | Noted: 2020-11-26

## 2024-05-20 PROBLEM — F20.9 SCHIZOPHRENIA: Status: ACTIVE | Noted: 2020-11-26

## 2024-05-20 PROBLEM — R41.82 AMS (ALTERED MENTAL STATUS): Status: ACTIVE | Noted: 2024-05-17

## 2024-05-20 PROBLEM — E11.9 DIABETES MELLITUS, TYPE 2: Status: ACTIVE | Noted: 2020-11-26

## 2024-05-21 ENCOUNTER — NON-APPOINTMENT (OUTPATIENT)
Age: 74
End: 2024-05-21

## 2024-05-21 LAB
ANION GAP SERPL CALC-SCNC: 12 MMOL/L
BASOPHILS # BLD AUTO: 0.06 K/UL
BASOPHILS NFR BLD AUTO: 0.6 %
BUN SERPL-MCNC: 38 MG/DL
CALCIUM SERPL-MCNC: 9.8 MG/DL
CHLORIDE SERPL-SCNC: 112 MMOL/L
CO2 SERPL-SCNC: 23 MMOL/L
CREAT SERPL-MCNC: 1.5 MG/DL
EGFR: 37 ML/MIN/1.73M2
EOSINOPHIL # BLD AUTO: 0.3 K/UL
EOSINOPHIL NFR BLD AUTO: 2.8 %
GLUCOSE SERPL-MCNC: 248 MG/DL
HCT VFR BLD CALC: 35.3 %
HGB BLD-MCNC: 10.3 G/DL
IMM GRANULOCYTES NFR BLD AUTO: 0.5 %
INFLUENZA A RESULT: NOT DETECTED
INFLUENZA B RESULT: NOT DETECTED
LYMPHOCYTES # BLD AUTO: 1.26 K/UL
LYMPHOCYTES NFR BLD AUTO: 11.9 %
MAN DIFF?: NORMAL
MCHC RBC-ENTMCNC: 26.2 PG
MCHC RBC-ENTMCNC: 29.2 GM/DL
MCV RBC AUTO: 89.8 FL
MONOCYTES # BLD AUTO: 0.73 K/UL
MONOCYTES NFR BLD AUTO: 6.9 %
NEUTROPHILS # BLD AUTO: 8.17 K/UL
NEUTROPHILS NFR BLD AUTO: 77.3 %
PLATELET # BLD AUTO: 182 K/UL
POTASSIUM SERPL-SCNC: 4.4 MMOL/L
RBC # BLD: 3.93 M/UL
RBC # FLD: 17.6 %
RESP SYN VIRUS RESULT: NOT DETECTED
SARS-COV-2 RESULT: NOT DETECTED
SODIUM SERPL-SCNC: 148 MMOL/L
WBC # FLD AUTO: 10.57 K/UL

## 2024-05-24 ENCOUNTER — NON-APPOINTMENT (OUTPATIENT)
Age: 74
End: 2024-05-24

## 2024-05-24 DIAGNOSIS — J18.9 PNEUMONIA, UNSPECIFIED ORGANISM: ICD-10-CM

## 2024-05-27 ENCOUNTER — NON-APPOINTMENT (OUTPATIENT)
Age: 74
End: 2024-05-27

## 2024-05-27 LAB — LITHIUM SERPL-SCNC: 0.87 MMOL/L

## 2024-05-28 ENCOUNTER — LABORATORY RESULT (OUTPATIENT)
Age: 74
End: 2024-05-28

## 2024-05-29 ENCOUNTER — NON-APPOINTMENT (OUTPATIENT)
Age: 74
End: 2024-05-29

## 2024-06-06 LAB
ALBUMIN SERPL ELPH-MCNC: 3.4 G/DL
ALP BLD-CCNC: 52 U/L
ALT SERPL-CCNC: 10 U/L
ANION GAP SERPL CALC-SCNC: 18 MMOL/L
AST SERPL-CCNC: 18 U/L
BILIRUB SERPL-MCNC: 0.2 MG/DL
BUN SERPL-MCNC: 36 MG/DL
CALCIUM SERPL-MCNC: 9.8 MG/DL
CHLORIDE SERPL-SCNC: 107 MMOL/L
CO2 SERPL-SCNC: 22 MMOL/L
CREAT SERPL-MCNC: 1.26 MG/DL
EGFR: 45 ML/MIN/1.73M2
GLUCOSE SERPL-MCNC: 139 MG/DL
HCT VFR BLD CALC: 31.6 %
HGB BLD-MCNC: 9.5 G/DL
MCHC RBC-ENTMCNC: 26.1 PG
MCHC RBC-ENTMCNC: 30.1 GM/DL
MCV RBC AUTO: 86.8 FL
PLATELET # BLD AUTO: 315 K/UL
POTASSIUM SERPL-SCNC: 4.3 MMOL/L
PROT SERPL-MCNC: 5.8 G/DL
RBC # BLD: 3.64 M/UL
RBC # FLD: 17.6 %
SODIUM SERPL-SCNC: 147 MMOL/L
WBC # FLD AUTO: 10.46 K/UL

## 2024-06-19 ENCOUNTER — APPOINTMENT (OUTPATIENT)
Dept: HOME HEALTH SERVICES | Facility: HOME HEALTH | Age: 74
End: 2024-06-19

## 2024-06-20 ENCOUNTER — APPOINTMENT (OUTPATIENT)
Dept: HOME HEALTH SERVICES | Facility: HOME HEALTH | Age: 74
End: 2024-06-20

## 2024-06-20 VITALS
SYSTOLIC BLOOD PRESSURE: 98 MMHG | TEMPERATURE: 209.3 F | HEART RATE: 70 BPM | OXYGEN SATURATION: 99 % | DIASTOLIC BLOOD PRESSURE: 64 MMHG | RESPIRATION RATE: 17 BRPM

## 2024-06-20 DIAGNOSIS — L89.152 PRESSURE ULCER OF SACRAL REGION, STAGE 2: ICD-10-CM

## 2024-06-20 RX ORDER — FLUTICASONE PROPIONATE 50 UG/1
50 SPRAY, METERED NASAL TWICE DAILY
Qty: 2 | Refills: 11 | Status: ACTIVE | COMMUNITY
Start: 2023-03-08

## 2024-06-20 RX ORDER — TRAZODONE HYDROCHLORIDE 50 MG/1
50 TABLET ORAL
Qty: 180 | Refills: 3 | Status: ACTIVE | COMMUNITY
Start: 2020-11-26

## 2024-06-20 RX ORDER — AMOXICILLIN AND CLAVULANATE POTASSIUM 250; 62.5 MG/5ML; MG/5ML
250-62.5 FOR SUSPENSION ORAL TWICE DAILY
Qty: 1 | Refills: 0 | Status: ACTIVE | COMMUNITY
Start: 2024-05-24

## 2024-06-20 RX ORDER — LORAZEPAM 2 MG/ML
2 CONCENTRATE ORAL
Qty: 2 | Refills: 0 | Status: ACTIVE | COMMUNITY
Start: 2020-11-26

## 2024-06-20 RX ORDER — LITHIUM CARBONATE 300 MG/1
300 TABLET ORAL
Qty: 90 | Refills: 3 | Status: ACTIVE | COMMUNITY
Start: 2020-11-26

## 2024-06-20 RX ORDER — SIMVASTATIN 20 MG/1
20 TABLET, FILM COATED ORAL
Qty: 90 | Refills: 3 | Status: ACTIVE | COMMUNITY
Start: 2020-09-09

## 2024-06-20 RX ORDER — SENNOSIDES 8.6 MG/1
8.6 TABLET ORAL
Qty: 180 | Refills: 3 | Status: ACTIVE | COMMUNITY
Start: 2020-11-26

## 2024-06-20 RX ORDER — METFORMIN HYDROCHLORIDE 500 MG/1
500 TABLET, COATED ORAL
Qty: 60 | Refills: 11 | Status: ACTIVE | COMMUNITY
Start: 2022-11-10

## 2024-06-20 RX ORDER — HALOPERIDOL 2 MG/ML
2 SOLUTION, CONCENTRATE ORAL
Qty: 120 | Refills: 5 | Status: ACTIVE | COMMUNITY
Start: 2021-06-17

## 2024-06-20 RX ORDER — CHLORHEXIDINE GLUCONATE, 0.12% ORAL RINSE 1.2 MG/ML
0.12 SOLUTION DENTAL
Qty: 1 | Refills: 3 | Status: ACTIVE | COMMUNITY
Start: 2023-02-01

## 2024-06-20 RX ORDER — L.ACIDOPH/L.BULG/B.BIF/S.THERM 1B-250 MG
TABLET ORAL
Qty: 14 | Refills: 0 | Status: ACTIVE | COMMUNITY
Start: 2024-05-29

## 2024-06-20 RX ORDER — METFORMIN ER 500 MG 500 MG/1
500 TABLET ORAL
Qty: 180 | Refills: 3 | Status: ACTIVE | COMMUNITY
Start: 2020-09-09

## 2024-06-20 RX ORDER — BLOOD SUGAR DIAGNOSTIC
W/DEVICE STRIP MISCELLANEOUS
Qty: 1 | Refills: 0 | Status: ACTIVE | COMMUNITY
Start: 2023-06-15

## 2024-06-20 RX ORDER — ISOPROPYL ALCOHOL 0.7 ML/ML
SWAB TOPICAL
Qty: 1 | Refills: 2 | Status: ACTIVE | COMMUNITY
Start: 2023-06-15

## 2024-06-20 RX ORDER — BLOOD SUGAR DIAGNOSTIC
STRIP MISCELLANEOUS DAILY
Qty: 1 | Refills: 0 | Status: ACTIVE | COMMUNITY
Start: 2023-06-15

## 2024-06-20 RX ORDER — ESCITALOPRAM OXALATE 10 MG/1
10 TABLET ORAL
Qty: 90 | Refills: 3 | Status: ACTIVE | COMMUNITY
Start: 2021-11-02

## 2024-06-20 RX ORDER — NEOMYCIN AND POLYMYXIN B SULFATES AND BACITRACIN ZINC 400; 3.5; 1 [USP'U]/G; MG/G; [USP'U]/G
3.5-400-1 OINTMENT OPHTHALMIC
Qty: 1 | Refills: 0 | Status: ACTIVE | COMMUNITY
Start: 2023-12-01

## 2024-06-20 RX ORDER — AZITHROMYCIN 200 MG/5ML
200 POWDER, FOR SUSPENSION ORAL
Qty: 60 | Refills: 0 | Status: ACTIVE | COMMUNITY
Start: 2024-05-24

## 2024-06-24 ENCOUNTER — RX RENEWAL (OUTPATIENT)
Age: 74
End: 2024-06-24

## 2024-06-24 RX ORDER — LACTULOSE 10 G/15ML
10 SOLUTION ORAL
Qty: 946 | Refills: 10 | Status: ACTIVE | COMMUNITY
Start: 2022-01-13 | End: 1900-01-01

## 2024-06-28 LAB — LITHIUM SERPL-SCNC: 0.86 MMOL/L

## 2024-07-02 ENCOUNTER — NON-APPOINTMENT (OUTPATIENT)
Age: 74
End: 2024-07-02

## 2024-07-08 ENCOUNTER — NON-APPOINTMENT (OUTPATIENT)
Age: 74
End: 2024-07-08

## 2024-07-08 DIAGNOSIS — B36.9 SUPERFICIAL MYCOSIS, UNSPECIFIED: ICD-10-CM

## 2024-07-09 ENCOUNTER — APPOINTMENT (OUTPATIENT)
Dept: HOME HEALTH SERVICES | Facility: HOME HEALTH | Age: 74
End: 2024-07-09

## 2024-07-09 VITALS
RESPIRATION RATE: 18 BRPM | HEART RATE: 65 BPM | DIASTOLIC BLOOD PRESSURE: 71 MMHG | SYSTOLIC BLOOD PRESSURE: 121 MMHG | OXYGEN SATURATION: 98 %

## 2024-07-09 RX ORDER — NYSTATIN 100000 1/G
100000 POWDER TOPICAL 3 TIMES DAILY
Qty: 1 | Refills: 3 | Status: ACTIVE | COMMUNITY
Start: 2024-07-09 | End: 1900-01-01

## 2024-07-23 ENCOUNTER — APPOINTMENT (OUTPATIENT)
Dept: HOME HEALTH SERVICES | Facility: HOME HEALTH | Age: 74
End: 2024-07-23
Payer: MEDICARE

## 2024-07-23 VITALS
TEMPERATURE: 97.88 F | HEART RATE: 78 BPM | DIASTOLIC BLOOD PRESSURE: 60 MMHG | RESPIRATION RATE: 16 BRPM | OXYGEN SATURATION: 95 % | SYSTOLIC BLOOD PRESSURE: 110 MMHG

## 2024-07-23 DIAGNOSIS — L89.152 PRESSURE ULCER OF SACRAL REGION, STAGE 2: ICD-10-CM

## 2024-07-23 DIAGNOSIS — E11.69 TYPE 2 DIABETES MELLITUS WITH OTHER SPECIFIED COMPLICATION: ICD-10-CM

## 2024-07-23 DIAGNOSIS — E11.9 TYPE 2 DIABETES MELLITUS W/OUT COMPLICATIONS: ICD-10-CM

## 2024-07-23 DIAGNOSIS — F03.90 UNSPECIFIED DEMENTIA W/OUT BEHAVIORAL DISTURBANCE: ICD-10-CM

## 2024-07-23 DIAGNOSIS — F20.9 SCHIZOPHRENIA, UNSPECIFIED: ICD-10-CM

## 2024-07-23 DIAGNOSIS — E78.5 TYPE 2 DIABETES MELLITUS WITH OTHER SPECIFIED COMPLICATION: ICD-10-CM

## 2024-07-23 PROCEDURE — 99349 HOME/RES VST EST MOD MDM 40: CPT

## 2024-07-23 RX ORDER — NYSTATIN 100000 [USP'U]/G
100000 CREAM TOPICAL 3 TIMES DAILY
Qty: 1 | Refills: 3 | Status: ACTIVE | COMMUNITY
Start: 2024-07-08 | End: 1900-01-01

## 2024-07-23 NOTE — COUNSELING
[Normal Weight - ( BMI  <25 )] : normal weight - ( BMI  <25 ) [Continue diet as tolerated] : continue diet as tolerated based on goals of care [Non - Smoker] : non-smoker [Smoke/CO Detectors] : smoke/CO detectors [Use assistive device to avoid falls] : use assistive device to avoid falls [___] : [unfilled] [] : foot exam [Not Recommended] : Aspirin use not recommended due to overall prognosis [Improve mobility] : improve mobility [Decrease stress] : decrease stress [Minimize unnecessary interventions] : minimize unnecessary interventions [Maintain functional ability] : maintain functional ability [Likely to achieve goals/desired outcomes] : likely to achieve goals/desired outcomes [Patient/Caregiver has ___ understanding of disease process] : patient/caregiver has [unfilled] understanding of disease process [Advanced Directives discussed: ____] : Advanced directives discussed: [unfilled] [Completed Medical Orders for Life-Sustaining Treatment] : completed medical orders for life-sustaining treatment [Full Code] : Code Status: Full Code [No Limitations] : Treatment Guidelines: No limitations [Trial of Intubation] : Intubation: Trial of Intubation [[facility] Date: _____] [facility] Completion/last verification date: [unfilled] [_____] : HCP: [unfilled]

## 2024-07-23 NOTE — PHYSICAL EXAM
[No Acute Distress] : no acute distress [Normal Sclera/Conjunctiva] : normal sclera/conjunctiva [Normal Outer Ear/Nose] : the ears and nose were normal in appearance [No JVD] : no jugular venous distention [Supple] : the neck was supple [No Respiratory Distress] : no respiratory distress [Clear to Auscultation] : lungs were clear to auscultation bilaterally [No Accessory Muscle Use] : no accessory muscle use [Normal Rate] : heart rate was normal  [Regular Rhythm] : with a regular rhythm [Normal Bowel Sounds] : normal bowel sounds [Non Tender] : non-tender [Soft] : abdomen soft [No CVA Tenderness] : no ~M costovertebral angle tenderness [de-identified] : does not engage [de-identified] : chair/bedbound [de-identified] : Sacral area stage 2 pressure ulcer look like its healing, using triad [de-identified] : confused, not verbal, making noise at times.

## 2024-07-23 NOTE — HISTORY OF PRESENT ILLNESS
[A] : A [Education provided] : education provided [Patient is stable] : patient is stable [Spouse] : spouse [FreeTextEntry1] : dementia, , DM2, schizophrenia, CHF, legal blindness 2/2 glaucoma, [FreeTextEntry2] : COVID SCREEN: Patient or caretaker denies fever, cough, trouble breathing, rash, vomiting. Patient has not been in close contact with anyone who is COVID-19 positive or suspected of having COVID-19.  N95 mask, gloves, eye wear and gown (if indicated) used during visit: Yes.  PMH dementia, DM2, schizophrenia, CHF, legal blindness 2/2 glaucoma, DLD. Seen today for routine follow-up visit w/spouse.  Social/Home Environment: -Lives w/ spouse.  -HHAs: Coverage ~10-11 h daily. OOP -[employer] isaias is important to patient and spouse.   Interval Events: Patient noted to be calmer today and spouse confirm patient has been better since last visit. -Spouse present during Visit  Patient is less restless spouse sitting at her side holding her hand. Patient with no fever, no cough, No SOB, spouse declined any symptoms during the week.  Sacral area stage look likes it healing continue triad  Subjective: 1. Appetite/Weight: Good appetite. Has 2 Glucerna's daily. Weight has been stable recently. Eats in small bites. Coughs w/ puree and thin liquids Uses thicken Pouder occasionally.  2. Gait/Falls: Ambulates w/ assistance. Does not remember to use walker. No recent falls.  3. Sleep: Variable. Sometimes only sleeps one hour at night. On Trazodone  4. BMs: Has 4-5 bmx weekly, controlled on Senna 1 tab PRN and lactulose.  5. Urine: No issues. Diaper dependent. 6. Skin: Intact 7. Mood/Memory: Anxious appearing, seated on couch, Does not engage with provider.  DME: Walker, w/c, commode *uses at night), hospital bed, oxygen PRN, handrails.   Medical Issues: -Dementia - Trazodone -DM2- Metformin does not check BG -schizophrenia - Haldol, Lithium Anxiety- Lexapro DLD- Atorvastatin  MOLST:  CPR yes, intubation and trial ventilation, do hospitalize, no limitation on medical interventions, long-term feeding tube, trial IVF, use antibiotics.

## 2024-07-23 NOTE — REVIEW OF SYSTEMS
[Memory Loss] : memory loss [Negative] : Heme/Lymph [FreeTextEntry9] : cchair bound [FreeTextEntry1] : As per HPI. Otherwise, rest of ROS negative.

## 2024-07-23 NOTE — HISTORY OF PRESENT ILLNESS
[A] : A [Patient is stable] : patient is stable [Education provided] : education provided [Spouse] : spouse [FreeTextEntry1] : dementia, , DM2, schizophrenia, CHF, legal blindness 2/2 glaucoma, [FreeTextEntry2] : COVID SCREEN: Patient or caretaker denies fever, cough, trouble breathing, rash, vomiting. Patient has not been in close contact with anyone who is COVID-19 positive or suspected of having COVID-19.  N95 mask, gloves, eye wear and gown (if indicated) used during visit: Yes.  PMH dementia, DM2, schizophrenia, CHF, legal blindness 2/2 glaucoma, DLD. Seen today for routine follow-up visit w/spouse.  Social/Home Environment: -Lives w/ spouse.  -HHAs: Coverage ~10-11 h daily. OOP -[employer] isaias is important to patient and spouse.   Interval Events: Patient noted to be calmer today and spouse confirm patient has been better since last visit. -Spouse present during Visit  Patient is less restless spouse sitting at her side holding her hand. Patient with no fever, no cough, No SOB, spouse declined any symptoms during the week.  Sacral area stage look likes it healing continue triad  Subjective: 1. Appetite/Weight: Good appetite. Has 2 Glucerna's daily. Weight has been stable recently. Eats in small bites. Coughs w/ puree and thin liquids Uses thicken Pouder occasionally.  2. Gait/Falls: Ambulates w/ assistance. Does not remember to use walker. No recent falls.  3. Sleep: Variable. Sometimes only sleeps one hour at night. On Trazodone  4. BMs: Has 4-5 bmx weekly, controlled on Senna 1 tab PRN and lactulose.  5. Urine: No issues. Diaper dependent. 6. Skin: Intact 7. Mood/Memory: Anxious appearing, seated on couch, Does not engage with provider.  DME: Walker, w/c, commode *uses at night), hospital bed, oxygen PRN, handrails.   Medical Issues: -Dementia - Trazodone -DM2- Metformin does not check BG -schizophrenia - Haldol, Lithium Anxiety- Lexapro DLD- Atorvastatin  MOLST:  CPR yes, intubation and trial ventilation, do hospitalize, no limitation on medical interventions, long-term feeding tube, trial IVF, use antibiotics.

## 2024-07-23 NOTE — COUNSELING
[Normal Weight - ( BMI  <25 )] : normal weight - ( BMI  <25 ) [Continue diet as tolerated] : continue diet as tolerated based on goals of care [Non - Smoker] : non-smoker [Smoke/CO Detectors] : smoke/CO detectors [Use assistive device to avoid falls] : use assistive device to avoid falls [___] : [unfilled] [] : foot exam [Not Recommended] : Aspirin use not recommended due to overall prognosis [Improve mobility] : improve mobility [Decrease stress] : decrease stress [Minimize unnecessary interventions] : minimize unnecessary interventions [Maintain functional ability] : maintain functional ability [Likely to achieve goals/desired outcomes] : likely to achieve goals/desired outcomes [Patient/Caregiver has ___ understanding of disease process] : patient/caregiver has [unfilled] understanding of disease process [Advanced Directives discussed: ____] : Advanced directives discussed: [unfilled] [Completed Medical Orders for Life-Sustaining Treatment] : completed medical orders for life-sustaining treatment [Full Code] : Code Status: Full Code [No Limitations] : Treatment Guidelines: No limitations [Trial of Intubation] : Intubation: Trial of Intubation [[facility]: _____] [facility]/last verification date: [unfilled] [_____] : HCP: [unfilled]

## 2024-07-23 NOTE — PHYSICAL EXAM
[No Acute Distress] : no acute distress [Normal Sclera/Conjunctiva] : normal sclera/conjunctiva [Normal Outer Ear/Nose] : the ears and nose were normal in appearance [No JVD] : no jugular venous distention [Supple] : the neck was supple [No Respiratory Distress] : no respiratory distress [Clear to Auscultation] : lungs were clear to auscultation bilaterally [No Accessory Muscle Use] : no accessory muscle use [Normal Rate] : heart rate was normal  [Regular Rhythm] : with a regular rhythm [Normal Bowel Sounds] : normal bowel sounds [Non Tender] : non-tender [Soft] : abdomen soft [No CVA Tenderness] : no ~M costovertebral angle tenderness [de-identified] : does not engage [de-identified] : chair/bedbound [de-identified] : Sacral area stage 2 pressure ulcer look like its healing, using triad [de-identified] : confused, not verbal, making noise at times.

## 2024-08-27 ENCOUNTER — LABORATORY RESULT (OUTPATIENT)
Age: 74
End: 2024-08-27

## 2024-08-29 ENCOUNTER — NON-APPOINTMENT (OUTPATIENT)
Age: 74
End: 2024-08-29

## 2024-10-15 ENCOUNTER — RX RENEWAL (OUTPATIENT)
Age: 74
End: 2024-10-15

## 2024-10-26 ENCOUNTER — TRANSCRIPTION ENCOUNTER (OUTPATIENT)
Age: 74
End: 2024-10-26

## 2024-11-06 ENCOUNTER — NON-APPOINTMENT (OUTPATIENT)
Age: 74
End: 2024-11-06

## 2024-11-07 ENCOUNTER — TRANSCRIPTION ENCOUNTER (OUTPATIENT)
Age: 74
End: 2024-11-07

## 2024-11-07 ENCOUNTER — LABORATORY RESULT (OUTPATIENT)
Age: 74
End: 2024-11-07

## 2024-11-07 ENCOUNTER — NON-APPOINTMENT (OUTPATIENT)
Age: 74
End: 2024-11-07

## 2024-11-07 ENCOUNTER — INPATIENT (INPATIENT)
Facility: HOSPITAL | Age: 74
LOS: 7 days | Discharge: HOME | DRG: 690 | End: 2024-11-15
Attending: STUDENT IN AN ORGANIZED HEALTH CARE EDUCATION/TRAINING PROGRAM | Admitting: STUDENT IN AN ORGANIZED HEALTH CARE EDUCATION/TRAINING PROGRAM
Payer: MEDICARE

## 2024-11-07 VITALS
RESPIRATION RATE: 18 BRPM | DIASTOLIC BLOOD PRESSURE: 56 MMHG | SYSTOLIC BLOOD PRESSURE: 94 MMHG | HEART RATE: 88 BPM | HEIGHT: 60 IN | WEIGHT: 100.09 LBS | OXYGEN SATURATION: 97 % | TEMPERATURE: 98 F

## 2024-11-07 DIAGNOSIS — K62.3 RECTAL PROLAPSE: Chronic | ICD-10-CM

## 2024-11-07 LAB
ALBUMIN SERPL ELPH-MCNC: 2.9 G/DL — LOW (ref 3.5–5)
ALP SERPL-CCNC: 74 U/L — SIGNIFICANT CHANGE UP (ref 40–120)
ALT FLD-CCNC: 52 U/L DA — SIGNIFICANT CHANGE UP (ref 10–60)
ANION GAP SERPL CALC-SCNC: 6 MMOL/L — SIGNIFICANT CHANGE UP (ref 5–17)
APPEARANCE UR: ABNORMAL
AST SERPL-CCNC: 36 U/L — SIGNIFICANT CHANGE UP (ref 10–40)
BASOPHILS # BLD AUTO: 0.03 K/UL — SIGNIFICANT CHANGE UP (ref 0–0.2)
BASOPHILS NFR BLD AUTO: 0.3 % — SIGNIFICANT CHANGE UP (ref 0–2)
BILIRUB SERPL-MCNC: 0.4 MG/DL — SIGNIFICANT CHANGE UP (ref 0.2–1.2)
BILIRUB UR-MCNC: NEGATIVE — SIGNIFICANT CHANGE UP
BUN SERPL-MCNC: 82 MG/DL — HIGH (ref 7–18)
CALCIUM SERPL-MCNC: 11.1 MG/DL — HIGH (ref 8.4–10.5)
CHLORIDE SERPL-SCNC: 135 MMOL/L — HIGH (ref 96–108)
CO2 SERPL-SCNC: 24 MMOL/L — SIGNIFICANT CHANGE UP (ref 22–31)
COLOR SPEC: YELLOW — SIGNIFICANT CHANGE UP
CREAT ?TM UR-MCNC: 51 MG/DL — SIGNIFICANT CHANGE UP
CREAT SERPL-MCNC: 2.59 MG/DL — HIGH (ref 0.5–1.3)
DIFF PNL FLD: ABNORMAL
EGFR: 19 ML/MIN/1.73M2 — LOW
EOSINOPHIL # BLD AUTO: 0.18 K/UL — SIGNIFICANT CHANGE UP (ref 0–0.5)
EOSINOPHIL NFR BLD AUTO: 1.5 % — SIGNIFICANT CHANGE UP (ref 0–6)
GLUCOSE SERPL-MCNC: 237 MG/DL — HIGH (ref 70–99)
GLUCOSE UR QL: NEGATIVE MG/DL — SIGNIFICANT CHANGE UP
HCT VFR BLD CALC: 38.3 % — SIGNIFICANT CHANGE UP (ref 34.5–45)
HGB BLD-MCNC: 11.2 G/DL — LOW (ref 11.5–15.5)
IMM GRANULOCYTES NFR BLD AUTO: 0.3 % — SIGNIFICANT CHANGE UP (ref 0–0.9)
KETONES UR-MCNC: NEGATIVE MG/DL — SIGNIFICANT CHANGE UP
LEUKOCYTE ESTERASE UR-ACNC: ABNORMAL
LITHIUM SERPL-MCNC: 1.3 MMOL/L — HIGH (ref 0.6–1.2)
LYMPHOCYTES # BLD AUTO: 1.74 K/UL — SIGNIFICANT CHANGE UP (ref 1–3.3)
LYMPHOCYTES # BLD AUTO: 14.8 % — SIGNIFICANT CHANGE UP (ref 13–44)
MAGNESIUM SERPL-MCNC: 2.9 MG/DL — HIGH (ref 1.6–2.6)
MCHC RBC-ENTMCNC: 27.1 PG — SIGNIFICANT CHANGE UP (ref 27–34)
MCHC RBC-ENTMCNC: 29.2 G/DL — LOW (ref 32–36)
MCV RBC AUTO: 92.5 FL — SIGNIFICANT CHANGE UP (ref 80–100)
MONOCYTES # BLD AUTO: 0.59 K/UL — SIGNIFICANT CHANGE UP (ref 0–0.9)
MONOCYTES NFR BLD AUTO: 5 % — SIGNIFICANT CHANGE UP (ref 2–14)
NEUTROPHILS # BLD AUTO: 9.14 K/UL — HIGH (ref 1.8–7.4)
NEUTROPHILS NFR BLD AUTO: 78.1 % — HIGH (ref 43–77)
NITRITE UR-MCNC: POSITIVE
NRBC # BLD: 0 /100 WBCS — SIGNIFICANT CHANGE UP (ref 0–0)
PH UR: 5.5 — SIGNIFICANT CHANGE UP (ref 5–8)
PLATELET # BLD AUTO: 176 K/UL — SIGNIFICANT CHANGE UP (ref 150–400)
POTASSIUM SERPL-MCNC: 4 MMOL/L — SIGNIFICANT CHANGE UP (ref 3.5–5.3)
POTASSIUM SERPL-SCNC: 4 MMOL/L — SIGNIFICANT CHANGE UP (ref 3.5–5.3)
POTASSIUM UR-SCNC: 30 MMOL/L — SIGNIFICANT CHANGE UP
PROT ?TM UR-MCNC: 17 MG/DL — HIGH (ref 0–12)
PROT SERPL-MCNC: 7 G/DL — SIGNIFICANT CHANGE UP (ref 6–8.3)
PROT UR-MCNC: NEGATIVE MG/DL — SIGNIFICANT CHANGE UP
PROT/CREAT UR-RTO: 0.3 RATIO — HIGH (ref 0–0.2)
RBC # BLD: 4.14 M/UL — SIGNIFICANT CHANGE UP (ref 3.8–5.2)
RBC # FLD: 17.9 % — HIGH (ref 10.3–14.5)
SODIUM SERPL-SCNC: 165 MMOL/L — CRITICAL HIGH (ref 135–145)
SODIUM UR-SCNC: 30 MMOL/L — SIGNIFICANT CHANGE UP
SP GR SPEC: 1.01 — SIGNIFICANT CHANGE UP (ref 1–1.03)
UROBILINOGEN FLD QL: 0.2 MG/DL — SIGNIFICANT CHANGE UP (ref 0.2–1)
WBC # BLD: 11.72 K/UL — HIGH (ref 3.8–10.5)
WBC # FLD AUTO: 11.72 K/UL — HIGH (ref 3.8–10.5)

## 2024-11-07 PROCEDURE — 99291 CRITICAL CARE FIRST HOUR: CPT

## 2024-11-07 PROCEDURE — 93010 ELECTROCARDIOGRAM REPORT: CPT

## 2024-11-07 RX ORDER — CEFTRIAXONE SODIUM 10 G
1000 VIAL (EA) INJECTION ONCE
Refills: 0 | Status: COMPLETED | OUTPATIENT
Start: 2024-11-07 | End: 2024-11-07

## 2024-11-07 RX ADMIN — Medication 1000 MILLILITER(S): at 21:41

## 2024-11-07 RX ADMIN — Medication 100 MILLILITER(S): at 21:41

## 2024-11-07 NOTE — H&P ADULT - HISTORY OF PRESENT ILLNESS
74F legally blind, WC bound, schizophrenia on Lithium, dementia, minimally verbal presenting with severe hypernatremia in the setting of worsening renal function and poor PO intake that has progressed these past 2-3 weeks, sent in from PCP given hypernatremia;  states that she has been progressively declining the 2-3  minimally verbal, worsening appetite which prompted home care services to draw labs. She is now on a puree diet, but still does not have much input.    He denies any fevers or aspiration events    In the ED, Na 164, BUN/Cr elevated, Lit 1.3  Urine

## 2024-11-07 NOTE — ED PROVIDER NOTE - PROGRESS NOTE DETAILS
Sodium 165 in the ED.  Given a liter of lactated Ringer's.  D5 water ordered at 100 cc/h.  ICU consulted. pt accepted to ICU

## 2024-11-07 NOTE — CONSULT NOTE ADULT - ASSESSMENT
74F legally blind, WC bound, schizophrenia on Lithium, dementia, minimally verbal presenting with severe hypernatremia in the setting of worsening renal function and poor PO intake that has progressed these past 2-3 weeks, sent in from PCP given hypernatremia;    Assessment and Plan:  #Neurological System (Dementia, Schizophrenia)  Assessment: The patient's dementia and schizophrenia contribute to her being minimally verbal and may affect her ability to communicate needs and adhere to treatment plans.  Per ; baseline; patient is minimally verbal; does not have appetite   Patient has progressed the last year where she is no longer chewing, now on a puree diet  Plan:  Consider psychiatry consult to evaluate the current psychiatric medication regimen, particularly lithium, in light of her renal function and neurological status.      #Cardio  No active issues    #Pulm  No active issues    #Renal   Assessment: Worsening renal function may be due to lithium nephrotoxicity, dehydration, or underlying chronic kidney disease.  BUN/Cr elevated 82/2.59 likely pre-renal in the setting of poor PO intake and lithium  Lithium level 1.3  Plan:  Review renal function tests (BUN, creatinine) and trend over time.  Consider adjusting or holding lithium, and monitor lithium levels.  Optimize hydration status with careful fluid management, monitoring intake and output.  Evaluate for other nephrotoxic agents and discontinue if appropriate.    #Electrolytes (Severe Hypernatremia)  Assessment: Hypernatremia is likely due to inadequate fluid intake and potential renal concentrating defects.  Multifactorial in the setting of poor PO intake and lithium use  Na on admission was 164;  Plan:  Monitor serum sodium levels Q6, aiming for a safe correction rate; goal <12/24 hours   Na goal 164-->152 in 24 hours; FWD 2.1L     #Gastrointestinal/Nutrition  Assessment: Poor oral intake is contributing to the patient's electrolyte imbalances and possible nutritional deficiencies.  Her advancing dementia has progressed; she is an extremely poor candidate for PEG  Plan:  [ ] Speech and swallow eval    #Musculoskeletal/Mobility  Assessment: The patient's wheelchair-bound status and legal blindness may contribute to decreased mobility and increased care needs.  Plan:  [ ] physical therapy to assess mobility and recommend strategies to maintain function and prevent deconditioning.    #Prophylaxis  Heparin SubQ  GI Protonix 74F legally blind, WC bound, schizophrenia on Lithium, dementia, minimally verbal presenting with severe hypernatremia, rectal prolapse  in the setting of worsening renal function and poor PO intake that has progressed these past 2-3 weeks, sent in from PCP given hypernatremia; ICU consulted for severe hypernatremia; however given unchanged mental status changes and stable vital signs; will recommend medicine admission    Assessment and Plan:    #Neurological System (Dementia, Schizophrenia)  Assessment: The patient's dementia and schizophrenia contribute to her being minimally verbal and may affect her ability to communicate needs and adhere to treatment plans.  Per ; baseline; patient is minimally verbal; does not have appetite   Patient has progressed the last year where she is no longer chewing, now on a puree diet  Plan:  Consider psychiatry consult to evaluate the current psychiatric medication regimen, particularly lithium, in light of her renal function and neurological status.  Continue psych medications if able to obtain PO;   GOC with palliative      #Cardio  No active issues    #Pulm  No active issues    #Renal   Assessment: Worsening renal function may be due to lithium nephrotoxicity, dehydration, or underlying chronic kidney disease.  BUN/Cr elevated 82/2.59 likely pre-renal in the setting of poor PO intake and lithium  Lithium level 1.3  Plan:  Review renal function tests (BUN, creatinine) and trend over time.  Consider adjusting or holding lithium, and monitor lithium levels.  Optimize hydration status with careful fluid management, monitoring intake and output.  Evaluate for other nephrotoxic agents and discontinue if appropriate.    #Electrolytes (Severe Hypernatremia)  Assessment: Hypernatremia is likely due to inadequate fluid intake and potential renal concentrating defects.  Multifactorial in the setting of poor PO intake and lithium use  Na on admission was 164;  Urine Osm:    Plan:  Monitor serum sodium levels Q6, aiming for a safe correction rate; goal <12/24 hours   Na goal 164-->152 in 24 hours; FWD 2.1L     #Gastrointestinal/Nutrition  Assessment: Poor oral intake is contributing to the patient's electrolyte imbalances and possible nutritional deficiencies.  Her advancing dementia has progressed; she is an extremely poor candidate for PEG as it would not change or reverse her dementia/quality of life  Plan:  [ ] Speech and swallow eval  [ ] Aspiration precautions    #Musculoskeletal/Mobility  Assessment: The patient's wheelchair-bound status and legal blindness may contribute to decreased mobility and increased care needs.  Plan:  [ ] physical therapy to assess mobility and recommend strategies to maintain function and prevent deconditioning.    #Prophylaxis  Heparin SubQ  GI Protonix 74F legally blind, WC bound, schizophrenia on Lithium, dementia, minimally verbal presenting with severe hypernatremia, rectal prolapse  in the setting of worsening renal function and poor PO intake that has progressed these past 2-3 weeks, sent in from PCP given hypernatremia; ICU consulted for severe hypernatremia; however given unchanged mental status changes and stable vital signs; will recommend medicine admission    Assessment and Plan:    #Neurological System (Dementia, Schizophrenia)  Assessment: The patient's dementia and schizophrenia contribute to her being minimally verbal and may affect her ability to communicate needs and adhere to treatment plans.  Per ; baseline; patient is minimally verbal; does not have appetite   Patient has progressed the last year where she is no longer chewing, now on a puree diet  Plan:  [ ] Consider psychiatry consult to evaluate the current psychiatric medication regimen, particularly lithium, in light of her renal function and neurological status.  [ ] Continue psych medications if able to obtain PO;   [ ] GOC with palliative      #Cardio  No active issues    #Pulm  No active issues    #Renal   Assessment: Worsening renal function may be due to lithium nephrotoxicity, dehydration, or underlying chronic kidney disease.  BUN/Cr elevated 82/2.59 likely pre-renal in the setting of poor PO intake and lithium  Lithium level 1.3  Plan:  [ ] Review renal function tests (BUN, creatinine) and trend over time.  [ ] Consider adjusting or holding lithium, and monitor lithium levels.  [ ] Optimize hydration status with careful fluid management, monitoring intake and output.  [ ] Evaluate for other nephrotoxic agents and discontinue if appropriate.    #Electrolytes (Severe Hypernatremia)  Assessment: Hypernatremia is likely due to inadequate fluid intake and potential renal concentrating defects.  Multifactorial in the setting of poor PO intake and lithium use  Na on admission was 164;  Urine Osm:  Plan:  [ ] Monitor serum sodium levels Q6, aiming for a safe correction rate; goal <12mEq/24 hours   [ ] Na goal 164-->152 in 24 hours; FWD 2.1L     #Gastrointestinal/Nutrition  Assessment: Poor oral intake is contributing to the patient's electrolyte imbalances and possible nutritional deficiencies.  Her advancing dementia has progressed; she is an extremely poor candidate for PEG as it would not change or reverse her dementia/quality of life  Plan:  [ ] Speech and swallow eval  [ ] Aspiration precautions    #Musculoskeletal/Mobility  Assessment: The patient's wheelchair-bound status and legal blindness may contribute to decreased mobility and increased care needs.  Plan:  [ ] physical therapy to assess mobility and recommend strategies to maintain function and prevent deconditioning.    #Prophylaxis  [ ] Heparin SubQ  [ ] GI Protonix

## 2024-11-07 NOTE — ED PROVIDER NOTE - CLINICAL SUMMARY MEDICAL DECISION MAKING FREE TEXT BOX
74-year-old female presents for hyponatremia.  PE as above.    Labs, fluid bolus, urinalysis, likely admission.

## 2024-11-07 NOTE — H&P ADULT - ATTENDING COMMENTS
75 y/o F with h/o dementia (wheelchair bound), schizophrenia on lithium, sent in by PCP for hypernatremia, following history of decreased PO intake over the past 2-3 weeks.  VS wnl on presentation. Dry mucous membranes noted on exam. Initial labs significant for mild leukocytosis, Na 165, PRASHANTH. Lithium level 1.3. UA+    Assessment:  - Metabolic encephalopathy  - Hypernatremia likely due to decrease free water intake, 75 y/o F with h/o dementia (wheelchair bound), schizophrenia on lithium, sent in by PCP for hypernatremia, following history of decreased PO intake over the past 2-3 weeks.  VS wnl on presentation. Dry mucous membranes noted on exam. Initial labs significant for mild leukocytosis, Na 165, PRASHANTH. Lithium level 1.3. UA+    Assessment:  - Metabolic encephalopathy  - Hypernatremia likely due to decrease free water intake, less likely nephrogenic DI  - Hypovolemia  - Dementia  - Schizophrenia     Plan:  - admit to ICU for close monitoring   - fluid resuscitate balance crystalloids, followed by D5W infusion 100cc/hr  - trend BMP   - obtain urine osm  - monitor I&O  - avoid rapid correction of Na  - SLP eval  - glucose monitoring  - sliding scale insulin   - DVT ppx lovenox

## 2024-11-07 NOTE — H&P ADULT - ASSESSMENT
74F legally blind, WC bound, schizophrenia on Lithium, dementia, minimally verbal presenting with severe hypernatremia, rectal prolapse  in the setting of worsening renal function and poor PO intake that has progressed these past 2-3 weeks, sent in from PCP given hypernatremia; ICU consulted for severe hypernatremia; however given unchanged mental status changes and stable vital signs; will recommend medicine admission    Assessment and Plan:    #Neurological System (Dementia, Schizophrenia)  #Acute encepholopathy on progressive dementia  Per ; baseline; patient is minimally verbal; does not have appetite   Patient has progressed the last year where she is no longer chewing, now on a puree diet  UA positive, Lithium elevated, Renal function impaired  Likely metabolic/toxic given elevated lithium level. Lithium toxicity may not be reflected in level of 1.3  Plan:  [ ] Consider psychiatry consult to evaluate the current psychiatric medication regimen, particularly lithium, in light of her renal function and neurological status.  [ ] Continue psych medications if able to obtain PO;   [ ] GOC with palliative      #Cardio  No active issues    #Pulm  No active issues    #Renal   Assessment: Worsening renal function may be due to lithium nephrotoxicity, dehydration, or underlying chronic kidney disease.  BUN/Cr elevated 82/2.59 likely pre-renal in the setting of poor PO intake and lithium  Lithium level 1.3; elevated but does not relflect if this is an acute toxicity  Plan:  [ ] Review renal function tests (BUN, creatinine) and trend over time.  [ ] Consider adjusting or holding lithium, and monitor lithium levels.  [ ] Optimize hydration status with careful fluid management, monitoring intake and output.  [ ] Evaluate for other nephrotoxic agents and discontinue if appropriate.    #Electrolytes (Severe Hypernatremia)  Assessment: Hypernatremia is likely due to inadequate fluid intake and potential renal concentrating defects.  Multifactorial in the setting of poor PO intake and lithium use  Na on admission was 164;  Urine Osm:  Plan:  [ ] Monitor serum sodium levels Q6, aiming for a safe correction rate; goal <12mEq/24 hours   [ ] Na goal 164-->152 in 24 hours; FWD 2.1L     #Gastrointestinal/Nutrition  Assessment: Poor oral intake is contributing to the patient's electrolyte imbalances and possible nutritional deficiencies.  Her advancing dementia has progressed; she is an extremely poor candidate for PEG as it would not change or reverse her dementia/quality of life  Plan:  [ ] Speech and swallow eval  [ ] Aspiration precautions    #Musculoskeletal/Mobility  Assessment: The patient's wheelchair-bound status and legal blindness may contribute to decreased mobility and increased care needs.  Plan:  [ ] physical therapy to assess mobility and recommend strategies to maintain function and prevent deconditioning.    #Prophylaxis  [ ] Heparin SubQ  [ ] GI Protonix 74F legally blind, WC bound, schizophrenia on Lithium, dementia, minimally verbal presenting with severe hypernatremia, rectal prolapse  in the setting of worsening renal function and poor PO intake that has progressed these past 2-3 weeks, sent in from PCP given hypernatremia; ICU consulted for severe hypernatremia; however given unchanged mental status changes and stable vital signs; will recommend medicine admission      #Neurological System (Dementia, Schizophrenia)  #Acute encepholopathy on progressive dementia  Per ; baseline; patient is minimally verbal; does not have appetite; but much less able to eat/swallow the past 2 weeks  Patient has progressed the last year where she is no longer chewing, now on a puree diet  UA positive, Lithium elevated, Renal function impaired  Likely metabolic/toxic given elevated lithium level. Lithium toxicity may not be reflected in level of 1.3  Plan:  [ ] Consider psychiatry consult to evaluate the current psychiatric medication regimen, particularly lithium, in light of her renal function and neurological status.  [ ] Continue psych medications if able to obtain PO;   [ ] GOC with palliative  [ ] Hold Lithium for now      #Cardio  No active issues    #Pulm  No active issues    #Renal   Assessment: Worsening renal function may be due to lithium nephrotoxicity, dehydration, or underlying chronic kidney disease.  BUN/Cr elevated 82/2.59 likely pre-renal in the setting of poor PO intake and lithium  Lithium level 1.3; elevated but does not relflect if this is an acute toxicity  Plan:  [ ] Review renal function tests (BUN, creatinine) and trend over time.  [ ] Consider adjusting or holding lithium, and monitor lithium levels.  [ ] Optimize hydration status with careful fluid management, monitoring intake and output.  [ ] Evaluate for other nephrotoxic agents and discontinue if appropriate.    #Electrolytes (Severe Hypernatremia)  Assessment: Hypernatremia is likely due to inadequate fluid intake and potential renal concentrating defects.  Multifactorial in the setting of poor PO intake and lithium use  Na on admission was 164;  Urine Osm:  Plan:  [ ] Monitor serum sodium levels Q6, aiming for a safe correction rate; goal <12mEq/24 hours   [ ] Na goal 164-->152 in 24 hours; FWD 2.1L     #Gastrointestinal/Nutrition  Assessment: Poor oral intake is contributing to the patient's electrolyte imbalances and possible nutritional deficiencies.  Her advancing dementia has progressed; she is an extremely poor candidate for PEG as it would not change or reverse her dementia/quality of life  Plan:  [ ] Speech and swallow eval  [ ] Aspiration precautions    #Musculoskeletal/Mobility  Assessment: The patient's wheelchair-bound status and legal blindness may contribute to decreased mobility and increased care needs.  Plan:  [ ] physical therapy to assess mobility and recommend strategies to maintain function and prevent deconditioning.    #Prophylaxis  [ ] Heparin SubQ  [ ] GI Protonix 74F legally blind, WC bound, schizophrenia on Lithium, dementia, minimally verbal presenting with severe hypernatremia, rectal prolapse  in the setting of worsening renal function and poor PO intake that has progressed these past 2-3 weeks, sent in from PCP given hypernatremia; ICU consulted for severe hypernatremia, metabolic encephalopathy       #Neurological System (Dementia, Schizophrenia)  #Acute encepholopathy on progressive dementia  Per ; baseline; patient is minimally verbal; does not have appetite; but much less able to eat/swallow the past 2 weeks  Patient has progressed the last year where she is no longer chewing, now on a puree diet  UA positive, Lithium elevated, Renal function impaired  Likely metabolic/toxic given elevated lithium level. Lithium toxicity may not be reflected in level of 1.3  Plan:  [ ] Consider psychiatry consult to evaluate the current psychiatric medication regimen, particularly lithium, in light of her renal function and neurological status.  [ ] Continue psych medications if able to obtain PO;   [ ] GOC with palliative  [ ] Hold Lithium for now      #Cardio  No active issues    #Pulm  No active issues    #Renal   Assessment: Worsening renal function may be due to lithium nephrotoxicity, dehydration, or underlying chronic kidney disease.  BUN/Cr elevated 82/2.59 likely pre-renal in the setting of poor PO intake and lithium  Lithium level 1.3; elevated but does not relflect if this is an acute toxicity  Plan:  [ ] Review renal function tests (BUN, creatinine) and trend over time.  [ ] Consider adjusting or holding lithium, and monitor lithium levels.  [ ] Optimize hydration status with careful fluid management, monitoring intake and output.  [ ] Evaluate for other nephrotoxic agents and discontinue if appropriate.    #Electrolytes (Severe Hypernatremia)  Assessment: Hypernatremia is likely due to inadequate fluid intake and potential renal concentrating defects.  Multifactorial in the setting of poor PO intake and lithium use  Na on admission was 164;  Urine Osm:  Plan:  [ ] Monitor serum sodium levels Q6, aiming for a safe correction rate; goal <12mEq/24 hours   [ ] Na goal 164-->152 in 24 hours; FWD 2.1L     #Gastrointestinal/Nutrition  Assessment: Poor oral intake is contributing to the patient's electrolyte imbalances and possible nutritional deficiencies.  Her advancing dementia has progressed; she is an extremely poor candidate for PEG as it would not change or reverse her dementia/quality of life  Plan:  [ ] Speech and swallow eval  [ ] Aspiration precautions    #Musculoskeletal/Mobility  Assessment: The patient's wheelchair-bound status and legal blindness may contribute to decreased mobility and increased care needs.  Plan:  [ ] physical therapy to assess mobility and recommend strategies to maintain function and prevent deconditioning.    #Prophylaxis  [ ] Heparin SubQ  [ ] GI Protonix 74F legally blind, WC bound, schizophrenia on Lithium, dementia, minimally verbal presenting with severe hypernatremia, rectal prolapse  in the setting of worsening renal function and poor PO intake that has progressed these past 2-3 weeks, sent in from PCP given hypernatremia; ICU consulted for severe hypernatremia, metabolic encephalopathy       #Neurological System (Dementia, Schizophrenia)  #Acute encepholopathy on progressive dementia  Per ; baseline; patient is minimally verbal; does not have appetite; but much less able to eat/swallow the past 2 weeks  Patient has progressed the last year where she is no longer chewing, now on a puree diet  UA positive, Lithium elevated, Renal function impaired  Likely metabolic/toxic given elevated lithium level. Lithium toxicity may not be reflected in level of 1.3  Plan:  [ ] Consider psychiatry consult to evaluate the current psychiatric medication regimen, particularly lithium, in light of her renal function and neurological status.  [ ] Continue psych medications if able to obtain PO;   [ ] GOC with palliative  [ ] Hold Lithium for now      #Cardio  No active issues    #Pulm  No active issues    #Renal   Assessment: Worsening renal function may be due to lithium nephrotoxicity, dehydration, or underlying chronic kidney disease.  BUN/Cr elevated 82/2.59 likely pre-renal in the setting of poor PO intake and lithium  Lithium level 1.3; elevated but does not relflect if this is an acute toxicity  Plan:  [ ] Review renal function tests (BUN, creatinine) and trend over time.  [ ] Consider adjusting or holding lithium, and monitor lithium levels.  [ ] Optimize hydration status with careful fluid management, monitoring intake and output.  [ ] Evaluate for other nephrotoxic agents and discontinue if appropriate.    #Electrolytes (Severe Hypernatremia)  Assessment: Hypernatremia is likely due to inadequate fluid intake and potential renal concentrating defects.  Multifactorial in the setting of poor PO intake and lithium use  Na on admission was 164;  [ ] follow up Urine Osm:  • Renal losses: UOsm <800; ?ADH release or kidney response: post-ATN, osmotic, DI, loop diuretic, hyperCa, elderly  • Extrarenal losses: UOsm >800; GI loss from NGT, vomiting, diarrhea, insensible losses, hypodipsia    Plan:  [ ] Monitor serum sodium levels Q6, aiming for a safe correction rate; goal <12mEq/24 hours   [ ] Na goal 164-->152 in 24 hours; FWD 2.1L     #Gastrointestinal/Nutrition  Assessment: Poor oral intake is contributing to the patient's electrolyte imbalances and possible nutritional deficiencies.  Her advancing dementia has progressed; she is an extremely poor candidate for PEG as it would not change or reverse her dementia/quality of life  Plan:  [ ] Speech and swallow eval  [ ] Aspiration precautions    #Musculoskeletal/Mobility  Assessment: The patient's wheelchair-bound status and legal blindness may contribute to decreased mobility and increased care needs.  Plan:  [ ] physical therapy to assess mobility and recommend strategies to maintain function and prevent deconditioning.    #Prophylaxis  [ ] Heparin SubQ  [ ] GI Protonix

## 2024-11-07 NOTE — H&P ADULT - NSHPPHYSICALEXAM_GEN_ALL_CORE
General: Cachectic  HEENT:  Right pupil pinpoint, left minimally reactive to light            Lungs: Bilateral BS  Cardiovascular: Regular  Gastrointestinal: Soft, Positive BS  Musculoskeletal: No clubbing.  Moves all extremities.    Skin: Warm.  Intact  Neurological: Withdrawal to pain

## 2024-11-07 NOTE — ED PROVIDER NOTE - OBJECTIVE STATEMENT
74-year-old female with a past medical history of diabetes, schizophrenia, depression, dementia, legally blind presents from home for hypernatremia on outpatient labs.  History is as per the patient's .  States that the patient has not been drinking any fluids at all and not really eating very much.  The patient did take her nighttime medications tonight.  States that she had outpatient labs done which showed hyponatremia and reduced renal function.  States that the patient does have a history of hyponatremia but typically the highest that is went over the 140s and occasionally the 150s but is never had hypernatremia to 175 74-year-old female with a past medical history of diabetes, schizophrenia, depression, dementia, legally blind presents from home for hypernatremia on outpatient labs.  History is as per the patient's .  States that the patient has not been drinking any fluids at all and not really eating very much.  The patient did take her nighttime medications tonight.  States that she had outpatient labs done which showed hypernatremia and reduced renal function.  States that the patient does have a history of hypernatremia but typically the highest that is went over the 140s and occasionally the 150s but is never had hypernatremia to 175

## 2024-11-07 NOTE — ED PROVIDER NOTE - NS ED MD DISPO ISOLATION TYPES
H/O hernia repair  30 years ago  H/O shoulder surgery    Injury of ankle and foot  surgically repaired, 10 years ago
None

## 2024-11-07 NOTE — H&P ADULT - CONVERSATION DETAILS
Discussed with Lauri, , patient's condition and reason for admission. Also discussed given her progressive decline and advancing dementia; what she would have wanted approaching end of life.  stated that he was not ready for the conversation yet and will think about it, but is open to discussing it after this acute period of correcting her Na.    FULL CODE for now

## 2024-11-07 NOTE — ED ADULT TRIAGE NOTE - BP NONINVASIVE DIASTOLIC (MM HG)
In an effort to ensure that our patients [employer], a Team Member has reviewed your chart and identified an opportunity to provide the best care possible. An attempt was made to discuss or schedule overdue Preventive or Disease Management screening.     The Outcome was Contact was not made, left message If you have any questions or need help with scheduling, contact our Health Outreach Team at [phone]. Care Gaps include Immunizations.    
56

## 2024-11-08 ENCOUNTER — NON-APPOINTMENT (OUTPATIENT)
Age: 74
End: 2024-11-08

## 2024-11-08 DIAGNOSIS — N39.0 URINARY TRACT INFECTION, SITE NOT SPECIFIED: ICD-10-CM

## 2024-11-08 DIAGNOSIS — E43 UNSPECIFIED SEVERE PROTEIN-CALORIE MALNUTRITION: ICD-10-CM

## 2024-11-08 DIAGNOSIS — Z75.8 OTHER PROBLEMS RELATED TO MEDICAL FACILITIES AND OTHER HEALTH CARE: ICD-10-CM

## 2024-11-08 DIAGNOSIS — Z51.5 ENCOUNTER FOR PALLIATIVE CARE: ICD-10-CM

## 2024-11-08 DIAGNOSIS — F03.90 UNSPECIFIED DEMENTIA, UNSPECIFIED SEVERITY, WITHOUT BEHAVIORAL DISTURBANCE, PSYCHOTIC DISTURBANCE, MOOD DISTURBANCE, AND ANXIETY: ICD-10-CM

## 2024-11-08 LAB
A1C WITH ESTIMATED AVERAGE GLUCOSE RESULT: 7.5 % — HIGH (ref 4–5.6)
ALBUMIN SERPL ELPH-MCNC: 2.2 G/DL — LOW (ref 3.5–5)
ALP SERPL-CCNC: 56 U/L — SIGNIFICANT CHANGE UP (ref 40–120)
ALT FLD-CCNC: 36 U/L DA — SIGNIFICANT CHANGE UP (ref 10–60)
ANION GAP SERPL CALC-SCNC: 0 MMOL/L — LOW (ref 5–17)
ANION GAP SERPL CALC-SCNC: 1 MMOL/L — LOW (ref 5–17)
ANION GAP SERPL CALC-SCNC: 3 MMOL/L — LOW (ref 5–17)
ANION GAP SERPL CALC-SCNC: 3 MMOL/L — LOW (ref 5–17)
APAP SERPL-MCNC: 3 UG/ML — LOW (ref 10–30)
AST SERPL-CCNC: 23 U/L — SIGNIFICANT CHANGE UP (ref 10–40)
BASE EXCESS BLDV CALC-SCNC: 3 MMOL/L — SIGNIFICANT CHANGE UP
BASOPHILS # BLD AUTO: 0.02 K/UL — SIGNIFICANT CHANGE UP (ref 0–0.2)
BASOPHILS NFR BLD AUTO: 0.2 % — SIGNIFICANT CHANGE UP (ref 0–2)
BILIRUB DIRECT SERPL-MCNC: <0.1 MG/DL — SIGNIFICANT CHANGE UP (ref 0–0.3)
BILIRUB INDIRECT FLD-MCNC: >0.3 MG/DL — SIGNIFICANT CHANGE UP (ref 0.2–1)
BILIRUB SERPL-MCNC: 0.4 MG/DL — SIGNIFICANT CHANGE UP (ref 0.2–1.2)
BUN SERPL-MCNC: 46 MG/DL — HIGH (ref 7–18)
BUN SERPL-MCNC: 55 MG/DL — HIGH (ref 7–18)
BUN SERPL-MCNC: 65 MG/DL — HIGH (ref 7–18)
BUN SERPL-MCNC: 73 MG/DL — HIGH (ref 7–18)
CA-I SERPL-SCNC: SIGNIFICANT CHANGE UP MMOL/L (ref 1.15–1.33)
CALCIUM SERPL-MCNC: 10.2 MG/DL — SIGNIFICANT CHANGE UP (ref 8.4–10.5)
CALCIUM SERPL-MCNC: 9.7 MG/DL — SIGNIFICANT CHANGE UP (ref 8.4–10.5)
CALCIUM SERPL-MCNC: 9.7 MG/DL — SIGNIFICANT CHANGE UP (ref 8.4–10.5)
CALCIUM SERPL-MCNC: 9.8 MG/DL — SIGNIFICANT CHANGE UP (ref 8.4–10.5)
CHLORIDE SERPL-SCNC: 130 MMOL/L — HIGH (ref 96–108)
CHLORIDE SERPL-SCNC: 131 MMOL/L — HIGH (ref 96–108)
CHLORIDE SERPL-SCNC: 131 MMOL/L — HIGH (ref 96–108)
CHLORIDE SERPL-SCNC: 133 MMOL/L — HIGH (ref 96–108)
CK SERPL-CCNC: 19 U/L — LOW (ref 21–215)
CO2 SERPL-SCNC: 26 MMOL/L — SIGNIFICANT CHANGE UP (ref 22–31)
CO2 SERPL-SCNC: 27 MMOL/L — SIGNIFICANT CHANGE UP (ref 22–31)
CO2 SERPL-SCNC: 29 MMOL/L — SIGNIFICANT CHANGE UP (ref 22–31)
CO2 SERPL-SCNC: 31 MMOL/L — SIGNIFICANT CHANGE UP (ref 22–31)
CREAT SERPL-MCNC: 1.71 MG/DL — HIGH (ref 0.5–1.3)
CREAT SERPL-MCNC: 1.9 MG/DL — HIGH (ref 0.5–1.3)
CREAT SERPL-MCNC: 2.02 MG/DL — HIGH (ref 0.5–1.3)
CREAT SERPL-MCNC: 2.16 MG/DL — HIGH (ref 0.5–1.3)
EGFR: 23 ML/MIN/1.73M2 — LOW
EGFR: 25 ML/MIN/1.73M2 — LOW
EGFR: 27 ML/MIN/1.73M2 — LOW
EGFR: 31 ML/MIN/1.73M2 — LOW
EOSINOPHIL # BLD AUTO: 0.14 K/UL — SIGNIFICANT CHANGE UP (ref 0–0.5)
EOSINOPHIL NFR BLD AUTO: 1.4 % — SIGNIFICANT CHANGE UP (ref 0–6)
ESTIMATED AVERAGE GLUCOSE: 169 MG/DL — HIGH (ref 68–114)
GAS PNL BLDV: 157 MMOL/L — HIGH (ref 136–145)
GAS PNL BLDV: SIGNIFICANT CHANGE UP
GLUCOSE BLDC GLUCOMTR-MCNC: 107 MG/DL — HIGH (ref 70–99)
GLUCOSE BLDC GLUCOMTR-MCNC: 136 MG/DL — HIGH (ref 70–99)
GLUCOSE BLDC GLUCOMTR-MCNC: 222 MG/DL — HIGH (ref 70–99)
GLUCOSE BLDC GLUCOMTR-MCNC: 248 MG/DL — HIGH (ref 70–99)
GLUCOSE BLDC GLUCOMTR-MCNC: 293 MG/DL — HIGH (ref 70–99)
GLUCOSE BLDC GLUCOMTR-MCNC: 328 MG/DL — HIGH (ref 70–99)
GLUCOSE SERPL-MCNC: 256 MG/DL — HIGH (ref 70–99)
GLUCOSE SERPL-MCNC: 259 MG/DL — HIGH (ref 70–99)
GLUCOSE SERPL-MCNC: 285 MG/DL — HIGH (ref 70–99)
GLUCOSE SERPL-MCNC: 326 MG/DL — HIGH (ref 70–99)
HBV SURFACE AG SER-ACNC: SIGNIFICANT CHANGE UP
HCO3 BLDV-SCNC: 29 MMOL/L — SIGNIFICANT CHANGE UP (ref 22–29)
HCT VFR BLD CALC: 31.2 % — LOW (ref 34.5–45)
HGB BLD-MCNC: 8.9 G/DL — LOW (ref 11.5–15.5)
IMM GRANULOCYTES NFR BLD AUTO: 0.3 % — SIGNIFICANT CHANGE UP (ref 0–0.9)
LACTATE BLDV-MCNC: 2.1 MMOL/L — HIGH (ref 0.5–2)
LACTATE SERPL-SCNC: 2.1 MMOL/L — HIGH (ref 0.7–2)
LACTATE SERPL-SCNC: 2.8 MMOL/L — HIGH (ref 0.7–2)
LITHIUM SERPL-MCNC: 0.9 MMOL/L — SIGNIFICANT CHANGE UP (ref 0.6–1.2)
LYMPHOCYTES # BLD AUTO: 1.58 K/UL — SIGNIFICANT CHANGE UP (ref 1–3.3)
LYMPHOCYTES # BLD AUTO: 15.6 % — SIGNIFICANT CHANGE UP (ref 13–44)
MAGNESIUM SERPL-MCNC: 2.3 MG/DL — SIGNIFICANT CHANGE UP (ref 1.6–2.6)
MCHC RBC-ENTMCNC: 27.4 PG — SIGNIFICANT CHANGE UP (ref 27–34)
MCHC RBC-ENTMCNC: 28.5 G/DL — LOW (ref 32–36)
MCV RBC AUTO: 96 FL — SIGNIFICANT CHANGE UP (ref 80–100)
MONOCYTES # BLD AUTO: 0.59 K/UL — SIGNIFICANT CHANGE UP (ref 0–0.9)
MONOCYTES NFR BLD AUTO: 5.8 % — SIGNIFICANT CHANGE UP (ref 2–14)
MRSA PCR RESULT.: SIGNIFICANT CHANGE UP
NEUTROPHILS # BLD AUTO: 7.78 K/UL — HIGH (ref 1.8–7.4)
NEUTROPHILS NFR BLD AUTO: 76.7 % — SIGNIFICANT CHANGE UP (ref 43–77)
NRBC # BLD: 0 /100 WBCS — SIGNIFICANT CHANGE UP (ref 0–0)
OSMOLALITY SERPL: 358 MOSMOL/KG — HIGH (ref 280–301)
OSMOLALITY UR: 256 MOSM/KG — SIGNIFICANT CHANGE UP (ref 50–1200)
OSMOLALITY UR: 308 MOSM/KG — SIGNIFICANT CHANGE UP (ref 50–1200)
PCO2 BLDV: 49 MMHG — HIGH (ref 39–42)
PH BLDV: 7.38 — SIGNIFICANT CHANGE UP (ref 7.32–7.43)
PHOSPHATE SERPL-MCNC: 3.2 MG/DL — SIGNIFICANT CHANGE UP (ref 2.5–4.5)
PLATELET # BLD AUTO: 130 K/UL — LOW (ref 150–400)
PO2 BLDV: 40 MMHG — SIGNIFICANT CHANGE UP
POTASSIUM BLDV-SCNC: 3.5 MMOL/L — SIGNIFICANT CHANGE UP (ref 3.5–5.1)
POTASSIUM SERPL-MCNC: 3.6 MMOL/L — SIGNIFICANT CHANGE UP (ref 3.5–5.3)
POTASSIUM SERPL-MCNC: 3.6 MMOL/L — SIGNIFICANT CHANGE UP (ref 3.5–5.3)
POTASSIUM SERPL-MCNC: 3.8 MMOL/L — SIGNIFICANT CHANGE UP (ref 3.5–5.3)
POTASSIUM SERPL-MCNC: 4.2 MMOL/L — SIGNIFICANT CHANGE UP (ref 3.5–5.3)
POTASSIUM SERPL-SCNC: 3.6 MMOL/L — SIGNIFICANT CHANGE UP (ref 3.5–5.3)
POTASSIUM SERPL-SCNC: 3.6 MMOL/L — SIGNIFICANT CHANGE UP (ref 3.5–5.3)
POTASSIUM SERPL-SCNC: 3.8 MMOL/L — SIGNIFICANT CHANGE UP (ref 3.5–5.3)
POTASSIUM SERPL-SCNC: 4.2 MMOL/L — SIGNIFICANT CHANGE UP (ref 3.5–5.3)
PROT SERPL-MCNC: 5.2 G/DL — LOW (ref 6–8.3)
RBC # BLD: 3.25 M/UL — LOW (ref 3.8–5.2)
RBC # FLD: 17.6 % — HIGH (ref 10.3–14.5)
S AUREUS DNA NOSE QL NAA+PROBE: DETECTED
SALICYLATES SERPL-MCNC: <1.7 MG/DL — LOW (ref 2.8–20)
SAO2 % BLDV: 69.6 % — SIGNIFICANT CHANGE UP
SODIUM SERPL-SCNC: 160 MMOL/L — CRITICAL HIGH (ref 135–145)
SODIUM SERPL-SCNC: 160 MMOL/L — CRITICAL HIGH (ref 135–145)
SODIUM SERPL-SCNC: 162 MMOL/L — CRITICAL HIGH (ref 135–145)
SODIUM SERPL-SCNC: 163 MMOL/L — CRITICAL HIGH (ref 135–145)
T4 AB SER-ACNC: 6.9 UG/DL — SIGNIFICANT CHANGE UP (ref 4.6–12)
TSH SERPL-MCNC: 2.2 UU/ML — SIGNIFICANT CHANGE UP (ref 0.34–4.82)
UUN UR-MCNC: 560 MG/DL — SIGNIFICANT CHANGE UP
WBC # BLD: 10.14 K/UL — SIGNIFICANT CHANGE UP (ref 3.8–10.5)
WBC # FLD AUTO: 10.14 K/UL — SIGNIFICANT CHANGE UP (ref 3.8–10.5)

## 2024-11-08 PROCEDURE — 71045 X-RAY EXAM CHEST 1 VIEW: CPT | Mod: 26

## 2024-11-08 PROCEDURE — 99223 1ST HOSP IP/OBS HIGH 75: CPT

## 2024-11-08 PROCEDURE — 93010 ELECTROCARDIOGRAM REPORT: CPT

## 2024-11-08 RX ORDER — INSULIN LISPRO 100/ML
VIAL (ML) SUBCUTANEOUS AT BEDTIME
Refills: 0 | Status: DISCONTINUED | OUTPATIENT
Start: 2024-11-08 | End: 2024-11-08

## 2024-11-08 RX ORDER — HALOPERIDOL DECANOATE 50 MG/ML
1 INJECTION INTRAMUSCULAR
Refills: 0 | DISCHARGE

## 2024-11-08 RX ORDER — MIDAZOLAM IN 5 % DEXTROSE/PF 15 MG/30ML
2 SYRINGE (ML) INTRAVENOUS ONCE
Refills: 0 | Status: DISCONTINUED | OUTPATIENT
Start: 2024-11-08 | End: 2024-11-08

## 2024-11-08 RX ORDER — TRAZODONE HYDROCHLORIDE 100 MG/1
0.5 TABLET ORAL
Refills: 0 | DISCHARGE

## 2024-11-08 RX ORDER — GLYCERIN/PROPYLENE GLYCOL 0.6 %-0.6%
1 DROPPERETTE, SINGLE-USE DROP DISPENSER OPHTHALMIC (EYE) THREE TIMES A DAY
Refills: 0 | Status: DISCONTINUED | OUTPATIENT
Start: 2024-11-08 | End: 2024-11-15

## 2024-11-08 RX ORDER — CHLORHEXIDINE GLUCONATE 40 MG/ML
1 SOLUTION TOPICAL
Refills: 0 | Status: DISCONTINUED | OUTPATIENT
Start: 2024-11-08 | End: 2024-11-15

## 2024-11-08 RX ORDER — HEPARIN SODIUM 10000 [USP'U]/ML
5000 INJECTION INTRAVENOUS; SUBCUTANEOUS EVERY 12 HOURS
Refills: 0 | Status: DISCONTINUED | OUTPATIENT
Start: 2024-11-08 | End: 2024-11-15

## 2024-11-08 RX ORDER — INSULIN LISPRO 100/ML
VIAL (ML) SUBCUTANEOUS EVERY 6 HOURS
Refills: 0 | Status: DISCONTINUED | OUTPATIENT
Start: 2024-11-08 | End: 2024-11-09

## 2024-11-08 RX ORDER — ESCITALOPRAM 10 MG/1
1 TABLET, FILM COATED ORAL
Refills: 0 | DISCHARGE

## 2024-11-08 RX ORDER — CEFTRIAXONE SODIUM 10 G
1000 VIAL (EA) INJECTION EVERY 24 HOURS
Refills: 0 | Status: COMPLETED | OUTPATIENT
Start: 2024-11-08 | End: 2024-11-10

## 2024-11-08 RX ADMIN — Medication 2: at 13:20

## 2024-11-08 RX ADMIN — Medication 100 MILLIGRAM(S): at 00:21

## 2024-11-08 RX ADMIN — Medication 1 APPLICATION(S): at 23:30

## 2024-11-08 RX ADMIN — Medication 100 MILLIGRAM(S): at 22:16

## 2024-11-08 RX ADMIN — Medication 2 MILLIGRAM(S): at 13:24

## 2024-11-08 RX ADMIN — Medication 1 APPLICATION(S): at 13:20

## 2024-11-08 RX ADMIN — Medication 500 MILLILITER(S): at 03:20

## 2024-11-08 RX ADMIN — Medication 1000 MILLILITER(S): at 15:16

## 2024-11-08 RX ADMIN — Medication 100 MILLILITER(S): at 15:16

## 2024-11-08 RX ADMIN — Medication 500 MILLILITER(S): at 10:56

## 2024-11-08 RX ADMIN — Medication 1000 MILLILITER(S): at 00:21

## 2024-11-08 RX ADMIN — Medication 1 APPLICATION(S): at 19:04

## 2024-11-08 RX ADMIN — Medication 500 MILLILITER(S): at 06:25

## 2024-11-08 RX ADMIN — Medication 100 MILLILITER(S): at 12:08

## 2024-11-08 RX ADMIN — Medication 4: at 06:46

## 2024-11-08 RX ADMIN — CHLORHEXIDINE GLUCONATE 1 APPLICATION(S): 40 SOLUTION TOPICAL at 06:41

## 2024-11-08 RX ADMIN — HEPARIN SODIUM 5000 UNIT(S): 10000 INJECTION INTRAVENOUS; SUBCUTANEOUS at 19:05

## 2024-11-08 NOTE — SWALLOW BEDSIDE ASSESSMENT ADULT - ORAL PHASE
Decreased anterior-posterior movement of the bolus/Delayed oral transit time/Stasis in anterior sulcus Decreased anterior-posterior movement of the bolus/Delayed oral transit time Decreased anterior-posterior movement of the bolus/Delayed oral transit time/Lingual stasis

## 2024-11-08 NOTE — SWALLOW BEDSIDE ASSESSMENT ADULT - SWALLOW EVAL: DIAGNOSIS
Pt p/w s&s oropharyngeal dysphagia with neurogenic component c/b poor oral aperture (resulting in limited PO acceptance), reflexive/ sustained jaw closure, tonic bite reflex, decreased bolus formation, slow A-P transport w/ base-of-tongue pumping, delayed swallow trigger, & decreased hyolaryngeal elevation. Cough on thin liquid trials & PO trials of larger presentation size. Malnutrition risk present.

## 2024-11-08 NOTE — CONSULT NOTE ADULT - PROBLEM SELECTOR RECOMMENDATION 4
Pt states that she was trying to put her walker in the truck and she slipped causing her to fall onto the asphalt hitting her head,pt is on ASA, pt denies LOC. Pt has a hematoma on the right side of her head. pending

## 2024-11-08 NOTE — CONSULT NOTE ADULT - TIME BILLING
This includes chart review, patient assessment, discussion and collaboration with interdisciplinary team members, excluding ACP.

## 2024-11-08 NOTE — PATIENT PROFILE ADULT - FALL HARM RISK - HARM RISK INTERVENTIONS

## 2024-11-08 NOTE — SWALLOW BEDSIDE ASSESSMENT ADULT - ORAL PREPARATORY PHASE
Reduced oral grading/Decreased mastication ability Reduced oral grading/Anterior loss of bolus Upon presentation of spoon, Pt executed reflexive, sustained jaw closure, suggestive of tonic bite reflex./Reduced oral grading poor pucker to cup presentation. Upon presentation of spoon, Pt executed reflexive, sustained jaw closure, suggestive of tonic bite reflex./Reduced oral grading

## 2024-11-08 NOTE — PROGRESS NOTE ADULT - SUBJECTIVE AND OBJECTIVE BOX
Patient is a 74y old  Female who presents with a chief complaint of     INTERVAL HPI/OVERNIGHT EVENTS:   No overnight events   Afebrile, hemodynamically stable     Subjective: Patient seen and examined at bedside.    ICU Vital Signs Last 24 Hrs  T(C): 36.1 (08 Nov 2024 08:00), Max: 36.6 (07 Nov 2024 20:48)  T(F): 97 (08 Nov 2024 08:00), Max: 97.9 (07 Nov 2024 20:48)  HR: 57 (08 Nov 2024 09:00) (47 - 88)  BP: 97/55 (08 Nov 2024 09:00) (76/41 - 115/58)  BP(mean): 69 (08 Nov 2024 09:00) (53 - 69)  ABP: --  ABP(mean): --  RR: 16 (08 Nov 2024 09:00) (12 - 28)  SpO2: 98% (08 Nov 2024 09:00) (94% - 99%)    O2 Parameters below as of 08 Nov 2024 08:00  Patient On (Oxygen Delivery Method): room air          I&O's Summary    07 Nov 2024 07:01  -  08 Nov 2024 07:00  --------------------------------------------------------  IN: 100 mL / OUT: 1090 mL / NET: -990 mL    08 Nov 2024 07:01  -  08 Nov 2024 09:44  --------------------------------------------------------  IN: 200 mL / OUT: 630 mL / NET: -430 mL          PHYSICAL EXAM:  GENERAL: No acute distress   HEAD:  Atraumatic, Normocephalic  EYES: EOMI, PERRLA, conjunctiva and sclera clear  ENMT: No tonsillar erythema, exudates, or enlargement; Moist mucous membranes  NECK: Supple, No JVD, Normal thyroid  HEART: Regular rate and rhythm; No murmurs, rubs, or gallops  RESPIRATORY: CTA B/L, No W/R/R  ABDOMEN: Soft, Nontender, Nondistended; Bowel sounds present  NEUROLOGY: A&Ox3, nonfocal, moving all extremities  EXTREMITIES:  2+ Peripheral Pulses, No clubbing, cyanosis, or edema  SKIN: warm, dry, normal color, no rash or abnormal lesions    LABS:                        8.9    10.14 )-----------( 130      ( 08 Nov 2024 03:28 )             31.2     11-08    160[HH]  |  130[H]  |  55[H]  ----------------------------<  326[H]  3.8   |  29  |  1.90[H]    Ca    9.8      08 Nov 2024 07:50  Phos  3.2     11-08  Mg     2.3     11-08    TPro  5.2[L]  /  Alb  2.2[L]  /  TBili  0.4  /  DBili  <0.1  /  AST  23  /  ALT  36  /  AlkPhos  56  11-08      Urinalysis Basic - ( 08 Nov 2024 07:50 )    Color: x / Appearance: x / SG: x / pH: x  Gluc: 326 mg/dL / Ketone: x  / Bili: x / Urobili: x   Blood: x / Protein: x / Nitrite: x   Leuk Esterase: x / RBC: x / WBC x   Sq Epi: x / Non Sq Epi: x / Bacteria: x      CAPILLARY BLOOD GLUCOSE      POCT Blood Glucose.: 328 mg/dL (08 Nov 2024 06:45)  POCT Blood Glucose.: 293 mg/dL (08 Nov 2024 05:33)  POCT Blood Glucose.: 222 mg/dL (08 Nov 2024 01:05)        Consultant(s) Notes Reviewed:  [x ] YES  [ ] NO    MEDICATIONS  (STANDING):  cefTRIAXone   IVPB 1000 milliGRAM(s) IV Intermittent every 24 hours  chlorhexidine 2% Cloths 1 Application(s) Topical <User Schedule>  dextrose 5%. 1000 milliLiter(s) (100 mL/Hr) IV Continuous <Continuous>  insulin lispro (ADMELOG) corrective regimen sliding scale   SubCutaneous every 6 hours    MEDICATIONS  (PRN):      Care Discussed with Consultants/Other Providers [ x] YES  [ ] NO    RADIOLOGY & ADDITIONAL TESTS: Patient is a 74y old  Female who presents with a chief complaint of     INTERVAL HPI/OVERNIGHT EVENTS:   No overnight events.     Subjective: Patient seen and examined at bedside. Bradycardic.    ICU Vital Signs Last 24 Hrs  T(C): 36.1 (08 Nov 2024 08:00), Max: 36.6 (07 Nov 2024 20:48)  T(F): 97 (08 Nov 2024 08:00), Max: 97.9 (07 Nov 2024 20:48)  HR: 57 (08 Nov 2024 09:00) (47 - 88)  BP: 97/55 (08 Nov 2024 09:00) (76/41 - 115/58)  BP(mean): 69 (08 Nov 2024 09:00) (53 - 69)  ABP: --  ABP(mean): --  RR: 16 (08 Nov 2024 09:00) (12 - 28)  SpO2: 98% (08 Nov 2024 09:00) (94% - 99%)    O2 Parameters below as of 08 Nov 2024 08:00  Patient On (Oxygen Delivery Method): room air      I&O's Summary    07 Nov 2024 07:01  -  08 Nov 2024 07:00  --------------------------------------------------------  IN: 100 mL / OUT: 1090 mL / NET: -990 mL    08 Nov 2024 07:01  -  08 Nov 2024 09:44  --------------------------------------------------------  IN: 200 mL / OUT: 630 mL / NET: -430 mL      PHYSICAL EXAM:  GENERAL: Laying in bed, resting  HEAD:  Atraumatic, Normocephalic  EYES: EOMI, PERRLA, conjunctiva and sclera clear, + R eye crusting  ENMT: No tonsillar erythema, exudates, or enlargement; Moist mucous membranes  NECK: Supple, No JVD, Normal thyroid  HEART: Bradycardic rhythm; No murmurs, rubs, or gallops  RESPIRATORY: CTA B/L, No W/R/R  ABDOMEN: Soft, Nontender, Nondistended; Bowel sounds present  NEUROLOGY: A&Ox0, nonfocal, moving all extremities  EXTREMITIES:  2+ Peripheral Pulses, No clubbing, cyanosis, or edema  SKIN: warm, dry, normal color, no rash or abnormal lesions    LABS:                        8.9    10.14 )-----------( 130      ( 08 Nov 2024 03:28 )             31.2     11-08    160[HH]  |  130[H]  |  55[H]  ----------------------------<  326[H]  3.8   |  29  |  1.90[H]    Ca    9.8      08 Nov 2024 07:50  Phos  3.2     11-08  Mg     2.3     11-08    TPro  5.2[L]  /  Alb  2.2[L]  /  TBili  0.4  /  DBili  <0.1  /  AST  23  /  ALT  36  /  AlkPhos  56  11-08      Urinalysis Basic - ( 08 Nov 2024 07:50 )    Color: x / Appearance: x / SG: x / pH: x  Gluc: 326 mg/dL / Ketone: x  / Bili: x / Urobili: x   Blood: x / Protein: x / Nitrite: x   Leuk Esterase: x / RBC: x / WBC x   Sq Epi: x / Non Sq Epi: x / Bacteria: x      CAPILLARY BLOOD GLUCOSE      POCT Blood Glucose.: 328 mg/dL (08 Nov 2024 06:45)  POCT Blood Glucose.: 293 mg/dL (08 Nov 2024 05:33)  POCT Blood Glucose.: 222 mg/dL (08 Nov 2024 01:05)        Consultant(s) Notes Reviewed:  [x ] YES  [ ] NO    MEDICATIONS  (STANDING):  cefTRIAXone   IVPB 1000 milliGRAM(s) IV Intermittent every 24 hours  chlorhexidine 2% Cloths 1 Application(s) Topical <User Schedule>  dextrose 5%. 1000 milliLiter(s) (100 mL/Hr) IV Continuous <Continuous>  insulin lispro (ADMELOG) corrective regimen sliding scale   SubCutaneous every 6 hours    Care Discussed with Consultants/Other Providers [ x] YES  [ ] NO

## 2024-11-08 NOTE — CONSULT NOTE ADULT - ASSESSMENT
Patient is a 75yo Female wheelchair bound, legally blind with schizophrenia on Lithium and dementia sent in for hypernatremia on outpt labs. Pt with poor PO intake for 2-3 weeks. Pt a/w hypernatremia (Na 165), PRASHANTH, hypercalcemia, bradycardia, acute metabolic encephalopathy, UTI and ?lithium toxicity (lithium level 1.3). Nephrolgoy consulted for elevated serum creatinine and sodium with ?lithium toxicity.    1. PRASHANTH- previous SCr 1-1.2; last SCr 1.26 on 6/5/24 on HIE. PRASHANTH likely due to lithium use with hypercalcemia/ dehydration due to poor PO intake. +UA in the setting of infection. Renal function improving with polyuria; see below. IVF as below. Pt with baig. Check Renal US. Pt with possible lithium toxicity; plan for HD today for 6 hrs as per Toxicology. Spoke with pt's  Lauri Mar; discussed benefits/ risk/ alt of HD; consent in chart. Check HepBsAg.  Strict I/Os. Avoid nephrotoxins/ NSAIDs/ RCA. Monitor BMP.  2. 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 Urine osm pending. Would hold off on DDAVP challenge today as pt will receive HD. If urine o/p >3L per day with persistent hypernatremia and low urine osm. Recc to check urine osm (time 0), then give DDAVP 2mcg IV x1 and then repeat urine osm q 30 mins for 2 hrs and monitor urine o/p to r/o nephrogenic DI. Will consider HCTZ vs amiloride once PRASHANTH resolves if consistent with nephrogenic DI.   3. Lithium toxicity- lithium level 1.3 on admission; with metabolic encephalopathy. Hold Lithium. Toxicology consulted; plan for HD today x 6 hrs for possible lithium toxicity. Monitor lithium level q 6 -8hrs.   4. Hypotension- BP low normal despite IVF. Pressors as per ICU. Monitor BP  5. Acute cystitis- +UA, f/u UCx. Pt on Ceftriaxone.    6. Hypercalcemia- corrected serum Ca 11.14; improving on IVF. TSH wnl. r/o lithium induced hyperparathyroidism. Check PTHi and 25 OH vit D. c/w IVF. Avoid LR IVF (has calcium). Consider 1/2 NS @ 100ml/hr. Monitor ionized calcium      [facility] NEPHROLOG[facility]  Nando Elizondo M.D.  Johann Noyola D.O.  Paulina Dotson M.D.  MD Keisha Parekh, MSN, ANP-C    Telephone: [phone]  Facsimile: [phone]    [address] [place] [address], [address]  [place] [id number]

## 2024-11-08 NOTE — CONSULT NOTE ADULT - CONVERSATION DETAILS
Discussed with Lauri, , patient's condition and reason for admission. Also discussed given her progressive decline and advancing dementia; what she would have wanted approaching end of life.  stated that he was not ready for the conversation yet and will think about it, but is open to discussing it after this acute period of correcting her Na.    FULL CODE for now
I spoke face to face with Lauri.     He said Nelda has been struggling with mental illness since age 15. Her whole family had mental illness. SHe decided not to have children because she diud not want to pass it along. She used to work as an  at a job center for the [employer]. Has a pension.     She lives at home with him now. They have 12h/d of HHA. She is unable to perform any ADLs at this time. Has been taking less po both food and water. Also is legally blind from glaucoma.     Lauri feels he is not sure what Nelda would want in terms of CPR or artificial life support since they never explicitly discussed it.  Lauri had been the HCP for Nelda's sister, who had a prolonged ICU stay with PEG and ? trach and went to LTAC and . He did not think this was a good death and suspect that Nelda would not want anything like that, but needs to think about it.    I told him I thought that CPR and intubation would be painful and burdensome for Nelda and encouraged him to make her DNR/DNI. He will think about it and get back to me or the ICU team.    I told him that Nelda was appropriate for hospice, if he decides he's ready and that this could be done at home and outlined the services that hospice would provide.     We agreed to talk again in a few days.     All questions answered and support given.

## 2024-11-08 NOTE — CONSULT NOTE ADULT - ASSESSMENT
74F legally blind, WC bound, schizophrenia on Lithium, dementia, minimally verbal presenting with severe hypernatremia. in ICU with AMS

## 2024-11-08 NOTE — CONSULT NOTE ADULT - PROBLEM SELECTOR RECOMMENDATION 2
Decreased PO intake    Albumin 2.2  Clinical evidence indicates that the patient has Severe protein calorie malnutrition/ 3rd degree    In context of         Chronic Illness (>1 month)    Energy/Food intake <50% of estimated energy requirement >5 days  Weight loss: Moderate - severe   Body Fat loss: Severe   Cachexia, temporal wasting, contracted, muscle atrophy  Muscle mass loss: Severe    Fluid Accumulation: moderate   Strength: weakened severe (bedbound)    NPO for now    recommend nutrition consult  recommend speech and swallow consult

## 2024-11-08 NOTE — PROCEDURE NOTE - NSPROCDETAILS_GEN_ALL_CORE
guidewire recovered/sterile dressing applied/sterile technique, catheter placed/ultrasound guidance with use of sterile gel and probe cove

## 2024-11-08 NOTE — SWALLOW BEDSIDE ASSESSMENT ADULT - PHARYNGEAL PHASE
Delayed pharyngeal swallow/Decreased laryngeal elevation/Multiple swallows Delayed pharyngeal swallow/Decreased laryngeal elevation/Wet vocal quality post oral intake/Cough post oral intake/Delayed cough post oral intake

## 2024-11-08 NOTE — CONSULT NOTE ADULT - CONSULT REASON
lithium toxicity
Severe Hypernatremia
hypernatremia with Elevated serum creatinine.  elevated lithium level
Consult to: Discuss complex medical decision making related to goals of care

## 2024-11-08 NOTE — SWALLOW BEDSIDE ASSESSMENT ADULT - SLP PERTINENT HISTORY OF CURRENT PROBLEM
74F legally blind, WC bound, schizophrenia on Lithium, dementia, minimally verbal presenting with severe hypernatremia in the setting of worsening renal function and poor PO intake that has progressed these past 2-3 weeks, sent in from PCP given hypernatremia.   states that she has been progressively declining the 2-3  minimally verbal, worsening appetite which prompted home care services to draw labs. She is now on a puree diet, but still does not have much input.He denies any fevers or aspiration events. In the ED, Na 164, BUN/Cr elevated, Lit 1.3. Admitted to ICU for severe hypernatremia, metabolic encephalopathy.

## 2024-11-08 NOTE — CONSULT NOTE ADULT - SUBJECTIVE AND OBJECTIVE BOX
74F legally blind, WC bound, schizophrenia on Lithium, dementia, minimally verbal presenting with severe hypernatremia in the setting of worsening renal function and poor PO intake that has progressed these past 2-3 weeks, sent in from PCP given hypernatremia;  states that she has been progressively declining the 2-3  minimally verbal, worsening appetite which prompted home care services to draw labs. She is now on a puree diet, but still does not have much input.    He denies any fevers or aspiration events    In the ED, Na 164, BUN/Cr elevated, Lit 1.3  Urine          PAST MEDICAL & SURGICAL HISTORY:  Diabetes      Depression      Dementia      Schizophrenia      Legally blind      Rectal prolapse          SOCIAL HX:   Smoking                         ETOH                            Other    FAMILY HISTORY:  No pertinent family history in first degree relatives    :  No known cardiovacular family hisotry     ROS:  See HPI     Allergies    penicillins (Fever)  sulfur (Other)  sulfa drugs (Unknown)  Stelazine (Other)  phenothiazines (Unknown)    Intolerances          PHYSICAL EXAM    ICU Vital Signs Last 24 Hrs  T(C): 36.6 (07 Nov 2024 20:48), Max: 36.6 (07 Nov 2024 20:48)  T(F): 97.9 (07 Nov 2024 20:48), Max: 97.9 (07 Nov 2024 20:48)  HR: 88 (07 Nov 2024 20:48) (88 - 88)  BP: 115/58 (07 Nov 2024 22:45) (94/56 - 115/58)  BP(mean): --  ABP: --  ABP(mean): --  RR: 18 (07 Nov 2024 20:48) (18 - 18)  SpO2: 97% (07 Nov 2024 20:48) (97% - 97%)    O2 Parameters below as of 07 Nov 2024 20:48  Patient On (Oxygen Delivery Method): room air            General: Cachectic  HEENT:  Right pupil pinpoint, left minimally reactive to light            Lungs: Bilateral BS  Cardiovascular: Regular  Gastrointestinal: Soft, Positive BS  Musculoskeletal: No clubbing.  Moves all extremities.    Skin: Warm.  Intact  Neurological: Withdrawal to pain        LABS:                          11.2   11.72 )-----------( 176      ( 07 Nov 2024 21:05 )             38.3                                               11-07    165[HH]  |  135[H]  |  82[H]  ----------------------------<  237[H]  4.0   |  24  |  2.59[H]    Ca    11.1[H]      07 Nov 2024 21:05  Mg     2.9     11-07    TPro  7.0  /  Alb  2.9[L]  /  TBili  0.4  /  DBili  x   /  AST  36  /  ALT  52  /  AlkPhos  74  11-07                                             Urinalysis Basic - ( 07 Nov 2024 21:05 )    Color: x / Appearance: x / SG: x / pH: x  Gluc: 237 mg/dL / Ketone: x  / Bili: x / Urobili: x   Blood: x / Protein: x / Nitrite: x   Leuk Esterase: x / RBC: x / WBC x   Sq Epi: x / Non Sq Epi: x / Bacteria: x                                                  LIVER FUNCTIONS - ( 07 Nov 2024 21:05 )  Alb: 2.9 g/dL / Pro: 7.0 g/dL / ALK PHOS: 74 U/L / ALT: 52 U/L DA / AST: 36 U/L / GGT: x                                                                                                                                       CXR:    ECHO:    MEDICATIONS  (STANDING):  dextrose 5%. 1000 milliLiter(s) (100 mL/Hr) IV Continuous <Continuous>    MEDICATIONS  (PRN):        
MEDICAL TOXICOLOGY CONSULT    HPI:TBD initial recommendations for 6 hr dialysis in setting of potential lithium toxicity/encephalopathy      PAST MEDICAL & SURGICAL HISTORY:  Diabetes      Depression      Dementia      Schizophrenia      Legally blind      Rectal prolapse          MEDICATION HISTORY:  cefTRIAXone   IVPB 1000 milliGRAM(s) IV Intermittent every 24 hours  chlorhexidine 2% Cloths 1 Application(s) Topical <User Schedule>  heparin   Injectable 5000 Unit(s) SubCutaneous every 12 hours  insulin lispro (ADMELOG) corrective regimen sliding scale   SubCutaneous every 6 hours  lactated ringers. 1000 milliLiter(s) IV Continuous <Continuous>  petrolatum Ophthalmic Ointment 1 Application(s) Right EYE three times a day      FAMILY HISTORY:  No pertinent family history in first degree relatives        REVIEW OF SYSTEMS:   _____unable to perform due to intoxication, dementia, or illness      Vital Signs Last 24 Hrs  T(C): 36.1 (08 Nov 2024 08:00), Max: 36.6 (07 Nov 2024 20:48)  T(F): 97 (08 Nov 2024 08:00), Max: 97.9 (07 Nov 2024 20:48)  HR: 52 (08 Nov 2024 11:00) (47 - 88)  BP: 93/57 (08 Nov 2024 11:00) (76/41 - 115/58)  BP(mean): 70 (08 Nov 2024 11:00) (53 - 71)  RR: 16 (08 Nov 2024 11:00) (12 - 28)  SpO2: 98% (08 Nov 2024 11:00) (94% - 99%)    Parameters below as of 08 Nov 2024 08:00  Patient On (Oxygen Delivery Method): room air        SIGNIFICANT LABORATORY STUDIES:                        8.9    10.14 )-----------( 130      ( 08 Nov 2024 03:28 )             31.2       11-08    160[HH]  |  130[H]  |  55[H]  ----------------------------<  326[H]  3.8   |  29  |  1.90[H]    Ca    9.8      08 Nov 2024 07:50  Phos  3.2     11-08  Mg     2.3     11-08    TPro  5.2[L]  /  Alb  2.2[L]  /  TBili  0.4  /  DBili  <0.1  /  AST  23  /  ALT  36  /  AlkPhos  56  11-08          Urinalysis Basic - ( 08 Nov 2024 07:50 )    Color: x / Appearance: x / SG: x / pH: x  Gluc: 326 mg/dL / Ketone: x  / Bili: x / Urobili: x   Blood: x / Protein: x / Nitrite: x   Leuk Esterase: x / RBC: x / WBC x   Sq Epi: x / Non Sq Epi: x / Bacteria: x        Anion Gap: 1[L] 11-08 @ 07:50  CK: -- 11-08 @ 07:50  Troponin:  --  11-08 @ 07:50  Pro-BNP:  --  11-08 @ 07:50  VBG:  --  11-08 @ 07:50  Carboxyhemoglobin %:  --  11-08 @ 07:50  Methemoglobin %:  --  11-08 @ 07:50  Osmolality Serum:  --  11-08 @ 07:50  Aspirin Level: --  11-08 @ 07:50  Acetaminophen Level:  --  11-08 @ 07:50  Ethanol Level:  --  11-08 @ 07:50  Digoxin Level:  --  11-08 @ 07:50  Phenytoin Level:  --  11-08 @ 07:50  Carbamazepine level:  --  11-08 @ 07:50  Lamotrigine level:  --  11-08 @ 07:50  Anion Gap: 3[L] 11-08 @ 03:28  CK: -- 11-08 @ 03:28  Troponin:  --  11-08 @ 03:28  Pro-BNP:  --  11-08 @ 03:28  VBG:  --  11-08 @ 03:28  Carboxyhemoglobin %:  --  11-08 @ 03:28  Methemoglobin %:  --  11-08 @ 03:28  Osmolality Serum:  --  11-08 @ 03:28  Aspirin Level: --  11-08 @ 03:28  Acetaminophen Level:  --  11-08 @ 03:28  Ethanol Level:  --  11-08 @ 03:28  Digoxin Level:  --  11-08 @ 03:28  Phenytoin Level:  --  11-08 @ 03:28  Carbamazepine level:  --  11-08 @ 03:28  Lamotrigine level:  --  11-08 @ 03:28  Anion Gap: 3[L] 11-08 @ 01:22  CK: -- 11-08 @ 01:22  Troponin:  --  11-08 @ 01:22  Pro-BNP:  --  11-08 @ 01:22  VBG:  --  11-08 @ 01:22  Carboxyhemoglobin %:  --  11-08 @ 01:22  Methemoglobin %:  --  11-08 @ 01:22  Osmolality Serum:  --  11-08 @ 01:22  Aspirin Level: --  11-08 @ 01:22  Acetaminophen Level:  --  11-08 @ 01:22  Ethanol Level:  --  11-08 @ 01:22  Digoxin Level:  --  11-08 @ 01:22  Phenytoin Level:  --  11-08 @ 01:22  Carbamazepine level:  --  11-08 @ 01:22  Lamotrigine level:  --  11-08 @ 01:22  Anion Gap: 6 11-07 @ 21:05  CK: -- 11-07 @ 21:05  Troponin:  --  11-07 @ 21:05  Pro-BNP:  --  11-07 @ 21:05  VBG:  --  11-07 @ 21:05  Carboxyhemoglobin %:  --  11-07 @ 21:05  Methemoglobin %:  --  11-07 @ 21:05  Osmolality Serum:  --  11-07 @ 21:05  Aspirin Level: --  11-07 @ 21:05  Acetaminophen Level:  --  11-07 @ 21:05  Ethanol Level:  --  11-07 @ 21:05  Digoxin Level:  --  11-07 @ 21:05  Phenytoin Level:  --  11-07 @ 21:05  Carbamazepine level:  --  11-07 @ 21:05  Lamotrigine level:  --  11-07 @ 21:05    
[facility] NEPHROLOGY- CONSULTATION NOTE    Patient is a 73yo Female wheelchair bound, legally blind with schizophrenia on Lithium and dementia sent in for hypernatremia on outpt labs. Pt with poor PO intake for 2-3 weeks. Pt a/w hypernatremia (Na 165), PRASHANTH, hypercalcemia, bradycardia, acute metabolic encephalopathy, UTI and ?lithium toxicity (lithium level 1.3). Nephrolgoy consulted for elevated serum creatinine and sodium with ?lithium toxicity.    Unable to obtain history from patient due to mental status. As per pt's , pt with schizophrenia (since the age of 15), was on chronic lithium and then briefly taken off but was resumed and has been on lithium for past 10-12 yrs now. Pt with poor PO intake/ refusing to eat for the past 2- 3 weeks.         PAST MEDICAL & SURGICAL HISTORY:  Diabetes      Depression      Dementia      Schizophrenia      Legally blind      Rectal prolapse        penicillins (Fever)  sulfur (Other)  sulfa drugs (Unknown)  Stelazine (Other)  phenothiazines (Unknown)    Home Medications Reviewed  Hospital Medications:   MEDICATIONS  (STANDING):  cefTRIAXone   IVPB 1000 milliGRAM(s) IV Intermittent every 24 hours  chlorhexidine 2% Cloths 1 Application(s) Topical <User Schedule>  heparin   Injectable 5000 Unit(s) SubCutaneous every 12 hours  insulin lispro (ADMELOG) corrective regimen sliding scale   SubCutaneous every 6 hours  lactated ringers Bolus 1000 milliLiter(s) IV Bolus once  lactated ringers. 1000 milliLiter(s) (100 mL/Hr) IV Continuous <Continuous>  neomycin/bacitracin/polymyxin Ointment 1 Application(s) Right EYE two times a day  petrolatum Ophthalmic Ointment 1 Application(s) Right EYE three times a day       FAMILY HISTORY:  No pertinent family history in first degree relatives        REVIEW OF SYSTEMS: Unable to obtain; as pt is encephalopathic    VITALS:  T(F): 97 (11-08-24 @ 08:00), Max: 97.9 (11-07-24 @ 20:48)  HR: 52 (11-08-24 @ 11:00)  BP: 93/57 (11-08-24 @ 11:00)  RR: 16 (11-08-24 @ 11:00)  SpO2: 98% (11-08-24 @ 11:00)  Wt(kg): --    11-07 @ 07:01  -  11-08 @ 07:00  --------------------------------------------------------  IN: 100 mL / OUT: 1090 mL / NET: -990 mL    11-08 @ 07:01  -  11-08 @ 14:55  --------------------------------------------------------  IN: 300 mL / OUT: 980 mL / NET: -680 mL      Height (cm): 152.4 (11-08 @ 01:12)  Weight (kg): 46.3 (11-08 @ 01:12)  BMI (kg/m2): 19.9 (11-08 @ 01:12)  BSA (m2): 1.4 (11-08 @ 01:12)    PHYSICAL EXAM:  Gen: Obtunded  HEENT: NCAT  Neck: no JVD  Cards: margarita, +S1/S2, no M/G/R  Resp: CTA B/L  GI: soft, NT/ND, NABS  : +baig with dilute urine  Extremities: no LE edema B/L  Derm: no rashes  Neuro: obtunded, no tremors    LABS:  11-08    162[HH]  |  131[H]  |  46[H]  ----------------------------<  259[H]  3.6   |  31  |  1.71[H]    Ca    9.7      08 Nov 2024 13:12  Phos  3.2     11-08  Mg     2.3     11-08    TPro  5.2[L]  /  Alb  2.2[L]  /  TBili  0.4  /  DBili  <0.1  /  AST  23  /  ALT  36  /  AlkPhos  56  11-08    Creatinine Trend: 1.71 <--, 1.90 <--, 2.02 <--, 2.16 <--, 2.59 <--                        8.9    10.14 )-----------( 130      ( 08 Nov 2024 03:28 )             31.2     Urine Studies:  Urinalysis Basic - ( 08 Nov 2024 13:12 )    Color:  / Appearance:  / SG:  / pH:   Gluc: 259 mg/dL / Ketone:   / Bili:  / Urobili:    Blood:  / Protein:  / Nitrite:    Leuk Esterase:  / RBC:  / WBC    Sq Epi:  / Non Sq Epi:  / Bacteria:       Sodium, Random Urine: 30 mmol/L (11-07 @ 22:40)  Creatinine, Random Urine: 51 mg/dL (11-07 @ 22:40)  Protein/Creatinine Ratio Calculation: 0.3 Ratio (11-07 @ 22:40)  Potassium, Random Urine: 30 mmol/L (11-07 @ 22:40)    RADIOLOGY & ADDITIONAL STUDIES:                
[facility] Geriatric and Palliative Consult Service:  Isatu Mccarthy DO: cell ([phone])  Anthony Taylor MD: cell ([phone])  Cesar Sawyer NP: cell ([phone])   Jesus Joe SW: cell ([phone])   Jessica Fleischer-Black, MD: cell ([phone])    Can contact any Palliative Team member via Microsoft Teams for consults and questions    HPI:  74F legally blind, WC bound, schizophrenia on Lithium, dementia, minimally verbal presenting with severe hypernatremia in the setting of worsening renal function and poor PO intake that has progressed these past 2-3 weeks, sent in from PCP given hypernatremia;  states that she has been progressively declining the 2-3  minimally verbal, worsening appetite which prompted home care services to draw labs. She is now on a puree diet, but still does not have much input.    He denies any fevers or aspiration events    In the ED, Na 164, BUN/Cr elevated, Lit 1.3  Urine   (07 Nov 2024 23:07)      PAST MEDICAL & SURGICAL HISTORY:  Diabetes      Depression      Dementia      Schizophrenia      Legally blind      Rectal prolapse          SOCIAL HISTORY:    Admitted from:  home  (with HHA)        [ none ] Substance abuse,  Yarsani: Anglican  Language preferred: English    FAMILY HISTORY:  No pertinent family history in first degree relatives     Mental illness    Baseline ADLs (prior to admission): dependent for all ADLs    Allergies    penicillins (Fever)  sulfur (Other)  sulfa drugs (Unknown)  Stelazine (Other)  phenothiazines (Unknown)    Intolerances      Review of Systems:  Unable to obtain due to poor mentation  Present Symptoms:   Pain:             Location -                               Aggravating factors -             Quality -             Radiation -             Timing-             Severity (0-10 scale):             Minimal acceptable level (0-10 scale):  Fatigue:  Nausea:  Lack of Appetite:   SOB:  Depression:  Anxiety:  Constipation:     CPOT:    https://ccs-sth.org/resources/Documents/Sedation%20Analgesia%20Delirium%20in%20CC/CCS%20STH%20Guideline%20template%20CPOT.pdf  PAIN AD Score:   http://geriatrictoolkit.[facility]/cog/painad.pdf (press ctrl +  left click to view)      MEDICATIONS  (STANDING):  cefTRIAXone   IVPB 1000 milliGRAM(s) IV Intermittent every 24 hours  chlorhexidine 2% Cloths 1 Application(s) Topical <User Schedule>  heparin   Injectable 5000 Unit(s) SubCutaneous every 12 hours  insulin lispro (ADMELOG) corrective regimen sliding scale   SubCutaneous every 6 hours  lactated ringers. 1000 milliLiter(s) (100 mL/Hr) IV Continuous <Continuous>  neomycin/bacitracin/polymyxin Ointment 1 Application(s) Right EYE two times a day  petrolatum Ophthalmic Ointment 1 Application(s) Right EYE three times a day    MEDICATIONS  (PRN):      PHYSICAL EXAM:  Vital Signs Last 24 Hrs  T(C): 36.1 (08 Nov 2024 15:10), Max: 36.6 (07 Nov 2024 20:48)  T(F): 97 (08 Nov 2024 15:10), Max: 97.9 (07 Nov 2024 20:48)  HR: 51 (08 Nov 2024 15:10) (44 - 88)  BP: 120/56 (08 Nov 2024 15:10) (76/41 - 120/56)  BP(mean): 77 (08 Nov 2024 15:00) (53 - 77)  RR: 116 (08 Nov 2024 15:10) (12 - 116)  SpO2: 100% (08 Nov 2024 15:10) (94% - 100%)    Parameters below as of 08 Nov 2024 15:10  Patient On (Oxygen Delivery Method): room air        General: obtunded oriented x 0    cachexia  nonverbal  unarousable     Palliative Performance Scale/Karnofsky Score: 10%  http://npcrc.org/files/news/palliative_performance_scale_ppsv2.pdf    HEENT: dry mouth    Lungs: tachypnea/labored breathing,   CV: RRR, S1S2,  GI: soft non distended non tender  incontinent  : incontinent   baig  Musculoskeletal: weakness x4 fully bedbound/wheelchair bound  Skin: no abnormal skin lesions, poor skin turgor  Neuro: obtunded   Oral intake ability: unable/only mouth care  LABS:                        8.9    10.14 )-----------( 130      ( 08 Nov 2024 03:28 )             31.2     11-08    162[HH]  |  131[H]  |  46[H]  ----------------------------<  259[H]  3.6   |  31  |  1.71[H]    Ca    9.7      08 Nov 2024 13:12  Phos  3.2     11-08  Mg     2.3     11-08    TPro  5.2[L]  /  Alb  2.2[L]  /  TBili  0.4  /  DBili  <0.1  /  AST  23  /  ALT  36  /  AlkPhos  56  11-08    Urinalysis Basic - ( 08 Nov 2024 13:12 )    Color: x / Appearance: x / SG: x / pH: x  Gluc: 259 mg/dL / Ketone: x  / Bili: x / Urobili: x   Blood: x / Protein: x / Nitrite: x   Leuk Esterase: x / RBC: x / WBC x   Sq Epi: x / Non Sq Epi: x / Bacteria: x        RADIOLOGY & ADDITIONAL STUDIES: Reviewed

## 2024-11-08 NOTE — CONSULT NOTE ADULT - ASSESSMENT
Assessment	  Concern for lithium toxicity    Toxicologic Context: Lithium is a monovalent cation used as a mood stabilizer. Toxicity can be from acute ingestion, acute-on-chronic, or chronic. Lithium is mainly a neurotoxin. Mild-moderate symptoms include lethargy, weakness, slurred speech, incoordination, tremors, myoclonic jerks, rigidity, and/or extrapyramidal effects. Severe symptoms include agitated delirium, coma, seizures, and hyperthermia Recovery may be slow, and patients may develop delayed neurological sequale of persistent cerebellar and cognitive dysfunction (sometimes referred to as SILENT or syndrome of irreversible lithium-effectuated neurotoxicity). Sometimes, dysrhythmias and leukocytosis may be seen. Chronic toxicity may cause nephrogenic diabetes insipidus, hyperparaythyroidism and/or hypothyroidism/hyperthyroidism.    Recommendations:  Treatment:   1)  Lithium may contribute to primary toxicity and encephalopathy with known elevated lithium level and kidney injury makes dialysis indicated for potential therapeutic benefit  2)  Request 6 hour HD if tolerated, with increased pressors if needed to stabilize during HD      All plans discussed with primary managing team.    Thank you for the consultation. Please reach out via Teams with any questions.  Gennaro Elizondo MD, Medical Toxicology Fellow

## 2024-11-08 NOTE — CONSULT NOTE ADULT - PROBLEM SELECTOR RECOMMENDATION 3
Lauri Mar is surrogate decision-maker by [facility] Care Decision Act.     Lauri is thinking about code status  For now Nelda is FULL CODE    Hospice-appropriate from severe dementia

## 2024-11-08 NOTE — PROGRESS NOTE ADULT - ASSESSMENT
74F legally blind, WC bound, schizophrenia on Lithium, dementia, minimally verbal presenting with severe hypernatremia, rectal prolapse  in the setting of worsening renal function and poor PO intake that has progressed these past 2-3 weeks, sent in from PCP given hypernatremia; ICU consulted for severe hypernatremia, metabolic encephalopathy       #Neurological System (Dementia, Schizophrenia)  #Acute encepholopathy on progressive dementia  Per ; baseline; patient is minimally verbal; does not have appetite; but much less able to eat/swallow the past 2 weeks  Patient has progressed the last year where she is no longer chewing, now on a puree diet  UA positive, Lithium elevated, Renal function impaired  Likely metabolic/toxic given elevated lithium level. Lithium toxicity may not be reflected in level of 1.3  Plan:  [ ] Consider psychiatry consult to evaluate the current psychiatric medication regimen, particularly lithium, in light of her renal function and neurological status.  [ ] Continue psych medications if able to obtain PO;   [ ] GOC with palliative  [ ] Hold Lithium for now      #Cardio  No active issues    #Pulm  No active issues    #Renal   Assessment: Worsening renal function may be due to lithium nephrotoxicity, dehydration, or underlying chronic kidney disease.  BUN/Cr elevated 82/2.59 likely pre-renal in the setting of poor PO intake and lithium  Lithium level 1.3; elevated but does not relflect if this is an acute toxicity  Plan:  [ ] Review renal function tests (BUN, creatinine) and trend over time.  [ ] Consider adjusting or holding lithium, and monitor lithium levels.  [ ] Optimize hydration status with careful fluid management, monitoring intake and output.  [ ] Evaluate for other nephrotoxic agents and discontinue if appropriate.    #Electrolytes (Severe Hypernatremia)  Assessment: Hypernatremia is likely due to inadequate fluid intake and potential renal concentrating defects.  Multifactorial in the setting of poor PO intake and lithium use  Na on admission was 164;  [ ] follow up Urine Osm:  • Renal losses: UOsm <800; ?ADH release or kidney response: post-ATN, osmotic, DI, loop diuretic, hyperCa, elderly  • Extrarenal losses: UOsm >800; GI loss from NGT, vomiting, diarrhea, insensible losses, hypodipsia    Plan:  [ ] Monitor serum sodium levels Q6, aiming for a safe correction rate; goal <12mEq/24 hours   [ ] Na goal 164-->152 in 24 hours; FWD 2.1L     #Gastrointestinal/Nutrition  Assessment: Poor oral intake is contributing to the patient's electrolyte imbalances and possible nutritional deficiencies.  Her advancing dementia has progressed; she is an extremely poor candidate for PEG as it would not change or reverse her dementia/quality of life  Plan:  [ ] Speech and swallow eval  [ ] Aspiration precautions    #Musculoskeletal/Mobility  Assessment: The patient's wheelchair-bound status and legal blindness may contribute to decreased mobility and increased care needs.  Plan:  [ ] physical therapy to assess mobility and recommend strategies to maintain function and prevent deconditioning.    #Prophylaxis  [ ] Heparin SubQ  [ ] GI Protonix 74F legally blind, WC bound, schizophrenia on Lithium, dementia, minimally verbal presenting with severe hypernatremia, rectal prolapse  in the setting of worsening renal function and poor PO intake that has progressed these past 2-3 weeks, sent in from PCP given hypernatremia; ICU consulted for severe hypernatremia, metabolic encephalopathy     #Neurological System (Dementia, Schizophrenia)  #Acute encephalopathy on progressive dementia  #End-stage dementia  # Lithium toxicity  As per : pt is minimally verbal at baseline  poor po intake last 3 weeks, progressed the last year where she is no longer chewing, now on a puree diet  UA positive, Lithium elevated, Renal function impaired  Likely metabolic encephalopathy from ?nephrogenic DI and hyperNa secondary to elevated lithium level v UTI encephalopathy  Encephalopathy from lithium toxicity may not be reflected in level of 1.3    hold Lithium, consider olanzapine if agitated  f/u Palliative GOC conversation     #Cardio  No active issues    #Pulm  No active issues    #Renal   #Severe Hypernatremia  #PRASHANTH  as above  Na on admission was 164  Multifactorial in the setting of poor PO intake (dehydration) v lithium nephrotoxicity/ DI   BUN/Cr elevated 82/2.59 likely pre-renal in the setting of poor PO intake and Li toxicity  Encephalopathy from lithium toxicity may not be reflected in level of 1.3    Toxicology consulted= suggested 6 hours of emergent HD likely from   consider ADH provocation test after HD  Urine Osm= 308  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

## 2024-11-08 NOTE — SWALLOW BEDSIDE ASSESSMENT ADULT - COMMENTS
Pt is AA+Ox0, HOB elevated to 90°. Mild cachexia. Dyskinesia & discoordinated UE & head movements. Pt could not coordinate lip rounding to create a seal for cup or straw usage. Successful feeding was only done with spoon.

## 2024-11-09 LAB
ANION GAP SERPL CALC-SCNC: 1 MMOL/L — LOW (ref 5–17)
ANION GAP SERPL CALC-SCNC: 2 MMOL/L — LOW (ref 5–17)
ANION GAP SERPL CALC-SCNC: 7 MMOL/L — SIGNIFICANT CHANGE UP (ref 5–17)
BUN SERPL-MCNC: 13 MG/DL — SIGNIFICANT CHANGE UP (ref 7–18)
BUN SERPL-MCNC: 13 MG/DL — SIGNIFICANT CHANGE UP (ref 7–18)
BUN SERPL-MCNC: 3 MG/DL — LOW (ref 7–18)
BUN SERPL-MCNC: 6 MG/DL — LOW (ref 7–18)
BUN SERPL-MCNC: 8 MG/DL — SIGNIFICANT CHANGE UP (ref 7–18)
CALCIUM SERPL-MCNC: 10 MG/DL — SIGNIFICANT CHANGE UP (ref 8.4–10.5)
CALCIUM SERPL-MCNC: 8 MG/DL — LOW (ref 8.4–10.5)
CALCIUM SERPL-MCNC: 8.6 MG/DL — SIGNIFICANT CHANGE UP (ref 8.4–10.5)
CALCIUM SERPL-MCNC: 8.6 MG/DL — SIGNIFICANT CHANGE UP (ref 8.4–10.5)
CALCIUM SERPL-MCNC: 8.9 MG/DL — SIGNIFICANT CHANGE UP (ref 8.4–10.5)
CALCIUM SERPL-MCNC: 9.2 MG/DL — SIGNIFICANT CHANGE UP (ref 8.4–10.5)
CHLORIDE SERPL-SCNC: 117 MMOL/L — HIGH (ref 96–108)
CHLORIDE SERPL-SCNC: 124 MMOL/L — HIGH (ref 96–108)
CHLORIDE SERPL-SCNC: 124 MMOL/L — HIGH (ref 96–108)
CHLORIDE SERPL-SCNC: 129 MMOL/L — HIGH (ref 96–108)
CHLORIDE SERPL-SCNC: 135 MMOL/L — HIGH (ref 96–108)
CO2 SERPL-SCNC: 24 MMOL/L — SIGNIFICANT CHANGE UP (ref 22–31)
CO2 SERPL-SCNC: 25 MMOL/L — SIGNIFICANT CHANGE UP (ref 22–31)
CO2 SERPL-SCNC: 28 MMOL/L — SIGNIFICANT CHANGE UP (ref 22–31)
CO2 SERPL-SCNC: 30 MMOL/L — SIGNIFICANT CHANGE UP (ref 22–31)
CO2 SERPL-SCNC: 30 MMOL/L — SIGNIFICANT CHANGE UP (ref 22–31)
CREAT ?TM UR-MCNC: <13 MG/DL — SIGNIFICANT CHANGE UP
CREAT SERPL-MCNC: 0.35 MG/DL — LOW (ref 0.5–1.3)
CREAT SERPL-MCNC: 0.81 MG/DL — SIGNIFICANT CHANGE UP (ref 0.5–1.3)
CREAT SERPL-MCNC: 1.09 MG/DL — SIGNIFICANT CHANGE UP (ref 0.5–1.3)
CREAT SERPL-MCNC: 1.34 MG/DL — HIGH (ref 0.5–1.3)
CREAT SERPL-MCNC: 1.34 MG/DL — HIGH (ref 0.5–1.3)
EGFR: 107 ML/MIN/1.73M2 — SIGNIFICANT CHANGE UP
EGFR: 42 ML/MIN/1.73M2 — LOW
EGFR: 42 ML/MIN/1.73M2 — LOW
EGFR: 53 ML/MIN/1.73M2 — LOW
EGFR: 76 ML/MIN/1.73M2 — SIGNIFICANT CHANGE UP
GLUCOSE BLDC GLUCOMTR-MCNC: 256 MG/DL — HIGH (ref 70–99)
GLUCOSE BLDC GLUCOMTR-MCNC: 310 MG/DL — HIGH (ref 70–99)
GLUCOSE BLDC GLUCOMTR-MCNC: 457 MG/DL — CRITICAL HIGH (ref 70–99)
GLUCOSE SERPL-MCNC: 126 MG/DL — HIGH (ref 70–99)
GLUCOSE SERPL-MCNC: 139 MG/DL — HIGH (ref 70–99)
GLUCOSE SERPL-MCNC: 181 MG/DL — HIGH (ref 70–99)
GLUCOSE SERPL-MCNC: 368 MG/DL — HIGH (ref 70–99)
GLUCOSE SERPL-MCNC: 402 MG/DL — HIGH (ref 70–99)
HCT VFR BLD CALC: 30.8 % — LOW (ref 34.5–45)
HGB BLD-MCNC: 9 G/DL — LOW (ref 11.5–15.5)
LITHIUM SERPL-MCNC: 0.2 MMOL/L — LOW (ref 0.6–1.2)
LITHIUM SERPL-MCNC: 0.3 MMOL/L — LOW (ref 0.6–1.2)
MAGNESIUM SERPL-MCNC: 1.9 MG/DL — SIGNIFICANT CHANGE UP (ref 1.6–2.6)
MCHC RBC-ENTMCNC: 26.9 PG — LOW (ref 27–34)
MCHC RBC-ENTMCNC: 29.2 G/DL — LOW (ref 32–36)
MCV RBC AUTO: 92.2 FL — SIGNIFICANT CHANGE UP (ref 80–100)
NRBC # BLD: 0 /100 WBCS — SIGNIFICANT CHANGE UP (ref 0–0)
PHOSPHATE SERPL-MCNC: 1.6 MG/DL — LOW (ref 2.5–4.5)
PLATELET # BLD AUTO: 103 K/UL — LOW (ref 150–400)
POTASSIUM SERPL-MCNC: 3.5 MMOL/L — SIGNIFICANT CHANGE UP (ref 3.5–5.3)
POTASSIUM SERPL-MCNC: 3.6 MMOL/L — SIGNIFICANT CHANGE UP (ref 3.5–5.3)
POTASSIUM SERPL-MCNC: 3.8 MMOL/L — SIGNIFICANT CHANGE UP (ref 3.5–5.3)
POTASSIUM SERPL-MCNC: 4.1 MMOL/L — SIGNIFICANT CHANGE UP (ref 3.5–5.3)
POTASSIUM SERPL-MCNC: 4.2 MMOL/L — SIGNIFICANT CHANGE UP (ref 3.5–5.3)
POTASSIUM SERPL-SCNC: 3.5 MMOL/L — SIGNIFICANT CHANGE UP (ref 3.5–5.3)
POTASSIUM SERPL-SCNC: 3.6 MMOL/L — SIGNIFICANT CHANGE UP (ref 3.5–5.3)
POTASSIUM SERPL-SCNC: 3.8 MMOL/L — SIGNIFICANT CHANGE UP (ref 3.5–5.3)
POTASSIUM SERPL-SCNC: 4.1 MMOL/L — SIGNIFICANT CHANGE UP (ref 3.5–5.3)
POTASSIUM SERPL-SCNC: 4.2 MMOL/L — SIGNIFICANT CHANGE UP (ref 3.5–5.3)
POTASSIUM UR-SCNC: 10 MMOL/L — SIGNIFICANT CHANGE UP
PROT ?TM UR-MCNC: 13 MG/DL — HIGH (ref 0–12)
PTH-INTACT FLD-MCNC: 41 PG/ML — SIGNIFICANT CHANGE UP (ref 15–65)
RBC # BLD: 3.34 M/UL — LOW (ref 3.8–5.2)
RBC # FLD: 16.7 % — HIGH (ref 10.3–14.5)
SODIUM SERPL-SCNC: 149 MMOL/L — HIGH (ref 135–145)
SODIUM SERPL-SCNC: 155 MMOL/L — HIGH (ref 135–145)
SODIUM SERPL-SCNC: 156 MMOL/L — HIGH (ref 135–145)
SODIUM SERPL-SCNC: 158 MMOL/L — HIGH (ref 135–145)
SODIUM SERPL-SCNC: 160 MMOL/L — CRITICAL HIGH (ref 135–145)
SODIUM UR-SCNC: 57 MMOL/L — SIGNIFICANT CHANGE UP
WBC # BLD: 8.46 K/UL — SIGNIFICANT CHANGE UP (ref 3.8–10.5)
WBC # FLD AUTO: 8.46 K/UL — SIGNIFICANT CHANGE UP (ref 3.8–10.5)

## 2024-11-09 PROCEDURE — 93010 ELECTROCARDIOGRAM REPORT: CPT

## 2024-11-09 RX ORDER — INSULIN LISPRO 100/ML
VIAL (ML) SUBCUTANEOUS AT BEDTIME
Refills: 0 | Status: DISCONTINUED | OUTPATIENT
Start: 2024-11-09 | End: 2024-11-11

## 2024-11-09 RX ORDER — HALOPERIDOL DECANOATE 50 MG/ML
2 INJECTION INTRAMUSCULAR ONCE
Refills: 0 | Status: COMPLETED | OUTPATIENT
Start: 2024-11-09 | End: 2024-11-09

## 2024-11-09 RX ORDER — HALOPERIDOL DECANOATE 50 MG/ML
1 INJECTION INTRAMUSCULAR ONCE
Refills: 0 | Status: DISCONTINUED | OUTPATIENT
Start: 2024-11-09 | End: 2024-11-09

## 2024-11-09 RX ORDER — INSULIN LISPRO 100/ML
VIAL (ML) SUBCUTANEOUS
Refills: 0 | Status: DISCONTINUED | OUTPATIENT
Start: 2024-11-09 | End: 2024-11-11

## 2024-11-09 RX ORDER — TRAZODONE HYDROCHLORIDE 100 MG/1
75 TABLET ORAL AT BEDTIME
Refills: 0 | Status: DISCONTINUED | OUTPATIENT
Start: 2024-11-09 | End: 2024-11-15

## 2024-11-09 RX ORDER — HALOPERIDOL DECANOATE 50 MG/ML
1 INJECTION INTRAMUSCULAR EVERY 6 HOURS
Refills: 0 | Status: DISCONTINUED | OUTPATIENT
Start: 2024-11-09 | End: 2024-11-15

## 2024-11-09 RX ORDER — POTASSIUM PHOSPHATE 236; 224 MG/ML; MG/ML
30 INJECTION, SOLUTION INTRAVENOUS ONCE
Refills: 0 | Status: COMPLETED | OUTPATIENT
Start: 2024-11-09 | End: 2024-11-09

## 2024-11-09 RX ORDER — HALOPERIDOL DECANOATE 50 MG/ML
1 INJECTION INTRAMUSCULAR
Refills: 0 | Status: DISCONTINUED | OUTPATIENT
Start: 2024-11-09 | End: 2024-11-15

## 2024-11-09 RX ADMIN — HEPARIN SODIUM 5000 UNIT(S): 10000 INJECTION INTRAVENOUS; SUBCUTANEOUS at 05:38

## 2024-11-09 RX ADMIN — HALOPERIDOL DECANOATE 1 MILLIGRAM(S): 50 INJECTION INTRAMUSCULAR at 17:13

## 2024-11-09 RX ADMIN — Medication 4: at 17:04

## 2024-11-09 RX ADMIN — Medication 1 APPLICATION(S): at 13:41

## 2024-11-09 RX ADMIN — Medication 50 MILLILITER(S): at 05:34

## 2024-11-09 RX ADMIN — Medication 1 APPLICATION(S): at 05:39

## 2024-11-09 RX ADMIN — TRAZODONE HYDROCHLORIDE 75 MILLIGRAM(S): 100 TABLET ORAL at 18:16

## 2024-11-09 RX ADMIN — POTASSIUM PHOSPHATE 83.33 MILLIMOLE(S): 236; 224 INJECTION, SOLUTION INTRAVENOUS at 05:37

## 2024-11-09 RX ADMIN — Medication 100 MILLIGRAM(S): at 22:01

## 2024-11-09 RX ADMIN — Medication 1000 MILLILITER(S): at 21:07

## 2024-11-09 RX ADMIN — HEPARIN SODIUM 5000 UNIT(S): 10000 INJECTION INTRAVENOUS; SUBCUTANEOUS at 17:13

## 2024-11-09 RX ADMIN — CHLORHEXIDINE GLUCONATE 1 APPLICATION(S): 40 SOLUTION TOPICAL at 05:03

## 2024-11-09 RX ADMIN — Medication 1 APPLICATION(S): at 17:04

## 2024-11-09 RX ADMIN — Medication 1 APPLICATION(S): at 05:38

## 2024-11-09 RX ADMIN — HALOPERIDOL DECANOATE 2 MILLIGRAM(S): 50 INJECTION INTRAMUSCULAR at 13:44

## 2024-11-09 RX ADMIN — HALOPERIDOL DECANOATE 2 MILLIGRAM(S): 50 INJECTION INTRAMUSCULAR at 15:15

## 2024-11-09 RX ADMIN — Medication 3: at 11:29

## 2024-11-09 RX ADMIN — Medication 100 MILLILITER(S): at 17:03

## 2024-11-09 RX ADMIN — Medication 4: at 22:35

## 2024-11-09 RX ADMIN — Medication 1 APPLICATION(S): at 22:01

## 2024-11-09 NOTE — DIETITIAN INITIAL EVALUATION ADULT - PERTINENT LABORATORY DATA
11-09    155[H]  |  124[H]  |  6[L]  ----------------------------<  139[H]  3.6   |  30  |  0.81    Ca    8.6      09 Nov 2024 03:45  Phos  1.6     11-09  Mg     1.9     11-09    TPro  5.2[L]  /  Alb  2.2[L]  /  TBili  0.4  /  DBili  <0.1  /  AST  23  /  ALT  36  /  AlkPhos  56  11-08  POCT Blood Glucose.: 136 mg/dL (11-08-24 @ 23:27)  A1C with Estimated Average Glucose Result: 7.5 % (11-08-24 @ 07:50)

## 2024-11-09 NOTE — DIETITIAN INITIAL EVALUATION ADULT - ORAL INTAKE PTA/DIET HISTORY
Pt admitted for severe hypernatremia. Visited pt at bedside, pt is noted to be minimally verbal, with dementia, schizophrenia, and legally blind. Will attain nutrition information through comprehensive chart review and through RN/PCA information. Called spouse: Lauri Rocha - [phone], provided additional nutrition history. No new food allergies noted per chart. Pt eating regular consistency food until 11/7 when she had to be switched to pureed diet consistency. Pt's spouse reported pt had fair PO intake PTA. Pt was sleeping at time of visit, had not eaten her breakfast yet. Gradual weight loss noted per chart. No recent episodes of nausea, vomiting, diarrhea, or constipation noted per chart. Hypernatremia - 155, hypophosphatemia (1.6), POCT 107 - 328 between 11/8 -11/9. Hba1c - 7.5%.

## 2024-11-09 NOTE — DIETITIAN INITIAL EVALUATION ADULT - ADD RECOMMEND
1) Recommend consistent carbohydrate diet as medically feasible.  2) Encourage PO intake and honor food preferences as able.  3) Monitor PO intake, labs, weights, BM's, and skin integrity.   4) Recommend reduced sugar mighty shake 2 x day.

## 2024-11-09 NOTE — DIETITIAN INITIAL EVALUATION ADULT - PERTINENT MEDS FT
MEDICATIONS  (STANDING):  cefTRIAXone   IVPB 1000 milliGRAM(s) IV Intermittent every 24 hours  chlorhexidine 2% Cloths 1 Application(s) Topical <User Schedule>  dextrose 5% + sodium chloride 0.45%. 1000 milliLiter(s) (50 mL/Hr) IV Continuous <Continuous>  heparin   Injectable 5000 Unit(s) SubCutaneous every 12 hours  insulin lispro (ADMELOG) corrective regimen sliding scale   SubCutaneous every 6 hours  neomycin/bacitracin/polymyxin Ointment 1 Application(s) Right EYE two times a day  petrolatum Ophthalmic Ointment 1 Application(s) Right EYE three times a day    MEDICATIONS  (PRN):

## 2024-11-09 NOTE — PROGRESS NOTE ADULT - SUBJECTIVE AND OBJECTIVE BOX
[facility] NEPHROLOGY- PROGRESS NOTE    Patient is a 75yo Female wheelchair bound, legally blind with schizophrenia on Lithium and dementia sent in for hypernatremia on outpt labs. Pt with poor PO intake for 2-3 weeks. Pt a/w hypernatremia (Na 165), PRASHANTH, hypercalcemia, bradycardia, acute metabolic encephalopathy, UTI and ?lithium toxicity (lithium level 1.3). Nephrolgoy consulted for elevated serum creatinine and sodium with ?lithium toxicity.    S/P HD for 6 hours on 11/8.    REVIEW OF SYSTEMS: Unable to obtain as patient non-verbal.    penicillins (Fever)  sulfur (Other)  sulfa drugs (Unknown)  Stelazine (Other)  phenothiazines (Unknown)      Hospital Medications: Medications reviewed    VITALS:  T(F): 99.5 (11-09-24 @ 04:00), Max: 99.5 (11-09-24 @ 00:00)  HR: 60 (11-09-24 @ 09:00)  BP: 117/54 (11-09-24 @ 09:00)  RR: 22 (11-09-24 @ 09:00)  SpO2: 98% (11-09-24 @ 09:00)  Wt(kg): --  Height (cm): 152.4 (11-08 @ 01:12)  Weight (kg): 46.3 (11-08 @ 01:12)  BMI (kg/m2): 19.9 (11-08 @ 01:12)  BSA (m2): 1.4 (11-08 @ 01:12)    11-08 @ 07:01  -  11-09 @ 07:00  --------------------------------------------------------  IN: 2850 mL / OUT: 3705 mL / NET: -855 mL        PHYSICAL EXAM:    Gen: NAD, calm, non-verbal  Cards: RRR, +S1/S2, no M/G/R  Resp: CTA B/L  GI: soft, NT/ND, NABS  : + baig with dilute urine  Vascular: no LE edema B/L, RIJ shiley intact    LABS:  11-09    156[H]  |  124[H]  |  8   ----------------------------<  402[H]  3.8   |  30  |  1.09    Ca    8.6      09 Nov 2024 09:30  Phos  1.6     11-09  Mg     1.9     11-09    TPro  5.2[L]  /  Alb  2.2[L]  /  TBili  0.4  /  DBili  <0.1  /  AST  23  /  ALT  36  /  AlkPhos  56  11-08    Creatinine Trend: 1.09 <--, 0.81 <--, 0.35 <--, 1.71 <--, 1.90 <--, 2.02 <--, 2.16 <--, 2.59 <--                        9.0    8.46  )-----------( 103      ( 09 Nov 2024 03:45 )             30.8     Urine Studies:  Urinalysis Basic - ( 09 Nov 2024 09:30 )    Color:  / Appearance:  / SG:  / pH:   Gluc: 402 mg/dL / Ketone:   / Bili:  / Urobili:    Blood:  / Protein:  / Nitrite:    Leuk Esterase:  / RBC:  / WBC    Sq Epi:  / Non Sq Epi:  / Bacteria:       Osmolality, Random Urine: 256 mosm/Kg (11-08 @ 11:05)  Sodium, Random Urine: 30 mmol/L (11-07 @ 22:40)  Creatinine, Random Urine: 51 mg/dL (11-07 @ 22:40)  Protein/Creatinine Ratio Calculation: 0.3 Ratio (11-07 @ 22:40)  Osmolality, Random Urine: 308 mosm/Kg (11-07 @ 22:40)  Potassium, Random Urine: 30 mmol/L (11-07 @ 22:40)      RADIOLOGY & ADDITIONAL STUDIES:

## 2024-11-09 NOTE — DIETITIAN NUTRITION RISK NOTIFICATION - COMMENTS
Please refer to initial dietitian note for comprehensive nutrition assessment.  Yuniel Dugan RD (Available on Teams)

## 2024-11-09 NOTE — DIETITIAN INITIAL EVALUATION ADULT - REASON FOR ADMISSION
This writer contact Trauma team in regards to patients increased sedated states in which I feel the narcotics are contributing to his current state.  Morphine was held ast 2 pm neurontin and flexeril as well.  Per Dr. Rivas who returned call to this writer is that pt continue to report he is in increasing pain do not hold the flexeril and gabapentin to aid in controlling pt pain.  Pt respirations are even and unlabored do not appear to have s/s of narcotic induced overdose pt is easily arousable to verbal and tactile stimuli awakens for meals and continue to call and voice concerns.  Will continue to monitor pt    Urinary tract infection

## 2024-11-09 NOTE — PROGRESS NOTE ADULT - ASSESSMENT
Patient is a 73yo Female wheelchair bound, legally blind with schizophrenia on Lithium and dementia sent in for hypernatremia on outpt labs. Pt with poor PO intake for 2-3 weeks. Pt a/w hypernatremia (Na 165), PRASHANTH, hypercalcemia, bradycardia, acute metabolic encephalopathy, UTI and ?lithium toxicity (lithium level 1.3). Nephrolgoy consulted for elevated serum creatinine and sodium with ?lithium toxicity.    1. PRASHANTH- previous SCr 1-1.2; last SCr 1.26 on 6/5/24 on HIE. PRASHANTH likely due to lithium use with hypercalcemia/ dehydration due to poor PO intake. +UA in the setting of infection. Renal function improving with polyuria; see below. IVF as below. Pt with baig. Defer renal US. Pt with possible lithium toxicity; s/p HD on 11/8 for 6 hours. No further plans for HD. Would remove shiley. Strict I/Os. Avoid nephrotoxins/ NSAIDs/ RCA. Monitor BMP.  2. 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 Would start low sodium diet to help decrease UO. If no improvement in polyuria, will start HCTZ. In interim, agree with hypotonic D5W.   3. Lithium toxicity- lithium level 1.3 on admission; with metabolic encephalopathy. S/P HD as above. Can remove shiley as no further plans for HD (lithium level now 0.2). Can repeat to monitor for rebound.  4. Hypotension- Resolved. Off pressors.  5. Acute cystitis- +UA, f/u UCx. Pt on Ceftriaxone.    6. Hypercalcemia- corrected serum Ca 11.14; improving on IVF. TSH wnl. likely in part due to hypovolemia as well as lithium. Hypercalcemia resolved. Will need hypercalcemia work up if recurs. Monitor ionized calcium      [facility] NEPHROLOGY  Nando Elizondo M.D.  Johann Noyola D.O.  Paulina Dotson M.D.  MD Keisha Parekh, MSN, ANP-C    Telephone: [phone]  Facsimile: [phone] [id number], [address]  [place] [address]

## 2024-11-09 NOTE — PROGRESS NOTE ADULT - ASSESSMENT
74F legally blind, WC bound, schizophrenia on Lithium, dementia, minimally verbal presenting with severe hypernatremia, rectal prolapse  in the setting of worsening renal function and poor PO intake that has progressed these past 2-3 weeks, sent in from PCP given hypernatremia; ICU consulted for severe hypernatremia, metabolic encephalopathy     #Neurological System (Dementia, Schizophrenia)  #Acute encephalopathy on progressive dementia  #End-stage dementia  # Lithium toxicity  As per : pt is minimally verbal at baseline  poor po intake last 3 weeks, progressed the last year where she is no longer chewing, now on a puree diet  UA positive, Lithium elevated, Renal function impaired  Likely metabolic encephalopathy from ?nephrogenic DI and hyperNa secondary to elevated lithium level v UTI encephalopathy  Encephalopathy from lithium toxicity may not be reflected in level of 1.3    hold Lithium, consider olanzapine if agitated  f/u Palliative GOC conversation     #Cardio  No active issues    #Pulm  No active issues    #Renal   #Severe Hypernatremia  #PRASHANTH  as above  Na on admission was 164  Multifactorial in the setting of poor PO intake (dehydration) v lithium nephrotoxicity/ DI   BUN/Cr elevated 82/2.59 likely pre-renal in the setting of poor PO intake and Li toxicity  Encephalopathy from lithium toxicity may not be reflected in level of 1.3    Toxicology consulted= suggested 6 hours of emergent HD likely from   consider ADH provocation test after HD  Urine Osm= 308  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 74F legally blind, WC bound, schizophrenia on Lithium, dementia, minimally verbal presenting with severe hypernatremia, rectal prolapse  in the setting of worsening renal function and poor PO intake that has progressed these past 2-3 weeks, sent in from PCP given hypernatremia; ICU consulted for severe hypernatremia, metabolic encephalopathy, and lithium toxicity. s/p HD 6 hrs.     #Neurological System (Dementia, Schizophrenia)  #Acute encephalopathy on progressive dementia  #End-stage dementia  # Lithium toxicity  as per : pt is minimally verbal at baseline  poor po intake last 3 weeks, progressed the last year where she is no longer chewing, now on a puree diet  UA positive, lithium elevated, renal function impaired  likely metabolic encephalopathy from ?nephrogenic DI and hyperNa secondary to elevated lithium level v UTI encephalopathy  encephalopathy from lithium toxicity may not be reflected in level of 1.3  acetaminophen, salicylate,   hospice appropriate as per palliative   - hold lithium, consider olanzapine if agitated    #Cardio  No active issues    #Pulm  No active issues    #Renal   #Severe Hypernatremia  #PRASHANTH  as above  Na on admission was 164  multifactorial in the setting of poor PO intake (dehydration) v lithium nephrotoxicity/ DI   BUN/Cr elevated 82/2.59 likely pre-renal in the setting of poor PO intake and Li toxicity  encephalopathy from lithium toxicity may not be reflected in level of 1.3  s/p HD 6hrs 11/8 as per toxicology   urine Osm= 308  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 Dotson    #Cystitis  UA +ve  - f/u UCx  - c/w CTX 1mg q24h    #Hypophosphatemia  phos 1.6 > repleted  - monitor and supplement prn    #Gastrointestinal/Nutrition  #Dementia  poor PO intake leading to electrolyte imbalances   her advancing dementia has progressed; she is an extremely poor candidate for PEG as it would not change or reverse her dementia/quality of life  tolerating feeds, nutrition consulted  - aspiration precautions    #Musculoskeletal/Mobility  #wheelchair-bound status   #legal blindness   - f/u PT recs    #Prophylaxis  [ ] Heparin SubQ  [ ] GI Protonix    #GOC/DISPO  FULL CODE  hospice appropriate as per palliative   Dispo ICU 74F legally blind, WC bound, schizophrenia on Lithium, dementia, minimally verbal presenting with severe hypernatremia, rectal prolapse  in the setting of worsening renal function and poor PO intake that has progressed these past 2-3 weeks, sent in from PCP given hypernatremia; ICU consulted for severe hypernatremia, metabolic encephalopathy, and lithium toxicity. s/p HD 6 hrs.     #End-stage dementia  #Schizophremia  #Acute encephalopathy   #Lithium toxicity  #Sever hypernatremia  #PRASHANTH    ====== [ NEURO ] ======  #Acute encephalopathy on progressive dementia  #End-stage dementia  # Lithium toxicity  as per : pt is minimally verbal at baseline  poor po intake last 3 weeks, progressed the last year where she is no longer chewing, now on a puree diet  UA positive, lithium elevated, renal function impaired  likely metabolic encephalopathy from ?nephrogenic DI and hyperNa secondary to elevated lithium level v UTI encephalopathy  encephalopathy from lithium toxicity may not be reflected in level of 1.3  acetaminophen, salicylate,   hospice appropriate as per palliative   - hold lithium, consider olanzapine if agitated    ====== [ CVS ] ======  No active issues    ====== [ PULM ] ======  No active issues    ====== [ RENAL ] ======  #Severe Hypernatremia  #PRASHANTH  as above  Na on admission was 164  multifactorial in the setting of poor PO intake (dehydration) v lithium nephrotoxicity/ DI   BUN/Cr elevated 82/2.59 likely pre-renal in the setting of poor PO intake and Li toxicity  encephalopathy from lithium toxicity may not be reflected in level of 1.3  s/p HD 6hrs 11/8 as per toxicology, SCr improved   urine Osm= 308  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 Dotson    #Cystitis  UA +ve  - f/u UCx  - c/w CTX 1mg q24h    #Hypophosphatemia  phos 1.6 > repleted  - monitor and supplement prn    ====== [ GI/NUTRITION ] ======  #Dementia  poor PO intake leading to electrolyte imbalances   her advancing dementia has progressed; she is an extremely poor candidate for PEG as it would not change or reverse her dementia/quality of life  tolerating feeds, nutrition consulted  - aspiration precautions    ====== [ MSK/MOBILITY ] ======  #wheelchair-bound status   #legal blindness   - f/u PT recs    ====== [ PROPHYLAXIS ] ======  DVT ppx: Heparin SubQ  GI ppx: Protonix    ====== [ GOC/DISPO ] ======  FULL CODE  hospice appropriate as per palliative   Dispo ICU 74F legally blind, WC bound, schizophrenia on Lithium, dementia, minimally verbal presenting with severe hypernatremia, rectal prolapse  in the setting of worsening renal function and poor PO intake that has progressed these past 2-3 weeks, sent in from PCP given hypernatremia; ICU consulted for severe hypernatremia, metabolic encephalopathy, and lithium toxicity. s/p HD 6 hrs.     #End-stage dementia  #Schizophrenia  #Acute encephalopathy   #Lithium toxicity  #Sever hypernatremia  #PRASHANTH    ====== [ NEURO ] ======  #Acute encephalopathy on progressive dementia  #End-stage dementia  #Lithium toxicity  as per : pt is minimally verbal at baseline  poor po intake last 3 weeks, progressed the last year where she is no longer chewing, now on a puree diet  UA positive, lithium elevated, renal function impaired  likely metabolic encephalopathy from ?nephrogenic DI and hyperNa secondary to elevated lithium level v UTI encephalopathy  encephalopathy from lithium toxicity may not be reflected in level of 1.3  acetaminophen, salicylate,   hospice appropriate as per palliative   - hold lithium, consider olanzapine if agitated    ====== [ CVS ] ======  No active issues    ====== [ PULM ] ======  No active issues    ====== [ RENAL ] ======  #Severe Hypernatremia  #PRASHANTH  as above  Na on admission was 164  multifactorial in the setting of poor PO intake (dehydration) v lithium nephrotoxicity/ DI   BUN/Cr elevated 82/2.59 likely pre-renal in the setting of poor PO intake and Li toxicity  encephalopathy from lithium toxicity may not be reflected in level of 1.3  s/p HD 6hrs 11/8 as per toxicology, SCr improved   urine Osm= 308  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 Dotson    #Cystitis  UA +ve  - f/u UCx  - c/w CTX 1mg q24h    #Hypophosphatemia  phos 1.6 > repleted  - monitor and supplement prn    ====== [ GI/NUTRITION ] ======  #Dementia  poor PO intake leading to electrolyte imbalances   her advancing dementia has progressed; she is an extremely poor candidate for PEG as it would not change or reverse her dementia/quality of life  tolerating feeds, nutrition consulted  - aspiration precautions    ====== [ MSK/MOBILITY ] ======  #wheelchair-bound status   #legal blindness   - f/u PT recs    ====== [ PROPHYLAXIS ] ======  DVT ppx: Heparin SubQ  GI ppx: Protonix    ====== [ GOC/DISPO ] ======  FULL CODE  hospice appropriate as per palliative   Dispo ICU 74F legally blind, WC bound, schizophrenia on Lithium, dementia, minimally verbal presenting with severe hypernatremia, rectal prolapse  in the setting of worsening renal function and poor PO intake that has progressed these past 2-3 weeks, sent in from PCP given hypernatremia; ICU consulted for severe hypernatremia, metabolic encephalopathy, and lithium toxicity. s/p HD 6 hrs.     #End-stage dementia  #Schizophrenia  #Acute encephalopathy   #Lithium toxicity  #Severe hypernatremia  #PRASHANTH    ====== [ NEURO ] ======  #Acute encephalopathy on progressive dementia  #End-stage dementia  #Lithium toxicity  as per : pt is minimally verbal at baseline  poor po intake last 3 weeks, progressed the last year where she is no longer chewing, now on a puree diet  UA positive, lithium elevated, renal function impaired  likely metabolic encephalopathy from ?nephrogenic DI and hyperNa secondary to elevated lithium level v UTI encephalopathy  encephalopathy from lithium toxicity may not be reflected in level of 1.3  acetaminophen, salicylate,   hospice appropriate as per palliative   - hold lithium, consider olanzapine if agitated    ====== [ CVS ] ======  No active issues    ====== [ PULM ] ======  No active issues    ====== [ RENAL ] ======  #Severe Hypernatremia  #PRASHANTH  as above  Na on admission was 164  multifactorial in the setting of poor PO intake (dehydration) v lithium nephrotoxicity/ DI   BUN/Cr elevated 82/2.59 likely pre-renal in the setting of poor PO intake and Li toxicity  encephalopathy from lithium toxicity may not be reflected in level of 1.3  s/p HD 6hrs 11/8 as per toxicology, SCr improved   urine Osm= 308  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 Dotson    #Cystitis  UA +ve  - f/u UCx  - c/w CTX 1mg q24h    #Hypophosphatemia  phos 1.6 > repleted  - monitor and supplement prn    ====== [ GI/NUTRITION ] ======  #Dementia  poor PO intake leading to electrolyte imbalances   her advancing dementia has progressed; she is an extremely poor candidate for PEG as it would not change or reverse her dementia/quality of life  tolerating feeds, nutrition consulted  - aspiration precautions    ====== [ MSK/MOBILITY ] ======  #wheelchair-bound status   #legal blindness   - f/u PT recs    ====== [ PROPHYLAXIS ] ======  DVT ppx: Heparin SubQ  GI ppx: Protonix    ====== [ GOC/DISPO ] ======  FULL CODE  hospice appropriate as per palliative   Dispo ICU 74F legally blind, WC bound, schizophrenia on Lithium, dementia, minimally verbal presenting with severe hypernatremia, rectal prolapse  in the setting of worsening renal function and poor PO intake that has progressed these past 2-3 weeks, sent in from PCP given hypernatremia; ICU consulted for severe hypernatremia, metabolic encephalopathy, and lithium toxicity. s/p HD 6 hrs. Na 156, on D5.     #End-stage dementia  #Schizophrenia  #Acute encephalopathy   #Lithium toxicity  #Severe hypernatremia  #PRASHANTH    ====== [ NEURO ] ======  #Acute encephalopathy on progressive dementia  #End-stage dementia  #Lithium toxicity  as per : pt is minimally verbal at baseline  poor po intake last 3 weeks, progressed the last year where she is no longer chewing, now on a puree diet  UA positive, lithium elevated, renal function impaired  likely metabolic encephalopathy from ?nephrogenic DI and hyperNa secondary to elevated lithium level v UTI encephalopathy  encephalopathy from lithium toxicity may not be reflected in level of 1.3  acetaminophen, salicylate,   hospice appropriate as per palliative   - hold lithium, consider olanzapine if agitated    ====== [ CVS ] ======  No active issues    ====== [ PULM ] ======  No active issues    ====== [ RENAL ] ======  #Severe Hypernatremia  #PRASHANTH  as above  Na on admission was 164  multifactorial in the setting of poor PO intake (dehydration) v lithium nephrotoxicity/ DI   BUN/Cr elevated 82/2.59 likely pre-renal in the setting of poor PO intake and Li toxicity  encephalopathy from lithium toxicity may not be reflected in level of 1.3  s/p HD 6hrs 11/8 as per toxicology, SCr improved   urine Osm= 308  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 Dotson    #Cystitis  UA +ve  - f/u UCx  - c/w CTX 1mg q24h    #Hypophosphatemia  phos 1.6 > repleted  - monitor and supplement prn    ====== [ GI/NUTRITION ] ======  #Dementia  poor PO intake leading to electrolyte imbalances   her advancing dementia has progressed; she is an extremely poor candidate for PEG as it would not change or reverse her dementia/quality of life  tolerating feeds, nutrition consulted  - aspiration precautions    ====== [ MSK/MOBILITY ] ======  #wheelchair-bound status   #legal blindness   - f/u PT recs    ====== [ PROPHYLAXIS ] ======  DVT ppx: Heparin SubQ  GI ppx: Protonix    ====== [ GOC/DISPO ] ======  FULL CODE  hospice appropriate as per palliative   Dispo ICU

## 2024-11-09 NOTE — CHART NOTE - NSCHARTNOTEFT_GEN_A_CORE
Right internal jugular shiley catheter removed using aseptic technique without complications. Pressure held at site for 15 minutes with minimal bleeding. Dressed with sterile gauze. Patient seen and examined at bedside for removal of central line. Chart and labs reviewed prior to removal, no contraindication to procedure. Area cleaned with Chlorhexidine and suture removal kit used to remove sutures holding catheter in place using. Patient then placed in reverse Trendelenburg position, upon exhalation, and then central line removed. Pressure applied for 15 minutes with gauze. No signs of active bleeding noted. No hematoma formation noted. Tegaderm and gauze/tape used to create pressure dressing to maintain pressure on central line site. Patient tolerated well without complications.     Discussed procedure with RN who will monitor and notify the provider for bleeding, hematoma formation, or other procedural complications.

## 2024-11-09 NOTE — PROGRESS NOTE ADULT - SUBJECTIVE AND OBJECTIVE BOX
INTERVAL HPI/OVERNIGHT EVENTS:     PRESSORS: [ ] YES [ ] NO  WHICH:    ANTIBIOTICS:                  DATE STARTED:  ANTIBIOTICS:                  DATE STARTED:  ANTIBIOTICS:                  DATE STARTED:    Antimicrobial:  cefTRIAXone   IVPB 1000 milliGRAM(s) IV Intermittent every 24 hours    Cardiovascular:    Pulmonary:    Hematalogic:  heparin   Injectable 5000 Unit(s) SubCutaneous every 12 hours    Other:  chlorhexidine 2% Cloths 1 Application(s) Topical <User Schedule>  insulin lispro (ADMELOG) corrective regimen sliding scale   SubCutaneous every 6 hours  neomycin/bacitracin/polymyxin Ointment 1 Application(s) Right EYE two times a day  petrolatum Ophthalmic Ointment 1 Application(s) Right EYE three times a day      Drug Dosing Weight  Height (cm): 152.4 (08 Nov 2024 01:12)  Weight (kg): 46.3 (08 Nov 2024 01:12)  BMI (kg/m2): 19.9 (08 Nov 2024 01:12)  BSA (m2): 1.4 (08 Nov 2024 01:12)    CENTRAL LINE: [ ] YES [ ] NO  LOCATION:   DATE INSERTED:  REMOVE: [ ] YES [ ] NO  EXPLAIN:    BENAVIDES: [ ] YES [ ] NO    DATE INSERTED:  REMOVE:  [ ] YES [ ] NO  EXPLAIN:    A-LINE:  [ ] YES [ ] NO  LOCATION:   DATE INSERTED:  REMOVE:  [ ] YES [ ] NO  EXPLAIN:    PMH/Social Hx/Fam Hx -reviewed admission note, no change since admission  PAST MEDICAL & SURGICAL HISTORY:  Diabetes      Depression      Dementia      Schizophrenia      Legally blind      Rectal prolapse        Heart faliure: acute [ ] chronic [ ] acute or chronic [ ] diastolic [ ] systolic [ ] combied systolic and diastolic[ ]  PRASHANTH: ATN[ ] renal medullary necrosis [ ] CKD I [ ]CKDII [ ]CKD III [ ]CKD IV [ ]CKD V [ ]Other pathological lesions [ ]  Abdominal Nutrition Status: malnutrition [ ] cachexia [ ] morbid obesity/BMI=40 [ ] Supplement ordered [___________]     T(C): 37.5 (11-09-24 @ 00:00), Max: 37.5 (11-09-24 @ 00:00)  HR: 68 (11-09-24 @ 01:00)  BP: 89/55 (11-09-24 @ 01:00)  BP(mean): 65 (11-09-24 @ 01:00)  ABP: --  ABP(mean): --  RR: 16 (11-09-24 @ 01:00)  SpO2: 100% (11-09-24 @ 01:00)  Wt(kg): --          11-07 @ 07:01  -  11-08 @ 07:00  --------------------------------------------------------  IN: 100 mL / OUT: 1090 mL / NET: -990 mL            PHYSICAL EXAM:    GEN: Healthy appearing, well-developed, NAD.  HEENT:  -Head: NC/AT;  -Eyes: No discharge or redness;  -Ears: External ears are normal.  -Nose: Normal nares.  -Mouth and throat: MMM. Normal gums, mucosa, palate,. Good dentition.  NEURO: Ambulating with no limitations. No focal deficits.  CV: RRR, no m/r/g.  LUNGS: CTAB, no w/r/c.  ABD: Soft, NT/ND, NBS, no masses or organomegaly.  SKIN: Warm, well perfused. No skin rashes or abnormal lesions.  MSK: Normal gait. No deformities.  EXT: No clubbing, cyanosis, or edema.      LABS:  CBC Full  -  ( 08 Nov 2024 03:28 )  WBC Count : 10.14 K/uL  RBC Count : 3.25 M/uL  Hemoglobin : 8.9 g/dL  Hematocrit : 31.2 %  Platelet Count - Automated : 130 K/uL  Mean Cell Volume : 96.0 fl  Mean Cell Hemoglobin : 27.4 pg  Mean Cell Hemoglobin Concentration : 28.5 g/dL  Auto Neutrophil # : 7.78 K/uL  Auto Lymphocyte # : 1.58 K/uL  Auto Monocyte # : 0.59 K/uL  Auto Eosinophil # : 0.14 K/uL  Auto Basophil # : 0.02 K/uL  Auto Neutrophil % : 76.7 %  Auto Lymphocyte % : 15.6 %  Auto Monocyte % : 5.8 %  Auto Eosinophil % : 1.4 %  Auto Basophil % : 0.2 %    11-09    149[H]  |  117[H]  |  3[L]  ----------------------------<  126[H]  3.5   |  25  |  0.35[L]    Ca    8.0[L]      09 Nov 2024 00:05  Phos  3.2     11-08  Mg     2.3     11-08    TPro  5.2[L]  /  Alb  2.2[L]  /  TBili  0.4  /  DBili  <0.1  /  AST  23  /  ALT  36  /  AlkPhos  56  11-08      Urinalysis Basic - ( 09 Nov 2024 00:05 )    Color: x / Appearance: x / SG: x / pH: x  Gluc: 126 mg/dL / Ketone: x  / Bili: x / Urobili: x   Blood: x / Protein: x / Nitrite: x   Leuk Esterase: x / RBC: x / WBC x   Sq Epi: x / Non Sq Epi: x / Bacteria: x          RADIOLOGY & ADDITIONAL STUDIES REVIEWED:      [ ]GOALS OF CARE DISCUSSION WITH PATIENT/FAMILY/PROXY:    CRITICAL CARE TIME SPENT: 35 minutes INTERVAL HPI/OVERNIGHT EVENTS:   Patient went into Afib w/ RVR overnight, HR 130s. Started on cardizem drip 15 > 10, stopped at 3 AM. HR 90s, sinus rhythm. Patient examined at bedside this morning and refused to be examined and kept saying "leave me alone, please, I beg you".       PRESSORS: [ ] YES [x] NO  WHICH:    Antimicrobial:  cefTRIAXone   IVPB 1000 milliGRAM(s) IV Intermittent every 24 hours    Cardiovascular:    Pulmonary:    Hematalogic:  heparin   Injectable 5000 Unit(s) SubCutaneous every 12 hours    Other:  chlorhexidine 2% Cloths 1 Application(s) Topical <User Schedule>  dextrose 5% + sodium chloride 0.45%. 1000 milliLiter(s) IV Continuous <Continuous>  insulin lispro (ADMELOG) corrective regimen sliding scale   SubCutaneous every 6 hours  neomycin/bacitracin/polymyxin Ointment 1 Application(s) Right EYE two times a day  petrolatum Ophthalmic Ointment 1 Application(s) Right EYE three times a day    cefTRIAXone   IVPB 1000 milliGRAM(s) IV Intermittent every 24 hours  chlorhexidine 2% Cloths 1 Application(s) Topical <User Schedule>  dextrose 5% + sodium chloride 0.45%. 1000 milliLiter(s) IV Continuous <Continuous>  heparin   Injectable 5000 Unit(s) SubCutaneous every 12 hours  insulin lispro (ADMELOG) corrective regimen sliding scale   SubCutaneous every 6 hours  neomycin/bacitracin/polymyxin Ointment 1 Application(s) Right EYE two times a day  petrolatum Ophthalmic Ointment 1 Application(s) Right EYE three times a day    Drug Dosing Weight  Height (cm): 152.4 (08 Nov 2024 01:12)  Weight (kg): 46.3 (08 Nov 2024 01:12)  BMI (kg/m2): 19.9 (08 Nov 2024 01:12)  BSA (m2): 1.4 (08 Nov 2024 01:12)    CENTRAL LINE: [ ] YES [x] NO  LOCATION:   DATE INSERTED:  REMOVE: [ ] YES [ ] NO  EXPLAIN:    BENAVIDES: [x] YES [ ] NO    DATE INSERTED: 11/4  REMOVE:  [ ] YES [ ] NO  EXPLAIN: markedly enlarged prostate     A-LINE:  [ ] YES [x] NO  LOCATION:   DATE INSERTED:  REMOVE:  [ ] YES [ ] NO  EXPLAIN:    PMH -reviewed admission note, no change since admission  PAST MEDICAL & SURGICAL HISTORY:  Diabetes      Depression      Dementia      Schizophrenia      Legally blind      Rectal prolapse          ICU Vital Signs Last 24 Hrs  T(C): 37.5 (09 Nov 2024 04:00), Max: 37.5 (09 Nov 2024 00:00)  T(F): 99.5 (09 Nov 2024 04:00), Max: 99.5 (09 Nov 2024 00:00)  HR: 60 (09 Nov 2024 09:00) (44 - 74)  BP: 117/54 (09 Nov 2024 09:00) (77/44 - 120/56)  BP(mean): 72 (09 Nov 2024 09:00) (55 - 77)  ABP: --  ABP(mean): --  RR: 22 (09 Nov 2024 09:00) (13 - 116)  SpO2: 98% (09 Nov 2024 09:00) (97% - 100%)    O2 Parameters below as of 08 Nov 2024 21:15  Patient On (Oxygen Delivery Method): room air                  11-08 @ 07:01  -  11-09 @ 07:00  --------------------------------------------------------  IN: 2850 mL / OUT: 3705 mL / NET: -855 mL            PHYSICAL EXAM:  refused to be examined      LABS:  CBC Full  -  ( 09 Nov 2024 03:45 )  WBC Count : 8.46 K/uL  RBC Count : 3.34 M/uL  Hemoglobin : 9.0 g/dL  Hematocrit : 30.8 %  Platelet Count - Automated : 103 K/uL  Mean Cell Volume : 92.2 fl  Mean Cell Hemoglobin : 26.9 pg  Mean Cell Hemoglobin Concentration : 29.2 g/dL  Auto Neutrophil # : x  Auto Lymphocyte # : x  Auto Monocyte # : x  Auto Eosinophil # : x  Auto Basophil # : x  Auto Neutrophil % : x  Auto Lymphocyte % : x  Auto Monocyte % : x  Auto Eosinophil % : x  Auto Basophil % : x    11-09    156[H]  |  124[H]  |  8   ----------------------------<  402[H]  3.8   |  30  |  1.09    Ca    8.6      09 Nov 2024 09:30  Phos  1.6     11-09  Mg     1.9     11-09    TPro  5.2[L]  /  Alb  2.2[L]  /  TBili  0.4  /  DBili  <0.1  /  AST  23  /  ALT  36  /  AlkPhos  56  11-08      Urinalysis Basic - ( 09 Nov 2024 09:30 )    Color: x / Appearance: x / SG: x / pH: x  Gluc: 402 mg/dL / Ketone: x  / Bili: x / Urobili: x   Blood: x / Protein: x / Nitrite: x   Leuk Esterase: x / RBC: x / WBC x   Sq Epi: x / Non Sq Epi: x / Bacteria: x INTERVAL HPI/OVERNIGHT EVENTS:   Patient on D5 + 1/2 NS due to hypernatremia, Na 155. Patient examined at bedside this morning, at baseline, being fed by RN.       PRESSORS: [ ] YES [x] NO  WHICH:    Antimicrobial:  cefTRIAXone   IVPB 1000 milliGRAM(s) IV Intermittent every 24 hours    Cardiovascular:    Pulmonary:    Hematalogic:  heparin   Injectable 5000 Unit(s) SubCutaneous every 12 hours    Other:  chlorhexidine 2% Cloths 1 Application(s) Topical <User Schedule>  dextrose 5% + sodium chloride 0.45%. 1000 milliLiter(s) IV Continuous <Continuous>  insulin lispro (ADMELOG) corrective regimen sliding scale   SubCutaneous every 6 hours  neomycin/bacitracin/polymyxin Ointment 1 Application(s) Right EYE two times a day  petrolatum Ophthalmic Ointment 1 Application(s) Right EYE three times a day    cefTRIAXone   IVPB 1000 milliGRAM(s) IV Intermittent every 24 hours  chlorhexidine 2% Cloths 1 Application(s) Topical <User Schedule>  dextrose 5% + sodium chloride 0.45%. 1000 milliLiter(s) IV Continuous <Continuous>  heparin   Injectable 5000 Unit(s) SubCutaneous every 12 hours  insulin lispro (ADMELOG) corrective regimen sliding scale   SubCutaneous every 6 hours  neomycin/bacitracin/polymyxin Ointment 1 Application(s) Right EYE two times a day  petrolatum Ophthalmic Ointment 1 Application(s) Right EYE three times a day    Drug Dosing Weight  Height (cm): 152.4 (08 Nov 2024 01:12)  Weight (kg): 46.3 (08 Nov 2024 01:12)  BMI (kg/m2): 19.9 (08 Nov 2024 01:12)  BSA (m2): 1.4 (08 Nov 2024 01:12)    CENTRAL LINE: [ ] YES [x] NO  LOCATION:   DATE INSERTED:  REMOVE: [ ] YES [ ] NO  EXPLAIN:    BENAVIDES: [x] YES [ ] NO    DATE INSERTED:   REMOVE:  [ ] YES [ ] NO  EXPLAIN:     A-LINE:  [ ] YES [x] NO  LOCATION:   DATE INSERTED:  REMOVE:  [ ] YES [ ] NO  EXPLAIN:    PMH -reviewed admission note, no change since admission  PAST MEDICAL & SURGICAL HISTORY:  Diabetes      Depression      Dementia      Schizophrenia      Legally blind      Rectal prolapse          ICU Vital Signs Last 24 Hrs  T(C): 37.5 (09 Nov 2024 04:00), Max: 37.5 (09 Nov 2024 00:00)  T(F): 99.5 (09 Nov 2024 04:00), Max: 99.5 (09 Nov 2024 00:00)  HR: 60 (09 Nov 2024 09:00) (44 - 74)  BP: 117/54 (09 Nov 2024 09:00) (77/44 - 120/56)  BP(mean): 72 (09 Nov 2024 09:00) (55 - 77)  ABP: --  ABP(mean): --  RR: 22 (09 Nov 2024 09:00) (13 - 116)  SpO2: 98% (09 Nov 2024 09:00) (97% - 100%)    O2 Parameters below as of 08 Nov 2024 21:15  Patient On (Oxygen Delivery Method): room air                  11-08 @ 07:01  -  11-09 @ 07:00  --------------------------------------------------------  IN: 2850 mL / OUT: 3705 mL / NET: -855 mL            PHYSICAL EXAM:  GENERAL: Sitting in bed, resting, being fed by RN  HEAD:  Atraumatic, normocephalic  EYES: Conjunctiva and sclera clear, R eye crusting  ENMT: Moist mucous membranes  NECK: Supple  HEART: Bradycardic rhythm; no murmurs, rubs, or gallops  RESPIRATORY: CTA B/L, No W/R/R  ABDOMEN: Soft, Nontender, Nondistended; Bowel sounds present  NEUROLOGY: A&Ox0, nonfocal, moving all extremities  EXTREMITIES:  2+ Peripheral Pulses, No clubbing, cyanosis, or edema  SKIN: warm, dry, normal color, no rash or abnormal lesions      LABS:  CBC Full  -  ( 09 Nov 2024 03:45 )  WBC Count : 8.46 K/uL  RBC Count : 3.34 M/uL  Hemoglobin : 9.0 g/dL  Hematocrit : 30.8 %  Platelet Count - Automated : 103 K/uL  Mean Cell Volume : 92.2 fl  Mean Cell Hemoglobin : 26.9 pg  Mean Cell Hemoglobin Concentration : 29.2 g/dL  Auto Neutrophil # : x  Auto Lymphocyte # : x  Auto Monocyte # : x  Auto Eosinophil # : x  Auto Basophil # : x  Auto Neutrophil % : x  Auto Lymphocyte % : x  Auto Monocyte % : x  Auto Eosinophil % : x  Auto Basophil % : x    11-09    156[H]  |  124[H]  |  8   ----------------------------<  402[H]  3.8   |  30  |  1.09    Ca    8.6      09 Nov 2024 09:30  Phos  1.6     11-09  Mg     1.9     11-09    TPro  5.2[L]  /  Alb  2.2[L]  /  TBili  0.4  /  DBili  <0.1  /  AST  23  /  ALT  36  /  AlkPhos  56  11-08      Urinalysis Basic - ( 09 Nov 2024 09:30 )    Color: x / Appearance: x / SG: x / pH: x  Gluc: 402 mg/dL / Ketone: x  / Bili: x / Urobili: x   Blood: x / Protein: x / Nitrite: x   Leuk Esterase: x / RBC: x / WBC x   Sq Epi: x / Non Sq Epi: x / Bacteria: x

## 2024-11-09 NOTE — DIETITIAN INITIAL EVALUATION ADULT - NS FNS DIET ORDER
Diet, Regular:   Minced and Moist (MINCEDMOIST)  Mildly Thick Liquids (MILDTHICKLIQS) (11-08-24 @ 13:07)

## 2024-11-10 LAB
A1C WITH ESTIMATED AVERAGE GLUCOSE RESULT: 7.7 % — HIGH (ref 4–5.6)
ANION GAP SERPL CALC-SCNC: 2 MMOL/L — LOW (ref 5–17)
ANION GAP SERPL CALC-SCNC: 3 MMOL/L — LOW (ref 5–17)
ANION GAP SERPL CALC-SCNC: 3 MMOL/L — LOW (ref 5–17)
ANION GAP SERPL CALC-SCNC: 4 MMOL/L — LOW (ref 5–17)
APPEARANCE UR: CLEAR — SIGNIFICANT CHANGE UP
BACTERIA # UR AUTO: ABNORMAL /HPF
BILIRUB UR-MCNC: NEGATIVE — SIGNIFICANT CHANGE UP
BUN SERPL-MCNC: 12 MG/DL — SIGNIFICANT CHANGE UP (ref 7–18)
BUN SERPL-MCNC: 12 MG/DL — SIGNIFICANT CHANGE UP (ref 7–18)
BUN SERPL-MCNC: 13 MG/DL — SIGNIFICANT CHANGE UP (ref 7–18)
BUN SERPL-MCNC: 14 MG/DL — SIGNIFICANT CHANGE UP (ref 7–18)
CALCIUM SERPL-MCNC: 8.7 MG/DL — SIGNIFICANT CHANGE UP (ref 8.4–10.5)
CALCIUM SERPL-MCNC: 9 MG/DL — SIGNIFICANT CHANGE UP (ref 8.4–10.5)
CALCIUM SERPL-MCNC: 9.1 MG/DL — SIGNIFICANT CHANGE UP (ref 8.4–10.5)
CALCIUM SERPL-MCNC: 9.1 MG/DL — SIGNIFICANT CHANGE UP (ref 8.4–10.5)
CHLORIDE SERPL-SCNC: 127 MMOL/L — HIGH (ref 96–108)
CHLORIDE SERPL-SCNC: 128 MMOL/L — HIGH (ref 96–108)
CHLORIDE SERPL-SCNC: 128 MMOL/L — HIGH (ref 96–108)
CHLORIDE SERPL-SCNC: 131 MMOL/L — HIGH (ref 96–108)
CO2 SERPL-SCNC: 23 MMOL/L — SIGNIFICANT CHANGE UP (ref 22–31)
CO2 SERPL-SCNC: 26 MMOL/L — SIGNIFICANT CHANGE UP (ref 22–31)
CO2 SERPL-SCNC: 26 MMOL/L — SIGNIFICANT CHANGE UP (ref 22–31)
CO2 SERPL-SCNC: 27 MMOL/L — SIGNIFICANT CHANGE UP (ref 22–31)
COLOR SPEC: YELLOW — SIGNIFICANT CHANGE UP
CREAT ?TM UR-MCNC: 23 MG/DL — SIGNIFICANT CHANGE UP
CREAT ?TM UR-MCNC: 23 MG/DL — SIGNIFICANT CHANGE UP
CREAT SERPL-MCNC: 1.33 MG/DL — HIGH (ref 0.5–1.3)
CREAT SERPL-MCNC: 1.43 MG/DL — HIGH (ref 0.5–1.3)
CREAT SERPL-MCNC: 1.48 MG/DL — HIGH (ref 0.5–1.3)
CREAT SERPL-MCNC: 1.53 MG/DL — HIGH (ref 0.5–1.3)
DIFF PNL FLD: NEGATIVE — SIGNIFICANT CHANGE UP
EGFR: 35 ML/MIN/1.73M2 — LOW
EGFR: 37 ML/MIN/1.73M2 — LOW
EGFR: 38 ML/MIN/1.73M2 — LOW
EGFR: 42 ML/MIN/1.73M2 — LOW
EPI CELLS # UR: PRESENT
ESTIMATED AVERAGE GLUCOSE: 174 MG/DL — HIGH (ref 68–114)
GLUCOSE BLDC GLUCOMTR-MCNC: 149 MG/DL — HIGH (ref 70–99)
GLUCOSE BLDC GLUCOMTR-MCNC: 240 MG/DL — HIGH (ref 70–99)
GLUCOSE BLDC GLUCOMTR-MCNC: 257 MG/DL — HIGH (ref 70–99)
GLUCOSE BLDC GLUCOMTR-MCNC: 263 MG/DL — HIGH (ref 70–99)
GLUCOSE BLDC GLUCOMTR-MCNC: 318 MG/DL — HIGH (ref 70–99)
GLUCOSE BLDC GLUCOMTR-MCNC: 377 MG/DL — HIGH (ref 70–99)
GLUCOSE SERPL-MCNC: 285 MG/DL — HIGH (ref 70–99)
GLUCOSE SERPL-MCNC: 300 MG/DL — HIGH (ref 70–99)
GLUCOSE SERPL-MCNC: 310 MG/DL — HIGH (ref 70–99)
GLUCOSE SERPL-MCNC: 447 MG/DL — HIGH (ref 70–99)
GLUCOSE UR QL: 100 MG/DL
HCT VFR BLD CALC: 31.3 % — LOW (ref 34.5–45)
HGB BLD-MCNC: 9.1 G/DL — LOW (ref 11.5–15.5)
KETONES UR-MCNC: NEGATIVE MG/DL — SIGNIFICANT CHANGE UP
LEUKOCYTE ESTERASE UR-ACNC: ABNORMAL
MAGNESIUM SERPL-MCNC: 2.1 MG/DL — SIGNIFICANT CHANGE UP (ref 1.6–2.6)
MAGNESIUM SERPL-MCNC: 2.1 MG/DL — SIGNIFICANT CHANGE UP (ref 1.6–2.6)
MCHC RBC-ENTMCNC: 27.2 PG — SIGNIFICANT CHANGE UP (ref 27–34)
MCHC RBC-ENTMCNC: 29.1 G/DL — LOW (ref 32–36)
MCV RBC AUTO: 93.4 FL — SIGNIFICANT CHANGE UP (ref 80–100)
NITRITE UR-MCNC: NEGATIVE — SIGNIFICANT CHANGE UP
NRBC # BLD: 0 /100 WBCS — SIGNIFICANT CHANGE UP (ref 0–0)
OSMOLALITY UR: 156 MOSM/KG — SIGNIFICANT CHANGE UP (ref 50–1200)
OSMOLALITY UR: 158 MOSM/KG — SIGNIFICANT CHANGE UP (ref 50–1200)
PH UR: 7 — SIGNIFICANT CHANGE UP (ref 5–8)
PHOSPHATE SERPL-MCNC: 2.6 MG/DL — SIGNIFICANT CHANGE UP (ref 2.5–4.5)
PHOSPHATE SERPL-MCNC: 2.7 MG/DL — SIGNIFICANT CHANGE UP (ref 2.5–4.5)
PLATELET # BLD AUTO: 100 K/UL — LOW (ref 150–400)
POTASSIUM SERPL-MCNC: 3.5 MMOL/L — SIGNIFICANT CHANGE UP (ref 3.5–5.3)
POTASSIUM SERPL-MCNC: 3.6 MMOL/L — SIGNIFICANT CHANGE UP (ref 3.5–5.3)
POTASSIUM SERPL-MCNC: 3.8 MMOL/L — SIGNIFICANT CHANGE UP (ref 3.5–5.3)
POTASSIUM SERPL-MCNC: 4.1 MMOL/L — SIGNIFICANT CHANGE UP (ref 3.5–5.3)
POTASSIUM SERPL-SCNC: 3.5 MMOL/L — SIGNIFICANT CHANGE UP (ref 3.5–5.3)
POTASSIUM SERPL-SCNC: 3.6 MMOL/L — SIGNIFICANT CHANGE UP (ref 3.5–5.3)
POTASSIUM SERPL-SCNC: 3.8 MMOL/L — SIGNIFICANT CHANGE UP (ref 3.5–5.3)
POTASSIUM SERPL-SCNC: 4.1 MMOL/L — SIGNIFICANT CHANGE UP (ref 3.5–5.3)
POTASSIUM UR-SCNC: 10 MMOL/L — SIGNIFICANT CHANGE UP
POTASSIUM UR-SCNC: 10 MMOL/L — SIGNIFICANT CHANGE UP
PROT ?TM UR-MCNC: 10 MG/DL — SIGNIFICANT CHANGE UP (ref 0–12)
PROT UR-MCNC: NEGATIVE MG/DL — SIGNIFICANT CHANGE UP
PROT/CREAT UR-RTO: 0.4 RATIO — HIGH (ref 0–0.2)
RBC # BLD: 3.35 M/UL — LOW (ref 3.8–5.2)
RBC # FLD: 16.7 % — HIGH (ref 10.3–14.5)
RBC CASTS # UR COMP ASSIST: 0 /HPF — SIGNIFICANT CHANGE UP (ref 0–4)
SODIUM SERPL-SCNC: 155 MMOL/L — HIGH (ref 135–145)
SODIUM SERPL-SCNC: 157 MMOL/L — HIGH (ref 135–145)
SODIUM SERPL-SCNC: 157 MMOL/L — HIGH (ref 135–145)
SODIUM SERPL-SCNC: 159 MMOL/L — HIGH (ref 135–145)
SODIUM UR-SCNC: 47 MMOL/L — SIGNIFICANT CHANGE UP
SODIUM UR-SCNC: 48 MMOL/L — SIGNIFICANT CHANGE UP
SODIUM UR-SCNC: 52 MMOL/L — SIGNIFICANT CHANGE UP
SODIUM UR-SCNC: 67 MMOL/L — SIGNIFICANT CHANGE UP
SP GR SPEC: 1 — SIGNIFICANT CHANGE UP (ref 1–1.03)
UROBILINOGEN FLD QL: 0.2 MG/DL — SIGNIFICANT CHANGE UP (ref 0.2–1)
UUN UR-MCNC: 82 MG/DL — SIGNIFICANT CHANGE UP
UUN UR-MCNC: 88 MG/DL — SIGNIFICANT CHANGE UP
WBC # BLD: 6.88 K/UL — SIGNIFICANT CHANGE UP (ref 3.8–10.5)
WBC # FLD AUTO: 6.88 K/UL — SIGNIFICANT CHANGE UP (ref 3.8–10.5)
WBC UR QL: 4 /HPF — SIGNIFICANT CHANGE UP (ref 0–5)

## 2024-11-10 RX ORDER — INSULIN REG, HUM S-S BUFF 100/ML
5 VIAL (ML) INJECTION ONCE
Refills: 0 | Status: COMPLETED | OUTPATIENT
Start: 2024-11-10 | End: 2024-11-10

## 2024-11-10 RX ORDER — DESMOPRESSIN ACETATE 4 UG/ML
2 INJECTION INTRAVENOUS ONCE
Refills: 0 | Status: COMPLETED | OUTPATIENT
Start: 2024-11-10 | End: 2024-11-10

## 2024-11-10 RX ORDER — DESMOPRESSIN ACETATE 4 UG/ML
1 INJECTION INTRAVENOUS ONCE
Refills: 0 | Status: COMPLETED | OUTPATIENT
Start: 2024-11-10 | End: 2024-11-10

## 2024-11-10 RX ADMIN — Medication 1 APPLICATION(S): at 14:19

## 2024-11-10 RX ADMIN — CHLORHEXIDINE GLUCONATE 1 APPLICATION(S): 40 SOLUTION TOPICAL at 05:27

## 2024-11-10 RX ADMIN — Medication 100 MILLILITER(S): at 14:28

## 2024-11-10 RX ADMIN — Medication 100 MILLIGRAM(S): at 21:39

## 2024-11-10 RX ADMIN — Medication 5 UNIT(S): at 03:23

## 2024-11-10 RX ADMIN — Medication 1 APPLICATION(S): at 05:28

## 2024-11-10 RX ADMIN — HEPARIN SODIUM 5000 UNIT(S): 10000 INJECTION INTRAVENOUS; SUBCUTANEOUS at 05:27

## 2024-11-10 RX ADMIN — HALOPERIDOL DECANOATE 1 MILLIGRAM(S): 50 INJECTION INTRAMUSCULAR at 17:18

## 2024-11-10 RX ADMIN — HALOPERIDOL DECANOATE 1 MILLIGRAM(S): 50 INJECTION INTRAMUSCULAR at 05:27

## 2024-11-10 RX ADMIN — DESMOPRESSIN ACETATE 202 MICROGRAM(S): 4 INJECTION INTRAVENOUS at 19:18

## 2024-11-10 RX ADMIN — Medication 1 APPLICATION(S): at 17:18

## 2024-11-10 RX ADMIN — DESMOPRESSIN ACETATE 1 MICROGRAM(S): 4 INJECTION INTRAVENOUS at 01:16

## 2024-11-10 RX ADMIN — Medication 1 APPLICATION(S): at 21:40

## 2024-11-10 RX ADMIN — HEPARIN SODIUM 5000 UNIT(S): 10000 INJECTION INTRAVENOUS; SUBCUTANEOUS at 17:18

## 2024-11-10 RX ADMIN — Medication 3: at 11:39

## 2024-11-10 RX ADMIN — TRAZODONE HYDROCHLORIDE 75 MILLIGRAM(S): 100 TABLET ORAL at 21:40

## 2024-11-10 RX ADMIN — Medication 1 APPLICATION(S): at 05:29

## 2024-11-10 RX ADMIN — Medication 4: at 16:01

## 2024-11-10 NOTE — PROGRESS NOTE ADULT - SUBJECTIVE AND OBJECTIVE BOX
INTERVAL HPI/OVERNIGHT EVENTS: Pt BP on the softer side, Na upto 160. was given D5 bolus X1. Repeat Na was 155; Desmopressin X1 given. Pt was agitated and pulling on lines, trying ot get out of bed. Haldol 1mg X1 given.    PRESSORS: [ ] YES [x ] NO  WHICH:    MEDICATIONS:     Antimicrobial:  cefTRIAXone   IVPB 1000 milliGRAM(s) IV Intermittent every 24 hours    Cardiovascular:    Pulmonary:    Hematalogic:  heparin   Injectable 5000 Unit(s) SubCutaneous every 12 hours    Other:  chlorhexidine 2% Cloths 1 Application(s) Topical <User Schedule>  dextrose 5%. 500 milliLiter(s) IV Continuous <Continuous>  dextrose 5%. 1000 milliLiter(s) IV Continuous <Continuous>  haloperidol     Tablet 1 milliGRAM(s) Oral two times a day  haloperidol    Injectable 1 milliGRAM(s) IntraMuscular every 6 hours PRN  insulin lispro (ADMELOG) corrective regimen sliding scale   SubCutaneous three times a day before meals  insulin lispro (ADMELOG) corrective regimen sliding scale   SubCutaneous at bedtime  neomycin/bacitracin/polymyxin Ointment 1 Application(s) Right EYE two times a day  petrolatum Ophthalmic Ointment 1 Application(s) Right EYE three times a day  traZODone 75 milliGRAM(s) Oral at bedtime      Drug Dosing Weight  Height (cm): 152.4 (08 Nov 2024 01:12)  Weight (kg): 46.3 (08 Nov 2024 01:12)  BMI (kg/m2): 19.9 (08 Nov 2024 01:12)  BSA (m2): 1.4 (08 Nov 2024 01:12)    INTUBATED: [ ] YES [ ] NO   DATE:     CENTRAL LINE: [ ] YES [ ] NO  LOCATION:       BENAVIDES: [ ] YES [ ] NO        A-LINE:  [ ] YES [ ] NO  LOCATION:       ICU Vital Signs Last 24 Hrs  T(C): 36 (10 Nov 2024 00:00), Max: 37.5 (09 Nov 2024 04:00)  T(F): 96.8 (10 Nov 2024 00:00), Max: 99.5 (09 Nov 2024 04:00)  HR: 56 (10 Nov 2024 02:00) (56 - 84)  BP: 97/46 (10 Nov 2024 02:00) (80/44 - 128/50)  BP(mean): 63 (10 Nov 2024 02:00) (47 - 107)  ABP: --  ABP(mean): --  RR: 19 (10 Nov 2024 02:00) (13 - 23)  SpO2: 100% (10 Nov 2024 02:00) (94% - 100%)              11-08 @ 07:01  -  11-09 @ 07:00  --------------------------------------------------------  IN: 2850 mL / OUT: 3705 mL / NET: -855 mL            REVIEW OF SYSTEMS: unable to obtain d/t AMS        PHYSICAL EXAM:    GENERAL: Sitting in bed, resting,  HEAD:  Atraumatic, normocephalic  EYES: Conjunctiva and sclera clear, R eye crusting  ENMT: Moist mucous membranes  NECK: Supple  HEART: Bradycardic rhythm; no murmurs, rubs, or gallops  RESPIRATORY: CTA B/L, No W/R/R  ABDOMEN: Soft, Nontender, Nondistended; Bowel sounds present  NEUROLOGY: A&Ox0, nonfocal, moving all extremities  EXTREMITIES:  2+ Peripheral Pulses, No clubbing, cyanosis, or edema  SKIN: warm, dry, normal color, no rash or abnormal lesions      LABS:  CBC Full  -  ( 09 Nov 2024 03:45 )  WBC Count : 8.46 K/uL  RBC Count : 3.34 M/uL  Hemoglobin : 9.0 g/dL  Hematocrit : 30.8 %  Platelet Count - Automated : 103 K/uL  Mean Cell Volume : 92.2 fl  Mean Cell Hemoglobin : 26.9 pg  Mean Cell Hemoglobin Concentration : 29.2 g/dL  Auto Neutrophil # : x  Auto Lymphocyte # : x  Auto Monocyte # : x  Auto Eosinophil # : x  Auto Basophil # : x  Auto Neutrophil % : x  Auto Lymphocyte % : x  Auto Monocyte % : x  Auto Eosinophil % : x  Auto Basophil % : x    11-09    155[H]  |  127[H]  |  14  ----------------------------<  447[H]  4.1   |  26  |  1.53[H]    Ca    9.1      09 Nov 2024 23:35  Phos  1.6     11-09  Mg     1.9     11-09    TPro  5.2[L]  /  Alb  2.2[L]  /  TBili  0.4  /  DBili  <0.1  /  AST  23  /  ALT  36  /  AlkPhos  56  11-08      Urinalysis Basic - ( 09 Nov 2024 23:35 )    Color: x / Appearance: x / SG: x / pH: x  Gluc: 447 mg/dL / Ketone: x  / Bili: x / Urobili: x   Blood: x / Protein: x / Nitrite: x   Leuk Esterase: x / RBC: x / WBC x   Sq Epi: x / Non Sq Epi: x / Bacteria: x      Culture Results:   >100,000 CFU/ml Escherichia coli (11-07 @ 22:40)      RADIOLOGY & ADDITIONAL STUDIES REVIEWED:  ***    [ ]GOALS OF CARE DISCUSSION WITH PATIENT/FAMILY/PROXY:   INTERVAL HPI/OVERNIGHT EVENTS: Pt BP on the softer side, Na up to 160 and was given D5 bolus X1. Repeat Na was 155; Desmopressin X1 given. Pt was agitated and pulling on lines, trying to get out of bed. Haldol 1mg X1 given.    PRESSORS: [ ] YES [x ] NO  WHICH:    MEDICATIONS:     Antimicrobial:  cefTRIAXone   IVPB 1000 milliGRAM(s) IV Intermittent every 24 hours    Cardiovascular:    Pulmonary:    Hematalogic:  heparin   Injectable 5000 Unit(s) SubCutaneous every 12 hours    Other:  chlorhexidine 2% Cloths 1 Application(s) Topical <User Schedule>  dextrose 5%. 500 milliLiter(s) IV Continuous <Continuous>  dextrose 5%. 1000 milliLiter(s) IV Continuous <Continuous>  haloperidol     Tablet 1 milliGRAM(s) Oral two times a day  haloperidol    Injectable 1 milliGRAM(s) IntraMuscular every 6 hours PRN  insulin lispro (ADMELOG) corrective regimen sliding scale   SubCutaneous three times a day before meals  insulin lispro (ADMELOG) corrective regimen sliding scale   SubCutaneous at bedtime  neomycin/bacitracin/polymyxin Ointment 1 Application(s) Right EYE two times a day  petrolatum Ophthalmic Ointment 1 Application(s) Right EYE three times a day  traZODone 75 milliGRAM(s) Oral at bedtime      Drug Dosing Weight  Height (cm): 152.4 (08 Nov 2024 01:12)  Weight (kg): 46.3 (08 Nov 2024 01:12)  BMI (kg/m2): 19.9 (08 Nov 2024 01:12)  BSA (m2): 1.4 (08 Nov 2024 01:12)    INTUBATED: [ ] YES [ ] NO   DATE:     CENTRAL LINE: [ ] YES [ ] NO  LOCATION:       BENAVIDES: [ ] YES [ ] NO        A-LINE:  [ ] YES [ ] NO  LOCATION:       ICU Vital Signs Last 24 Hrs  T(C): 36 (10 Nov 2024 00:00), Max: 37.5 (09 Nov 2024 04:00)  T(F): 96.8 (10 Nov 2024 00:00), Max: 99.5 (09 Nov 2024 04:00)  HR: 56 (10 Nov 2024 02:00) (56 - 84)  BP: 97/46 (10 Nov 2024 02:00) (80/44 - 128/50)  BP(mean): 63 (10 Nov 2024 02:00) (47 - 107)  ABP: --  ABP(mean): --  RR: 19 (10 Nov 2024 02:00) (13 - 23)  SpO2: 100% (10 Nov 2024 02:00) (94% - 100%)              11-08 @ 07:01  -  11-09 @ 07:00  --------------------------------------------------------  IN: 2850 mL / OUT: 3705 mL / NET: -855 mL            REVIEW OF SYSTEMS: unable to obtain d/t AMS        PHYSICAL EXAM:    GENERAL: Sitting in bed, resting,  HEAD:  Atraumatic, normocephalic  EYES: Conjunctiva and sclera clear, R eye crusting  ENMT: Moist mucous membranes  NECK: Supple  HEART: Bradycardic rhythm; no murmurs, rubs, or gallops  RESPIRATORY: CTA B/L, No W/R/R  ABDOMEN: Soft, Nontender, Nondistended; Bowel sounds present  NEUROLOGY: A&Ox0, nonfocal, moving all extremities  EXTREMITIES:  2+ Peripheral Pulses, No clubbing, cyanosis, or edema  SKIN: warm, dry, normal color, no rash or abnormal lesions      LABS:  CBC Full  -  ( 09 Nov 2024 03:45 )  WBC Count : 8.46 K/uL  RBC Count : 3.34 M/uL  Hemoglobin : 9.0 g/dL  Hematocrit : 30.8 %  Platelet Count - Automated : 103 K/uL  Mean Cell Volume : 92.2 fl  Mean Cell Hemoglobin : 26.9 pg  Mean Cell Hemoglobin Concentration : 29.2 g/dL  Auto Neutrophil # : x  Auto Lymphocyte # : x  Auto Monocyte # : x  Auto Eosinophil # : x  Auto Basophil # : x  Auto Neutrophil % : x  Auto Lymphocyte % : x  Auto Monocyte % : x  Auto Eosinophil % : x  Auto Basophil % : x    11-09    155[H]  |  127[H]  |  14  ----------------------------<  447[H]  4.1   |  26  |  1.53[H]    Ca    9.1      09 Nov 2024 23:35  Phos  1.6     11-09  Mg     1.9     11-09    TPro  5.2[L]  /  Alb  2.2[L]  /  TBili  0.4  /  DBili  <0.1  /  AST  23  /  ALT  36  /  AlkPhos  56  11-08      Urinalysis Basic - ( 09 Nov 2024 23:35 )    Color: x / Appearance: x / SG: x / pH: x  Gluc: 447 mg/dL / Ketone: x  / Bili: x / Urobili: x   Blood: x / Protein: x / Nitrite: x   Leuk Esterase: x / RBC: x / WBC x   Sq Epi: x / Non Sq Epi: x / Bacteria: x      Culture Results:   >100,000 CFU/ml Escherichia coli (11-07 @ 22:40)      RADIOLOGY & ADDITIONAL STUDIES REVIEWED:  ***    [ ]GOALS OF CARE DISCUSSION WITH PATIENT/FAMILY/PROXY:

## 2024-11-10 NOTE — PROGRESS NOTE ADULT - SUBJECTIVE AND OBJECTIVE BOX
[facility] NEPHROLOGY- PROGRESS NOTE    Patient is a 73yo Female wheelchair bound, legally blind with schizophrenia on Lithium and dementia sent in for hypernatremia on outpt labs. Pt with poor PO intake for 2-3 weeks. Pt a/w hypernatremia (Na 165), PRASHANTH, hypercalcemia, bradycardia, acute metabolic encephalopathy, UTI and ?lithium toxicity (lithium level 1.3). Nephrolgoy consulted for elevated serum creatinine and sodium with ?lithium toxicity.    S/P HD for 6 hours on 11/8.    REVIEW OF SYSTEMS: Unable to obtain as patient non-verbal.    penicillins (Fever)  sulfur (Other)  sulfa drugs (Unknown)  Stelazine (Other)  phenothiazines (Unknown)      Hospital Medications: Medications reviewed      VITALS:  T(F): 97.4 (11-10-24 @ 08:00), Max: 97.4 (11-10-24 @ 08:00)  HR: 66 (11-10-24 @ 14:00)  BP: 108/52 (11-10-24 @ 14:00)  RR: 19 (11-10-24 @ 14:00)  SpO2: 100% (11-10-24 @ 14:00)  Wt(kg): --    11-09 @ 07:01  -  11-10 @ 07:00  --------------------------------------------------------  IN: 2400 mL / OUT: 4110 mL / NET: -1710 mL    11-10 @ 07:01  -  11-10 @ 16:08  --------------------------------------------------------  IN: 600 mL / OUT: 0 mL / NET: 600 mL        PHYSICAL EXAM:    Gen: NAD, calm, non-verbal  Cards: RRR, +S1/S2, no M/G/R  Resp: CTA B/L  GI: soft, NT/ND, NABS  : + baig with dilute urine  Vascular: no LE edema B/L, WAN perez removed        LABS:  11-10    159[H]  |  128[H]  |  12  ----------------------------<  285[H]  3.5   |  27  |  1.33[H]    Ca    8.7      10 Nov 2024 10:38  Phos  2.7     11-10  Mg     2.1     11-10      Creatinine Trend: 1.33 <--, 1.48 <--, 1.53 <--, 1.34 <--, 1.34 <--, 1.09 <--, 0.81 <--, 0.35 <--, 1.71 <--, 1.90 <--, 2.02 <--, 2.16 <--, 2.59 <--                        9.1    6.88  )-----------( 100      ( 10 Nov 2024 03:33 )             31.3     Urine Studies:  Urinalysis Basic - ( 10 Nov 2024 10:38 )    Color:  / Appearance:  / SG:  / pH:   Gluc: 285 mg/dL / Ketone:   / Bili:  / Urobili:    Blood:  / Protein:  / Nitrite:    Leuk Esterase:  / RBC:  / WBC    Sq Epi:  / Non Sq Epi:  / Bacteria:       Sodium, Random Urine: 52 mmol/L (11-10 @ 10:38)  Potassium, Random Urine: 10 mmol/L (11-10 @ 10:38)  Sodium, Random Urine: 57 mmol/L (11-09 @ 19:55)  Creatinine, Random Urine: <13 mg/dL (11-09 @ 19:55)  Osmolality, Random Urine: 156 mosm/Kg (11-09 @ 19:55)  Potassium, Random Urine: 10 mmol/L (11-09 @ 19:55)  Osmolality, Random Urine: 256 mosm/Kg (11-08 @ 11:05)  Sodium, Random Urine: 30 mmol/L (11-07 @ 22:40)  Creatinine, Random Urine: 51 mg/dL (11-07 @ 22:40)  Protein/Creatinine Ratio Calculation: 0.3 Ratio (11-07 @ 22:40)  Osmolality, Random Urine: 308 mosm/Kg (11-07 @ 22:40)  Potassium, Random Urine: 30 mmol/L (11-07 @ 22:40)

## 2024-11-10 NOTE — PROGRESS NOTE ADULT - ASSESSMENT
74F legally blind, WC bound, schizophrenia on Lithium, dementia, minimally verbal presenting with severe hypernatremia, rectal prolapse  in the setting of worsening renal function and poor PO intake that has progressed these past 2-3 weeks, sent in from PCP given hypernatremia; ICU consulted for severe hypernatremia, metabolic encephalopathy, and lithium toxicity. s/p HD 6 hrs. Na 156, on D5.     #End-stage dementia  #Schizophrenia  #Acute encephalopathy   #Lithium toxicity  #Severe hypernatremia  #PRASHANTH    ====== [ NEURO ] ======  #Acute encephalopathy on progressive dementia  #End-stage dementia  #Lithium toxicity  as per : pt is minimally verbal at baseline  poor po intake last 3 weeks, progressed the last year where she is no longer chewing, now on a puree diet  UA positive, lithium elevated, renal function impaired  likely metabolic encephalopathy from ?nephrogenic DI and hyperNa secondary to elevated lithium level v UTI encephalopathy  encephalopathy from lithium toxicity may not be reflected in level of 1.3  acetaminophen, salicylate,   hospice appropriate as per palliative   - hold lithium, consider olanzapine if agitated    ====== [ CVS ] ======  No active issues    ====== [ PULM ] ======  No active issues    ====== [ RENAL ] ======  #Severe Hypernatremia  #PRASHANTH  as above  Na on admission was 164  multifactorial in the setting of poor PO intake (dehydration) v lithium nephrotoxicity/ DI   BUN/Cr elevated 82/2.59 likely pre-renal in the setting of poor PO intake and Li toxicity  encephalopathy from lithium toxicity may not be reflected in level of 1.3  s/p HD 6hrs 11/8 as per toxicology, SCr improved   urine Osm= 308  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 Dotson    #Cystitis  UA +ve  - f/u UCx  - c/w CTX 1mg q24h    #Hypophosphatemia  phos 1.6 > repleted  - monitor and supplement prn    ====== [ GI/NUTRITION ] ======  #Dementia  poor PO intake leading to electrolyte imbalances   her advancing dementia has progressed; she is an extremely poor candidate for PEG as it would not change or reverse her dementia/quality of life  tolerating feeds, nutrition consulted  - aspiration precautions    ====== [ MSK/MOBILITY ] ======  #wheelchair-bound status   #legal blindness   - f/u PT recs    ====== [ PROPHYLAXIS ] ======  DVT ppx: Heparin SubQ  GI ppx: Protonix    ====== [ GOC/DISPO ] ======  FULL CODE  hospice appropriate as per palliative   Dispo ICU 74F legally blind, WC bound, schizophrenia on Lithium, dementia, minimally verbal presenting with severe hypernatremia, rectal prolapse  in the setting of worsening renal function and poor PO intake that has progressed these past 2-3 weeks, sent in from PCP given hypernatremia; ICU consulted for severe hypernatremia, metabolic encephalopathy, and lithium toxicity. s/p HD 6 hrs. Still hyperNa on D5.     #End-stage dementia  #Schizophrenia  #Acute encephalopathy   #Lithium toxicity  #Severe hypernatremia  #PRASHANTH    ====== [ NEURO ] ======  #Acute encephalopathy on progressive dementia  #End-stage dementia  #Lithium toxicity  as per : pt is minimally verbal at baseline  poor po intake last 3 weeks, progressed the last year where she is no longer chewing, now on a puree diet  UA positive, lithium elevated, renal function impaired  likely metabolic encephalopathy from ?nephrogenic DI and hyperNa secondary to elevated lithium level v UTI encephalopathy  encephalopathy from lithium toxicity may not be reflected in level of 1.3  acetaminophen, salicylate negative  hospice appropriate as per palliative   - hold lithium, consider olanzapine if agitated    ====== [ CVS ] ======  No active issues    ====== [ PULM ] ======  No active issues    ====== [ RENAL ] ======  #Severe Hypernatremia  #PRASHANTH  as above  Na on admission was 164  multifactorial in the setting of poor PO intake (dehydration) v lithium nephrotoxicity/ DI   BUN/Cr elevated 82/2.59 likely pre-renal in the setting of poor PO intake and Li toxicity  encephalopathy from lithium toxicity may not be reflected in level of 1.3  s/p HD 6hrs 11/8 as per toxicology, SCr improved   urine Osm= 308  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 Dotson    #Cystitis  UA +ve  - UCx > 100k E coli   - c/w CTX 1mg q24h    #Hypophosphatemia  phos 1.6 > repleted  - monitor and supplement prn    ====== [ GI/NUTRITION ] ======  #Dementia  poor PO intake leading to electrolyte imbalances   her advancing dementia has progressed; she is an extremely poor candidate for PEG as it would not change or reverse her dementia/quality of life  tolerating feeds, nutrition consulted  - aspiration precautions    ====== [ MSK/MOBILITY ] ======  #wheelchair-bound status   #legal blindness   - f/u PT recs    ====== [ PROPHYLAXIS ] ======  DVT ppx: Heparin SubQ  GI ppx: Protonix    ====== [ GOC/DISPO ] ======  FULL CODE  hospice appropriate as per palliative   Dispo ICU

## 2024-11-10 NOTE — PROGRESS NOTE ADULT - ASSESSMENT
Patient is a 75yo Female wheelchair bound, legally blind with schizophrenia on Lithium and dementia sent in for hypernatremia on outpt labs. Pt with poor PO intake for 2-3 weeks. Pt a/w hypernatremia (Na 165), PRASHANTH, hypercalcemia, bradycardia, acute metabolic encephalopathy, UTI and ?lithium toxicity (lithium level 1.3). Nephrolgoy consulted for elevated serum creatinine and sodium with ?lithium toxicity.    1. PRASHANTH- previous SCr 1-1.2; last SCr 1.26 on 6/5/24 on HIE. PRASHANTH likely due to lithium use with hypercalcemia/ dehydration due to poor PO intake. +UA in the setting of infection. Renal function improving with polyuria; see below. IVF as below. Pt with baig. Defer renal US. Pt with possible lithium toxicity; s/p HD on 11/8 for 6 hours. No further plans for HD. Shiley removed on 11/9. Strict I/Os. Avoid nephrotoxins/ NSAIDs/ RCA. Monitor BMP.  2. 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 Change diet to low sodium. Management will be difficult given poor mental status (patient unable to drink to thirst) and hypotension (which would hold off on HCTZ and amiloride at this time) and unresolved PRASHANTH (limited use of NSAIDS). In interim, agree with D5W but would increase rate to 125 ml/hour and give additional boluses to keep net positive.  3. Lithium toxicity- lithium level 1.3 on admission; with metabolic encephalopathy. S/P HD as above. No further plans for HD. Shiley removed.  4. Hypotension- BP low. Off pressors. Can start midodrine as needed.  5. Acute cystitis- +UA, f/u UCx sensitivity. Pt on Ceftriaxone.    6. Hypercalcemia- corrected serum Ca 11.14; improving on IVF. TSH wnl. likely in part due to hypovolemia as well as lithium. Hypercalcemia resolved. Will need hypercalcemia work up if recurs. Monitor ionized calcium      [facility] NEPHROLOGY  Nando Elizondo M.D.  Johann Noyola D.O.  Paulina Dotson M.D.  MD Keisha Parekh, SANTIAGO, ANP-C    Telephone: [phone]  Facsimile: [phone], [address]  [place] [id number]   Patient is a 75yo Female wheelchair bound, legally blind with schizophrenia on Lithium and dementia sent in for hypernatremia on outpt labs. Pt with poor PO intake for 2-3 weeks. Pt a/w hypernatremia (Na 165), PRASHANTH, hypercalcemia, bradycardia, acute metabolic encephalopathy, UTI and ?lithium toxicity (lithium level 1.3). Nephrolgoy consulted for elevated serum creatinine and sodium with ?lithium toxicity.    1. PRASHANTH- previous SCr 1-1.2; last SCr 1.26 on 6/5/24 on HIE. PRASHANTH likely due to lithium use with hypercalcemia/ dehydration due to poor PO intake. +UA in the setting of infection. Renal function improving with polyuria; see below. IVF as below. Pt with baig. Defer renal US. Pt with possible lithium toxicity; s/p HD on 11/8 for 6 hours. No further plans for HD. Shiley removed on 11/9. Strict I/Os. Avoid nephrotoxins/ NSAIDs/ RCA. Monitor BMP.  2. 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 Change diet to low sodium. Management will be difficult given poor mental status (patient unable to drink to thirst) and hypotension (which would hold off on HCTZ and amiloride at this time) and unresolved PRASHANTH (limited use of NSAIDS). In interim, agree with D5W but would increase rate to 125 ml/hour and give additional boluses to keep net positive. Patient s/p DDAVP. Would give 2 mcg dose and check urine osm 2 hours post-administration to assess for partial versus complete DDAVP resistance.   3. Lithium toxicity- lithium level 1.3 on admission; with metabolic encephalopathy. S/P HD as above. No further plans for HD. Shiley removed.  4. Hypotension- BP low. Off pressors. Can start midodrine as needed.  5. Acute cystitis- +UA, f/u UCx sensitivity. Pt on Ceftriaxone.    6. Hypercalcemia- corrected serum Ca 11.14; improving on IVF. TSH wnl. likely in part due to hypovolemia as well as lithium. Hypercalcemia resolved. Will need hypercalcemia work up if recurs. Monitor ionized calcium      [facility] NEPHROLOGY  Nando Elizondo M.D.  Johann R Noyola, D.LIZ Dotson M.D.  MD Keisha Parekh, MSN, ANP-C    Telephone: [phone]  Facsimile: [phone] [place] [address], [address]  [place] [id number]

## 2024-11-11 LAB
ANION GAP SERPL CALC-SCNC: 3 MMOL/L — LOW (ref 5–17)
ANION GAP SERPL CALC-SCNC: 4 MMOL/L — LOW (ref 5–17)
BUN SERPL-MCNC: 11 MG/DL — SIGNIFICANT CHANGE UP (ref 7–18)
BUN SERPL-MCNC: 14 MG/DL — SIGNIFICANT CHANGE UP (ref 7–18)
CALCIUM SERPL-MCNC: 9.1 MG/DL — SIGNIFICANT CHANGE UP (ref 8.4–10.5)
CALCIUM SERPL-MCNC: 9.3 MG/DL — SIGNIFICANT CHANGE UP (ref 8.4–10.5)
CHLORIDE SERPL-SCNC: 126 MMOL/L — HIGH (ref 96–108)
CHLORIDE SERPL-SCNC: 127 MMOL/L — HIGH (ref 96–108)
CO2 SERPL-SCNC: 25 MMOL/L — SIGNIFICANT CHANGE UP (ref 22–31)
CO2 SERPL-SCNC: 26 MMOL/L — SIGNIFICANT CHANGE UP (ref 22–31)
CREAT SERPL-MCNC: 1.29 MG/DL — SIGNIFICANT CHANGE UP (ref 0.5–1.3)
CREAT SERPL-MCNC: 1.3 MG/DL — SIGNIFICANT CHANGE UP (ref 0.5–1.3)
EGFR: 43 ML/MIN/1.73M2 — LOW
EGFR: 44 ML/MIN/1.73M2 — LOW
GLUCOSE BLDC GLUCOMTR-MCNC: 237 MG/DL — HIGH (ref 70–99)
GLUCOSE BLDC GLUCOMTR-MCNC: 245 MG/DL — HIGH (ref 70–99)
GLUCOSE BLDC GLUCOMTR-MCNC: 271 MG/DL — HIGH (ref 70–99)
GLUCOSE BLDC GLUCOMTR-MCNC: 326 MG/DL — HIGH (ref 70–99)
GLUCOSE SERPL-MCNC: 271 MG/DL — HIGH (ref 70–99)
GLUCOSE SERPL-MCNC: 313 MG/DL — HIGH (ref 70–99)
HCT VFR BLD CALC: 29.2 % — LOW (ref 34.5–45)
HGB BLD-MCNC: 8.6 G/DL — LOW (ref 11.5–15.5)
MAGNESIUM SERPL-MCNC: 2 MG/DL — SIGNIFICANT CHANGE UP (ref 1.6–2.6)
MCHC RBC-ENTMCNC: 27.3 PG — SIGNIFICANT CHANGE UP (ref 27–34)
MCHC RBC-ENTMCNC: 29.5 G/DL — LOW (ref 32–36)
MCV RBC AUTO: 92.7 FL — SIGNIFICANT CHANGE UP (ref 80–100)
NRBC # BLD: 0 /100 WBCS — SIGNIFICANT CHANGE UP (ref 0–0)
OSMOLALITY UR: 125 MOSM/KG — SIGNIFICANT CHANGE UP (ref 50–1200)
OSMOLALITY UR: 165 MOSM/KG — SIGNIFICANT CHANGE UP (ref 50–1200)
OSMOLALITY UR: 180 MOSM/KG — SIGNIFICANT CHANGE UP (ref 50–1200)
PHOSPHATE SERPL-MCNC: 2.3 MG/DL — LOW (ref 2.5–4.5)
PLATELET # BLD AUTO: 112 K/UL — LOW (ref 150–400)
POTASSIUM SERPL-MCNC: 3.5 MMOL/L — SIGNIFICANT CHANGE UP (ref 3.5–5.3)
POTASSIUM SERPL-MCNC: 3.8 MMOL/L — SIGNIFICANT CHANGE UP (ref 3.5–5.3)
POTASSIUM SERPL-SCNC: 3.5 MMOL/L — SIGNIFICANT CHANGE UP (ref 3.5–5.3)
POTASSIUM SERPL-SCNC: 3.8 MMOL/L — SIGNIFICANT CHANGE UP (ref 3.5–5.3)
RBC # BLD: 3.15 M/UL — LOW (ref 3.8–5.2)
RBC # FLD: 17 % — HIGH (ref 10.3–14.5)
SODIUM SERPL-SCNC: 155 MMOL/L — HIGH (ref 135–145)
SODIUM SERPL-SCNC: 156 MMOL/L — HIGH (ref 135–145)
UUN UR-MCNC: 125 MG/DL — SIGNIFICANT CHANGE UP
WBC # BLD: 6.39 K/UL — SIGNIFICANT CHANGE UP (ref 3.8–10.5)
WBC # FLD AUTO: 6.39 K/UL — SIGNIFICANT CHANGE UP (ref 3.8–10.5)

## 2024-11-11 PROCEDURE — 99232 SBSQ HOSP IP/OBS MODERATE 35: CPT

## 2024-11-11 PROCEDURE — 99233 SBSQ HOSP IP/OBS HIGH 50: CPT | Mod: GC

## 2024-11-11 RX ORDER — INSULIN LISPRO 100/ML
VIAL (ML) SUBCUTANEOUS
Refills: 0 | Status: DISCONTINUED | OUTPATIENT
Start: 2024-11-11 | End: 2024-11-15

## 2024-11-11 RX ORDER — POTASSIUM PHOSPHATE 236; 224 MG/ML; MG/ML
30 INJECTION, SOLUTION INTRAVENOUS ONCE
Refills: 0 | Status: COMPLETED | OUTPATIENT
Start: 2024-11-11 | End: 2024-11-11

## 2024-11-11 RX ORDER — INSULIN LISPRO 100/ML
VIAL (ML) SUBCUTANEOUS AT BEDTIME
Refills: 0 | Status: DISCONTINUED | OUTPATIENT
Start: 2024-11-11 | End: 2024-11-15

## 2024-11-11 RX ORDER — MUPIROCIN 20 MG/G
1 OINTMENT TOPICAL
Refills: 0 | Status: DISCONTINUED | OUTPATIENT
Start: 2024-11-11 | End: 2024-11-15

## 2024-11-11 RX ORDER — CHLORHEXIDINE GLUCONATE 40 MG/ML
1 SOLUTION TOPICAL
Refills: 0 | Status: DISCONTINUED | OUTPATIENT
Start: 2024-11-11 | End: 2024-11-11

## 2024-11-11 RX ORDER — INSULIN LISPRO 100/ML
3 VIAL (ML) SUBCUTANEOUS
Refills: 0 | Status: DISCONTINUED | OUTPATIENT
Start: 2024-11-11 | End: 2024-11-12

## 2024-11-11 RX ORDER — INSULIN GLARGINE,HUM.REC.ANLOG 100/ML
5 VIAL (ML) SUBCUTANEOUS AT BEDTIME
Refills: 0 | Status: DISCONTINUED | OUTPATIENT
Start: 2024-11-11 | End: 2024-11-12

## 2024-11-11 RX ADMIN — Medication 3 UNIT(S): at 11:58

## 2024-11-11 RX ADMIN — HEPARIN SODIUM 5000 UNIT(S): 10000 INJECTION INTRAVENOUS; SUBCUTANEOUS at 17:05

## 2024-11-11 RX ADMIN — Medication 1 APPLICATION(S): at 05:15

## 2024-11-11 RX ADMIN — Medication 6: at 11:58

## 2024-11-11 RX ADMIN — Medication 4: at 06:17

## 2024-11-11 RX ADMIN — HALOPERIDOL DECANOATE 1 MILLIGRAM(S): 50 INJECTION INTRAMUSCULAR at 17:05

## 2024-11-11 RX ADMIN — TRAZODONE HYDROCHLORIDE 75 MILLIGRAM(S): 100 TABLET ORAL at 23:10

## 2024-11-11 RX ADMIN — Medication 1 APPLICATION(S): at 23:10

## 2024-11-11 RX ADMIN — HALOPERIDOL DECANOATE 1 MILLIGRAM(S): 50 INJECTION INTRAMUSCULAR at 05:27

## 2024-11-11 RX ADMIN — Medication 4: at 17:07

## 2024-11-11 RX ADMIN — POTASSIUM PHOSPHATE 83.33 MILLIMOLE(S): 236; 224 INJECTION, SOLUTION INTRAVENOUS at 05:15

## 2024-11-11 RX ADMIN — MUPIROCIN 1 APPLICATION(S): 20 OINTMENT TOPICAL at 17:11

## 2024-11-11 RX ADMIN — Medication 250 MILLILITER(S): at 03:15

## 2024-11-11 RX ADMIN — Medication 100 MILLILITER(S): at 10:45

## 2024-11-11 RX ADMIN — Medication 5 UNIT(S): at 23:10

## 2024-11-11 RX ADMIN — CHLORHEXIDINE GLUCONATE 1 APPLICATION(S): 40 SOLUTION TOPICAL at 05:15

## 2024-11-11 RX ADMIN — Medication 1 APPLICATION(S): at 17:09

## 2024-11-11 RX ADMIN — Medication 3 UNIT(S): at 17:07

## 2024-11-11 RX ADMIN — HEPARIN SODIUM 5000 UNIT(S): 10000 INJECTION INTRAVENOUS; SUBCUTANEOUS at 05:15

## 2024-11-11 NOTE — PROGRESS NOTE ADULT - PROBLEM SELECTOR PLAN 2
Decreased PO intake    Albumin 2.2  Clinical evidence indicates that the patient has Severe protein calorie malnutrition/ 3rd degree    In context of         Chronic Illness (>1 month)    Energy/Food intake <50% of estimated energy requirement >5 days  Weight loss: Moderate - severe   Body Fat loss: Severe   Cachexia, temporal wasting, contracted, muscle atrophy  Muscle mass loss: Severe    Fluid Accumulation: moderate   Strength: weakened severe (bedbound)    NPO for now    Appreciate nutrition consult recs  Appreciate speech and swallow consult recs

## 2024-11-11 NOTE — PROGRESS NOTE ADULT - ASSESSMENT
Patient is a 73yo Female wheelchair bound, legally blind with schizophrenia on Lithium and dementia sent in for hypernatremia on outpt labs. Pt with poor PO intake for 2-3 weeks. Pt a/w hypernatremia (Na 165), PRASHANTH, hypercalcemia, bradycardia, acute metabolic encephalopathy, UTI and ?lithium toxicity (lithium level 1.3). Nephrolgoy consulted for elevated serum creatinine and sodium with ?lithium toxicity.    1. PRASHANTH- previous SCr 1-1.2; last SCr 1.26 on 6/5/24 on HIE. PRASHANTH likely due to lithium use with hypercalcemia/ dehydration due to poor PO intake. +UA in the setting of infection. Renal function improving with polyuria; see below. IVF as below. Pt with baig. Defer renal US. Pt with possible lithium toxicity; s/p HD on 11/8 for 6 hours. No further plans for HD. Shiley removed on 11/9. Strict I/Os. Avoid nephrotoxins/ NSAIDs/ RCA. Monitor BMP.    2. 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 Change diet to low sodium. Management will be difficult given poor mental status (patient unable to drink to thirst) and hypotension.  Continue free water. Can increase infusion rate.  Pt will need increased enteral free water intake.  As dementia is an issue, pt actually may require a feeding tube for hydration (would consider PEG if c/w GOC).  However, long term parenteral hydration and greater risk than enteral hydration; GOC require further clarification.    Given propensity for dehydration and hypotension, not give HCTZ and amiloride at this time or possibly at all in the future).  Unresolved PRASHANTH (limited use of NSAIDS).   Patient s/p DDAVP, which seems to have had no effect.  Can repeat test to be sure: give 2 mcg dose and check urine osm 2 hours post-administration to assess for partial versus complete DDAVP resistance.     3. Lithium toxicity- lithium level 1.3 on admission; with metabolic encephalopathy. S/P HD as above. No further plans for HD. Shiley removed.  4. Hypotension- BP low. Off pressors. Can start midodrine as needed.  5. Acute cystitis- +UA, f/u UCx sensitivity. Pt on Ceftriaxone.    6. Hypercalcemia- corrected serum Ca 11.14; improving on IVF. TSH wnl. likely in part due to hypovolemia as well as lithium. Hypercalcemia resolved. Will need hypercalcemia work up if recurs. Monitor ionized calcium      [facility] NEPHROLOGY  Nando Elizondo M.D.  Johann Noyola D.O.  Paulina Dotson M.D.  MD Keisha Parekh, MSN, ANP-C    Telephone: [phone]  Facsimile: [phone] [place] [address], [address]  [place] [address]

## 2024-11-11 NOTE — PROGRESS NOTE ADULT - ASSESSMENT
74F legally blind, WC bound, schizophrenia on Lithium, dementia, minimally verbal presenting with severe hypernatremia. in ICU with AMS. More alert today.

## 2024-11-11 NOTE — PROGRESS NOTE ADULT - SUBJECTIVE AND OBJECTIVE BOX
Patient is a 74y old  Female who presents with a chief complaint of AMS with concern for lithium toxicity (10 Nov 2024 02:14)      INTERVAL HPI/OVERNIGHT EVENTS:   No overnight events   Afebrile, hemodynamically stable     Subjective: Patient seen and examined at bedside.    ICU Vital Signs Last 24 Hrs  T(C): 36.4 (11 Nov 2024 04:00), Max: 36.8 (10 Nov 2024 17:05)  T(F): 97.5 (11 Nov 2024 04:00), Max: 98.2 (10 Nov 2024 17:05)  HR: 63 (11 Nov 2024 07:00) (56 - 76)  BP: 116/57 (11 Nov 2024 07:00) (80/49 - 129/69)  BP(mean): 75 (11 Nov 2024 07:00) (60 - 99)  ABP: --  ABP(mean): --  RR: 15 (11 Nov 2024 07:00) (11 - 20)  SpO2: 100% (11 Nov 2024 07:00) (97% - 100%)      I&O's Summary    10 Nov 2024 07:01  -  11 Nov 2024 07:00  --------------------------------------------------------  IN: 4710 mL / OUT: 2205 mL / NET: 2505 mL          PHYSICAL EXAM:  GENERAL: No acute distress   HEAD:  Atraumatic, Normocephalic  EYES: EOMI, PERRLA, conjunctiva and sclera clear  ENMT: No tonsillar erythema, exudates, or enlargement; Moist mucous membranes  NECK: Supple, No JVD, Normal thyroid  HEART: Regular rate and rhythm; No murmurs, rubs, or gallops  RESPIRATORY: CTA B/L, No W/R/R  ABDOMEN: Soft, Nontender, Nondistended; Bowel sounds present  NEUROLOGY: A&Ox3, nonfocal, moving all extremities  EXTREMITIES:  2+ Peripheral Pulses, No clubbing, cyanosis, or edema  SKIN: warm, dry, normal color, no rash or abnormal lesions    LABS:                        8.6    6.39  )-----------( 112      ( 11 Nov 2024 03:50 )             29.2     11-11    155[H]  |  126[H]  |  11  ----------------------------<  313[H]  3.5   |  25  |  1.29    Ca    9.1      11 Nov 2024 03:50  Phos  2.3     11-11  Mg     2.0     11-11        Urinalysis Basic - ( 11 Nov 2024 03:50 )    Color: x / Appearance: x / SG: x / pH: x  Gluc: 313 mg/dL / Ketone: x  / Bili: x / Urobili: x   Blood: x / Protein: x / Nitrite: x   Leuk Esterase: x / RBC: x / WBC x   Sq Epi: x / Non Sq Epi: x / Bacteria: x      CAPILLARY BLOOD GLUCOSE      POCT Blood Glucose.: 326 mg/dL (11 Nov 2024 06:12)  POCT Blood Glucose.: 240 mg/dL (10 Nov 2024 22:50)  POCT Blood Glucose.: 318 mg/dL (10 Nov 2024 15:52)  POCT Blood Glucose.: 263 mg/dL (10 Nov 2024 11:06)        Consultant(s) Notes Reviewed:  [x ] YES  [ ] NO    MEDICATIONS  (STANDING):  chlorhexidine 2% Cloths 1 Application(s) Topical <User Schedule>  dextrose 5%. 500 milliLiter(s) (100 mL/Hr) IV Continuous <Continuous>  dextrose 5%. 1000 milliLiter(s) (1000 mL/Hr) IV Continuous <Continuous>  haloperidol     Tablet 1 milliGRAM(s) Oral two times a day  heparin   Injectable 5000 Unit(s) SubCutaneous every 12 hours  insulin lispro (ADMELOG) corrective regimen sliding scale   SubCutaneous three times a day before meals  insulin lispro (ADMELOG) corrective regimen sliding scale   SubCutaneous at bedtime  neomycin/bacitracin/polymyxin Ointment 1 Application(s) Right EYE two times a day  petrolatum Ophthalmic Ointment 1 Application(s) Right EYE three times a day  traZODone 75 milliGRAM(s) Oral at bedtime    MEDICATIONS  (PRN):  haloperidol    Injectable 1 milliGRAM(s) IntraMuscular every 6 hours PRN Agitation      Care Discussed with Consultants/Other Providers [ x] YES  [ ] NO    RADIOLOGY & ADDITIONAL TESTS: Patient is a 74y old  Female who presents with a chief complaint of AMS with concern for lithium toxicity (10 Nov 2024 02:14)      INTERVAL HPI/OVERNIGHT EVENTS:   No overnight events. Afebrile, hemodynamically stable     Subjective: Patient seen and examined at bedside. More awake today responding to questions. Mentioning she needs to go to [facility] - confused. At baseline?    ICU Vital Signs Last 24 Hrs  T(C): 36.4 (11 Nov 2024 04:00), Max: 36.8 (10 Nov 2024 17:05)  T(F): 97.5 (11 Nov 2024 04:00), Max: 98.2 (10 Nov 2024 17:05)  HR: 63 (11 Nov 2024 07:00) (56 - 76)  BP: 116/57 (11 Nov 2024 07:00) (80/49 - 129/69)  BP(mean): 75 (11 Nov 2024 07:00) (60 - 99)  ABP: --  ABP(mean): --  RR: 15 (11 Nov 2024 07:00) (11 - 20)  SpO2: 100% (11 Nov 2024 07:00) (97% - 100%)      I&O's Summary    10 Nov 2024 07:01  -  11 Nov 2024 07:00  --------------------------------------------------------  IN: 4710 mL / OUT: 2205 mL / NET: 2505 mL      PHYSICAL EXAM:  GENERAL: No acute distress   HEAD:  Atraumatic, Normocephalic  EYES: EOMI, PERRLA, conjunctiva and sclera clear  ENMT: No tonsillar erythema, exudates, or enlargement; Moist mucous membranes  NECK: Supple, No JVD, Normal thyroid  HEART: Regular rate and rhythm; No murmurs, rubs, or gallops  RESPIRATORY: CTA B/L, No W/R/R  ABDOMEN: Soft, Nontender, Nondistended; Bowel sounds present  NEUROLOGY: A&Ox0, nonfocal, moving all extremities  EXTREMITIES:  2+ Peripheral Pulses, No clubbing, cyanosis, or edema  SKIN: warm, dry, normal color, no rash or abnormal lesions    LABS:                        8.6    6.39  )-----------( 112      ( 11 Nov 2024 03:50 )             29.2     11-11    155[H]  |  126[H]  |  11  ----------------------------<  313[H]  3.5   |  25  |  1.29    Ca    9.1      11 Nov 2024 03:50  Phos  2.3     11-11  Mg     2.0     11-11        Urinalysis Basic - ( 11 Nov 2024 03:50 )    Color: x / Appearance: x / SG: x / pH: x  Gluc: 313 mg/dL / Ketone: x  / Bili: x / Urobili: x   Blood: x / Protein: x / Nitrite: x   Leuk Esterase: x / RBC: x / WBC x   Sq Epi: x / Non Sq Epi: x / Bacteria: x      CAPILLARY BLOOD GLUCOSE      POCT Blood Glucose.: 326 mg/dL (11 Nov 2024 06:12)  POCT Blood Glucose.: 240 mg/dL (10 Nov 2024 22:50)  POCT Blood Glucose.: 318 mg/dL (10 Nov 2024 15:52)  POCT Blood Glucose.: 263 mg/dL (10 Nov 2024 11:06)        Consultant(s) Notes Reviewed:  [x ] YES  [ ] NO    MEDICATIONS  (STANDING):  chlorhexidine 2% Cloths 1 Application(s) Topical <User Schedule>  dextrose 5%. 500 milliLiter(s) (100 mL/Hr) IV Continuous <Continuous>  dextrose 5%. 1000 milliLiter(s) (1000 mL/Hr) IV Continuous <Continuous>  haloperidol     Tablet 1 milliGRAM(s) Oral two times a day  heparin   Injectable 5000 Unit(s) SubCutaneous every 12 hours  insulin lispro (ADMELOG) corrective regimen sliding scale   SubCutaneous three times a day before meals  insulin lispro (ADMELOG) corrective regimen sliding scale   SubCutaneous at bedtime  neomycin/bacitracin/polymyxin Ointment 1 Application(s) Right EYE two times a day  petrolatum Ophthalmic Ointment 1 Application(s) Right EYE three times a day  traZODone 75 milliGRAM(s) Oral at bedtime    MEDICATIONS  (PRN):  haloperidol    Injectable 1 milliGRAM(s) IntraMuscular every 6 hours PRN Agitation      Care Discussed with Consultants/Other Providers [ x] YES  [ ] NO    RADIOLOGY & ADDITIONAL TESTS:

## 2024-11-11 NOTE — PROGRESS NOTE ADULT - SUBJECTIVE AND OBJECTIVE BOX
follow up on:  complex medical decision making related to goals of care    [facility] Geriatric and Palliative Consult Service:  Isatu Mccarthy DO: cell ([phone])  Anthony Taylor MD: cell ([phone])  Cesar Sawyer NP: cell ([phone])   Jeuss Joe LMSW: cell ([phone])   Jessica Fleischer-Black, MD: cell: ([phone])    Can contact any Palliative Team member via Microsoft Teams for consults and questions    OVERNIGHT EVENTS:    Present Symptoms: Mild, Moderate, Severe    Review of Systems: [All others negative or Unable to obtain due to poor mentation]  Present Symptoms: Mild, Moderate, Severe  Pain:             Location -                               Aggravating factors -             Quality -             Radiation -             Timing-             Severity (0-10 scale):             Minimal acceptable level (0-10 scale):  Fatigue:  Nausea:  Lack of Appetite:   SOB:  Depression:  Anxiety:  Constipation:     CPOT:    https://ccs-st.org/resources/Documents/Sedation%20Analgesia%20Delirium%20in%20CC/CCS%20STH%20Guideline%20template%20CPOT.pdf  PAIN AD Score:   [email or web address] (press ctrl +  left click to view)MEDICATIONS  (STANDING):  chlorhexidine 2% Cloths 1 Application(s) Topical <User Schedule>  dextrose 5%. 500 milliLiter(s) (100 mL/Hr) IV Continuous <Continuous>  haloperidol     Tablet 1 milliGRAM(s) Oral two times a day  heparin   Injectable 5000 Unit(s) SubCutaneous every 12 hours  insulin glargine Injectable (LANTUS) 5 Unit(s) SubCutaneous at bedtime  insulin lispro (ADMELOG) corrective regimen sliding scale   SubCutaneous three times a day before meals  insulin lispro (ADMELOG) corrective regimen sliding scale   SubCutaneous at bedtime  insulin lispro Injectable (ADMELOG) 3 Unit(s) SubCutaneous three times a day before meals  mupirocin 2% Ointment 1 Application(s) Both Nostrils two times a day  neomycin/bacitracin/polymyxin Ointment 1 Application(s) Right EYE two times a day  petrolatum Ophthalmic Ointment 1 Application(s) Right EYE three times a day  traZODone 75 milliGRAM(s) Oral at bedtime    MEDICATIONS  (PRN):  haloperidol    Injectable 1 milliGRAM(s) IntraMuscular every 6 hours PRN Agitation      PHYSICAL EXAM:  Vital Signs Last 24 Hrs  T(C): 36.7 (11 Nov 2024 08:00), Max: 36.8 (10 Nov 2024 17:05)  T(F): 98 (11 Nov 2024 08:00), Max: 98.2 (10 Nov 2024 17:05)  HR: 65 (11 Nov 2024 12:00) (58 - 76)  BP: 93/59 (11 Nov 2024 12:00) (80/49 - 129/69)  BP(mean): 70 (11 Nov 2024 12:00) (60 - 99)  RR: 19 (11 Nov 2024 12:00) (11 - 20)  SpO2: 100% (11 Nov 2024 12:00) (97% - 100%)        General: alert  oriented x ____    lethargic distressed cachexia  verbal nonverbal  unarousable     Palliative Performance Scale/Karnofsky Score:  http://npcrc.org/files/news/palliative_performance_scale_ppsv2.pdf    HEENT: no abnormal lesion, dry mouth  ET tube/trach oral lesions:  Lungs: tachypnea/labored breathing, audible excessive secretions  CV: RRR, S1S2, tachycardia  GI: soft non distended non tender  incontinent               PEG/NG/OG tube  constipation  last BM:   : incontinent  oliguria/anuria  baig  Musculoskeletal: weakness x4 edema x4    ambulatory with assistance   mostly/fully bedbound/wheelchair bound  Skin: no abnormal skin lesions, poor skin turgor, pressure ulcers stage:   Neuro: no deficits, mild cognitive impairment dsyphagia/dysarthria paresis  Oral intake ability: unable/only mouth care, minimal moderate full capability    LABS:                          8.6    6.39  )-----------( 112      ( 11 Nov 2024 03:50 )             29.2     11-11    155[H]  |  126[H]  |  11  ----------------------------<  313[H]  3.5   |  25  |  1.29    Ca    9.1      11 Nov 2024 03:50  Phos  2.3     11-11  Mg     2.0     11-11      Urinalysis Basic - ( 11 Nov 2024 03:50 )    Color: x / Appearance: x / SG: x / pH: x  Gluc: 313 mg/dL / Ketone: x  / Bili: x / Urobili: x   Blood: x / Protein: x / Nitrite: x   Leuk Esterase: x / RBC: x / WBC x   Sq Epi: x / Non Sq Epi: x / Bacteria: x        RADIOLOGY & ADDITIONAL STUDIES: follow up on:  complex medical decision making related to goals of care    [facility] Geriatric and Palliative Consult Service:  Isatu Mccarthy DO: cell ([phone])  Anthony Taylor MD: cell ([phone])  Cesar Sawyer NP: cell ([phone])   Jesus Joe LMSW: cell ([phone])   Jessica Fleischer-Black, MD: cell: ([phone])    Can contact any Palliative Team member via Microsoft Teams for consults and questions    OVERNIGHT EVENTS:    Present Symptoms: Mild, Moderate, Severe    Review of Systems: [All others negative or Unable to obtain due to poor mentation]  Present Symptoms: Mild, Moderate, Severe  Pain:             Location -                               Aggravating factors -             Quality -             Radiation -             Timing-             Severity (0-10 scale):             Minimal acceptable level (0-10 scale):  Fatigue:  Nausea:  Lack of Appetite:   SOB:  Depression:  Anxiety:  Constipation:     CPOT:    https://ccs-st.org/resources/Documents/Sedation%20Analgesia%20Delirium%20in%20CC/CCS%20STH%20Guideline%20template%20CPOT.pdf  PAIN AD Score:   [email or web address] (press ctrl +  left click to view)    MEDICATIONS  (STANDING):  chlorhexidine 2% Cloths 1 Application(s) Topical <User Schedule>  dextrose 5%. 500 milliLiter(s) (100 mL/Hr) IV Continuous <Continuous>  haloperidol     Tablet 1 milliGRAM(s) Oral two times a day  heparin   Injectable 5000 Unit(s) SubCutaneous every 12 hours  insulin glargine Injectable (LANTUS) 5 Unit(s) SubCutaneous at bedtime  insulin lispro (ADMELOG) corrective regimen sliding scale   SubCutaneous three times a day before meals  insulin lispro (ADMELOG) corrective regimen sliding scale   SubCutaneous at bedtime  insulin lispro Injectable (ADMELOG) 3 Unit(s) SubCutaneous three times a day before meals  mupirocin 2% Ointment 1 Application(s) Both Nostrils two times a day  neomycin/bacitracin/polymyxin Ointment 1 Application(s) Right EYE two times a day  petrolatum Ophthalmic Ointment 1 Application(s) Right EYE three times a day  traZODone 75 milliGRAM(s) Oral at bedtime    MEDICATIONS  (PRN):  haloperidol    Injectable 1 milliGRAM(s) IntraMuscular every 6 hours PRN Agitation      PHYSICAL EXAM:  Vital Signs Last 24 Hrs  T(C): 36.7 (11 Nov 2024 08:00), Max: 36.8 (10 Nov 2024 17:05)  T(F): 98 (11 Nov 2024 08:00), Max: 98.2 (10 Nov 2024 17:05)  HR: 65 (11 Nov 2024 12:00) (58 - 76)  BP: 93/59 (11 Nov 2024 12:00) (80/49 - 129/69)  BP(mean): 70 (11 Nov 2024 12:00) (60 - 99)  RR: 19 (11 Nov 2024 12:00) (11 - 20)  SpO2: 100% (11 Nov 2024 12:00) (97% - 100%)        General: alert  oriented x 0    cachexia  verbal      Palliative Performance Scale/Karnofsky Score: 10%  http://npcrc.org/files/news/palliative_performance_scale_ppsv2.pdf    HEENT: dry mouth    Lungs: CTA b/l  CV: RRR, S1S2  GI: soft non distended non tender  i  : incontinent    baig  Musculoskeletal: weakness x4  fully bedbound/wheelchair bound  Skin: no abnormal skin lesions, poor skin turgor  Neuro: vocalizing. responding to internal stimuli. Moving all 4 extremities  Oral intake ability: unable/only mouth care, minimal moderate full capability    LABS:                          8.6    6.39  )-----------( 112      ( 11 Nov 2024 03:50 )             29.2     11-11    155[H]  |  126[H]  |  11  ----------------------------<  313[H]  3.5   |  25  |  1.29    Ca    9.1      11 Nov 2024 03:50  Phos  2.3     11-11  Mg     2.0     11-11      Urinalysis Basic - ( 11 Nov 2024 03:50 )    Color: x / Appearance: x / SG: x / pH: x  Gluc: 313 mg/dL / Ketone: x  / Bili: x / Urobili: x   Blood: x / Protein: x / Nitrite: x   Leuk Esterase: x / RBC: x / WBC x   Sq Epi: x / Non Sq Epi: x / Bacteria: x        RADIOLOGY & ADDITIONAL STUDIES:

## 2024-11-11 NOTE — PROGRESS NOTE ADULT - PROBLEM SELECTOR PLAN 3
Lauri Mar is surrogate decision-maker by [facility] Care Decision Act.     Lauri is thinking about code status  For now Nelda is FULL CODE    Hospice-appropriate from severe dementia.

## 2024-11-11 NOTE — PROGRESS NOTE ADULT - ASSESSMENT
====== [ NEURO ] ======  #Acute encephalopathy on progressive dementia  #End-stage dementia  #Lithium toxicity  as per : pt is minimally verbal at baseline  poor po intake last 3 weeks, progressed the last year where she is no longer chewing, now on a puree diet  UA positive, lithium elevated, renal function impaired  likely metabolic encephalopathy from ?nephrogenic DI and hyperNa secondary to elevated lithium level v UTI encephalopathy  encephalopathy from lithium toxicity may not be reflected in level of 1.3  acetaminophen, salicylate negative  hospice appropriate as per palliative   - hold lithium, consider olanzapine if agitated    ====== [ CVS ] ======  No active issues    ====== [ PULM ] ======  No active issues    ====== [ RENAL ] ======  #Severe Hypernatremia  #PRASHANTH  as above  Na on admission was 164  multifactorial in the setting of poor PO intake (dehydration) v lithium nephrotoxicity/ DI   BUN/Cr elevated 82/2.59 likely pre-renal in the setting of poor PO intake and Li toxicity  encephalopathy from lithium toxicity may not be reflected in level of 1.3  s/p HD 6hrs 11/8 as per toxicology, SCr improved   urine Osm= 308  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 Dotson    #Cystitis  UA +ve  - UCx > 100k E coli   - c/w CTX 1mg q24h    #Hypophosphatemia  phos 1.6 > repleted  - monitor and supplement prn    ====== [ GI/NUTRITION ] ======  #Dementia  poor PO intake leading to electrolyte imbalances   her advancing dementia has progressed; she is an extremely poor candidate for PEG as it would not change or reverse her dementia/quality of life  tolerating feeds, nutrition consulted  - aspiration precautions    ====== [ MSK/MOBILITY ] ======  #wheelchair-bound status   #legal blindness   - f/u PT recs    ====== [ PROPHYLAXIS ] ======  DVT ppx: Heparin SubQ  GI ppx: Protonix    ====== [ GOC/DISPO ] ======  FULL CODE  hospice appropriate as per palliative   Dispo ICU   ====== [ NEURO ] ======  #Acute encephalopathy on progressive dementia  #End-stage dementia  #Lithium toxicity  as per : pt is minimally verbal at baseline  poor po intake last 3 weeks, progressed the last year where she is no longer chewing, now on a puree diet  UA positive, lithium elevated, renal function impaired  likely metabolic encephalopathy from ?nephrogenic DI and hyperNa secondary to elevated lithium level v UTI encephalopathy  encephalopathy from lithium toxicity may not be reflected in level of 1.3  acetaminophen, salicylate negative  hospice appropriate as per palliative   - hold lithium, consider olanzapine if agitated    ====== [ CVS ] ======  No active issues    ====== [ PULM ] ======  No active issues    ====== [ RENAL ] ======  #Severe Hypernatremia  #PRASHANTH  #Li-induced nephrogenic DI  as above  Na on admission was 164  multifactorial in the setting of poor PO intake (dehydration) v lithium nephrotoxicity/ DI   BUN/Cr elevated 82/2.59 likely pre-renal in the setting of poor PO intake and Li toxicity  encephalopathy from lithium toxicity may not be reflected in level of 1.3  s/p HD 6hrs 11/8 as per toxicology, SCr improved   urine Osm= 308  Patient s/p DDAVP x2, which seems to have had no effect  As per nephro= increased enteral free water intake, no need for HCTZ or ddAVP  As pt demented, may require a feeding tube for hydration (would consider PEG if c/w GOC)  - c/w D5  - trend BMP q6hrs  - safe correction rate; goal <12mEq/24 hours; Na goal 155 -->148 in 24 hrs  - Nephro Dr. Dotson    #Cystitis  UA +ve  - UCx > 100k E coli   - s/p CTX 1mg q24h x 3 days    #Hypophosphatemia  phos 1.6 > repleted  - monitor and supplement prn    ====== [ GI/NUTRITION ] ======  #Dementia  poor PO intake leading to electrolyte imbalances   her advancing dementia has progressed; she is an extremely poor candidate for PEG as it would not change or reverse her dementia/quality of life  tolerating feeds, nutrition consulted  - aspiration precautions    ====== [ ENDO] ======  #DM, Hyperglycemia  A1C= 7.7  mod SS, basaglar, and premeals  fingersticks    ====== [ MSK/MOBILITY ] ======  #wheelchair-bound status   #legal blindness   - f/u PT recs    ====== [ PROPHYLAXIS ] ======  DVT ppx: Heparin SubQ  GI ppx: Protonix    ====== [ GOC/DISPO ] ======  FULL CODE  hospice appropriate as per palliative   Dispo ICU

## 2024-11-11 NOTE — PROGRESS NOTE ADULT - SUBJECTIVE AND OBJECTIVE BOX
Full note to follow.    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  Continue free water. Can increase infusion rate.  Pt will need increased enteral free water intake.  If dementia is an issue, pt actually may require a feeding tube for hydration.

## 2024-11-12 LAB
ANION GAP SERPL CALC-SCNC: 1 MMOL/L — LOW (ref 5–17)
ANION GAP SERPL CALC-SCNC: 1 MMOL/L — LOW (ref 5–17)
ANION GAP SERPL CALC-SCNC: 2 MMOL/L — LOW (ref 5–17)
ANION GAP SERPL CALC-SCNC: 2 MMOL/L — LOW (ref 5–17)
BUN SERPL-MCNC: 13 MG/DL — SIGNIFICANT CHANGE UP (ref 7–18)
BUN SERPL-MCNC: 14 MG/DL — SIGNIFICANT CHANGE UP (ref 7–18)
BUN SERPL-MCNC: 17 MG/DL — SIGNIFICANT CHANGE UP (ref 7–18)
BUN SERPL-MCNC: 18 MG/DL — SIGNIFICANT CHANGE UP (ref 7–18)
CALCIUM SERPL-MCNC: 10 MG/DL — SIGNIFICANT CHANGE UP (ref 8.4–10.5)
CALCIUM SERPL-MCNC: 9.6 MG/DL — SIGNIFICANT CHANGE UP (ref 8.4–10.5)
CALCIUM SERPL-MCNC: 9.7 MG/DL — SIGNIFICANT CHANGE UP (ref 8.4–10.5)
CALCIUM SERPL-MCNC: 9.9 MG/DL — SIGNIFICANT CHANGE UP (ref 8.4–10.5)
CHLORIDE SERPL-SCNC: 128 MMOL/L — HIGH (ref 96–108)
CHLORIDE SERPL-SCNC: 131 MMOL/L — HIGH (ref 96–108)
CHLORIDE SERPL-SCNC: 132 MMOL/L — HIGH (ref 96–108)
CHLORIDE SERPL-SCNC: 133 MMOL/L — HIGH (ref 96–108)
CO2 SERPL-SCNC: 23 MMOL/L — SIGNIFICANT CHANGE UP (ref 22–31)
CO2 SERPL-SCNC: 26 MMOL/L — SIGNIFICANT CHANGE UP (ref 22–31)
CO2 SERPL-SCNC: 27 MMOL/L — SIGNIFICANT CHANGE UP (ref 22–31)
CO2 SERPL-SCNC: 28 MMOL/L — SIGNIFICANT CHANGE UP (ref 22–31)
CREAT ?TM UR-MCNC: <13 MG/DL — SIGNIFICANT CHANGE UP
CREAT SERPL-MCNC: 1.14 MG/DL — SIGNIFICANT CHANGE UP (ref 0.5–1.3)
CREAT SERPL-MCNC: 1.27 MG/DL — SIGNIFICANT CHANGE UP (ref 0.5–1.3)
CREAT SERPL-MCNC: 1.33 MG/DL — HIGH (ref 0.5–1.3)
CREAT SERPL-MCNC: 1.47 MG/DL — HIGH (ref 0.5–1.3)
EGFR: 37 ML/MIN/1.73M2 — LOW
EGFR: 42 ML/MIN/1.73M2 — LOW
EGFR: 44 ML/MIN/1.73M2 — LOW
EGFR: 51 ML/MIN/1.73M2 — LOW
GLUCOSE BLDC GLUCOMTR-MCNC: 212 MG/DL — HIGH (ref 70–99)
GLUCOSE BLDC GLUCOMTR-MCNC: 222 MG/DL — HIGH (ref 70–99)
GLUCOSE BLDC GLUCOMTR-MCNC: 280 MG/DL — HIGH (ref 70–99)
GLUCOSE BLDC GLUCOMTR-MCNC: 291 MG/DL — HIGH (ref 70–99)
GLUCOSE SERPL-MCNC: 166 MG/DL — HIGH (ref 70–99)
GLUCOSE SERPL-MCNC: 181 MG/DL — HIGH (ref 70–99)
GLUCOSE SERPL-MCNC: 312 MG/DL — HIGH (ref 70–99)
GLUCOSE SERPL-MCNC: 316 MG/DL — HIGH (ref 70–99)
HCT VFR BLD CALC: 34.8 % — SIGNIFICANT CHANGE UP (ref 34.5–45)
HGB BLD-MCNC: 10.1 G/DL — LOW (ref 11.5–15.5)
MAGNESIUM SERPL-MCNC: 2.1 MG/DL — SIGNIFICANT CHANGE UP (ref 1.6–2.6)
MCHC RBC-ENTMCNC: 27 PG — SIGNIFICANT CHANGE UP (ref 27–34)
MCHC RBC-ENTMCNC: 29 G/DL — LOW (ref 32–36)
MCV RBC AUTO: 93 FL — SIGNIFICANT CHANGE UP (ref 80–100)
NRBC # BLD: 0 /100 WBCS — SIGNIFICANT CHANGE UP (ref 0–0)
PHOSPHATE SERPL-MCNC: 2.9 MG/DL — SIGNIFICANT CHANGE UP (ref 2.5–4.5)
PLATELET # BLD AUTO: 113 K/UL — LOW (ref 150–400)
POTASSIUM SERPL-MCNC: 3.7 MMOL/L — SIGNIFICANT CHANGE UP (ref 3.5–5.3)
POTASSIUM SERPL-MCNC: 3.8 MMOL/L — SIGNIFICANT CHANGE UP (ref 3.5–5.3)
POTASSIUM SERPL-MCNC: 3.9 MMOL/L — SIGNIFICANT CHANGE UP (ref 3.5–5.3)
POTASSIUM SERPL-MCNC: 4 MMOL/L — SIGNIFICANT CHANGE UP (ref 3.5–5.3)
POTASSIUM SERPL-SCNC: 3.7 MMOL/L — SIGNIFICANT CHANGE UP (ref 3.5–5.3)
POTASSIUM SERPL-SCNC: 3.8 MMOL/L — SIGNIFICANT CHANGE UP (ref 3.5–5.3)
POTASSIUM SERPL-SCNC: 3.9 MMOL/L — SIGNIFICANT CHANGE UP (ref 3.5–5.3)
POTASSIUM SERPL-SCNC: 4 MMOL/L — SIGNIFICANT CHANGE UP (ref 3.5–5.3)
POTASSIUM UR-SCNC: 7 MMOL/L — SIGNIFICANT CHANGE UP
PROT ?TM UR-MCNC: 8 MG/DL — SIGNIFICANT CHANGE UP (ref 0–12)
PROT/CREAT UR-RTO: SIGNIFICANT CHANGE UP RATIO (ref 0–0.2)
RBC # BLD: 3.74 M/UL — LOW (ref 3.8–5.2)
RBC # FLD: 17.2 % — HIGH (ref 10.3–14.5)
SODIUM SERPL-SCNC: 156 MMOL/L — HIGH (ref 135–145)
SODIUM SERPL-SCNC: 158 MMOL/L — HIGH (ref 135–145)
SODIUM SERPL-SCNC: 159 MMOL/L — HIGH (ref 135–145)
SODIUM SERPL-SCNC: 161 MMOL/L — CRITICAL HIGH (ref 135–145)
SODIUM UR-SCNC: 60 MMOL/L — SIGNIFICANT CHANGE UP
SODIUM UR-SCNC: 64 MMOL/L — SIGNIFICANT CHANGE UP
UUN UR-MCNC: 106 MG/DL — SIGNIFICANT CHANGE UP
WBC # BLD: 5.9 K/UL — SIGNIFICANT CHANGE UP (ref 3.8–10.5)
WBC # FLD AUTO: 5.9 K/UL — SIGNIFICANT CHANGE UP (ref 3.8–10.5)

## 2024-11-12 PROCEDURE — 99233 SBSQ HOSP IP/OBS HIGH 50: CPT | Mod: GC

## 2024-11-12 PROCEDURE — 99233 SBSQ HOSP IP/OBS HIGH 50: CPT

## 2024-11-12 RX ORDER — INSULIN GLARGINE,HUM.REC.ANLOG 100/ML
10 VIAL (ML) SUBCUTANEOUS AT BEDTIME
Refills: 0 | Status: DISCONTINUED | OUTPATIENT
Start: 2024-11-12 | End: 2024-11-13

## 2024-11-12 RX ORDER — DESMOPRESSIN ACETATE 4 UG/ML
2 INJECTION INTRAVENOUS ONCE
Refills: 0 | Status: COMPLETED | OUTPATIENT
Start: 2024-11-12 | End: 2024-11-12

## 2024-11-12 RX ORDER — INSULIN LISPRO 100/ML
6 VIAL (ML) SUBCUTANEOUS
Refills: 0 | Status: DISCONTINUED | OUTPATIENT
Start: 2024-11-12 | End: 2024-11-15

## 2024-11-12 RX ADMIN — DESMOPRESSIN ACETATE 2 MICROGRAM(S): 4 INJECTION INTRAVENOUS at 21:16

## 2024-11-12 RX ADMIN — Medication 1 APPLICATION(S): at 17:15

## 2024-11-12 RX ADMIN — Medication 1000 MILLILITER(S): at 05:21

## 2024-11-12 RX ADMIN — Medication 6: at 10:56

## 2024-11-12 RX ADMIN — Medication 4: at 17:18

## 2024-11-12 RX ADMIN — Medication 1 APPLICATION(S): at 21:20

## 2024-11-12 RX ADMIN — HALOPERIDOL DECANOATE 1 MILLIGRAM(S): 50 INJECTION INTRAMUSCULAR at 17:21

## 2024-11-12 RX ADMIN — Medication 1 APPLICATION(S): at 05:54

## 2024-11-12 RX ADMIN — Medication 10 UNIT(S): at 21:18

## 2024-11-12 RX ADMIN — Medication 3 UNIT(S): at 06:18

## 2024-11-12 RX ADMIN — Medication 6 UNIT(S): at 17:19

## 2024-11-12 RX ADMIN — Medication 250 MILLILITER(S): at 20:44

## 2024-11-12 RX ADMIN — CHLORHEXIDINE GLUCONATE 1 APPLICATION(S): 40 SOLUTION TOPICAL at 05:54

## 2024-11-12 RX ADMIN — HALOPERIDOL DECANOATE 1 MILLIGRAM(S): 50 INJECTION INTRAMUSCULAR at 05:53

## 2024-11-12 RX ADMIN — Medication 3 UNIT(S): at 10:56

## 2024-11-12 RX ADMIN — HEPARIN SODIUM 5000 UNIT(S): 10000 INJECTION INTRAVENOUS; SUBCUTANEOUS at 17:16

## 2024-11-12 RX ADMIN — MUPIROCIN 1 APPLICATION(S): 20 OINTMENT TOPICAL at 05:55

## 2024-11-12 RX ADMIN — HEPARIN SODIUM 5000 UNIT(S): 10000 INJECTION INTRAVENOUS; SUBCUTANEOUS at 05:53

## 2024-11-12 RX ADMIN — MUPIROCIN 1 APPLICATION(S): 20 OINTMENT TOPICAL at 17:17

## 2024-11-12 RX ADMIN — Medication 6: at 06:17

## 2024-11-12 RX ADMIN — Medication 150 MILLILITER(S): at 17:20

## 2024-11-12 NOTE — PROGRESS NOTE ADULT - PROBLEM SELECTOR PLAN 3
Lauri Mar is surrogate decision-maker by [facility] Care Decision Act.     Lauri is thinking about code status  For now Nelda is FULL CODE    Hospice-appropriate from severe dementia.  Lauri Mar is surrogate decision-maker by [facility] Care Decision Act.     Lauri is thinking about code status  For now Nelda is FULL CODE    Hospice-appropriate from severe dementia.    d/w team

## 2024-11-12 NOTE — PROGRESS NOTE ADULT - SUBJECTIVE AND OBJECTIVE BOX
[facility] NEPHROLOGY- PROGRESS NOTE    Patient is a 75yo Female wheelchair bound, legally blind with schizophrenia on Lithium and dementia sent in for hypernatremia on outpt labs. Pt with poor PO intake for 2-3 weeks. Pt a/w hypernatremia (Na 165), PRASHANTH, hypercalcemia, bradycardia, acute metabolic encephalopathy, UTI and ?lithium toxicity (lithium level 1.3). Nephrolgoy consulted for elevated serum creatinine and sodium with ?lithium toxicity.    S/P HD for 6 hours on 11/8.    REVIEW OF SYSTEMS: Unable to obtain as patient non-verbal.    penicillins (Fever)  sulfur (Other)  sulfa drugs (Unknown)  Stelazine (Other)  phenothiazines (Unknown)      Hospital Medications: Medications reviewed    VITALS:  T(F): 96.8 (11-12-24 @ 08:00), Max: 98.1 (11-12-24 @ 00:00)  HR: 70 (11-12-24 @ 11:00)  BP: 107/54 (11-12-24 @ 11:00)  RR: 14 (11-12-24 @ 11:00)  SpO2: 99% (11-12-24 @ 11:00)  Wt(kg): --    11-11 @ 07:01  -  11-12 @ 07:00  --------------------------------------------------------  IN: 3850 mL / OUT: 4425 mL / NET: -575 mL    11-12 @ 07:01  -  11-12 @ 11:42  --------------------------------------------------------  IN: 400 mL / OUT: 900 mL / NET: -500 mL        PHYSICAL EXAM:    Gen: NAD,  non-verbal  Cards: RRR, +S1/S2, no M/G/R  Resp: CTA B/L  GI: soft, NT/ND, NABS  : + baig with dilute urine  Vascular: no LE edema B/L        LABS:  11-12    158[H]  |  133[H]  |  13  ----------------------------<  312[H]  4.0   |  23  |  1.33[H]    Ca    9.6      12 Nov 2024 04:00  Phos  2.9     11-12  Mg     2.1     11-12      Creatinine Trend: 1.33 <--, 1.30 <--, 1.29 <--, 1.43 <--, 1.33 <--, 1.48 <--, 1.53 <--, 1.34 <--, 1.34 <--, 1.09 <--, 0.81 <--, 0.35 <--, 1.71 <--, 1.90 <--, 2.02 <--, 2.16 <--, 2.59 <--                        10.1   5.90  )-----------( 113      ( 12 Nov 2024 04:00 )             34.8     Urine Studies:  Urinalysis Basic - ( 12 Nov 2024 04:00 )    Color:  / Appearance:  / SG:  / pH:   Gluc: 312 mg/dL / Ketone:   / Bili:  / Urobili:    Blood:  / Protein:  / Nitrite:    Leuk Esterase:  / RBC:  / WBC    Sq Epi:  / Non Sq Epi:  / Bacteria:       Sodium, Random Urine: 67 mmol/L (11-10 @ 20:11)  Osmolality, Random Urine: 180 mosm/Kg (11-10 @ 20:11)  Osmolality, Random Urine: 125 mosm/Kg (11-10 @ 18:30)  Sodium, Random Urine: 48 mmol/L (11-10 @ 18:30)  Sodium, Random Urine: 47 mmol/L (11-10 @ 16:00)  Creatinine, Random Urine: 23 mg/dL (11-10 @ 16:00)  Osmolality, Random Urine: 165 mosm/Kg (11-10 @ 16:00)  Potassium, Random Urine: 10 mmol/L (11-10 @ 16:00)  Creatinine, Random Urine: 23 mg/dL (11-10 @ 16:00)  Protein/Creatinine Ratio Calculation: 0.4 Ratio (11-10 @ 16:00)  Sodium, Random Urine: 52 mmol/L (11-10 @ 10:38)  Osmolality, Random Urine: 158 mosm/Kg (11-10 @ 10:38)  Potassium, Random Urine: 10 mmol/L (11-10 @ 10:38)  Sodium, Random Urine: 57 mmol/L (11-09 @ 19:55)  Creatinine, Random Urine: <13 mg/dL (11-09 @ 19:55)  Osmolality, Random Urine: 156 mosm/Kg (11-09 @ 19:55)  Potassium, Random Urine: 10 mmol/L (11-09 @ 19:55)  Osmolality, Random Urine: 256 mosm/Kg (11-08 @ 11:05)  Sodium, Random Urine: 30 mmol/L (11-07 @ 22:40)  Creatinine, Random Urine: 51 mg/dL (11-07 @ 22:40)  Protein/Creatinine Ratio Calculation: 0.3 Ratio (11-07 @ 22:40)  Osmolality, Random Urine: 308 mosm/Kg (11-07 @ 22:40)  Potassium, Random Urine: 30 mmol/L (11-07 @ 22:40)

## 2024-11-12 NOTE — PROGRESS NOTE ADULT - ASSESSMENT
74F legally blind, WC bound, schizophrenia on Lithium, dementia, minimally verbal presenting with severe hypernatremia, rectal prolapse in the setting of worsening renal function and poor PO intake that has progressed these past 2-3 weeks, sent in from PCP given hypernatremia; ICU consulted for severe hypernatremia, metabolic encephalopathy, and lithium toxicity. s/p HD 6 hrs. Still hyperNa on D5.     ====== [ NEURO ] ======  #Acute encephalopathy on progressive dementia  #End-stage dementia  #Lithium toxicity  as per : pt is minimally verbal at baseline  poor po intake last 3 weeks, progressed the last year where she is no longer chewing, now on a puree diet  UA positive, lithium elevated, renal function impaired  likely metabolic encephalopathy from ?nephrogenic DI and hyperNa secondary to elevated lithium level v UTI encephalopathy  encephalopathy from lithium toxicity may not be reflected in level of 1.3  acetaminophen, salicylate negative  hospice appropriate as per palliative   hold lithium, consider olanzapine if agitated  patient at baseline as per     ====== [ CVS ] ======  No active issues    ====== [ PULM ] ======  No active issues    ====== [ RENAL ] ======  #Severe Hypernatremia  #PRASHANTH  #Li-induced Nephrogenic DI  as above  Na on admission was 164  multifactorial in the setting of poor PO intake (dehydration) v lithium nephrotoxicity/ DI   BUN/Cr elevated 82/2.59 likely pre-renal in the setting of poor PO intake and Li toxicity  encephalopathy from lithium toxicity may not be reflected in level of 1.3  s/p HD 6hrs 11/8 as per toxicology, SCr improved   urine Osm= 308  Patient s/p DDAVP x2, which seems to have had no effect  As per nephro= increased enteral free water intake, no need for HCTZ or ddAVP  As pt demented, may require a feeding tube for hydration (would consider PEG if c/w GOC)  - c/w D5  - trend BMP q6hrs  - safe correction rate; goal <12mEq/24 hours; Na goal 155 -->148 in 24 hrs  - Nephro Dr. Dotson    #Cystitis  UA +ve  - UCx > 100k E coli   - s/p CTX 1mg q24h x 3 days    #Hypophosphatemia  phos 1.6 > repleted  - monitor and supplement prn    ====== [ GI/NUTRITION ] ======  #Dementia  poor PO intake leading to electrolyte imbalances   her advancing dementia has progressed; she is an extremely poor candidate for PEG as it would not change or reverse her dementia/quality of life  tolerating feeds, nutrition consulted  - aspiration precautions    ====== [ ENDO] ======  #DM, Hyperglycemia  A1C= 7.7  titrate insulin to improve hyperNa  mod SS, 10 basaglar, and 5 premeals  fingersticks    ====== [ MSK/MOBILITY ] ======  #wheelchair-bound status   #legal blindness   - f/u PT recs    ====== [ PROPHYLAXIS ] ======  DVT ppx: Heparin SubQ  GI ppx: Protonix    ====== [ GOC/DISPO ] ======  FULL CODE  hospice appropriate as per palliative  Dispo ICU

## 2024-11-12 NOTE — PROGRESS NOTE ADULT - SUBJECTIVE AND OBJECTIVE BOX
follow up on:  complex medical decision making related to goals of care    [facility] Geriatric and Palliative Consult Service:  Isatu Mccarthy DO: cell ([phone])  Anthony Taylor MD: cell ([phone])  Cesar Sawyer NP: cell ([phone])   Jesus Joe LMSW: cell ([phone])   Jessica Fleischer-Black, MD: cell: ([phone])    Can contact any Palliative Team member via [employer] Teams for consults and questions    OVERNIGHT EVENTS:  Favian spoke with Palliative Care SW and agreed to home hospice on d/c.    Present Symptoms: Mild  Review of Systems: Unable to obtain due to poor mentation  Present Symptoms: Mild  Pain:             Location -                               Aggravating factors -             Quality -             Radiation -             Timing-             Severity (0-10 scale):             Minimal acceptable level (0-10 scale):  Fatigue:  Nausea:  Lack of Appetite:   SOB:  Depression:  Anxiety:  Constipation:     CPOT:    https://ccs-st.org/resources/Documents/[place]%20Analgesia%20Delirium%20in%20CC/CCS%20STH%20Guideline%20template%20CPOT.pdf  PAIN AD Score: 0  [email or web address] (press ctrl +  left click to view)    MEDICATIONS  (STANDING):  chlorhexidine 2% Cloths 1 Application(s) Topical <User Schedule>  dextrose 5%. 500 milliLiter(s) (100 mL/Hr) IV Continuous <Continuous>  haloperidol     Tablet 1 milliGRAM(s) Oral two times a day  heparin   Injectable 5000 Unit(s) SubCutaneous every 12 hours  insulin glargine Injectable (LANTUS) 5 Unit(s) SubCutaneous at bedtime  insulin lispro (ADMELOG) corrective regimen sliding scale   SubCutaneous three times a day before meals  insulin lispro (ADMELOG) corrective regimen sliding scale   SubCutaneous at bedtime  insulin lispro Injectable (ADMELOG) 3 Unit(s) SubCutaneous three times a day before meals  mupirocin 2% Ointment 1 Application(s) Both Nostrils two times a day  neomycin/bacitracin/polymyxin Ointment 1 Application(s) Right EYE two times a day  petrolatum Ophthalmic Ointment 1 Application(s) Right EYE three times a day  traZODone 75 milliGRAM(s) Oral at bedtime    MEDICATIONS  (PRN):  haloperidol    Injectable 1 milliGRAM(s) IntraMuscular every 6 hours PRN Agitation      PHYSICAL EXAM:  Vital Signs Last 24 Hrs  T(C): 36.7 (11 Nov 2024 08:00), Max: 36.8 (10 Nov 2024 17:05)  T(F): 98 (11 Nov 2024 08:00), Max: 98.2 (10 Nov 2024 17:05)  HR: 65 (11 Nov 2024 12:00) (58 - 76)  BP: 93/59 (11 Nov 2024 12:00) (80/49 - 129/69)  BP(mean): 70 (11 Nov 2024 12:00) (60 - 99)  RR: 19 (11 Nov 2024 12:00) (11 - 20)  SpO2: 100% (11 Nov 2024 12:00) (97% - 100%)        General: alert  oriented x 0    cachexia  verbal      Palliative Performance Scale/Karnofsky Score: 10%  http://npcrc.org/files/news/palliative_performance_scale_ppsv2.pdf    HEENT: dry mouth    Lungs: CTA b/l  CV: RRR, S1S2  GI: soft non distended non tender  i  : incontinent    baig  Musculoskeletal: weakness x4  fully bedbound/wheelchair bound  Skin: no abnormal skin lesions, poor skin turgor  Neuro: vocalizing. responding to internal stimuli. Moving all 4 extremities  Oral intake ability: unable/only mouth care, minimal moderate full capability    LABS:                          8.6    6.39  )-----------( 112      ( 11 Nov 2024 03:50 )             29.2     11-11    155[H]  |  126[H]  |  11  ----------------------------<  313[H]  3.5   |  25  |  1.29    Ca    9.1      11 Nov 2024 03:50  Phos  2.3     11-11  Mg     2.0     11-11      Urinalysis Basic - ( 11 Nov 2024 03:50 )    Color: x / Appearance: x / SG: x / pH: x  Gluc: 313 mg/dL / Ketone: x  / Bili: x / Urobili: x   Blood: x / Protein: x / Nitrite: x   Leuk Esterase: x / RBC: x / WBC x   Sq Epi: x / Non Sq Epi: x / Bacteria: x        RADIOLOGY & ADDITIONAL STUDIES: Reviewed

## 2024-11-12 NOTE — PROGRESS NOTE ADULT - SUBJECTIVE AND OBJECTIVE BOX
Patient is a 74y old  Female who presents with a chief complaint of AMS with concern for lithium toxicity (10 Nov 2024 02:14)      INTERVAL HPI/OVERNIGHT EVENTS:   No overnight events   Afebrile, hemodynamically stable     Subjective: Patient seen and examined at bedside.    ICU Vital Signs Last 24 Hrs  T(C): 36 (12 Nov 2024 08:00), Max: 36.7 (12 Nov 2024 00:00)  T(F): 96.8 (12 Nov 2024 08:00), Max: 98.1 (12 Nov 2024 00:00)  HR: 84 (12 Nov 2024 08:00) (59 - 89)  BP: 132/73 (12 Nov 2024 08:00) (93/59 - 145/51)  BP(mean): 87 (12 Nov 2024 08:00) (70 - 105)  ABP: --  ABP(mean): --  RR: 15 (12 Nov 2024 08:00) (11 - 24)  SpO2: 100% (12 Nov 2024 08:00) (98% - 100%)    O2 Parameters below as of 12 Nov 2024 08:00  Patient On (Oxygen Delivery Method): room air          I&O's Summary    11 Nov 2024 07:01  -  12 Nov 2024 07:00  --------------------------------------------------------  IN: 3850 mL / OUT: 4425 mL / NET: -575 mL          PHYSICAL EXAM:  GENERAL: No acute distress   HEAD:  Atraumatic, Normocephalic  EYES: EOMI, PERRLA, conjunctiva and sclera clear  ENMT: No tonsillar erythema, exudates, or enlargement; Moist mucous membranes  NECK: Supple, No JVD, Normal thyroid  HEART: Regular rate and rhythm; No murmurs, rubs, or gallops  RESPIRATORY: CTA B/L, No W/R/R  ABDOMEN: Soft, Nontender, Nondistended; Bowel sounds present  NEUROLOGY: A&Ox3, nonfocal, moving all extremities  EXTREMITIES:  2+ Peripheral Pulses, No clubbing, cyanosis, or edema  SKIN: warm, dry, normal color, no rash or abnormal lesions    LABS:                        10.1   5.90  )-----------( 113      ( 12 Nov 2024 04:00 )             34.8     11-12    158[H]  |  133[H]  |  13  ----------------------------<  312[H]  4.0   |  23  |  1.33[H]    Ca    9.6      12 Nov 2024 04:00  Phos  2.9     11-12  Mg     2.1     11-12        Urinalysis Basic - ( 12 Nov 2024 04:00 )    Color: x / Appearance: x / SG: x / pH: x  Gluc: 312 mg/dL / Ketone: x  / Bili: x / Urobili: x   Blood: x / Protein: x / Nitrite: x   Leuk Esterase: x / RBC: x / WBC x   Sq Epi: x / Non Sq Epi: x / Bacteria: x      CAPILLARY BLOOD GLUCOSE      POCT Blood Glucose.: 291 mg/dL (12 Nov 2024 06:02)  POCT Blood Glucose.: 245 mg/dL (11 Nov 2024 22:54)  POCT Blood Glucose.: 237 mg/dL (11 Nov 2024 16:28)  POCT Blood Glucose.: 271 mg/dL (11 Nov 2024 11:57)        Consultant(s) Notes Reviewed:  [x ] YES  [ ] NO    MEDICATIONS  (STANDING):  chlorhexidine 2% Cloths 1 Application(s) Topical <User Schedule>  dextrose 5%. 500 milliLiter(s) (100 mL/Hr) IV Continuous <Continuous>  haloperidol     Tablet 1 milliGRAM(s) Oral two times a day  heparin   Injectable 5000 Unit(s) SubCutaneous every 12 hours  insulin glargine Injectable (LANTUS) 5 Unit(s) SubCutaneous at bedtime  insulin lispro (ADMELOG) corrective regimen sliding scale   SubCutaneous three times a day before meals  insulin lispro (ADMELOG) corrective regimen sliding scale   SubCutaneous at bedtime  insulin lispro Injectable (ADMELOG) 3 Unit(s) SubCutaneous three times a day before meals  mupirocin 2% Ointment 1 Application(s) Both Nostrils two times a day  neomycin/bacitracin/polymyxin Ointment 1 Application(s) Right EYE two times a day  petrolatum Ophthalmic Ointment 1 Application(s) Right EYE three times a day  traZODone 75 milliGRAM(s) Oral at bedtime    MEDICATIONS  (PRN):  haloperidol    Injectable 1 milliGRAM(s) IntraMuscular every 6 hours PRN Agitation      Care Discussed with Consultants/Other Providers [ x] YES  [ ] NO    RADIOLOGY & ADDITIONAL TESTS: Patient is a 74y old  Female who presents with a chief complaint of AMS with concern for lithium toxicity (10 Nov 2024 02:14)      INTERVAL HPI/OVERNIGHT EVENTS:   No overnight events. Afebrile, hemodynamically stable     Subjective: No acute events overnight.  Patient examined at bedside this AM.  Patient denies acute complaints    ICU Vital Signs Last 24 Hrs  T(C): 36 (12 Nov 2024 08:00), Max: 36.7 (12 Nov 2024 00:00)  T(F): 96.8 (12 Nov 2024 08:00), Max: 98.1 (12 Nov 2024 00:00)  HR: 84 (12 Nov 2024 08:00) (59 - 89)  BP: 132/73 (12 Nov 2024 08:00) (93/59 - 145/51)  BP(mean): 87 (12 Nov 2024 08:00) (70 - 105)  ABP: --  ABP(mean): --  RR: 15 (12 Nov 2024 08:00) (11 - 24)  SpO2: 100% (12 Nov 2024 08:00) (98% - 100%)    O2 Parameters below as of 12 Nov 2024 08:00  Patient On (Oxygen Delivery Method): room air    I&O's Summary    11 Nov 2024 07:01  -  12 Nov 2024 07:00  --------------------------------------------------------  IN: 3850 mL / OUT: 4425 mL / NET: -575 mL          PHYSICAL EXAM:  GENERAL: No acute distress   HEAD:  Atraumatic, Normocephalic  EYES: EOMI, PERRLA, conjunctiva and sclera clear  ENMT: No tonsillar erythema, exudates, or enlargement; Moist mucous membranes  NECK: Supple, No JVD, Normal thyroid  HEART: Regular rate and rhythm; No murmurs, rubs, or gallops  RESPIRATORY: CTA B/L, No W/R/R  ABDOMEN: Soft, Nontender, Nondistended; Bowel sounds present  NEUROLOGY: A&Ox0, nonfocal, moving all extremities  EXTREMITIES:  2+ Peripheral Pulses, No clubbing, cyanosis, or edema  SKIN: warm, dry, normal color, no rash or abnormal lesions    LABS:                        10.1   5.90  )-----------( 113      ( 12 Nov 2024 04:00 )             34.8     11-12    158[H]  |  133[H]  |  13  ----------------------------<  312[H]  4.0   |  23  |  1.33[H]    Ca    9.6      12 Nov 2024 04:00  Phos  2.9     11-12  Mg     2.1     11-12        Urinalysis Basic - ( 12 Nov 2024 04:00 )    Color: x / Appearance: x / SG: x / pH: x  Gluc: 312 mg/dL / Ketone: x  / Bili: x / Urobili: x   Blood: x / Protein: x / Nitrite: x   Leuk Esterase: x / RBC: x / WBC x   Sq Epi: x / Non Sq Epi: x / Bacteria: x      CAPILLARY BLOOD GLUCOSE      POCT Blood Glucose.: 291 mg/dL (12 Nov 2024 06:02)  POCT Blood Glucose.: 245 mg/dL (11 Nov 2024 22:54)  POCT Blood Glucose.: 237 mg/dL (11 Nov 2024 16:28)  POCT Blood Glucose.: 271 mg/dL (11 Nov 2024 11:57)        Consultant(s) Notes Reviewed:  [x ] YES  [ ] NO    MEDICATIONS  (STANDING):  chlorhexidine 2% Cloths 1 Application(s) Topical <User Schedule>  dextrose 5%. 500 milliLiter(s) (100 mL/Hr) IV Continuous <Continuous>  haloperidol     Tablet 1 milliGRAM(s) Oral two times a day  heparin   Injectable 5000 Unit(s) SubCutaneous every 12 hours  insulin glargine Injectable (LANTUS) 5 Unit(s) SubCutaneous at bedtime  insulin lispro (ADMELOG) corrective regimen sliding scale   SubCutaneous three times a day before meals  insulin lispro (ADMELOG) corrective regimen sliding scale   SubCutaneous at bedtime  insulin lispro Injectable (ADMELOG) 3 Unit(s) SubCutaneous three times a day before meals  mupirocin 2% Ointment 1 Application(s) Both Nostrils two times a day  neomycin/bacitracin/polymyxin Ointment 1 Application(s) Right EYE two times a day  petrolatum Ophthalmic Ointment 1 Application(s) Right EYE three times a day  traZODone 75 milliGRAM(s) Oral at bedtime    MEDICATIONS  (PRN):  haloperidol    Injectable 1 milliGRAM(s) IntraMuscular every 6 hours PRN Agitation      Care Discussed with Consultants/Other Providers [ x] YES  [ ] NO    RADIOLOGY & ADDITIONAL TESTS:

## 2024-11-12 NOTE — PROGRESS NOTE ADULT - ASSESSMENT
Patient is a 73yo Female wheelchair bound, legally blind with schizophrenia on Lithium and dementia sent in for hypernatremia on outpt labs. Pt with poor PO intake for 2-3 weeks. Pt a/w hypernatremia (Na 165), PRASHANTH, hypercalcemia, bradycardia, acute metabolic encephalopathy, UTI and ?lithium toxicity (lithium level 1.3). Nephrolgoy consulted for elevated serum creatinine and sodium with ?lithium toxicity.    1. PRASHANTH- previous SCr 1-1.2; last SCr 1.26 on 6/5/24 on HIE. PRASHANTH likely due to lithium use with hypercalcemia/ dehydration due to poor PO intake. +UA in the setting of infection. Renal function overall improving and now stable with polyuria; see below. IVF as below. Pt with baig. Defer renal US. Pt with possible lithium toxicity; s/p HD on 11/8 for 6 hours. No further plans for HD. Shiley removed on 11/9. Strict I/Os. Avoid nephrotoxins/ NSAIDs/ RCA. Monitor BMP.  2. 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 s/p DDAVP 2 mcg dose s/p urine osm with no improvement in urine osm; consistent with nephrogenic DI.   c/w low sodium diet. Recc low protein diet. Management will be difficult given poor mental status (patient unable to drink to thirst) and hypotension (which would hold off on HCTZ and amiloride at this time) and unresolved PRASHANTH (limited use of NSAIDS). In interim, agree with D5W but would increase rate to 120 ml/hour as pt with 2.7L free water deficit. Monitor serum sodium.   3. Lithium toxicity- lithium level 1.3 on admission; with metabolic encephalopathy. S/P HD as above. No further plans for HD. Shiley removed.  4. Hypotension- BP low. Off pressors. Can start midodrine as needed.  5. Acute cystitis- +UA, UCx Ecoli. Finished Ceftriaxone.    6. Hypercalcemia- corrected serum Ca 11.04; improving on IVF. TSH wnl. Likely in part due to hypovolemia as well as lithium.  Monitor ionized calcium      [facility] NEPHROLOG[facility]  Nando Elizondo M.D.  Johann Noyola D.O.  Paulina Dotson M.D.  MD Keisha Parekh, MSN, ANP-C    Telephone: [phone]  Facsimile: [phone]    [address] [place] [address], [address]  [place] [id number]

## 2024-11-13 LAB
ANION GAP SERPL CALC-SCNC: 1 MMOL/L — LOW (ref 5–17)
ANION GAP SERPL CALC-SCNC: 2 MMOL/L — LOW (ref 5–17)
BASOPHILS # BLD AUTO: 0.02 K/UL — SIGNIFICANT CHANGE UP (ref 0–0.2)
BASOPHILS NFR BLD AUTO: 0.3 % — SIGNIFICANT CHANGE UP (ref 0–2)
BUN SERPL-MCNC: 13 MG/DL — SIGNIFICANT CHANGE UP (ref 7–18)
BUN SERPL-MCNC: 16 MG/DL — SIGNIFICANT CHANGE UP (ref 7–18)
CALCIUM SERPL-MCNC: 9.7 MG/DL — SIGNIFICANT CHANGE UP (ref 8.4–10.5)
CALCIUM SERPL-MCNC: 9.7 MG/DL — SIGNIFICANT CHANGE UP (ref 8.4–10.5)
CHLORIDE SERPL-SCNC: 125 MMOL/L — HIGH (ref 96–108)
CHLORIDE SERPL-SCNC: 127 MMOL/L — HIGH (ref 96–108)
CO2 SERPL-SCNC: 26 MMOL/L — SIGNIFICANT CHANGE UP (ref 22–31)
CO2 SERPL-SCNC: 28 MMOL/L — SIGNIFICANT CHANGE UP (ref 22–31)
CREAT SERPL-MCNC: 1.16 MG/DL — SIGNIFICANT CHANGE UP (ref 0.5–1.3)
CREAT SERPL-MCNC: 1.31 MG/DL — HIGH (ref 0.5–1.3)
EGFR: 43 ML/MIN/1.73M2 — LOW
EGFR: 49 ML/MIN/1.73M2 — LOW
EOSINOPHIL # BLD AUTO: 0.14 K/UL — SIGNIFICANT CHANGE UP (ref 0–0.5)
EOSINOPHIL NFR BLD AUTO: 2.2 % — SIGNIFICANT CHANGE UP (ref 0–6)
GLUCOSE BLDC GLUCOMTR-MCNC: 167 MG/DL — HIGH (ref 70–99)
GLUCOSE BLDC GLUCOMTR-MCNC: 270 MG/DL — HIGH (ref 70–99)
GLUCOSE BLDC GLUCOMTR-MCNC: 292 MG/DL — HIGH (ref 70–99)
GLUCOSE SERPL-MCNC: 113 MG/DL — HIGH (ref 70–99)
GLUCOSE SERPL-MCNC: 315 MG/DL — HIGH (ref 70–99)
HCT VFR BLD CALC: 30.1 % — LOW (ref 34.5–45)
HGB BLD-MCNC: 8.8 G/DL — LOW (ref 11.5–15.5)
IMM GRANULOCYTES NFR BLD AUTO: 0.3 % — SIGNIFICANT CHANGE UP (ref 0–0.9)
LYMPHOCYTES # BLD AUTO: 1.57 K/UL — SIGNIFICANT CHANGE UP (ref 1–3.3)
LYMPHOCYTES # BLD AUTO: 24.4 % — SIGNIFICANT CHANGE UP (ref 13–44)
MAGNESIUM SERPL-MCNC: 2 MG/DL — SIGNIFICANT CHANGE UP (ref 1.6–2.6)
MCHC RBC-ENTMCNC: 26.7 PG — LOW (ref 27–34)
MCHC RBC-ENTMCNC: 29.2 G/DL — LOW (ref 32–36)
MCV RBC AUTO: 91.5 FL — SIGNIFICANT CHANGE UP (ref 80–100)
MONOCYTES # BLD AUTO: 0.56 K/UL — SIGNIFICANT CHANGE UP (ref 0–0.9)
MONOCYTES NFR BLD AUTO: 8.7 % — SIGNIFICANT CHANGE UP (ref 2–14)
NEUTROPHILS # BLD AUTO: 4.13 K/UL — SIGNIFICANT CHANGE UP (ref 1.8–7.4)
NEUTROPHILS NFR BLD AUTO: 64.1 % — SIGNIFICANT CHANGE UP (ref 43–77)
NRBC # BLD: 0 /100 WBCS — SIGNIFICANT CHANGE UP (ref 0–0)
OSMOLALITY UR: 171 MOSM/KG — SIGNIFICANT CHANGE UP (ref 50–1200)
OSMOLALITY UR: 177 MOSM/KG — SIGNIFICANT CHANGE UP (ref 50–1200)
OSMOLALITY UR: 212 MOSM/KG — SIGNIFICANT CHANGE UP (ref 50–1200)
PHOSPHATE SERPL-MCNC: 2.7 MG/DL — SIGNIFICANT CHANGE UP (ref 2.5–4.5)
PLATELET # BLD AUTO: 158 K/UL — SIGNIFICANT CHANGE UP (ref 150–400)
POTASSIUM SERPL-MCNC: 3.6 MMOL/L — SIGNIFICANT CHANGE UP (ref 3.5–5.3)
POTASSIUM SERPL-MCNC: 3.6 MMOL/L — SIGNIFICANT CHANGE UP (ref 3.5–5.3)
POTASSIUM SERPL-SCNC: 3.6 MMOL/L — SIGNIFICANT CHANGE UP (ref 3.5–5.3)
POTASSIUM SERPL-SCNC: 3.6 MMOL/L — SIGNIFICANT CHANGE UP (ref 3.5–5.3)
RBC # BLD: 3.29 M/UL — LOW (ref 3.8–5.2)
RBC # FLD: 17.2 % — HIGH (ref 10.3–14.5)
SODIUM SERPL-SCNC: 154 MMOL/L — HIGH (ref 135–145)
SODIUM SERPL-SCNC: 155 MMOL/L — HIGH (ref 135–145)
SODIUM UR-SCNC: 72 MMOL/L — SIGNIFICANT CHANGE UP
WBC # BLD: 6.44 K/UL — SIGNIFICANT CHANGE UP (ref 3.8–10.5)
WBC # FLD AUTO: 6.44 K/UL — SIGNIFICANT CHANGE UP (ref 3.8–10.5)

## 2024-11-13 PROCEDURE — 99231 SBSQ HOSP IP/OBS SF/LOW 25: CPT

## 2024-11-13 PROCEDURE — 99497 ADVNCD CARE PLAN 30 MIN: CPT

## 2024-11-13 PROCEDURE — 99233 SBSQ HOSP IP/OBS HIGH 50: CPT | Mod: GC

## 2024-11-13 RX ORDER — INSULIN GLARGINE,HUM.REC.ANLOG 100/ML
15 VIAL (ML) SUBCUTANEOUS AT BEDTIME
Refills: 0 | Status: DISCONTINUED | OUTPATIENT
Start: 2024-11-13 | End: 2024-11-15

## 2024-11-13 RX ORDER — SENNA 187 MG
2 TABLET ORAL AT BEDTIME
Refills: 0 | Status: DISCONTINUED | OUTPATIENT
Start: 2024-11-13 | End: 2024-11-15

## 2024-11-13 RX ADMIN — HALOPERIDOL DECANOATE 1 MILLIGRAM(S): 50 INJECTION INTRAMUSCULAR at 18:38

## 2024-11-13 RX ADMIN — TRAZODONE HYDROCHLORIDE 75 MILLIGRAM(S): 100 TABLET ORAL at 21:34

## 2024-11-13 RX ADMIN — Medication 6 UNIT(S): at 11:05

## 2024-11-13 RX ADMIN — MUPIROCIN 1 APPLICATION(S): 20 OINTMENT TOPICAL at 05:31

## 2024-11-13 RX ADMIN — Medication 120 MILLILITER(S): at 06:39

## 2024-11-13 RX ADMIN — Medication 120 MILLILITER(S): at 05:15

## 2024-11-13 RX ADMIN — Medication 2 TABLET(S): at 21:34

## 2024-11-13 RX ADMIN — Medication 1 APPLICATION(S): at 05:31

## 2024-11-13 RX ADMIN — Medication 1 APPLICATION(S): at 13:01

## 2024-11-13 RX ADMIN — Medication 1 APPLICATION(S): at 21:34

## 2024-11-13 RX ADMIN — MUPIROCIN 1 APPLICATION(S): 20 OINTMENT TOPICAL at 17:03

## 2024-11-13 RX ADMIN — HEPARIN SODIUM 5000 UNIT(S): 10000 INJECTION INTRAVENOUS; SUBCUTANEOUS at 17:00

## 2024-11-13 RX ADMIN — CHLORHEXIDINE GLUCONATE 1 APPLICATION(S): 40 SOLUTION TOPICAL at 05:29

## 2024-11-13 RX ADMIN — Medication 500 MILLILITER(S): at 05:10

## 2024-11-13 RX ADMIN — Medication 6 UNIT(S): at 16:22

## 2024-11-13 RX ADMIN — Medication 6 UNIT(S): at 06:38

## 2024-11-13 RX ADMIN — Medication 6: at 16:21

## 2024-11-13 RX ADMIN — HALOPERIDOL DECANOATE 1 MILLIGRAM(S): 50 INJECTION INTRAMUSCULAR at 00:48

## 2024-11-13 RX ADMIN — Medication 150 MILLILITER(S): at 00:21

## 2024-11-13 RX ADMIN — HEPARIN SODIUM 5000 UNIT(S): 10000 INJECTION INTRAVENOUS; SUBCUTANEOUS at 05:30

## 2024-11-13 RX ADMIN — Medication 15 UNIT(S): at 21:35

## 2024-11-13 RX ADMIN — HALOPERIDOL DECANOATE 1 MILLIGRAM(S): 50 INJECTION INTRAMUSCULAR at 05:29

## 2024-11-13 RX ADMIN — Medication 1 APPLICATION(S): at 17:03

## 2024-11-13 RX ADMIN — Medication 6: at 06:38

## 2024-11-13 NOTE — PROGRESS NOTE ADULT - ADVANCED CARE PLANNING
Voluntary discussion occurred addressing advanced care planning
Voluntary discussion occurred addressing advanced care planning

## 2024-11-13 NOTE — PROGRESS NOTE ADULT - ASSESSMENT
74F legally blind, WC bound, schizophrenia on Lithium, dementia, minimally verbal presenting with severe hypernatremia, rectal prolapse  in the setting of worsening renal function and poor PO intake that has progressed these past 2-3 weeks, sent in from PCP given hypernatremia; ICU consulted for severe hypernatremia, metabolic encephalopathy, and lithium toxicity. s/p HD 6 hrs. Still hyperNa on D5.     ====== [ NEURO ] ======  #Acute encephalopathy on progressive dementia  #End-stage dementia  #Lithium toxicity  as per : pt is minimally verbal at baseline  poor po intake last 3 weeks, progressed the last year where she is no longer chewing, now on a puree diet  UA positive, lithium elevated, renal function impaired  likely metabolic encephalopathy from ?nephrogenic DI and hyperNa secondary to elevated lithium level v UTI encephalopathy  encephalopathy from lithium toxicity may not be reflected in level of 1.3  acetaminophen, salicylate negative  hospice appropriate as per palliative   hold lithium, consider olanzapine if agitated  patient at baseline as per     ====== [ CVS ] ======  No active issues    ====== [ PULM ] ======  No active issues    ====== [ RENAL ] ======  #Severe Hypernatremia  #PRASHANTH  #Li-induced Nephrogenic DI  as above  Na on admission was 164  multifactorial in the setting of poor PO intake (dehydration) v lithium nephrotoxicity/ DI   BUN/Cr elevated 82/2.59 likely pre-renal in the setting of poor PO intake and Li toxicity  encephalopathy from lithium toxicity may not be reflected in level of 1.3  s/p HD 6hrs 11/8 as per toxicology, SCr improved   urine Osm= 308  Patient s/p DDAVP x2, which seems to have had no effect  As per nephro= increased enteral free water intake, no need for HCTZ or ddAVP  As pt demented, may require a feeding tube for hydration (would consider PEG if c/w GOC)  - c/w D5  - trend BMP q6hrs  - safe correction rate; goal <12mEq/24 hours; Na goal 155 -->148 in 24 hrs  - Nephro Dr. Dotson    #Cystitis  UA +ve  - UCx > 100k E coli   - s/p CTX 1mg q24h x 3 days    #Hypophosphatemia  phos 1.6 > repleted  - monitor and supplement prn    ====== [ GI/NUTRITION ] ======  #Dementia  poor PO intake leading to electrolyte imbalances   her advancing dementia has progressed; she is an extremely poor candidate for PEG as it would not change or reverse her dementia/quality of life  tolerating feeds, nutrition consulted  - aspiration precautions    ====== [ ENDO] ======  #DM, Hyperglycemia  A1C= 7.7  titrate insulin to improve hyperNa  mod SS, 10 basaglar, and 5 premeals  fingersticks    ====== [ MSK/MOBILITY ] ======  #wheelchair-bound status   #legal blindness   - f/u PT recs    ====== [ PROPHYLAXIS ] ======  DVT ppx: Heparin SubQ  GI ppx: Protonix    ====== [ GOC/DISPO ] ======  FULL CODE  hospice appropriate as per palliative  Dispo ICU   74F legally blind, WC bound, schizophrenia on Lithium, dementia, minimally verbal presenting with severe hypernatremia, rectal prolapse  in the setting of worsening renal function and poor PO intake that has progressed these past 2-3 weeks, sent in from PCP given hypernatremia; ICU consulted for severe hypernatremia, metabolic encephalopathy, and lithium toxicity. s/p HD 6 hrs. Still hyperNa on D5.     ====== [ NEURO ] ======  #Acute encephalopathy on progressive dementia  #End-stage dementia  #Lithium toxicity  as per : pt is minimally verbal at baseline  poor po intake last 3 weeks, progressed the last year where she is no longer chewing, now on a puree diet  UA positive, lithium elevated, renal function impaired  likely metabolic encephalopathy from ?nephrogenic DI and hyperNa secondary to elevated lithium level v UTI encephalopathy  encephalopathy from lithium toxicity may not be reflected in level of 1.3  acetaminophen, salicylate negative  hospice appropriate as per palliative   hold lithium, consider olanzapine if agitated  patient at baseline as per     ====== [ CVS ] ======  No active issues    ====== [ PULM ] ======  No active issues    ====== [ RENAL ] ======  #Severe Hypernatremia  #PRASHANTH  #Li-induced Nephrogenic DI  as above  Na on admission was 164  multifactorial in the setting of poor PO intake (dehydration) v lithium nephrotoxicity/ DI   BUN/Cr elevated 82/2.59 likely pre-renal in the setting of poor PO intake and Li toxicity  encephalopathy from lithium toxicity may not be reflected in level of 1.3  s/p HD 6hrs 11/8 as per toxicology, SCr improved   urine Osm= 308  Patient s/p DDAVP x2, which seems to have had no effect  As per nephro= increased enteral free water intake, no need for HCTZ or ddAVP  As pt demented, may require a feeding tube for hydration (would consider PEG if c/w GOC)  - c/w D5  - trend BMP q6hrs  - safe correction rate; goal <12mEq/24 hours; Na goal 155 -->148 in 24 hrs  - Nephro Dr. Dotson    #Cystitis  UA +ve  - UCx > 100k E coli   - s/p CTX 1mg q24h x 3 days    #Hypophosphatemia  phos 1.6 > repleted  - monitor and supplement prn    ====== [ GI/NUTRITION ] ======  #Dementia  poor PO intake leading to electrolyte imbalances   her advancing dementia has progressed; she is an extremely poor candidate for PEG as it would not change or reverse her dementia/quality of life  tolerating feeds, nutrition consulted  - aspiration precautions    ====== [ ENDO] ======  #DM, Hyperglycemia  A1C= 7.7  titrate insulin to improve hyperNa  mod SS, 10 basaglar, and 5 premeals  fingersticks    ====== [ MSK/MOBILITY ] ======  #wheelchair-bound status   #legal blindness   - f/u PT recs    ====== [ PROPHYLAXIS ] ======  DVT ppx: Heparin SubQ  GI ppx: Protonix    ====== [ GOC/DISPO ] ======  DNR/DNI  hospice appropriate as per palliative  Dispo ICU

## 2024-11-13 NOTE — PROGRESS NOTE ADULT - SUBJECTIVE AND OBJECTIVE BOX
follow up on:  complex medical decision making related to goals of care    [facility] Geriatric and Palliative Consult Service:  Isatu Mccarthy DO: cell ([phone])  Anthony Taylor MD: cell ([phone])  Cesar Sawyer NP: cell ([phone])   Jesus Joe LMSW: cell ([phone])   Jessica Fleischer-Black, MD: cell: ([phone])    Can contact any Palliative Team member via [employer] Teams for consults and questions    OVERNIGHT EVENTS:  Favian spoke with [facility] physician and Nelda ko accepted for home hospice on d/c.    Present Symptoms: Mild  Review of Systems: Unable to obtain due to poor mentation  Present Symptoms: Mild  Pain:             Location -                               Aggravating factors -             Quality -             Radiation -             Timing-             Severity (0-10 scale):             Minimal acceptable level (0-10 scale):  Fatigue:  Nausea:  Lack of Appetite:   SOB:  Depression:  Anxiety:  Constipation:     CPOT:    https://ccs-st.org/resources/Documents/Sedation%20Analgesia%20Delirium%20in%20CC/CCS%20STH%20Guideline%20template%20CPOT.pdf  PAIN AD Score: 0  http://geriatrictoolkit.[place].[facility]/cog/painad.pdf (press ctrl +  left click to view)    MEDICATIONS  (STANDING):  chlorhexidine 2% Cloths 1 Application(s) Topical <User Schedule>  dextrose 5%. 500 milliLiter(s) (100 mL/Hr) IV Continuous <Continuous>  haloperidol     Tablet 1 milliGRAM(s) Oral two times a day  heparin   Injectable 5000 Unit(s) SubCutaneous every 12 hours  insulin glargine Injectable (LANTUS) 5 Unit(s) SubCutaneous at bedtime  insulin lispro (ADMELOG) corrective regimen sliding scale   SubCutaneous three times a day before meals  insulin lispro (ADMELOG) corrective regimen sliding scale   SubCutaneous at bedtime  insulin lispro Injectable (ADMELOG) 3 Unit(s) SubCutaneous three times a day before meals  mupirocin 2% Ointment 1 Application(s) Both Nostrils two times a day  neomycin/bacitracin/polymyxin Ointment 1 Application(s) Right EYE two times a day  petrolatum Ophthalmic Ointment 1 Application(s) Right EYE three times a day  traZODone 75 milliGRAM(s) Oral at bedtime    MEDICATIONS  (PRN):  haloperidol    Injectable 1 milliGRAM(s) IntraMuscular every 6 hours PRN Agitation      PHYSICAL EXAM:  Vital Signs Last 24 Hrs  T(C): 36.7 (11 Nov 2024 08:00), Max: 36.8 (10 Nov 2024 17:05)  T(F): 98 (11 Nov 2024 08:00), Max: 98.2 (10 Nov 2024 17:05)  HR: 65 (11 Nov 2024 12:00) (58 - 76)  BP: 93/59 (11 Nov 2024 12:00) (80/49 - 129/69)  BP(mean): 70 (11 Nov 2024 12:00) (60 - 99)  RR: 19 (11 Nov 2024 12:00) (11 - 20)  SpO2: 100% (11 Nov 2024 12:00) (97% - 100%)        General: alert  oriented x 0    cachexia  verbal      Palliative Performance Scale/Karnofsky Score: 10%  http://npcrc.org/files/news/palliative_performance_scale_ppsv2.pdf    HEENT: dry mouth    Lungs: CTA b/l  CV: RRR, S1S2  GI: soft non distended non tender    : incontinent    baig  Musculoskeletal: weakness x4  fully bedbound/wheelchair bound  Skin: no abnormal skin lesions, poor skin turgor  Neuro: vocalizing. responding to internal stimuli. Moving all 4 extremities  Oral intake ability: unable/only mouth care, minimal moderate full capability    LABS:                          8.6    6.39  )-----------( 112      ( 11 Nov 2024 03:50 )             29.2     11-11    155[H]  |  126[H]  |  11  ----------------------------<  313[H]  3.5   |  25  |  1.29    Ca    9.1      11 Nov 2024 03:50  Phos  2.3     11-11  Mg     2.0     11-11      Urinalysis Basic - ( 11 Nov 2024 03:50 )    Color: x / Appearance: x / SG: x / pH: x  Gluc: 313 mg/dL / Ketone: x  / Bili: x / Urobili: x   Blood: x / Protein: x / Nitrite: x   Leuk Esterase: x / RBC: x / WBC x   Sq Epi: x / Non Sq Epi: x / Bacteria: x        RADIOLOGY & ADDITIONAL STUDIES: Reviewed

## 2024-11-13 NOTE — CHART NOTE - NSCHARTNOTEFT_GEN_A_CORE
Assessment:     Factors impacting intake: [ ] none [ ] nausea  [ ] vomiting [ ] diarrhea [ ] constipation  [ ]chewing problems [ ] swallowing issues  [ ] other:     Diet Presciption:   Intake:     Daily Weight in k.6 (2024 08:00)  Weight in k.7 (10 Nov 2024 07:01)  Weight in k.3 (2024 07:00)  Weight in k (2024 15:10)  Weight in k (2024 08:00)    % Weight Change    Pertinent Medications: MEDICATIONS  (STANDING):  chlorhexidine 2% Cloths 1 Application(s) Topical <User Schedule>  dextrose 5%. 500 milliLiter(s) (120 mL/Hr) IV Continuous <Continuous>  haloperidol     Tablet 1 milliGRAM(s) Oral two times a day  heparin   Injectable 5000 Unit(s) SubCutaneous every 12 hours  insulin glargine Injectable (LANTUS) 15 Unit(s) SubCutaneous at bedtime  insulin lispro (ADMELOG) corrective regimen sliding scale   SubCutaneous three times a day before meals  insulin lispro (ADMELOG) corrective regimen sliding scale   SubCutaneous at bedtime  insulin lispro Injectable (ADMELOG) 6 Unit(s) SubCutaneous three times a day before meals  mupirocin 2% Ointment 1 Application(s) Both Nostrils two times a day  neomycin/bacitracin/polymyxin Ointment 1 Application(s) Right EYE two times a day  petrolatum Ophthalmic Ointment 1 Application(s) Right EYE three times a day  traZODone 75 milliGRAM(s) Oral at bedtime    MEDICATIONS  (PRN):  haloperidol    Injectable 1 milliGRAM(s) IntraMuscular every 6 hours PRN Agitation    Pertinent Labs:  Na155 mmol/L[H] Glu 315 mg/dL[H] K+ 3.6 mmol/L Cr  1.31 mg/dL[H] BUN 16 mg/dL  Phos 2.7 mg/dL  Alb 2.2 g/dL[L]     CAPILLARY BLOOD GLUCOSE      POCT Blood Glucose.: 292 mg/dL (2024 06:31)  POCT Blood Glucose.: 222 mg/dL (2024 21:09)  POCT Blood Glucose.: 212 mg/dL (2024 17:04)  POCT Blood Glucose.: 280 mg/dL (2024 10:36)      Skin:     Estimated Needs:   [ ] no change since previous assessment  [ ] recalculated:     Previous Nutrition Diagnosis:   [ ] Inadequate Energy Intake [ ]Inadequate Oral Intake [ ] Excessive Energy Intake   [ ] Underweight [ ] Increased Nutrient Needs [ ] Overweight/Obesity  [ ] Swallowing Difficult   [ ] Altered GI Function [ ] Unintended Weight Loss [ ] Food & Nutrition Related Knowledge Deficit [ ] Malnutrition   [ ] Not Ready for Diet/Life Style Changes     Nutrition Diagnosis is [ ] ongoing  [ ] Improving   [ ] resolved [ ] not applicable     New Nutrition Diagnosis: [ ] not applicable       Interventions:   Recommend  [ ] Change Diet To:  [ ] Nutrition Supplement  [ ] Nutrition Support  [ ] Other:     Monitoring and Evaluation:   [ ] PO intake [ x ] Tolerance to diet prescription [ x ] weights [ x ] labs[ x ] follow up per protocol  [ ] other: Assessment:       Nutrition reassessment for follow-up. Chart reviewed, pt visited in ICU, asleep, agitated/restless at times per chart; poor oral intake, breakfast <25% consumed; s/p Palliative consult, planning for home hospice per team     Factors impacting intake: [ x ] swallowing issues  [ x ] other: change in mental status; difficulty chewing; difficulty feeding self; difficulty swallowing; acute on chronic comorbidities; cultural/ethnical/individual food preferences     Diet Prescription: Minced and Moist, Consistent CHO DASH diet, Mildly Thick Liquid; Mighty Shake 1 can po bid     Daily Weight in k.6 (2024 08:00)  Weight in k.7 (10 Nov 2024 07:01)  Weight in k.3 (2024 07:00)  Weight in k (2024 15:10)  Weight in k (2024 08:00)    % Weight Change: changes may due to fluid/scale variance; 2+ foot edema      Pertinent Medications: MEDICATIONS  (STANDING):  chlorhexidine 2% Cloths 1 Application(s) Topical <User Schedule>  dextrose 5%. 500 milliLiter(s) (120 mL/Hr) IV Continuous <Continuous>  haloperidol     Tablet 1 milliGRAM(s) Oral two times a day  heparin   Injectable 5000 Unit(s) SubCutaneous every 12 hours  insulin glargine Injectable (LANTUS) 15 Unit(s) SubCutaneous at bedtime  insulin lispro (ADMELOG) corrective regimen sliding scale   SubCutaneous three times a day before meals  insulin lispro (ADMELOG) corrective regimen sliding scale   SubCutaneous at bedtime  insulin lispro Injectable (ADMELOG) 6 Unit(s) SubCutaneous three times a day before meals  mupirocin 2% Ointment 1 Application(s) Both Nostrils two times a day  neomycin/bacitracin/polymyxin Ointment 1 Application(s) Right EYE two times a day  petrolatum Ophthalmic Ointment 1 Application(s) Right EYE three times a day  traZODone 75 milliGRAM(s) Oral at bedtime    MEDICATIONS  (PRN):  haloperidol    Injectable 1 milliGRAM(s) IntraMuscular every 6 hours PRN Agitation    Pertinent Labs: 11-13 Na155 mmol/L[H] Glu 315 mg/dL[H] K+ 3.6 mmol/L Cr  1.31 mg/dL[H] BUN 16 mg/dL - Phos 2.7 mg/dL - Alb 2.2 g/dL[L]     CAPILLARY BLOOD GLUCOSE    POCT Blood Glucose.: 292 mg/dL (2024 06:31)  POCT Blood Glucose.: 222 mg/dL (2024 21:09)  POCT Blood Glucose.: 212 mg/dL (2024 17:04)  POCT Blood Glucose.: 280 mg/dL (2024 10:36)    Skin: Pressure Injury: stage II x 1     Estimated Needs:   [ x ] no change since previous assessment  [ ] recalculated:     Previous Nutrition Diagnosis:   [ x ] Malnutrition ( SEVERE)    Nutrition Diagnosis is [ x ] ongoing  [ ] Improving   [ ] resolved [ ] not applicable     New Nutrition Diagnosis: [ x ] not applicable     Interventions/Recommend  [ x ] Continue diet Rx as ordered   [ ] Nutrition Supplement  [ ] Nutrition Support  [ x ] Other: Nursing to continue feeding assistance and encouragement, aspiration precaution                   Provide food choices within diet Rx as available/updated                   Discussed with team   Monitoring and Evaluation:   [ x ] PO intake [ x ] Tolerance to diet prescription [ x ] weights [ x ] labs[ x ] follow up per protocol  [ ] other:

## 2024-11-13 NOTE — PROGRESS NOTE ADULT - CONVERSATION/DISCUSSION
FILIBERTO Discussed/Hospice Referral
Prognosis/Treatment Options/Hospice Referral
Prognosis/MOLST Discussed/Treatment Options/Hospice Referral

## 2024-11-13 NOTE — PROGRESS NOTE ADULT - PROBLEM SELECTOR PLAN 3
Lauri Mar is surrogate decision-maker by [facility] Care Decision Act.     Nelda is DNR/DNI  Abx ok, IVF ok, HD can be considered. No long term feeding tube  Accepted for hospice severe dementia.    d/w team

## 2024-11-13 NOTE — PROGRESS NOTE ADULT - SUBJECTIVE AND OBJECTIVE BOX
Patient is a 74y old  Female who presents with a chief complaint of Hypernatremia (12 Nov 2024 10:25)      INTERVAL HPI/OVERNIGHT EVENTS:   No overnight events   Afebrile, hemodynamically stable     Subjective: Patient seen and examined at bedside.    ICU Vital Signs Last 24 Hrs  T(C): 35.6 (13 Nov 2024 04:00), Max: 36.6 (12 Nov 2024 20:39)  T(F): 96 (13 Nov 2024 04:00), Max: 97.8 (12 Nov 2024 20:39)  HR: 76 (13 Nov 2024 07:00) (49 - 96)  BP: 114/64 (13 Nov 2024 07:00) (84/48 - 138/57)  BP(mean): 80 (13 Nov 2024 07:00) (60 - 100)  ABP: --  ABP(mean): --  RR: 16 (13 Nov 2024 07:00) (14 - 29)  SpO2: 98% (13 Nov 2024 07:00) (95% - 100%)      I&O's Summary    12 Nov 2024 07:01  -  13 Nov 2024 07:00  --------------------------------------------------------  IN: 3110 mL / OUT: 4320 mL / NET: -1210 mL          PHYSICAL EXAM:  GENERAL: No acute distress   HEAD:  Atraumatic, Normocephalic  EYES: EOMI, PERRLA, conjunctiva and sclera clear  ENMT: No tonsillar erythema, exudates, or enlargement; Moist mucous membranes  NECK: Supple, No JVD, Normal thyroid  HEART: Regular rate and rhythm; No murmurs, rubs, or gallops  RESPIRATORY: CTA B/L, No W/R/R  ABDOMEN: Soft, Nontender, Nondistended; Bowel sounds present  NEUROLOGY: A&Ox3, nonfocal, moving all extremities  EXTREMITIES:  2+ Peripheral Pulses, No clubbing, cyanosis, or edema  SKIN: warm, dry, normal color, no rash or abnormal lesions    LABS:                        8.8    6.44  )-----------( 158      ( 13 Nov 2024 03:25 )             30.1     11-13    155[H]  |  127[H]  |  16  ----------------------------<  315[H]  3.6   |  26  |  1.31[H]    Ca    9.7      13 Nov 2024 03:25  Phos  2.7     11-13  Mg     2.0     11-13        Urinalysis Basic - ( 13 Nov 2024 03:25 )    Color: x / Appearance: x / SG: x / pH: x  Gluc: 315 mg/dL / Ketone: x  / Bili: x / Urobili: x   Blood: x / Protein: x / Nitrite: x   Leuk Esterase: x / RBC: x / WBC x   Sq Epi: x / Non Sq Epi: x / Bacteria: x      CAPILLARY BLOOD GLUCOSE      POCT Blood Glucose.: 292 mg/dL (13 Nov 2024 06:31)  POCT Blood Glucose.: 222 mg/dL (12 Nov 2024 21:09)  POCT Blood Glucose.: 212 mg/dL (12 Nov 2024 17:04)  POCT Blood Glucose.: 280 mg/dL (12 Nov 2024 10:36)        Consultant(s) Notes Reviewed:  [x ] YES  [ ] NO    MEDICATIONS  (STANDING):  chlorhexidine 2% Cloths 1 Application(s) Topical <User Schedule>  dextrose 5%. 500 milliLiter(s) (120 mL/Hr) IV Continuous <Continuous>  haloperidol     Tablet 1 milliGRAM(s) Oral two times a day  heparin   Injectable 5000 Unit(s) SubCutaneous every 12 hours  insulin glargine Injectable (LANTUS) 15 Unit(s) SubCutaneous at bedtime  insulin lispro (ADMELOG) corrective regimen sliding scale   SubCutaneous three times a day before meals  insulin lispro (ADMELOG) corrective regimen sliding scale   SubCutaneous at bedtime  insulin lispro Injectable (ADMELOG) 6 Unit(s) SubCutaneous three times a day before meals  mupirocin 2% Ointment 1 Application(s) Both Nostrils two times a day  neomycin/bacitracin/polymyxin Ointment 1 Application(s) Right EYE two times a day  petrolatum Ophthalmic Ointment 1 Application(s) Right EYE three times a day  traZODone 75 milliGRAM(s) Oral at bedtime    MEDICATIONS  (PRN):  haloperidol    Injectable 1 milliGRAM(s) IntraMuscular every 6 hours PRN Agitation      Care Discussed with Consultants/Other Providers [ x] YES  [ ] NO    RADIOLOGY & ADDITIONAL TESTS: Patient is a 74y old  Female who presents with a chief complaint of Hypernatremia (12 Nov 2024 10:25)      INTERVAL HPI/OVERNIGHT EVENTS:   No overnight events   Afebrile, hemodynamically stable     Subjective: Patient seen and examined at bedside.    ICU Vital Signs Last 24 Hrs  T(C): 35.6 (13 Nov 2024 04:00), Max: 36.6 (12 Nov 2024 20:39)  T(F): 96 (13 Nov 2024 04:00), Max: 97.8 (12 Nov 2024 20:39)  HR: 76 (13 Nov 2024 07:00) (49 - 96)  BP: 114/64 (13 Nov 2024 07:00) (84/48 - 138/57)  BP(mean): 80 (13 Nov 2024 07:00) (60 - 100)  ABP: --  ABP(mean): --  RR: 16 (13 Nov 2024 07:00) (14 - 29)  SpO2: 98% (13 Nov 2024 07:00) (95% - 100%)      I&O's Summary    12 Nov 2024 07:01  -  13 Nov 2024 07:00  --------------------------------------------------------  IN: 3110 mL / OUT: 4320 mL / NET: -1210 mL          PHYSICAL EXAM:  GENERAL: No acute distress   HEAD:  Atraumatic, Normocephalic  EYES: EOMI, PERRLA, conjunctiva and sclera clear  ENMT: No tonsillar erythema, exudates, or enlargement; Moist mucous membranes  NECK: Supple, No JVD, Normal thyroid  HEART: Regular rate and rhythm; No murmurs, rubs, or gallops  RESPIRATORY: CTA B/L, No W/R/R  ABDOMEN: Soft, Nontender, Nondistended; Bowel sounds present  NEUROLOGY: A&Ox0, nonfocal, moving all extremities  EXTREMITIES:  2+ Peripheral Pulses, No clubbing, cyanosis, or edema  SKIN: warm, dry, normal color, no rash or abnormal lesions    LABS:                        8.8    6.44  )-----------( 158      ( 13 Nov 2024 03:25 )             30.1     11-13    155[H]  |  127[H]  |  16  ----------------------------<  315[H]  3.6   |  26  |  1.31[H]    Ca    9.7      13 Nov 2024 03:25  Phos  2.7     11-13  Mg     2.0     11-13        Urinalysis Basic - ( 13 Nov 2024 03:25 )    Color: x / Appearance: x / SG: x / pH: x  Gluc: 315 mg/dL / Ketone: x  / Bili: x / Urobili: x   Blood: x / Protein: x / Nitrite: x   Leuk Esterase: x / RBC: x / WBC x   Sq Epi: x / Non Sq Epi: x / Bacteria: x      CAPILLARY BLOOD GLUCOSE      POCT Blood Glucose.: 292 mg/dL (13 Nov 2024 06:31)  POCT Blood Glucose.: 222 mg/dL (12 Nov 2024 21:09)  POCT Blood Glucose.: 212 mg/dL (12 Nov 2024 17:04)  POCT Blood Glucose.: 280 mg/dL (12 Nov 2024 10:36)        Consultant(s) Notes Reviewed:  [x ] YES  [ ] NO    MEDICATIONS  (STANDING):  chlorhexidine 2% Cloths 1 Application(s) Topical <User Schedule>  dextrose 5%. 500 milliLiter(s) (120 mL/Hr) IV Continuous <Continuous>  haloperidol     Tablet 1 milliGRAM(s) Oral two times a day  heparin   Injectable 5000 Unit(s) SubCutaneous every 12 hours  insulin glargine Injectable (LANTUS) 15 Unit(s) SubCutaneous at bedtime  insulin lispro (ADMELOG) corrective regimen sliding scale   SubCutaneous three times a day before meals  insulin lispro (ADMELOG) corrective regimen sliding scale   SubCutaneous at bedtime  insulin lispro Injectable (ADMELOG) 6 Unit(s) SubCutaneous three times a day before meals  mupirocin 2% Ointment 1 Application(s) Both Nostrils two times a day  neomycin/bacitracin/polymyxin Ointment 1 Application(s) Right EYE two times a day  petrolatum Ophthalmic Ointment 1 Application(s) Right EYE three times a day  traZODone 75 milliGRAM(s) Oral at bedtime    MEDICATIONS  (PRN):  haloperidol    Injectable 1 milliGRAM(s) IntraMuscular every 6 hours PRN Agitation      Care Discussed with Consultants/Other Providers [ x] YES  [ ] NO    RADIOLOGY & ADDITIONAL TESTS:

## 2024-11-13 NOTE — PROGRESS NOTE ADULT - PROBLEM SELECTOR PLAN 2
Decreased PO intake    Albumin 2.2  Clinical evidence indicates that the patient has Severe protein calorie malnutrition/ 3rd degree    In context of         Chronic Illness (>1 month)    Energy/Food intake <50% of estimated energy requirement >5 days  Weight loss: Moderate - severe   Body Fat loss: Severe   Cachexia, temporal wasting, contracted, muscle atrophy  Muscle mass loss: Severe    Fluid Accumulation: moderate   Strength: weakened severe (bedbound)    NPO for now    Appreciate nutrition consult recs  Appreciate speech and swallow consult recs Decreased PO intake    Albumin 2.2  Clinical evidence indicates that the patient has Severe protein calorie malnutrition/ 3rd degree    In context of         Chronic Illness (>1 month)    Energy/Food intake <50% of estimated energy requirement >5 days  Weight loss: Moderate - severe   Body Fat loss: Severe   Cachexia, temporal wasting, contracted, muscle atrophy  Muscle mass loss: Severe    Fluid Accumulation: moderate   Strength: weakened severe (bedbound)    Taking some po now

## 2024-11-13 NOTE — PROGRESS NOTE ADULT - ASSESSMENT
Patient is a 73yo Female wheelchair bound, legally blind with schizophrenia on Lithium and dementia sent in for hypernatremia on outpt labs. Pt with poor PO intake for 2-3 weeks. Pt a/w hypernatremia (Na 165), PRASHANTH, hypercalcemia, bradycardia, acute metabolic encephalopathy, UTI and ?lithium toxicity (lithium level 1.3). Nephrolgoy consulted for elevated serum creatinine and sodium with ?lithium toxicity.    1. PRASHANTH- previous SCr 1-1.2; last SCr 1.26 on 6/5/24 on HIE. PRASHANTH likely due to lithium use with hypercalcemia/ dehydration due to poor PO intake. +UA in the setting of infection. Renal function  improving  with polyuria; see below. IVF as below. Pt with baig. Defer renal US. Pt with possible lithium toxicity; s/p HD on 11/8 for 6 hours. No further plans for HD. Shiley removed on 11/9. Strict I/Os. Avoid nephrotoxins/ NSAIDs/ RCA. Monitor BMP.  2. 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 s/p DDAVP 2 mcg dose s/p urine osm with no improvement in urine osm; consistent with nephrogenic DI.   c/w low sodium diet. Recc low protein diet. Management will be difficult given poor mental status (patient unable to drink to thirst) and hypotension (which would hold off on HCTZ and amiloride at this time)  c/w D5W  120 ml/hour   Monitor serum sodium.   3. Lithium toxicity- lithium level 1.3 on admission; with metabolic encephalopathy. S/P HD as above. No further plans for HD. Shiley removed.  4. Hypotension- BP low. Off pressors. Can start midodrine as needed.  5. Acute cystitis- +UA, UCx Ecoli. Finished Ceftriaxone.    6. Hypercalcemia- corrected serum Ca 11.14; improving on IVF. TSH wnl. Likely in part due to hypovolemia as well as lithium.  Check serum albumin and ionized Ca. Monitor ionized calcium      [facility] NEPHROLOGY  Nando Elizondo M.D.  Johann Noyola D.O.  Paulina Dotson M.D.  MD Keisha Parekh, MSN, ANP-C    Telephone: [phone]  Facsimile: [phone]    [address] [place] [address] [address], [address]  [place] [address]

## 2024-11-13 NOTE — PROGRESS NOTE ADULT - CONVERSATION DETAILS
Had discussion at bedside today. Nelda is more awake today. Interacting with Lauri. Still unable to make her own healthcare decisions. This is not expected to change.    We discussed hospice. Lauri is familiar with hospice because Nelda was enrolled in home hospice in 2019. He was happy wit the support at that time, but her clinical picture improved and she no longer needed hospice.     He has not yet decided about DNR/DNI ("I'm 80% there.")    Asked if I could refer my  to him to answer any questions about hospice and he agreed.     Support offered and all questions answered.    Nelda remains FULL CODE for now.
Spoke at length face to face with Favian today. He is exploring home hospice as a d/c plan (with [facility]). Nelda had been on home hospice before and he had been satisfied with the services.     We discussed DNR/DNI again and he is not yet ready. We talked about the philosophy of hospice and how CPR is not consistent with that.     He is struggling between "giving up" and "letting go, " and trying to understand the difference.  The idea of not checking labs makes him nervous.    He told me that he gets his strength from God and that he attends Religious and I asked if I could send the . He agreed    Later, I checked in and he said he had seen Father Bairon and that he felt much more peaceful about his decisions, although he is still not ready to Palliative care will sign off. Please reconsult if needed. a DNR/DNI.     We will speak about this again tomorrow.
We spoke with Lauri in ICU today. Nelda is unable to participate in decision-making.    I shared that Nelda has been accepted to [facility] hospice for home hospice whenever Lauri wants to take her home. Lauri is uncomfortable bringing her home until her sodium has been corrected a bit more (today is 155). "A few more days."     He is still having difficulty with the idea that he is "giving up" on Nelda.  He is comforted by the discussion he had with the hospice physician who said that the hospice nurse can check labs if clinically indicated.    He wanted to see the MOLST form, so we went over it. He decided he was ready to make her DNR/DNI. OK with ivf and abx. No feeding tube. Possible HD (as the HD x 1 she received helped her this time).    MOLST filled out and signed.      We offered support. Answered all questions

## 2024-11-13 NOTE — PROGRESS NOTE ADULT - PROBLEM SELECTOR PLAN 1
Baseline very low functioning  Unable to make her own health-care decisions  FAST 7C  Has HHA 12h/d (private pay)    Hospice-appropriate.    Lauri's goal is to being her home

## 2024-11-13 NOTE — PROGRESS NOTE ADULT - SUBJECTIVE AND OBJECTIVE BOX
[facility] NEPHROLOGY- PROGRESS NOTE    Patient is a 75yo Female wheelchair bound, legally blind with schizophrenia on Lithium and dementia sent in for hypernatremia on outpt labs. Pt with poor PO intake for 2-3 weeks. Pt a/w hypernatremia (Na 165), PRASHANTH, hypercalcemia, bradycardia, acute metabolic encephalopathy, UTI and ?lithium toxicity (lithium level 1.3). Nephrolgoy consulted for elevated serum creatinine and sodium with ?lithium toxicity.    S/P HD for 6 hours on 11/8.    REVIEW OF SYSTEMS: Unable to obtain as patient non-verbal.    penicillins (Fever)  sulfur (Other)  sulfa drugs (Unknown)  Stelazine (Other)  phenothiazines (Unknown)      Hospital Medications: Medications reviewed    VITALS:  T(F): 97.6 (11-13-24 @ 15:00), Max: 97.8 (11-12-24 @ 20:39)  HR: 77 (11-13-24 @ 16:00)  BP: 115/69 (11-13-24 @ 16:00)  RR: 15 (11-13-24 @ 16:00)  SpO2: 100% (11-13-24 @ 16:00)  Wt(kg): --    11-12 @ 07:01  -  11-13 @ 07:00  --------------------------------------------------------  IN: 3110 mL / OUT: 4320 mL / NET: -1210 mL      PHYSICAL EXAM:    Gen: NAD,  non-verbal  Cards: RRR, +S1/S2, no M/G/R  Resp: CTA B/L  GI: soft, NT/ND, NABS  : + baig with dilute urine  Vascular: no LE edema B/L       LABS:  11-13    154[H]  |  125[H]  |  13  ----------------------------<  113[H]  3.6   |  28  |  1.16    Ca    9.7      13 Nov 2024 10:00  Phos  2.7     11-13  Mg     2.0     11-13      Creatinine Trend: 1.16 <--, 1.31 <--, 1.27 <--, 1.47 <--, 1.14 <--, 1.33 <--, 1.30 <--, 1.29 <--, 1.43 <--, 1.33 <--, 1.48 <--, 1.53 <--, 1.34 <--, 1.34 <--, 1.09 <--, 0.81 <--, 0.35 <--, 1.71 <--, 1.90 <--, 2.02 <--, 2.16 <--, 2.59 <--                        8.8    6.44  )-----------( 158      ( 13 Nov 2024 03:25 )             30.1     Urine Studies:  Urinalysis Basic - ( 13 Nov 2024 10:00 )    Color:  / Appearance:  / SG:  / pH:   Gluc: 113 mg/dL / Ketone:   / Bili:  / Urobili:    Blood:  / Protein:  / Nitrite:    Leuk Esterase:  / RBC:  / WBC    Sq Epi:  / Non Sq Epi:  / Bacteria:       Sodium, Random Urine: 72 mmol/L (11-13 @ 03:25)  Osmolality, Random Urine: 212 mosm/Kg (11-13 @ 03:25)  Sodium, Random Urine: 64 mmol/L (11-12 @ 19:30)  Osmolality, Random Urine: 171 mosm/Kg (11-12 @ 19:30)  Sodium, Random Urine: 60 mmol/L (11-12 @ 12:50)  Creatinine, Random Urine: <13 mg/dL (11-12 @ 12:50)  Protein/Creatinine Ratio Calculation: SEE NOTE Ratio (11-12 @ 12:50)  Osmolality, Random Urine: 177 mosm/Kg (11-12 @ 12:50)  Potassium, Random Urine: 7 mmol/L (11-12 @ 12:50)  Sodium, Random Urine: 67 mmol/L (11-10 @ 20:11)  Osmolality, Random Urine: 180 mosm/Kg (11-10 @ 20:11)  Osmolality, Random Urine: 125 mosm/Kg (11-10 @ 18:30)  Sodium, Random Urine: 48 mmol/L (11-10 @ 18:30)  Sodium, Random Urine: 47 mmol/L (11-10 @ 16:00)  Creatinine, Random Urine: 23 mg/dL (11-10 @ 16:00)  Osmolality, Random Urine: 165 mosm/Kg (11-10 @ 16:00)  Potassium, Random Urine: 10 mmol/L (11-10 @ 16:00)  Creatinine, Random Urine: 23 mg/dL (11-10 @ 16:00)  Protein/Creatinine Ratio Calculation: 0.4 Ratio (11-10 @ 16:00)  Sodium, Random Urine: 52 mmol/L (11-10 @ 10:38)  Osmolality, Random Urine: 158 mosm/Kg (11-10 @ 10:38)  Potassium, Random Urine: 10 mmol/L (11-10 @ 10:38)  Sodium, Random Urine: 57 mmol/L (11-09 @ 19:55)  Creatinine, Random Urine: <13 mg/dL (11-09 @ 19:55)  Osmolality, Random Urine: 156 mosm/Kg (11-09 @ 19:55)  Potassium, Random Urine: 10 mmol/L (11-09 @ 19:55)  Osmolality, Random Urine: 256 mosm/Kg (11-08 @ 11:05)  Sodium, Random Urine: 30 mmol/L (11-07 @ 22:40)  Creatinine, Random Urine: 51 mg/dL (11-07 @ 22:40)  Protein/Creatinine Ratio Calculation: 0.3 Ratio (11-07 @ 22:40)  Osmolality, Random Urine: 308 mosm/Kg (11-07 @ 22:40)  Potassium, Random Urine: 30 mmol/L (11-07 @ 22:40)

## 2024-11-14 ENCOUNTER — TRANSCRIPTION ENCOUNTER (OUTPATIENT)
Age: 74
End: 2024-11-14

## 2024-11-14 LAB
ANION GAP SERPL CALC-SCNC: 2 MMOL/L — LOW (ref 5–17)
BASOPHILS # BLD AUTO: 0.02 K/UL — SIGNIFICANT CHANGE UP (ref 0–0.2)
BASOPHILS NFR BLD AUTO: 0.3 % — SIGNIFICANT CHANGE UP (ref 0–2)
BUN SERPL-MCNC: 20 MG/DL — HIGH (ref 7–18)
CALCIUM SERPL-MCNC: 9.5 MG/DL — SIGNIFICANT CHANGE UP (ref 8.4–10.5)
CHLORIDE SERPL-SCNC: 123 MMOL/L — HIGH (ref 96–108)
CO2 SERPL-SCNC: 27 MMOL/L — SIGNIFICANT CHANGE UP (ref 22–31)
CREAT SERPL-MCNC: 1.37 MG/DL — HIGH (ref 0.5–1.3)
EGFR: 41 ML/MIN/1.73M2 — LOW
EOSINOPHIL # BLD AUTO: 0.11 K/UL — SIGNIFICANT CHANGE UP (ref 0–0.5)
EOSINOPHIL NFR BLD AUTO: 1.9 % — SIGNIFICANT CHANGE UP (ref 0–6)
GLUCOSE BLDC GLUCOMTR-MCNC: 130 MG/DL — HIGH (ref 70–99)
GLUCOSE BLDC GLUCOMTR-MCNC: 144 MG/DL — HIGH (ref 70–99)
GLUCOSE BLDC GLUCOMTR-MCNC: 156 MG/DL — HIGH (ref 70–99)
GLUCOSE BLDC GLUCOMTR-MCNC: 239 MG/DL — HIGH (ref 70–99)
GLUCOSE SERPL-MCNC: 193 MG/DL — HIGH (ref 70–99)
HCT VFR BLD CALC: 31.5 % — LOW (ref 34.5–45)
HGB BLD-MCNC: 9.4 G/DL — LOW (ref 11.5–15.5)
IMM GRANULOCYTES NFR BLD AUTO: 0.2 % — SIGNIFICANT CHANGE UP (ref 0–0.9)
LYMPHOCYTES # BLD AUTO: 1.55 K/UL — SIGNIFICANT CHANGE UP (ref 1–3.3)
LYMPHOCYTES # BLD AUTO: 26.6 % — SIGNIFICANT CHANGE UP (ref 13–44)
MAGNESIUM SERPL-MCNC: 1.9 MG/DL — SIGNIFICANT CHANGE UP (ref 1.6–2.6)
MCHC RBC-ENTMCNC: 27.6 PG — SIGNIFICANT CHANGE UP (ref 27–34)
MCHC RBC-ENTMCNC: 29.8 G/DL — LOW (ref 32–36)
MCV RBC AUTO: 92.4 FL — SIGNIFICANT CHANGE UP (ref 80–100)
MONOCYTES # BLD AUTO: 0.52 K/UL — SIGNIFICANT CHANGE UP (ref 0–0.9)
MONOCYTES NFR BLD AUTO: 8.9 % — SIGNIFICANT CHANGE UP (ref 2–14)
NEUTROPHILS # BLD AUTO: 3.61 K/UL — SIGNIFICANT CHANGE UP (ref 1.8–7.4)
NEUTROPHILS NFR BLD AUTO: 62.1 % — SIGNIFICANT CHANGE UP (ref 43–77)
NRBC # BLD: 0 /100 WBCS — SIGNIFICANT CHANGE UP (ref 0–0)
OSMOLALITY UR: 152 MOSM/KG — SIGNIFICANT CHANGE UP (ref 50–1200)
PHOSPHATE SERPL-MCNC: 2.7 MG/DL — SIGNIFICANT CHANGE UP (ref 2.5–4.5)
PLATELET # BLD AUTO: 178 K/UL — SIGNIFICANT CHANGE UP (ref 150–400)
POTASSIUM SERPL-MCNC: 3.9 MMOL/L — SIGNIFICANT CHANGE UP (ref 3.5–5.3)
POTASSIUM SERPL-SCNC: 3.9 MMOL/L — SIGNIFICANT CHANGE UP (ref 3.5–5.3)
RBC # BLD: 3.41 M/UL — LOW (ref 3.8–5.2)
RBC # FLD: 17.2 % — HIGH (ref 10.3–14.5)
SODIUM SERPL-SCNC: 152 MMOL/L — HIGH (ref 135–145)
SODIUM UR-SCNC: 48 MMOL/L — SIGNIFICANT CHANGE UP
WBC # BLD: 5.82 K/UL — SIGNIFICANT CHANGE UP (ref 3.8–10.5)
WBC # FLD AUTO: 5.82 K/UL — SIGNIFICANT CHANGE UP (ref 3.8–10.5)

## 2024-11-14 PROCEDURE — 99233 SBSQ HOSP IP/OBS HIGH 50: CPT | Mod: GC

## 2024-11-14 RX ADMIN — CHLORHEXIDINE GLUCONATE 1 APPLICATION(S): 40 SOLUTION TOPICAL at 05:13

## 2024-11-14 RX ADMIN — Medication 120 MILLILITER(S): at 05:13

## 2024-11-14 RX ADMIN — Medication 6 UNIT(S): at 11:36

## 2024-11-14 RX ADMIN — Medication 2 TABLET(S): at 21:07

## 2024-11-14 RX ADMIN — Medication 2: at 06:35

## 2024-11-14 RX ADMIN — Medication 6 UNIT(S): at 06:35

## 2024-11-14 RX ADMIN — Medication 1 APPLICATION(S): at 05:35

## 2024-11-14 RX ADMIN — TRAZODONE HYDROCHLORIDE 75 MILLIGRAM(S): 100 TABLET ORAL at 21:07

## 2024-11-14 RX ADMIN — Medication 0: at 17:42

## 2024-11-14 RX ADMIN — MUPIROCIN 1 APPLICATION(S): 20 OINTMENT TOPICAL at 17:43

## 2024-11-14 RX ADMIN — HEPARIN SODIUM 5000 UNIT(S): 10000 INJECTION INTRAVENOUS; SUBCUTANEOUS at 17:42

## 2024-11-14 RX ADMIN — HALOPERIDOL DECANOATE 1 MILLIGRAM(S): 50 INJECTION INTRAMUSCULAR at 21:06

## 2024-11-14 RX ADMIN — Medication 1 APPLICATION(S): at 17:44

## 2024-11-14 RX ADMIN — Medication 500 MILLILITER(S): at 05:52

## 2024-11-14 RX ADMIN — Medication 6 UNIT(S): at 17:43

## 2024-11-14 RX ADMIN — HALOPERIDOL DECANOATE 1 MILLIGRAM(S): 50 INJECTION INTRAMUSCULAR at 17:42

## 2024-11-14 RX ADMIN — Medication 4: at 11:35

## 2024-11-14 RX ADMIN — Medication 15 UNIT(S): at 21:12

## 2024-11-14 RX ADMIN — Medication 120 MILLILITER(S): at 13:51

## 2024-11-14 RX ADMIN — Medication 120 MILLILITER(S): at 22:56

## 2024-11-14 RX ADMIN — MUPIROCIN 1 APPLICATION(S): 20 OINTMENT TOPICAL at 05:52

## 2024-11-14 RX ADMIN — Medication 1 APPLICATION(S): at 21:15

## 2024-11-14 RX ADMIN — HEPARIN SODIUM 5000 UNIT(S): 10000 INJECTION INTRAVENOUS; SUBCUTANEOUS at 05:13

## 2024-11-14 RX ADMIN — Medication 1 APPLICATION(S): at 13:51

## 2024-11-14 RX ADMIN — HALOPERIDOL DECANOATE 1 MILLIGRAM(S): 50 INJECTION INTRAMUSCULAR at 05:13

## 2024-11-14 NOTE — DISCHARGE NOTE PROVIDER - ATTENDING DISCHARGE PHYSICAL EXAMINATION:
Gen: chronically ill and frail appearing woman in bed; NAD  HEENT: atraumatic; PERRL, MMM  Neck: supple  Cardiac: S1/S2, RRR  Resp: CTAB, respirations non-labored  GI: abd soft, ND  Ext: WWP  Vasc: 2+ radial pulses bilaterally  Neuro: awake, alert but not oriented; PRYOR

## 2024-11-14 NOTE — CHART NOTE - NSCHARTNOTEFT_GEN_A_CORE
Spoke to  at bedside and provided him support during this difficult time making decisions for his wife with end stage dementia and Li induced DI. Provided education about trajectory and treatment. Informed pt that she will require low sodium diet and increased water consumption for treatment of her DI. Pt's  agreed to home hospice leaving tomorrow AM.

## 2024-11-14 NOTE — DISCHARGE NOTE PROVIDER - NSDCCPCAREPLAN_GEN_ALL_CORE_FT
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 Laboratory tests confirmed the diagnosis, showing resistance to antidiuretic hormone (ADH) and elevated serum lithium levels. You have been on lithium therapy for bipolar disorder for over 10 years, which likely contributed to the development of DI. You completed 6 hours of hemodialysis which removed the lithium from the blood. Treatment during the hospital stay included discontinuation of lithium and administration of water through her IV to manage symptoms. The patient's sodium slowly lowered, but we were unable to completely correct the high sodium because of poor oral intake. Continue with a low sodium diet and drink copious amounts of water. DO NOT continue to take your lithium - instead, continue to take your haldol.      SECONDARY DISCHARGE DIAGNOSES  Diagnosis: Hypernatremia  Assessment and Plan of Treatment: See above    Diagnosis: Dementia  Assessment and Plan of Treatment: You were diagnosed with end stage dementia. You exhibited severe cognitive impairment, profound memory loss, inability to perform activities of daily living, and significant physical decline. We reoriented you daily and monitored your mentation. Given the advanced nature of the disease and poor prognosis, discussions with your family and the palliative care team led to the decision to transition to hospice care for comfort-focused management. Medications were adjusted to alleviate symptoms and ensure the patient's comfort. Please take as needed LORAZEPAM for agitation and MORPHINE for discomfort.  Hospice services will provide supportive care aimed at enhancing the quality of life, managing pain, and providing emotional support to you and your family. To contact the hospice team for more information please call . Maintain a peaceful and comfortable environment and encouraged loved ones to reach out to hospice staff for any assistance or concerns.    Diagnosis: Severe protein-calorie malnutrition  Assessment and Plan of Treatment: You were diagnosed with severe protein calorie malnutrition. We increased your feeds with high protein meals.    Diagnosis: Acute cystitis  Assessment and Plan of Treatment: You were found to have an urinary tract infection. Your initial labs showed positive urinalysis, positive Urine culture, and elevated white blood cells.  Urinary tract infections can cause weakness, confusion, or spread to other organ systems in the body.  You were started on IV antibiotics in the hospital with the goal of clearing this infection then transitioned to oral antibiotics.    Diagnosis: PRASHANTH (acute kidney injury)  Assessment and Plan of Treatment: You were diagnosed with acute kidney injury. Your creatinine was found to be elevated. You were treated with IV fluids to help normalize. This is likely from poor oral intake and your diabetes insipitus.

## 2024-11-14 NOTE — PROGRESS NOTE ADULT - SUBJECTIVE AND OBJECTIVE BOX
[facility] NEPHROLOGY- PROGRESS NOTE    Patient is a 73yo Female wheelchair bound, legally blind with schizophrenia on Lithium and dementia sent in for hypernatremia on outpt labs. Pt with poor PO intake for 2-3 weeks. Pt a/w hypernatremia (Na 165), PRASHANTH, hypercalcemia, bradycardia, acute metabolic encephalopathy, UTI and ?lithium toxicity (lithium level 1.3). Nephrolgoy consulted for elevated serum creatinine and sodium with ?lithium toxicity.    S/P HD for 6 hours on 11/8.    REVIEW OF SYSTEMS: Unable to obtain as patient non-verbal.    penicillins (Fever)  sulfur (Other)  sulfa drugs (Unknown)  Stelazine (Other)  phenothiazines (Unknown)      Hospital Medications: Medications reviewed    VITALS:  T(F): 97.2 (11-14-24 @ 08:00), Max: 97.6 (11-13-24 @ 15:00)  HR: 83 (11-14-24 @ 10:00)  BP: 108/52 (11-14-24 @ 10:00)  RR: 16 (11-14-24 @ 10:00)  SpO2: 100% (11-14-24 @ 09:00)  Wt(kg): --    11-13 @ 07:01  -  11-14 @ 07:00  --------------------------------------------------------  IN: 2880 mL / OUT: 4560 mL / NET: -1680 mL    11-14 @ 07:01  -  11-14 @ 11:46  --------------------------------------------------------  IN: 390 mL / OUT: 225 mL / NET: 165 mL      PHYSICAL EXAM:    Gen: NAD,  non-verbal  Cards: RRR, +S1/S2, no M/G/R  Resp: CTA B/L  GI: soft, NT/ND, NABS  : + baig with dilute urine  Vascular: no LE edema B/L       LABS:  11-14  152 <--, 154 <--, 155 <--, 156 <--, 161 <--, 159 <--, 158 <--  152[H]  |  123[H]  |  20[H]  ----------------------------<  193[H]  3.9   |  27  |  1.37[H]    Ca    9.5      14 Nov 2024 03:37  Phos  2.7     11-14  Mg     1.9     11-14      Creatinine Trend: 1.37 <--, 1.16 <--, 1.31 <--, 1.27 <--, 1.47 <--, 1.14 <--, 1.33 <--, 1.30 <--, 1.29 <--, 1.43 <--, 1.33 <--, 1.48 <--, 1.53 <--, 1.34 <--, 1.34 <--, 1.09 <--, 0.81 <--, 0.35 <--, 1.71 <--, 1.90 <--, 2.02 <--, 2.16 <--, 2.59 <--                        9.4    5.82  )-----------( 178      ( 14 Nov 2024 03:37 )             31.5     Urine Studies:  Urinalysis Basic - ( 14 Nov 2024 03:37 )    Color:  / Appearance:  / SG:  / pH:   Gluc: 193 mg/dL / Ketone:   / Bili:  / Urobili:    Blood:  / Protein:  / Nitrite:    Leuk Esterase:  / RBC:  / WBC    Sq Epi:  / Non Sq Epi:  / Bacteria:       Sodium, Random Urine: 48 mmol/L (11-14 @ 03:37)  Sodium, Random Urine: 72 mmol/L (11-13 @ 03:25)  Osmolality, Random Urine: 212 mosm/Kg (11-13 @ 03:25)  Sodium, Random Urine: 64 mmol/L (11-12 @ 19:30)  Osmolality, Random Urine: 171 mosm/Kg (11-12 @ 19:30)  Sodium, Random Urine: 60 mmol/L (11-12 @ 12:50)  Creatinine, Random Urine: <13 mg/dL (11-12 @ 12:50)  Protein/Creatinine Ratio Calculation: SEE NOTE Ratio (11-12 @ 12:50)  Osmolality, Random Urine: 177 mosm/Kg (11-12 @ 12:50)  Potassium, Random Urine: 7 mmol/L (11-12 @ 12:50)  Sodium, Random Urine: 67 mmol/L (11-10 @ 20:11)  Osmolality, Random Urine: 180 mosm/Kg (11-10 @ 20:11)  Osmolality, Random Urine: 125 mosm/Kg (11-10 @ 18:30)  Sodium, Random Urine: 48 mmol/L (11-10 @ 18:30)  Sodium, Random Urine: 47 mmol/L (11-10 @ 16:00)  Creatinine, Random Urine: 23 mg/dL (11-10 @ 16:00)  Osmolality, Random Urine: 165 mosm/Kg (11-10 @ 16:00)  Potassium, Random Urine: 10 mmol/L (11-10 @ 16:00)  Creatinine, Random Urine: 23 mg/dL (11-10 @ 16:00)  Protein/Creatinine Ratio Calculation: 0.4 Ratio (11-10 @ 16:00)  Sodium, Random Urine: 52 mmol/L (11-10 @ 10:38)  Osmolality, Random Urine: 158 mosm/Kg (11-10 @ 10:38)  Potassium, Random Urine: 10 mmol/L (11-10 @ 10:38)  Sodium, Random Urine: 57 mmol/L (11-09 @ 19:55)  Creatinine, Random Urine: <13 mg/dL (11-09 @ 19:55)  Osmolality, Random Urine: 156 mosm/Kg (11-09 @ 19:55)  Potassium, Random Urine: 10 mmol/L (11-09 @ 19:55)  Osmolality, Random Urine: 256 mosm/Kg (11-08 @ 11:05)  Sodium, Random Urine: 30 mmol/L (11-07 @ 22:40)  Creatinine, Random Urine: 51 mg/dL (11-07 @ 22:40)  Protein/Creatinine Ratio Calculation: 0.3 Ratio (11-07 @ 22:40)  Osmolality, Random Urine: 308 mosm/Kg (11-07 @ 22:40)  Potassium, Random Urine: 30 mmol/L (11-07 @ 22:40)

## 2024-11-14 NOTE — DISCHARGE NOTE PROVIDER - NSDCCAREPROVSEEN_GEN_ALL_CORE_FT
Janett, Kiko Ng, Ayana Haas, Missael Toledo, Brian R FleischerBlack, Jessica Janett, Kiko Ng, Ayana Haas, Missael Toledo, Brian R FleischerBlack, Jessica FleischerBlack, Julissa

## 2024-11-14 NOTE — PROGRESS NOTE ADULT - SUBJECTIVE AND OBJECTIVE BOX
Patient is a 74y old  Female who presents with a chief complaint of AMS (13 Nov 2024 13:41)      INTERVAL HPI/OVERNIGHT EVENTS:   No overnight events   Afebrile, hemodynamically stable     Subjective: Patient seen and examined at bedside.    ICU Vital Signs Last 24 Hrs  T(C): 36 (14 Nov 2024 04:00), Max: 36.4 (13 Nov 2024 15:00)  T(F): 96.8 (14 Nov 2024 04:00), Max: 97.6 (13 Nov 2024 15:00)  HR: 67 (14 Nov 2024 06:00) (52 - 101)  BP: 107/59 (14 Nov 2024 06:00) (82/56 - 137/62)  BP(mean): 72 (14 Nov 2024 06:00) (60 - 110)  ABP: --  ABP(mean): --  RR: 16 (14 Nov 2024 06:00) (11 - 19)  SpO2: 99% (14 Nov 2024 06:00) (99% - 100%)      I&O's Summary    13 Nov 2024 07:01  -  14 Nov 2024 07:00  --------------------------------------------------------  IN: 2880 mL / OUT: 4560 mL / NET: -1680 mL          PHYSICAL EXAM:  GENERAL: No acute distress   HEAD:  Atraumatic, Normocephalic  EYES: EOMI, PERRLA, conjunctiva and sclera clear  ENMT: No tonsillar erythema, exudates, or enlargement; Moist mucous membranes  NECK: Supple, No JVD, Normal thyroid  HEART: Regular rate and rhythm; No murmurs, rubs, or gallops  RESPIRATORY: CTA B/L, No W/R/R  ABDOMEN: Soft, Nontender, Nondistended; Bowel sounds present  NEUROLOGY: A&Ox0, nonfocal, moving all extremities  EXTREMITIES:  2+ Peripheral Pulses, No clubbing, cyanosis, or edema  SKIN: warm, dry, normal color, no rash or abnormal lesions          LABS:                        9.4    5.82  )-----------( 178      ( 14 Nov 2024 03:37 )             31.5     11-14    152[H]  |  123[H]  |  20[H]  ----------------------------<  193[H]  3.9   |  27  |  1.37[H]    Ca    9.5      14 Nov 2024 03:37  Phos  2.7     11-14  Mg     1.9     11-14        Urinalysis Basic - ( 14 Nov 2024 03:37 )    Color: x / Appearance: x / SG: x / pH: x  Gluc: 193 mg/dL / Ketone: x  / Bili: x / Urobili: x   Blood: x / Protein: x / Nitrite: x   Leuk Esterase: x / RBC: x / WBC x   Sq Epi: x / Non Sq Epi: x / Bacteria: x      CAPILLARY BLOOD GLUCOSE      POCT Blood Glucose.: 156 mg/dL (14 Nov 2024 06:09)  POCT Blood Glucose.: 167 mg/dL (13 Nov 2024 21:06)  POCT Blood Glucose.: 270 mg/dL (13 Nov 2024 16:14)        Consultant(s) Notes Reviewed:  [x ] YES  [ ] NO    MEDICATIONS  (STANDING):  chlorhexidine 2% Cloths 1 Application(s) Topical <User Schedule>  dextrose 5%. 500 milliLiter(s) (120 mL/Hr) IV Continuous <Continuous>  haloperidol     Tablet 1 milliGRAM(s) Oral two times a day  heparin   Injectable 5000 Unit(s) SubCutaneous every 12 hours  insulin glargine Injectable (LANTUS) 15 Unit(s) SubCutaneous at bedtime  insulin lispro (ADMELOG) corrective regimen sliding scale   SubCutaneous three times a day before meals  insulin lispro (ADMELOG) corrective regimen sliding scale   SubCutaneous at bedtime  insulin lispro Injectable (ADMELOG) 6 Unit(s) SubCutaneous three times a day before meals  mupirocin 2% Ointment 1 Application(s) Both Nostrils two times a day  neomycin/bacitracin/polymyxin Ointment 1 Application(s) Right EYE two times a day  petrolatum Ophthalmic Ointment 1 Application(s) Right EYE three times a day  senna 2 Tablet(s) Oral at bedtime  traZODone 75 milliGRAM(s) Oral at bedtime    MEDICATIONS  (PRN):  haloperidol    Injectable 1 milliGRAM(s) IntraMuscular every 6 hours PRN Agitation      Care Discussed with Consultants/Other Providers [ x] YES  [ ] NO    RADIOLOGY & ADDITIONAL TESTS: Patient is a 74y old  Female who presents with a chief complaint of AMS (13 Nov 2024 13:41)      INTERVAL HPI/OVERNIGHT EVENTS:   No overnight events   Afebrile, hemodynamically stable     Subjective: Patient seen and examined at bedside.    ICU Vital Signs Last 24 Hrs  T(C): 36 (14 Nov 2024 04:00), Max: 36.4 (13 Nov 2024 15:00)  T(F): 96.8 (14 Nov 2024 04:00), Max: 97.6 (13 Nov 2024 15:00)  HR: 67 (14 Nov 2024 06:00) (52 - 101)  BP: 107/59 (14 Nov 2024 06:00) (82/56 - 137/62)  BP(mean): 72 (14 Nov 2024 06:00) (60 - 110)  ABP: --  ABP(mean): --  RR: 16 (14 Nov 2024 06:00) (11 - 19)  SpO2: 99% (14 Nov 2024 06:00) (99% - 100%)      I&O's Summary    13 Nov 2024 07:01  -  14 Nov 2024 07:00  --------------------------------------------------------  IN: 2880 mL / OUT: 4560 mL / NET: -1680 mL    PHYSICAL EXAM:  GENERAL: No acute distress   HEAD:  Atraumatic, Normocephalic  EYES: EOMI, PERRLA, conjunctiva and sclera clear  ENMT: No tonsillar erythema, exudates, or enlargement; Moist mucous membranes  NECK: Supple, No JVD, Normal thyroid  HEART: Regular rate and rhythm; No murmurs, rubs, or gallops  RESPIRATORY: CTA B/L, No W/R/R  ABDOMEN: Soft, Nontender, Nondistended; Bowel sounds present  NEUROLOGY: A&Ox0, nonfocal, moving all extremities  EXTREMITIES:  2+ Peripheral Pulses, No clubbing, cyanosis, or edema  SKIN: warm, dry, normal color, no rash or abnormal lesions    LABS:                        9.4    5.82  )-----------( 178      ( 14 Nov 2024 03:37 )             31.5     11-14    152[H]  |  123[H]  |  20[H]  ----------------------------<  193[H]  3.9   |  27  |  1.37[H]    Ca    9.5      14 Nov 2024 03:37  Phos  2.7     11-14  Mg     1.9     11-14        Urinalysis Basic - ( 14 Nov 2024 03:37 )    Color: x / Appearance: x / SG: x / pH: x  Gluc: 193 mg/dL / Ketone: x  / Bili: x / Urobili: x   Blood: x / Protein: x / Nitrite: x   Leuk Esterase: x / RBC: x / WBC x   Sq Epi: x / Non Sq Epi: x / Bacteria: x      CAPILLARY BLOOD GLUCOSE      POCT Blood Glucose.: 156 mg/dL (14 Nov 2024 06:09)  POCT Blood Glucose.: 167 mg/dL (13 Nov 2024 21:06)  POCT Blood Glucose.: 270 mg/dL (13 Nov 2024 16:14)        Consultant(s) Notes Reviewed:  [x ] YES  [ ] NO    MEDICATIONS  (STANDING):  chlorhexidine 2% Cloths 1 Application(s) Topical <User Schedule>  dextrose 5%. 500 milliLiter(s) (120 mL/Hr) IV Continuous <Continuous>  haloperidol     Tablet 1 milliGRAM(s) Oral two times a day  heparin   Injectable 5000 Unit(s) SubCutaneous every 12 hours  insulin glargine Injectable (LANTUS) 15 Unit(s) SubCutaneous at bedtime  insulin lispro (ADMELOG) corrective regimen sliding scale   SubCutaneous three times a day before meals  insulin lispro (ADMELOG) corrective regimen sliding scale   SubCutaneous at bedtime  insulin lispro Injectable (ADMELOG) 6 Unit(s) SubCutaneous three times a day before meals  mupirocin 2% Ointment 1 Application(s) Both Nostrils two times a day  neomycin/bacitracin/polymyxin Ointment 1 Application(s) Right EYE two times a day  petrolatum Ophthalmic Ointment 1 Application(s) Right EYE three times a day  senna 2 Tablet(s) Oral at bedtime  traZODone 75 milliGRAM(s) Oral at bedtime    MEDICATIONS  (PRN):  haloperidol    Injectable 1 milliGRAM(s) IntraMuscular every 6 hours PRN Agitation      Care Discussed with Consultants/Other Providers [ x] YES  [ ] NO    RADIOLOGY & ADDITIONAL TESTS:

## 2024-11-14 NOTE — DISCHARGE NOTE PROVIDER - NSDCMRMEDTOKEN_GEN_ALL_CORE_FT
Haldol 1 mg oral tablet: 1 tab(s) orally 2 times a day  Lexapro 10 mg oral tablet: 1 tab(s) orally once a day  lithium 300 mg oral tablet: 1 tab(s) orally once a day (at bedtime)  metFORMIN 500 mg oral tablet: 1 tab(s) orally 2 times a day  simvastatin 20 mg oral tablet: 1 tab(s) orally once a day (at bedtime)  traZODone 150 mg oral tablet: 0.5 tab(s) orally once a day (at bedtime)   Haldol 1 mg oral tablet: 1 tab(s) orally 2 times a day  Lexapro 10 mg oral tablet: 1 tab(s) orally once a day  traZODone 150 mg oral tablet: 0.5 tab(s) orally once a day (at bedtime)   bisacodyl 10 mg rectal suppository: 1 suppository(ies) rectally once a day  Haldol 1 mg oral tablet: 1 tab(s) orally 2 times a day  Lexapro 10 mg oral tablet: 1 tab(s) orally once a day  traZODone 150 mg oral tablet: 0.5 tab(s) orally once a day (at bedtime)   bisacodyl 10 mg rectal suppository: 1 suppository(ies) rectally once a day  Haldol 1 mg oral tablet: 1 tab(s) orally 2 times a day  Lexapro 10 mg oral tablet: 1 tab(s) orally once a day  LORazepam 2 mg/mL oral concentrate: 0.5 milliliter(s) orally 4 times a day as needed for  agitation MDD: 2  morphine 20 mg/5 mL oral solution: 2.5 milliliter(s) orally every 4 hours as needed for distress MDD: 15  traZODone 150 mg oral tablet: 0.5 tab(s) orally once a day (at bedtime)

## 2024-11-14 NOTE — PROGRESS NOTE ADULT - ATTENDING COMMENTS
73 y/o F with h/o dementia (wheelchair bound), schizophrenia on lithium, sent in by PCP for hypernatremia, following history of decreased PO intake over the past 2-3 weeks.  VS wnl on presentation. Dry mucous membranes noted on exam. Initial labs significant for mild leukocytosis, Na 165, PRASHANTH. Lithium level 1.3. UA+    Assessment:  - Acute encephalopathy - worsening dementia vs lithium toxicity  - Hypernatremia likely due to decrease free water intake vs nephrogenic DI  - Hypovolemia  - Dementia  - Schizophrenia     Plan:  - Cont fluid resuscitate balance crystalloids  - trend BMP   - obtain urine osm  - monitor I&O  - avoid rapid correction of Na  - Discussed with nephrology and per toxicology consult, will start HD today   - Hold lithium for now  - May need vasopressor support during HD  - SLP eval  - glucose monitoring  - sliding scale insulin   - DVT ppx lovenox  - Discussed with  at bedside
73yo woman w/ PMH dementia (wheelchair bound), schizophrenia on lithium, sent in by PCP for hypernatremia, following history of decreased PO intake over the past 2-3 weeks; found with severe hypernatremia and despite appropriate lithium level at time of serum study, high concern for lithium toxicity w/ drug-induced nephrogenic DI and admitted to ICU for further management.     ASSESSMENT  #Acute metabolic encephalopathy - worsening dementia vs lithium toxicity  #Hypernatremia likely due to decrease free water intake and nephrogenic DI  #Hypovolemia  #Dementia  #Schizophrenia   #Lithium toxicity suspected    Plan:  - cont free water repletion and IVF supplementation  --> pt has limited ability to drink to thirst w/ dementia; maintain low sodium diet   - trend BMP, uOsm and sOsm; replete lytes accordingly  - strict I/O  - S/p DDAVP without significant response  - nephrology eval and recs appr; received adjunct HD as per toxicology consult recs  - cont holding Lithium  - SLP eval  - glucose monitoring and adjust sliding scale insulin/basal especially with d5 IVF  - DVT ppx HSQ  - full code status, pal care following
73yo woman w/ PMH dementia (wheelchair bound), schizophrenia on lithium, sent in by PCP for hypernatremia, following history of decreased PO intake over the past 2-3 weeks; found with severe hypernatremia and despite appropriate lithium level at time of serum study, high concern for lithium toxicity w/ drug-induced nephrogenic DI and admitted to ICU for further management.     ASSESSMENT  #Acute metabolic encephalopathy - worsening dementia vs lithium toxicity  #Hypernatremia likely due to decrease free water intake and nephrogenic DI  #Hypovolemia  #Dementia  #Schizophrenia   #Lithium toxicity suspected    Plan:  - cont free water repletion and IVF supplementation; s/p additional IVF bolus overnight  --> pt has limited ability to drink to thirst w/ dementia; maintain low sodium diet   - trend BMP, uOsm and sOsm; replete lytes accordingly  - strict I/O  - S/p DDAVP without significant response; received additional dose 11/12 overnight  - nephrology eval and recs appr; received adjunct HD as per toxicology consult recs  - cont holding Lithium  - SLP eval apr  - glucose monitoring and adjust sliding scale insulin/basal especially with d5 IVF  - DVT ppx HSQ vs. SQL  - DNR/DNI code status and pt's spouse would like home hospice care    Transfer to medicine
75yo woman w/ PMH dementia (wheelchair bound), schizophrenia on lithium, sent in by PCP for hypernatremia, following history of decreased PO intake over the past 2-3 weeks; found with severe hypernatremia and despite appropriate lithium level at time of serum study, high concern for lithium toxicity w/ drug-induced nephrogenic DI and admitted to ICU for further management.     ASSESSMENT  #Acute metabolic encephalopathy - worsening dementia vs lithium toxicity  #Hypernatremia likely due to decrease free water intake and nephrogenic DI  #Hypovolemia  #Dementia  #Schizophrenia   #Lithium toxicity suspected    Plan:  - cont free water repletion and IVF supplementation; s/p additional adjunct IVF bolus  --> pt has limited ability to drink to thirst w/ dementia; maintain low sodium diet   - trend BMP, urine lytes  - strict I/O  - S/p DDAVP without significant response; received additional dose 11/12 overnight  - nephrology eval and recs appr; had received adjunct HD as per toxicology consult rec on presentation; no further HD  - cont holding Lithium  - SLP eval apr  - glucose monitoring and adjust sliding scale insulin/basal especially with d5 IVF  - DVT ppx HSQ vs. SQL  - DNR/DNI code status and pt's spouse would like home hospice care    Transfer to medicine vs. d/c home hospice?
73yo woman w/ PMH dementia (wheelchair bound), schizophrenia on lithium, sent in by PCP for hypernatremia, following history of decreased PO intake over the past 2-3 weeks; found with severe hypernatremia and despite appropriate lithium level at time of serum study, high concern for lithium toxicity w/ drug-induced nephrogenic DI and admitted to ICU for further management.     ASSESSMENT  #Acute metabolic encephalopathy - worsening dementia vs lithium toxicity  #Hypernatremia likely due to decrease free water intake and nephrogenic DI  #Hypovolemia  #Dementia  #Schizophrenia   #Lithium toxicity suspected    Plan:  - cont free water repletion and IVF supplementation  - trend BMP, uOsm and sOsm; replete lytes accordingly  - strict I/O  - S/p DDAVP without significant response  - nephrology eval and recs appr; received adjunct HD as per toxicology consult recs  - cont holding Lithium  - SLP eval  - glucose monitoring and adjust sliding scale insulin/basal especially with d5 IVF  - DVT ppx HSQ  - full code status  - cont ICU care
IMP: This is  a 74 yr old woman  with h/o dementia (wheelchair bound), schizophrenia on lithium, sent in by PCP for hypernatremia, following history of decreased PO intake over the past 2-3 weeks.  VS wnl on presentation. Dry mucous membranes noted on exam. Initial labs significant for mild leukocytosis, Na 165, PRASHANTH. Lithium level 1.3. UA+    Assessment:  - Acute encephalopathy - worsening dementia vs lithium toxicity  - Hypernatremia likely due to decrease free water intake vs nephrogenic DI  - Hypovolemia  - Dementia  - Schizophrenia     Plan:  - Cont fluid resuscitate balance crystalloids  - trend BMP   - obtain urine osm  - monitor I&O  - S/p DDAVP  - BMP Q6h   - avoid rapid correction of Na  - Discussed with nephrology and per toxicology consult, s/ p HD   - Hold lithium for now  - PRASHANTH improving   - SLP eval  - glucose monitoring  - sliding scale insulin   - DVT ppx lovenox .
IMP: This is  a 74 yr old woman  with h/o dementia (wheelchair bound), schizophrenia on lithium, sent in by PCP for hypernatremia, following history of decreased PO intake over the past 2-3 weeks.  VS wnl on presentation. Dry mucous membranes noted on exam. Initial labs significant for mild leukocytosis, Na 165, PRASHANTH. Lithium level 1.3. UA+    Assessment:  - Acute encephalopathy - worsening dementia vs lithium toxicity  - Hypernatremia likely due to decrease free water intake vs nephrogenic DI  - Hypovolemia  - Dementia  - Schizophrenia     Plan:  - Cont fluid resuscitate balance crystalloids  - trend BMP   - obtain urine osm  - monitor I&O  - avoid rapid correction of Na  - Discussed with nephrology and per toxicology consult, will start HD today   - Hold lithium for now  - PRASHANTH improving   - SLP eval  - glucose monitoring  - sliding scale insulin   - DVT ppx lovenox

## 2024-11-14 NOTE — DISCHARGE NOTE PROVIDER - DETAILS OF MALNUTRITION DIAGNOSIS/DIAGNOSES
This patient has been assessed with a concern for Malnutrition and was treated during this hospitalization for the following Nutrition diagnosis/diagnoses:     -  11/09/2024: Severe protein-calorie malnutrition

## 2024-11-14 NOTE — PROGRESS NOTE ADULT - ASSESSMENT
74F legally blind, WC bound, schizophrenia on Lithium, dementia, minimally verbal presenting with severe hypernatremia, rectal prolapse  in the setting of worsening renal function and poor PO intake that has progressed these past 2-3 weeks, sent in from PCP given hypernatremia; ICU consulted for severe hypernatremia, metabolic encephalopathy, and lithium toxicity. s/p HD 6 hrs. Still hyperNa on D5.     ====== [ NEURO ] ======  #Acute encephalopathy on progressive dementia  #End-stage dementia  #Lithium toxicity  as per : pt is minimally verbal at baseline  poor po intake last 3 weeks, progressed the last year where she is no longer chewing, now on a puree diet  UA positive, lithium elevated, renal function impaired  likely metabolic encephalopathy from ?nephrogenic DI and hyperNa secondary to elevated lithium level v UTI encephalopathy  encephalopathy from lithium toxicity may not be reflected in level of 1.3  acetaminophen, salicylate negative  hospice appropriate as per palliative   hold lithium, consider olanzapine if agitated  patient at baseline as per     ====== [ CVS ] ======  No active issues    ====== [ PULM ] ======  No active issues    ====== [ RENAL ] ======  #Severe Hypernatremia  #PRASHANTH  #Li-induced Nephrogenic DI  as above  Na on admission was 164  multifactorial in the setting of poor PO intake (dehydration) v lithium nephrotoxicity/ DI   BUN/Cr elevated 82/2.59 likely pre-renal in the setting of poor PO intake and Li toxicity  encephalopathy from lithium toxicity may not be reflected in level of 1.3  s/p HD 6hrs 11/8 as per toxicology, SCr improved   urine Osm= 308  Patient s/p DDAVP x2, which seems to have had no effect  As per nephro= increased enteral free water intake, no need for HCTZ or ddAVP  As pt demented, may require a feeding tube for hydration (would consider PEG if c/w GOC)  - c/w D5  - trend BMP q6hrs  - safe correction rate; goal <12mEq/24 hours; Na goal 155 -->148 in 24 hrs  - Nephro Dr. Dotson    #Cystitis  UA +ve  - UCx > 100k E coli   - s/p CTX 1mg q24h x 3 days    #Hypophosphatemia  phos 1.6 > repleted  - monitor and supplement prn    ====== [ GI/NUTRITION ] ======  #Dementia  poor PO intake leading to electrolyte imbalances   her advancing dementia has progressed; she is an extremely poor candidate for PEG as it would not change or reverse her dementia/quality of life  tolerating feeds, nutrition consulted  - aspiration precautions    ====== [ ENDO] ======  #DM, Hyperglycemia  A1C= 7.7  titrate insulin to improve hyperNa  mod SS, 10 basaglar, and 5 premeals  fingersticks    ====== [ MSK/MOBILITY ] ======  #wheelchair-bound status   #legal blindness   - f/u PT recs    ====== [ PROPHYLAXIS ] ======  DVT ppx: Heparin SubQ  GI ppx: Protonix    ====== [ GOC/DISPO ] ======  DNR/DNI  hospice appropriate as per palliative  Dispo ICU   74F legally blind, WC bound, schizophrenia on Lithium, dementia, minimally verbal presenting with severe hypernatremia, rectal prolapse  in the setting of worsening renal function and poor PO intake that has progressed these past 2-3 weeks, sent in from PCP given hypernatremia; ICU consulted for severe hypernatremia, metabolic encephalopathy, and lithium toxicity. s/p HD 6 hrs. Still hyperNa on D5.     ====== [ NEURO ] ======  #Acute encephalopathy on progressive dementia  #End-stage dementia  #Lithium toxicity  as per : pt is minimally verbal at baseline  poor po intake last 3 weeks, progressed the last year where she is no longer chewing, now on a puree diet  UA positive, lithium elevated, renal function impaired  likely metabolic encephalopathy from ?nephrogenic DI and hyperNa secondary to elevated lithium level v UTI encephalopathy  encephalopathy from lithium toxicity may not be reflected in level of 1.3  acetaminophen, salicylate negative  hospice appropriate as per palliative   hold lithium, consider olanzapine if agitated  patient at baseline as per     ====== [ CVS ] ======  No active issues    ====== [ PULM ] ======  No active issues    ====== [ RENAL ] ======  #Severe Hypernatremia  #PRASHANTH  #Li-induced Nephrogenic DI  as above  Na on admission was 164  multifactorial in the setting of poor PO intake (dehydration) v lithium nephrotoxicity/ DI   BUN/Cr elevated 82/2.59 likely pre-renal in the setting of poor PO intake and Li toxicity  encephalopathy from lithium toxicity may not be reflected in level of 1.3  s/p HD 6hrs 11/8 as per toxicology, SCr improved   urine Osm= 308  Patient s/p DDAVP x2, which seems to have had no effect  As per nephro= increased enteral free water intake, no need for HCTZ or ddAVP  As pt demented, may require a feeding tube for hydration (would consider PEG if c/w GOC)  c/w D5  trend BMP  safe correction rate; goal <12mEq/24 hours; Na goal 155 -->148 in 24 hrs  Nephro Dr. Dotson    #Cystitis  UA +ve  - UCx > 100k E coli   - s/p CTX 1mg q24h x 3 days    #Hypophosphatemia  phos 1.6 > repleted  - monitor and supplement prn    ====== [ GI/NUTRITION ] ======  #Dementia  poor PO intake leading to electrolyte imbalances   her advancing dementia has progressed; she is an extremely poor candidate for PEG as it would not change or reverse her dementia/quality of life  tolerating feeds, nutrition consulted  - aspiration precautions    ====== [ ENDO] ======  #DM, Hyperglycemia  A1C= 7.7  titrate insulin to improve hyperNa  mod SS, 10 basaglar, and 5 premeals  fingersticks    ====== [ MSK/MOBILITY ] ======  #wheelchair-bound status   #legal blindness   - f/u PT recs    ====== [ PROPHYLAXIS ] ======  DVT ppx: Heparin SubQ  GI ppx: Protonix    ====== [ GOC/DISPO ] ======  DNR/DNI  Dispo home hospice

## 2024-11-14 NOTE — DISCHARGE NOTE PROVIDER - HOSPITAL COURSE
74F legally blind, WC bound, schizophrenia on Lithium, end stage dementia (minimally verbal at baseline), rectal prolapse, minimally verbal presenting with severe hypernatremia in the setting of worsening renal function and poor PO intake that has progressed past 3 weeks, progressed the last year where she is no longer chewing, now on a puree diet. Pt sent in from PCP given hypernatremia;  states that she has been progressively declining the 2-3 weeks, worsening appetite which prompted home care services to draw labs. He denies any fevers or aspiration events. In the ED, Na 164, Lit 1.3, BUN/Cr elevated 82/2.59 likely pre-renal in the setting of poor PO intake and Li toxicity. Admitted to ICU for severe hypernatremia, acute on chronic metabolic v infectious encephalopathy, and Li toxicity. Toxicology was consulted who recommended hemodialysis. The lithium was held and an acetaminophen and salicylate level was negative. A tri-dialysis catheter was consented and was put in the R internal jugular vein. Hemodialysis was completed for 6 hours and the Lithium and Cr levels improved. Nephrology was consulted and a ddAVP provocation test was performed which diagnosed with lithium induced nephrogenic diabetes insipidus. As per nephro: increase enteral free water intake, no need for HCTZ or ddAVP. As pt demented, may require a feeding tube for hydration (would consider PEG if c/w GOC). Pt would be high risk for auto-PEG removal given dementia and it would not change or reverse her dementia/quality of life. The patient was continued on D5 for the hypernatremia. A1C was found to be 7.7. As a result, the sugars were elevated and insulin was titrated. The patient completed a three day course of ceftriaxone for the UTI. Palliative was consulted and the decision was made to be DNR/DNI and to go home with hospice.    Patient is stable for discharge per attending and is advised to follow up at home with hospice. Please refer to patient's complete medical chart with documents for a full hospital course, for this is only a brief summary.

## 2024-11-14 NOTE — PROGRESS NOTE ADULT - ASSESSMENT
Patient is a 75yo Female wheelchair bound, legally blind with schizophrenia on Lithium and dementia sent in for hypernatremia on outpt labs. Pt with poor PO intake for 2-3 weeks. Pt a/w hypernatremia (Na 165), PRASHANTH, hypercalcemia, bradycardia, acute metabolic encephalopathy, UTI and ?lithium toxicity (lithium level 1.3). Nephrolgoy consulted for elevated serum creatinine and sodium with ?lithium toxicity.    1. PRASHANTH- previous SCr 1-1.2; last SCr 1.26 on 6/5/24 on HIE. PRASHANTH likely due to lithium use with hypercalcemia/ dehydration due to poor PO intake. +UA in the setting of infection. Renal function overall improving  with polyuria; see below. IVF as below. Pt with baig. Defer renal US. Pt with possible lithium toxicity; s/p HD on 11/8 for 6 hours. No further plans for HD. Shiley removed on 11/9. Strict I/Os. Avoid nephrotoxins/ NSAIDs/ RCA. Monitor BMP.  2. 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 s/p DDAVP 2 mcg dose  with no improvement in urine osm; consistent with nephrogenic DI.   c/w low sodium diet. Recc low protein diet. Management will be difficult given poor mental status (patient unable to drink to thirst) and hypotension (which would hold off on HCTZ and amiloride at this time due to risk of PRASHANTH)  c/w D5W IV but increase rate to 150 ml/hour   Monitor serum sodium.   3. Lithium toxicity- lithium level 1.3 on admission; with metabolic encephalopathy. S/P HD as above. No further plans for HD. Shiley removed.  4. Hypotension- BP low normal. Off pressors. Can start midodrine as needed. Monitor BP  5. Acute cystitis- +UA, UCx Ecoli. Finished Ceftriaxone.    6. Hypercalcemia- corrected serum Ca 10.94; improving on IVF. TSH wnl. Likely in part due to hypovolemia as well as lithium.  Check serum albumin and ionized Ca. Monitor ionized calcium    Discussed with ICU team    [facility]  Nando Elizondo M.D.  Johann Noyola D.O.  Paulina Dotson M.D.  MD Keisha Parekh Jeison, MSN, ANP-C    Telephone: [phone]  Facsimile: [phone] [id number], [address]  [place] [id number]

## 2024-11-15 ENCOUNTER — TRANSCRIPTION ENCOUNTER (OUTPATIENT)
Age: 74
End: 2024-11-15

## 2024-11-15 ENCOUNTER — APPOINTMENT (OUTPATIENT)
Dept: HOME HEALTH SERVICES | Facility: HOME HEALTH | Age: 74
End: 2024-11-15
Payer: MEDICARE

## 2024-11-15 VITALS
SYSTOLIC BLOOD PRESSURE: 114 MMHG | HEART RATE: 103 BPM | DIASTOLIC BLOOD PRESSURE: 71 MMHG | RESPIRATION RATE: 15 BRPM | OXYGEN SATURATION: 96 %

## 2024-11-15 VITALS
SYSTOLIC BLOOD PRESSURE: 100 MMHG | TEMPERATURE: 98.24 F | OXYGEN SATURATION: 96 % | DIASTOLIC BLOOD PRESSURE: 60 MMHG | RESPIRATION RATE: 16 BRPM | HEART RATE: 90 BPM

## 2024-11-15 DIAGNOSIS — F20.9 SCHIZOPHRENIA, UNSPECIFIED: ICD-10-CM

## 2024-11-15 DIAGNOSIS — Z97.8 PRESENCE OF OTHER SPECIFIED DEVICES: ICD-10-CM

## 2024-11-15 DIAGNOSIS — R53.2 FUNCTIONAL QUADRIPLEGIA: ICD-10-CM

## 2024-11-15 DIAGNOSIS — L25.8 UNSPECIFIED CONTACT DERMATITIS DUE TO OTHER AGENTS: ICD-10-CM

## 2024-11-15 DIAGNOSIS — R32 UNSPECIFIED CONTACT DERMATITIS DUE TO OTHER AGENTS: ICD-10-CM

## 2024-11-15 DIAGNOSIS — Z51.5 ENCOUNTER FOR PALLIATIVE CARE: ICD-10-CM

## 2024-11-15 DIAGNOSIS — F03.90 UNSPECIFIED DEMENTIA W/OUT BEHAVIORAL DISTURBANCE: ICD-10-CM

## 2024-11-15 DIAGNOSIS — N30.00 ACUTE CYSTITIS WITHOUT HEMATURIA: ICD-10-CM

## 2024-11-15 LAB
GLUCOSE BLDC GLUCOMTR-MCNC: 207 MG/DL — HIGH (ref 70–99)
GLUCOSE BLDC GLUCOMTR-MCNC: 77 MG/DL — SIGNIFICANT CHANGE UP (ref 70–99)

## 2024-11-15 PROCEDURE — 99239 HOSP IP/OBS DSCHRG MGMT >30: CPT

## 2024-11-15 PROCEDURE — 99496 TRANSJ CARE MGMT HIGH F2F 7D: CPT

## 2024-11-15 RX ORDER — HALOPERIDOL DECANOATE 50 MG/ML
1 INJECTION INTRAMUSCULAR
Qty: 12 | Refills: 0
Start: 2024-11-15 | End: 2024-11-17

## 2024-11-15 RX ORDER — MORPHINE SULFATE 30 MG/1
2.5 TABLET, EXTENDED RELEASE ORAL
Qty: 450 | Refills: 0
Start: 2024-11-15 | End: 2024-12-14

## 2024-11-15 RX ORDER — LORAZEPAM 2 MG
0.5 TABLET ORAL
Qty: 30 | Refills: 0
Start: 2024-11-15 | End: 2024-11-17

## 2024-11-15 RX ORDER — HYOSCYAMINE SULFATE 0.125 MG
0 TABLET,DISINTEGRATING ORAL
Qty: 12 | Refills: 0
Start: 2024-11-15

## 2024-11-15 RX ORDER — SIMVASTATIN 80 MG/1
1 TABLET, FILM COATED ORAL
Refills: 0 | DISCHARGE

## 2024-11-15 RX ORDER — ACETAMINOPHEN 500 MG
1 TABLET ORAL
Qty: 12 | Refills: 0
Start: 2024-11-15 | End: 2024-11-17

## 2024-11-15 RX ORDER — LORAZEPAM 2 MG
0.5 TABLET ORAL
Qty: 60 | Refills: 0
Start: 2024-11-15 | End: 2024-12-14

## 2024-11-15 RX ORDER — MORPHINE SULFATE 30 MG/1
0.25 TABLET, EXTENDED RELEASE ORAL
Qty: 15 | Refills: 0
Start: 2024-11-15 | End: 2024-11-17

## 2024-11-15 RX ORDER — LORAZEPAM 2 MG
1 TABLET ORAL
Qty: 12 | Refills: 0
Start: 2024-11-15 | End: 2024-11-17

## 2024-11-15 RX ORDER — PROCHLORPERAZINE MALEATE 5 MG/1
1 TABLET ORAL
Qty: 6 | Refills: 0
Start: 2024-11-15 | End: 2024-11-17

## 2024-11-15 RX ORDER — METFORMIN HYDROCHLORIDE 500 MG/1
1 TABLET, EXTENDED RELEASE ORAL
Refills: 0 | DISCHARGE

## 2024-11-15 RX ORDER — LITHIUM CARBONATE 300 MG
1 CAPSULE ORAL
Refills: 0 | DISCHARGE

## 2024-11-15 RX ADMIN — Medication 4: at 07:35

## 2024-11-15 RX ADMIN — Medication 1 APPLICATION(S): at 05:29

## 2024-11-15 RX ADMIN — CHLORHEXIDINE GLUCONATE 1 APPLICATION(S): 40 SOLUTION TOPICAL at 05:29

## 2024-11-15 RX ADMIN — HEPARIN SODIUM 5000 UNIT(S): 10000 INJECTION INTRAVENOUS; SUBCUTANEOUS at 05:29

## 2024-11-15 RX ADMIN — Medication 120 MILLILITER(S): at 08:20

## 2024-11-15 RX ADMIN — MUPIROCIN 1 APPLICATION(S): 20 OINTMENT TOPICAL at 05:28

## 2024-11-15 RX ADMIN — HALOPERIDOL DECANOATE 1 MILLIGRAM(S): 50 INJECTION INTRAMUSCULAR at 05:29

## 2024-11-15 RX ADMIN — Medication 1 APPLICATION(S): at 05:28

## 2024-11-15 RX ADMIN — Medication 6 UNIT(S): at 07:35

## 2024-11-15 NOTE — DISCHARGE NOTE NURSING/CASE MANAGEMENT/SOCIAL WORK - FINANCIAL ASSISTANCE
[facility] provides services at a reduced cost to those who are determined to be eligible through [facility]’s financial assistance program. Information regarding [facility]’s financial assistance program can be found by going to [email or web address] or by calling [phone].

## 2024-11-15 NOTE — PROGRESS NOTE ADULT - ASSESSMENT
74F legally blind, WC bound, schizophrenia on Lithium, dementia, minimally verbal presenting with severe hypernatremia, rectal prolapse  in the setting of worsening renal function and poor PO intake that has progressed these past 2-3 weeks, sent in from PCP given hypernatremia; ICU consulted for severe hypernatremia, metabolic encephalopathy, and lithium toxicity. s/p HD 6 hrs. Still hyperNa on D5.     ====== [ NEURO ] ======  #Acute encephalopathy on progressive dementia  #End-stage dementia  #Lithium toxicity  as per : pt is minimally verbal at baseline  poor po intake last 3 weeks, progressed the last year where she is no longer chewing, now on a puree diet  UA positive, lithium elevated, renal function impaired  likely metabolic encephalopathy from ?nephrogenic DI and hyperNa secondary to elevated lithium level v UTI encephalopathy  encephalopathy from lithium toxicity may not be reflected in level of 1.3  acetaminophen, salicylate negative  hospice appropriate as per palliative   hold lithium, consider olanzapine if agitated  patient at baseline as per     ====== [ CVS ] ======  No active issues    ====== [ PULM ] ======  No active issues    ====== [ RENAL ] ======  #Severe Hypernatremia  #PRASHANTH  #Li-induced Nephrogenic DI  as above  Na on admission was 164  multifactorial in the setting of poor PO intake (dehydration) v lithium nephrotoxicity/ DI   BUN/Cr elevated 82/2.59 likely pre-renal in the setting of poor PO intake and Li toxicity  encephalopathy from lithium toxicity may not be reflected in level of 1.3  s/p HD 6hrs 11/8 as per toxicology, SCr improved   urine Osm= 308  Patient s/p DDAVP x2, which seems to have had no effect  As per nephro= increased enteral free water intake, no need for HCTZ or ddAVP  As pt demented, may require a feeding tube for hydration (would consider PEG if c/w GOC)  c/w D5  trend BMP  safe correction rate; goal <12mEq/24 hours; Na goal 155 -->148 in 24 hrs  Nephro Dr. Dotson    #Cystitis  UA +ve  - UCx > 100k E coli   - s/p CTX 1mg q24h x 3 days    #Hypophosphatemia  phos 1.6 > repleted  - monitor and supplement prn    ====== [ GI/NUTRITION ] ======  #Dementia  poor PO intake leading to electrolyte imbalances   her advancing dementia has progressed; she is an extremely poor candidate for PEG as it would not change or reverse her dementia/quality of life  tolerating feeds, nutrition consulted  - aspiration precautions    ====== [ ENDO] ======  #DM, Hyperglycemia  A1C= 7.7  titrate insulin to improve hyperNa  mod SS, 10 basaglar, and 5 premeals  fingersticks    ====== [ MSK/MOBILITY ] ======  #wheelchair-bound status   #legal blindness   - f/u PT recs    ====== [ PROPHYLAXIS ] ======  DVT ppx: Heparin SubQ  GI ppx: Protonix    ====== [ GOC/DISPO ] ======  DNR/DNI  Dispo home hospice

## 2024-11-15 NOTE — DISCHARGE NOTE NURSING/CASE MANAGEMENT/SOCIAL WORK - PATIENT PORTAL LINK FT
You can access the FollowMyHealth Patient Portal offered by [facility] by registering at the following website: [email or web address]. By joining [employer]’s FollowMyHealth portal, you will also be able to view your health information using other applications (apps) compatible with our system.

## 2024-11-15 NOTE — DISCHARGE NOTE NURSING/CASE MANAGEMENT/SOCIAL WORK - NSDCVIVACCINE_GEN_ALL_CORE_FT
Influenza, high-dose, trivalent, preservative free; 19-Nov-2019 13:18; Jessica Salazar (RN); [employer]; [id number] (Exp. Date: 24-Jun-2020); IntraMuscular; Deltoid Right.; 0.5 milliLiter(s); VIS (VIS Published: 15-Aug-2019, VIS Presented: 19-Nov-2019);

## 2024-11-15 NOTE — PROGRESS NOTE ADULT - SUBJECTIVE AND OBJECTIVE BOX
Patient is a 74y old  Female who presents with a chief complaint of Nephrogenic Diabetes Insipidus secondary to Lithium toxicity (14 Nov 2024 16:48)      INTERVAL HPI/OVERNIGHT EVENTS:   No overnight events   Afebrile, hemodynamically stable     Subjective: Patient seen and examined at bedside.    ICU Vital Signs Last 24 Hrs  T(C): 36.3 (15 Nov 2024 04:00), Max: 37.4 (14 Nov 2024 15:51)  T(F): 97.3 (15 Nov 2024 04:00), Max: 99.4 (14 Nov 2024 15:51)  HR: 78 (15 Nov 2024 09:00) (66 - 101)  BP: 101/68 (15 Nov 2024 09:00) (73/50 - 145/81)  BP(mean): 76 (15 Nov 2024 09:00) (58 - 95)  ABP: --  ABP(mean): --  RR: 15 (15 Nov 2024 09:00) (12 - 32)  SpO2: 98% (15 Nov 2024 09:00) (96% - 100%)    O2 Parameters below as of 14 Nov 2024 19:00  Patient On (Oxygen Delivery Method): room air          I&O's Summary    14 Nov 2024 07:01  -  15 Nov 2024 07:00  --------------------------------------------------------  IN: 2990 mL / OUT: 4000 mL / NET: -1010 mL          PHYSICAL EXAM:  GENERAL: No acute distress   HEAD:  Atraumatic, Normocephalic  EYES: EOMI, PERRLA, conjunctiva and sclera clear  ENMT: No tonsillar erythema, exudates, or enlargement; Moist mucous membranes  NECK: Supple, No JVD, Normal thyroid  HEART: Regular rate and rhythm; No murmurs, rubs, or gallops  RESPIRATORY: CTA B/L, No W/R/R  ABDOMEN: Soft, Nontender, Nondistended; Bowel sounds present  NEUROLOGY: A&Ox3, nonfocal, moving all extremities  EXTREMITIES:  2+ Peripheral Pulses, No clubbing, cyanosis, or edema  SKIN: warm, dry, normal color, no rash or abnormal lesions    LABS:                        9.4    5.82  )-----------( 178      ( 14 Nov 2024 03:37 )             31.5     11-14    152[H]  |  123[H]  |  20[H]  ----------------------------<  193[H]  3.9   |  27  |  1.37[H]    Ca    9.5      14 Nov 2024 03:37  Phos  2.7     11-14  Mg     1.9     11-14        Urinalysis Basic - ( 14 Nov 2024 03:37 )    Color: x / Appearance: x / SG: x / pH: x  Gluc: 193 mg/dL / Ketone: x  / Bili: x / Urobili: x   Blood: x / Protein: x / Nitrite: x   Leuk Esterase: x / RBC: x / WBC x   Sq Epi: x / Non Sq Epi: x / Bacteria: x      CAPILLARY BLOOD GLUCOSE      POCT Blood Glucose.: 207 mg/dL (15 Nov 2024 06:45)  POCT Blood Glucose.: 130 mg/dL (14 Nov 2024 21:11)  POCT Blood Glucose.: 144 mg/dL (14 Nov 2024 17:27)  POCT Blood Glucose.: 239 mg/dL (14 Nov 2024 11:21)        Consultant(s) Notes Reviewed:  [x ] YES  [ ] NO    MEDICATIONS  (STANDING):  chlorhexidine 2% Cloths 1 Application(s) Topical <User Schedule>  dextrose 5%. 500 milliLiter(s) (120 mL/Hr) IV Continuous <Continuous>  haloperidol     Tablet 1 milliGRAM(s) Oral two times a day  heparin   Injectable 5000 Unit(s) SubCutaneous every 12 hours  insulin glargine Injectable (LANTUS) 15 Unit(s) SubCutaneous at bedtime  insulin lispro (ADMELOG) corrective regimen sliding scale   SubCutaneous three times a day before meals  insulin lispro (ADMELOG) corrective regimen sliding scale   SubCutaneous at bedtime  insulin lispro Injectable (ADMELOG) 6 Unit(s) SubCutaneous three times a day before meals  mupirocin 2% Ointment 1 Application(s) Both Nostrils two times a day  neomycin/bacitracin/polymyxin Ointment 1 Application(s) Right EYE two times a day  petrolatum Ophthalmic Ointment 1 Application(s) Right EYE three times a day  senna 2 Tablet(s) Oral at bedtime  traZODone 75 milliGRAM(s) Oral at bedtime    MEDICATIONS  (PRN):  haloperidol    Injectable 1 milliGRAM(s) IntraMuscular every 6 hours PRN Agitation      Care Discussed with Consultants/Other Providers [ x] YES  [ ] NO    RADIOLOGY & ADDITIONAL TESTS: Patient is a 74y old  Female who presents with a chief complaint of 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 Afebrile, hemodynamically stable     Subjective: Patient seen and examined at bedside.    ICU Vital Signs Last 24 Hrs  T(C): 36.3 (15 Nov 2024 04:00), Max: 37.4 (14 Nov 2024 15:51)  T(F): 97.3 (15 Nov 2024 04:00), Max: 99.4 (14 Nov 2024 15:51)  HR: 78 (15 Nov 2024 09:00) (66 - 101)  BP: 101/68 (15 Nov 2024 09:00) (73/50 - 145/81)  BP(mean): 76 (15 Nov 2024 09:00) (58 - 95)  ABP: --  ABP(mean): --  RR: 15 (15 Nov 2024 09:00) (12 - 32)  SpO2: 98% (15 Nov 2024 09:00) (96% - 100%)    O2 Parameters below as of 14 Nov 2024 19:00  Patient On (Oxygen Delivery Method): room air          I&O's Summary    14 Nov 2024 07:01  -  15 Nov 2024 07:00  --------------------------------------------------------  IN: 2990 mL / OUT: 4000 mL / NET: -1010 mL          PHYSICAL EXAM:  GENERAL: No acute distress   HEAD:  Atraumatic, Normocephalic  EYES: EOMI, PERRLA, conjunctiva and sclera clear  ENMT: No tonsillar erythema, exudates, or enlargement; Moist mucous membranes  NECK: Supple, No JVD, Normal thyroid  HEART: Regular rate and rhythm; No murmurs, rubs, or gallops  RESPIRATORY: CTA B/L, No W/R/R  ABDOMEN: Soft, Nontender, Nondistended; Bowel sounds present  NEUROLOGY: A&Ox0, nonfocal, moving all extremities  EXTREMITIES:  2+ Peripheral Pulses, No clubbing, cyanosis, or edema  SKIN: warm, dry, normal color, no rash or abnormal lesions    LABS:                        9.4    5.82  )-----------( 178      ( 14 Nov 2024 03:37 )             31.5     11-14    152[H]  |  123[H]  |  20[H]  ----------------------------<  193[H]  3.9   |  27  |  1.37[H]    Ca    9.5      14 Nov 2024 03:37  Phos  2.7     11-14  Mg     1.9     11-14        Urinalysis Basic - ( 14 Nov 2024 03:37 )    Color: x / Appearance: x / SG: x / pH: x  Gluc: 193 mg/dL / Ketone: x  / Bili: x / Urobili: x   Blood: x / Protein: x / Nitrite: x   Leuk Esterase: x / RBC: x / WBC x   Sq Epi: x / Non Sq Epi: x / Bacteria: x      CAPILLARY BLOOD GLUCOSE      POCT Blood Glucose.: 207 mg/dL (15 Nov 2024 06:45)  POCT Blood Glucose.: 130 mg/dL (14 Nov 2024 21:11)  POCT Blood Glucose.: 144 mg/dL (14 Nov 2024 17:27)  POCT Blood Glucose.: 239 mg/dL (14 Nov 2024 11:21)        Consultant(s) Notes Reviewed:  [x ] YES  [ ] NO    MEDICATIONS  (STANDING):  chlorhexidine 2% Cloths 1 Application(s) Topical <User Schedule>  dextrose 5%. 500 milliLiter(s) (120 mL/Hr) IV Continuous <Continuous>  haloperidol     Tablet 1 milliGRAM(s) Oral two times a day  heparin   Injectable 5000 Unit(s) SubCutaneous every 12 hours  insulin glargine Injectable (LANTUS) 15 Unit(s) SubCutaneous at bedtime  insulin lispro (ADMELOG) corrective regimen sliding scale   SubCutaneous three times a day before meals  insulin lispro (ADMELOG) corrective regimen sliding scale   SubCutaneous at bedtime  insulin lispro Injectable (ADMELOG) 6 Unit(s) SubCutaneous three times a day before meals  mupirocin 2% Ointment 1 Application(s) Both Nostrils two times a day  neomycin/bacitracin/polymyxin Ointment 1 Application(s) Right EYE two times a day  petrolatum Ophthalmic Ointment 1 Application(s) Right EYE three times a day  senna 2 Tablet(s) Oral at bedtime  traZODone 75 milliGRAM(s) Oral at bedtime    MEDICATIONS  (PRN):  haloperidol    Injectable 1 milliGRAM(s) IntraMuscular every 6 hours PRN Agitation      Care Discussed with Consultants/Other Providers [ x] YES  [ ] NO    RADIOLOGY & ADDITIONAL TESTS:

## 2024-11-15 NOTE — PROGRESS NOTE ADULT - NUTRITIONAL ASSESSMENT
This patient has been assessed with a concern for Malnutrition and has been determined to have a diagnosis/diagnoses of Severe protein-calorie malnutrition.    This patient is being managed with:   Diet Minced and Moist-  Consistent Carbohydrate {No Snacks}  Mildly Thick Liquids (MILDTHICKLIQS)  Low Sodium  Entered: Nov 11 2024  4:50PM  
This patient has been assessed with a concern for Malnutrition and has been determined to have a diagnosis/diagnoses of Severe protein-calorie malnutrition.    This patient is being managed with:   Diet Minced and Moist-  Consistent Carbohydrate {No Snacks}  Mildly Thick Liquids (MILDTHICKLIQS)  Low Sodium  Free Water Flush Instructions:  Please give Grupo Lam 1 can PO bid  Entered: Nov 12 2024  8:44AM  
This patient has been assessed with a concern for Malnutrition and has been determined to have a diagnosis/diagnoses of Severe protein-calorie malnutrition.    This patient is being managed with:   Diet Minced and Moist-  Consistent Carbohydrate {No Snacks}  Mildly Thick Liquids (MILDTHICKLIQS)  Low Sodium  Free Water Flush Instructions:  Please give Grupo Lam 1 can PO bid  Entered: Nov 12 2024  8:44AM  
This patient has been assessed with a concern for Malnutrition and has been determined to have a diagnosis/diagnoses of Severe protein-calorie malnutrition.    This patient is being managed with:   Diet Regular-  Minced and Moist (MINCEDMOIST)  Mildly Thick Liquids (MILDTHICKLIQS)  Low Sodium  Entered: Nov 11 2024  1:21AM  
This patient has been assessed with a concern for Malnutrition and has been determined to have a diagnosis/diagnoses of Severe protein-calorie malnutrition.    This patient is being managed with:   Diet Minced and Moist-  Consistent Carbohydrate {No Snacks}  Mildly Thick Liquids (MILDTHICKLIQS)  Low Sodium  Free Water Flush Instructions:  Please give Grupo Lam 1 can PO bid  Entered: Nov 12 2024  8:44AM  
This patient has been assessed with a concern for Malnutrition and has been determined to have a diagnosis/diagnoses of Severe protein-calorie malnutrition.    This patient is being managed with:   Diet Regular-  Minced and Moist (MINCEDMOIST)  Mildly Thick Liquids (MILDTHICKLIQS)  Entered: Nov 8 2024  1:06PM

## 2024-11-15 NOTE — PROGRESS NOTE ADULT - PROVIDER SPECIALTY LIST ADULT
Critical Care
Critical Care
Nephrology
Nephrology
Critical Care
Nephrology
Critical Care
Critical Care
Nephrology
Palliative Care

## 2024-11-18 PROBLEM — L25.8 INCONTINENCE ASSOCIATED DERMATITIS: Status: ACTIVE | Noted: 2024-11-18

## 2024-11-18 PROBLEM — Z97.8 FOLEY CATHETER IN PLACE: Status: ACTIVE | Noted: 2024-11-18

## 2024-11-18 PROBLEM — R53.2 FUNCTIONAL QUADRIPLEGIA: Status: ACTIVE | Noted: 2024-11-18

## 2024-11-18 PROBLEM — Z51.5 HOSPICE CARE PATIENT: Status: ACTIVE | Noted: 2024-11-18

## 2024-11-18 RX ORDER — MORPHINE SULFATE 20 MG/5ML
20 SOLUTION ORAL EVERY 4 HOURS
Refills: 0 | Status: ACTIVE | COMMUNITY
Start: 2024-11-18

## 2024-11-18 RX ORDER — BISACODYL 10 MG/1
10 SUPPOSITORY RECTAL DAILY
Refills: 0 | Status: ACTIVE | COMMUNITY
Start: 2024-11-18

## 2024-11-20 ENCOUNTER — APPOINTMENT (OUTPATIENT)
Dept: HOME HEALTH SERVICES | Facility: HOME HEALTH | Age: 74
End: 2024-11-20

## 2024-11-26 PROCEDURE — 80048 BASIC METABOLIC PNL TOTAL CA: CPT

## 2024-11-26 PROCEDURE — 82803 BLOOD GASES ANY COMBINATION: CPT

## 2024-11-26 PROCEDURE — 82550 ASSAY OF CK (CPK): CPT

## 2024-11-26 PROCEDURE — 93005 ELECTROCARDIOGRAM TRACING: CPT

## 2024-11-26 PROCEDURE — 92610 EVALUATE SWALLOWING FUNCTION: CPT

## 2024-11-26 PROCEDURE — 87086 URINE CULTURE/COLONY COUNT: CPT

## 2024-11-26 PROCEDURE — 83605 ASSAY OF LACTIC ACID: CPT

## 2024-11-26 PROCEDURE — 84156 ASSAY OF PROTEIN URINE: CPT

## 2024-11-26 PROCEDURE — 83935 ASSAY OF URINE OSMOLALITY: CPT

## 2024-11-26 PROCEDURE — 84295 ASSAY OF SERUM SODIUM: CPT

## 2024-11-26 PROCEDURE — 80178 ASSAY OF LITHIUM: CPT

## 2024-11-26 PROCEDURE — 84443 ASSAY THYROID STIM HORMONE: CPT

## 2024-11-26 PROCEDURE — 71045 X-RAY EXAM CHEST 1 VIEW: CPT

## 2024-11-26 PROCEDURE — 82570 ASSAY OF URINE CREATININE: CPT

## 2024-11-26 PROCEDURE — 87186 SC STD MICRODIL/AGAR DIL: CPT

## 2024-11-26 PROCEDURE — 84540 ASSAY OF URINE/UREA-N: CPT

## 2024-11-26 PROCEDURE — 84133 ASSAY OF URINE POTASSIUM: CPT

## 2024-11-26 PROCEDURE — 36415 COLL VENOUS BLD VENIPUNCTURE: CPT

## 2024-11-26 PROCEDURE — 83930 ASSAY OF BLOOD OSMOLALITY: CPT

## 2024-11-26 PROCEDURE — 85025 COMPLETE CBC W/AUTO DIFF WBC: CPT

## 2024-11-26 PROCEDURE — 81001 URINALYSIS AUTO W/SCOPE: CPT

## 2024-11-26 PROCEDURE — 80307 DRUG TEST PRSMV CHEM ANLYZR: CPT

## 2024-11-26 PROCEDURE — 80053 COMPREHEN METABOLIC PANEL: CPT

## 2024-11-26 PROCEDURE — 82310 ASSAY OF CALCIUM: CPT

## 2024-11-26 PROCEDURE — 99285 EMERGENCY DEPT VISIT HI MDM: CPT

## 2024-11-26 PROCEDURE — 87640 STAPH A DNA AMP PROBE: CPT

## 2024-11-26 PROCEDURE — 80076 HEPATIC FUNCTION PANEL: CPT

## 2024-11-26 PROCEDURE — 82962 GLUCOSE BLOOD TEST: CPT

## 2024-11-26 PROCEDURE — 83970 ASSAY OF PARATHORMONE: CPT

## 2024-11-26 PROCEDURE — 84132 ASSAY OF SERUM POTASSIUM: CPT

## 2024-11-26 PROCEDURE — 84300 ASSAY OF URINE SODIUM: CPT

## 2024-11-26 PROCEDURE — 87641 MR-STAPH DNA AMP PROBE: CPT

## 2024-11-26 PROCEDURE — 85027 COMPLETE CBC AUTOMATED: CPT

## 2024-11-26 PROCEDURE — 83735 ASSAY OF MAGNESIUM: CPT

## 2024-11-26 PROCEDURE — 84100 ASSAY OF PHOSPHORUS: CPT

## 2024-11-26 PROCEDURE — 83036 HEMOGLOBIN GLYCOSYLATED A1C: CPT

## 2024-11-26 PROCEDURE — 82330 ASSAY OF CALCIUM: CPT

## 2024-11-26 PROCEDURE — 87340 HEPATITIS B SURFACE AG IA: CPT

## 2024-11-26 PROCEDURE — 84436 ASSAY OF TOTAL THYROXINE: CPT

## 2024-12-06 ENCOUNTER — LABORATORY RESULT (OUTPATIENT)
Age: 74
End: 2024-12-06

## 2025-01-23 NOTE — DISCHARGE NOTE PROVIDER - TIME SPENT: (MINUTES SPENT ON THE DISCHARGE SERVICE)
Patient called back to medication refill line for a status updated.  Advised patient of prior note Lamisil was only for one month for treatment of athletes feet. He is finished with it.    32

## 2025-01-24 ENCOUNTER — APPOINTMENT (OUTPATIENT)
Dept: HOME HEALTH SERVICES | Facility: HOME HEALTH | Age: 75
End: 2025-01-24

## 2025-03-11 NOTE — ED PROVIDER NOTE - NSDCPRINTRESULTS_ED_ALL_ED
Son updated, went to get RX, sips of soda offered and applesauce   Patient requests all Lab and Radiology Results on their Discharge Instructions

## 2025-03-14 ENCOUNTER — APPOINTMENT (OUTPATIENT)
Dept: HOME HEALTH SERVICES | Facility: HOME HEALTH | Age: 75
End: 2025-03-14
Payer: MEDICARE

## 2025-03-14 VITALS — RESPIRATION RATE: 16 BRPM

## 2025-03-14 DIAGNOSIS — Z51.5 ENCOUNTER FOR PALLIATIVE CARE: ICD-10-CM

## 2025-03-14 DIAGNOSIS — F03.90 UNSPECIFIED DEMENTIA W/OUT BEHAVIORAL DISTURBANCE: ICD-10-CM

## 2025-03-14 DIAGNOSIS — F20.9 SCHIZOPHRENIA, UNSPECIFIED: ICD-10-CM

## 2025-03-14 DIAGNOSIS — E11.69 TYPE 2 DIABETES MELLITUS WITH OTHER SPECIFIED COMPLICATION: ICD-10-CM

## 2025-03-14 DIAGNOSIS — R53.2 FUNCTIONAL QUADRIPLEGIA: ICD-10-CM

## 2025-03-14 DIAGNOSIS — E78.5 TYPE 2 DIABETES MELLITUS WITH OTHER SPECIFIED COMPLICATION: ICD-10-CM

## 2025-03-14 DIAGNOSIS — E11.9 TYPE 2 DIABETES MELLITUS W/OUT COMPLICATIONS: ICD-10-CM

## 2025-03-14 DIAGNOSIS — L89.152 PRESSURE ULCER OF SACRAL REGION, STAGE 2: ICD-10-CM

## 2025-03-14 PROCEDURE — 99349 HOME/RES VST EST MOD MDM 40: CPT | Mod: 2W

## 2025-03-14 RX ORDER — METFORMIN HYDROCHLORIDE 500 MG/1
500 TABLET, COATED ORAL
Qty: 180 | Refills: 1 | Status: ACTIVE | COMMUNITY
Start: 2025-03-14

## 2025-03-14 RX ORDER — SENNOSIDES 8.6 MG TABLETS 8.6 MG/1
8.6 TABLET ORAL
Qty: 180 | Refills: 3 | Status: ACTIVE | COMMUNITY
Start: 2025-03-14

## 2025-05-05 NOTE — ED ADULT NURSE NOTE - ISOLATION TYPE:
Pt was called and she stated a TV ultrasound was not performed and asked if that was normal. I asked pt is she asked the ultrasound tech and she did not. I informed pt that I am not familiar with [facility] policy, but when Dr. Bridges receives the results that she will know how the imaging was done. Pt voiced understanding.   None

## 2025-05-19 ENCOUNTER — APPOINTMENT (OUTPATIENT)
Dept: HOME HEALTH SERVICES | Facility: HOME HEALTH | Age: 75
End: 2025-05-19
Payer: MEDICARE

## 2025-05-19 VITALS
OXYGEN SATURATION: 96 % | HEART RATE: 70 BPM | TEMPERATURE: 208.58 F | DIASTOLIC BLOOD PRESSURE: 68 MMHG | RESPIRATION RATE: 18 BRPM | SYSTOLIC BLOOD PRESSURE: 110 MMHG

## 2025-05-19 DIAGNOSIS — E11.9 TYPE 2 DIABETES MELLITUS W/OUT COMPLICATIONS: ICD-10-CM

## 2025-05-19 DIAGNOSIS — F03.90 UNSPECIFIED DEMENTIA W/OUT BEHAVIORAL DISTURBANCE: ICD-10-CM

## 2025-05-19 DIAGNOSIS — E78.5 TYPE 2 DIABETES MELLITUS WITH OTHER SPECIFIED COMPLICATION: ICD-10-CM

## 2025-05-19 DIAGNOSIS — E11.69 TYPE 2 DIABETES MELLITUS WITH OTHER SPECIFIED COMPLICATION: ICD-10-CM

## 2025-05-19 PROCEDURE — 99348 HOME/RES VST EST LOW MDM 30: CPT

## 2025-06-24 ENCOUNTER — APPOINTMENT (OUTPATIENT)
Dept: HOME HEALTH SERVICES | Facility: HOME HEALTH | Age: 75
End: 2025-06-24
Payer: MEDICARE

## 2025-06-24 VITALS
HEART RATE: 76 BPM | OXYGEN SATURATION: 95 % | RESPIRATION RATE: 18 BRPM | SYSTOLIC BLOOD PRESSURE: 122 MMHG | DIASTOLIC BLOOD PRESSURE: 84 MMHG | TEMPERATURE: 208.4 F

## 2025-06-24 PROCEDURE — 99348 HOME/RES VST EST LOW MDM 30: CPT

## 2025-06-25 ENCOUNTER — TRANSCRIPTION ENCOUNTER (OUTPATIENT)
Age: 75
End: 2025-06-25

## 2025-07-08 PROBLEM — R63.4 WEIGHT LOSS: Status: ACTIVE | Noted: 2025-07-08

## 2025-07-10 LAB
ALBUMIN SERPL ELPH-MCNC: 3.5 G/DL
ALP BLD-CCNC: 74 U/L
ALT SERPL-CCNC: 10 U/L
ANION GAP SERPL CALC-SCNC: 16 MMOL/L
AST SERPL-CCNC: 13 U/L
BASOPHILS # BLD AUTO: 0.03 K/UL
BASOPHILS NFR BLD AUTO: 0.3 %
BILIRUB SERPL-MCNC: 0.3 MG/DL
BUN SERPL-MCNC: 35 MG/DL
CALCIUM SERPL-MCNC: 10.1 MG/DL
CHLORIDE SERPL-SCNC: 110 MMOL/L
CHOLEST SERPL-MCNC: 212 MG/DL
CO2 SERPL-SCNC: 23 MMOL/L
CREAT SERPL-MCNC: 1.04 MG/DL
EGFRCR SERPLBLD CKD-EPI 2021: 56 ML/MIN/1.73M2
EOSINOPHIL # BLD AUTO: 0.11 K/UL
EOSINOPHIL NFR BLD AUTO: 1.3 %
ESTIMATED AVERAGE GLUCOSE: 189 MG/DL
GLUCOSE SERPL-MCNC: 215 MG/DL
HBA1C MFR BLD HPLC: 8.2 %
HCT VFR BLD CALC: 39.1 %
HDLC SERPL-MCNC: 48 MG/DL
HGB BLD-MCNC: 11.7 G/DL
IMM GRANULOCYTES NFR BLD AUTO: 0.2 %
LDLC SERPL-MCNC: 132 MG/DL
LITHIUM SERPL-SCNC: <0.2 MMOL/L
LYMPHOCYTES # BLD AUTO: 1.77 K/UL
LYMPHOCYTES NFR BLD AUTO: 20.4 %
MAN DIFF?: NORMAL
MCHC RBC-ENTMCNC: 25.9 PG
MCHC RBC-ENTMCNC: 29.9 G/DL
MCV RBC AUTO: 86.5 FL
MONOCYTES # BLD AUTO: 0.78 K/UL
MONOCYTES NFR BLD AUTO: 9 %
NEUTROPHILS # BLD AUTO: 5.97 K/UL
NEUTROPHILS NFR BLD AUTO: 68.8 %
NONHDLC SERPL-MCNC: 165 MG/DL
PLATELET # BLD AUTO: 297 K/UL
POTASSIUM SERPL-SCNC: 4.2 MMOL/L
PREALB SERPL NEPH-MCNC: 30 MG/DL
PROT SERPL-MCNC: 6.3 G/DL
RBC # BLD: 4.52 M/UL
RBC # FLD: 17.3 %
SODIUM SERPL-SCNC: 149 MMOL/L
TRIGL SERPL-MCNC: 182 MG/DL
WBC # FLD AUTO: 8.68 K/UL

## 2025-07-17 ENCOUNTER — APPOINTMENT (OUTPATIENT)
Dept: HOME HEALTH SERVICES | Facility: HOME HEALTH | Age: 75
End: 2025-07-17

## 2025-07-17 VITALS
SYSTOLIC BLOOD PRESSURE: 125 MMHG | TEMPERATURE: 208.4 F | RESPIRATION RATE: 18 BRPM | HEART RATE: 73 BPM | DIASTOLIC BLOOD PRESSURE: 84 MMHG | OXYGEN SATURATION: 95 %

## 2025-08-18 ENCOUNTER — APPOINTMENT (OUTPATIENT)
Dept: HOME HEALTH SERVICES | Facility: HOME HEALTH | Age: 75
End: 2025-08-18
Payer: MEDICARE

## 2025-08-18 VITALS
SYSTOLIC BLOOD PRESSURE: 124 MMHG | DIASTOLIC BLOOD PRESSURE: 70 MMHG | RESPIRATION RATE: 18 BRPM | OXYGEN SATURATION: 95 % | HEART RATE: 72 BPM

## 2025-08-18 DIAGNOSIS — F03.90 UNSPECIFIED DEMENTIA W/OUT BEHAVIORAL DISTURBANCE: ICD-10-CM

## 2025-08-18 DIAGNOSIS — E78.5 TYPE 2 DIABETES MELLITUS WITH OTHER SPECIFIED COMPLICATION: ICD-10-CM

## 2025-08-18 DIAGNOSIS — E11.9 TYPE 2 DIABETES MELLITUS W/OUT COMPLICATIONS: ICD-10-CM

## 2025-08-18 DIAGNOSIS — E11.69 TYPE 2 DIABETES MELLITUS WITH OTHER SPECIFIED COMPLICATION: ICD-10-CM

## 2025-08-18 PROCEDURE — 99348 HOME/RES VST EST LOW MDM 30: CPT
